# Patient Record
Sex: FEMALE | Race: WHITE | NOT HISPANIC OR LATINO | Employment: UNEMPLOYED | URBAN - METROPOLITAN AREA
[De-identification: names, ages, dates, MRNs, and addresses within clinical notes are randomized per-mention and may not be internally consistent; named-entity substitution may affect disease eponyms.]

---
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
## 2022-01-01 ENCOUNTER — EXTERNAL RECORD (OUTPATIENT)
Dept: OTHER | Age: 22
End: 2022-01-01

## 2022-01-03 ENCOUNTER — LAB SERVICES (OUTPATIENT)
Dept: LAB | Age: 22
End: 2022-01-03

## 2022-01-03 ENCOUNTER — OFFICE VISIT (OUTPATIENT)
Dept: FAMILY MEDICINE | Age: 22
End: 2022-01-03

## 2022-01-03 VITALS
HEIGHT: 68 IN | OXYGEN SATURATION: 99 % | BODY MASS INDEX: 21.95 KG/M2 | WEIGHT: 144.8 LBS | SYSTOLIC BLOOD PRESSURE: 98 MMHG | HEART RATE: 87 BPM | DIASTOLIC BLOOD PRESSURE: 60 MMHG

## 2022-01-03 DIAGNOSIS — Z00.00 ROUTINE GENERAL MEDICAL EXAMINATION AT HEALTH CARE FACILITY: Primary | ICD-10-CM

## 2022-01-03 DIAGNOSIS — Z32.00 POSSIBLE PREGNANCY: ICD-10-CM

## 2022-01-03 DIAGNOSIS — I34.1 MITRAL VALVE PROLAPSE: ICD-10-CM

## 2022-01-03 DIAGNOSIS — Z00.00 ROUTINE GENERAL MEDICAL EXAMINATION AT A HEALTH CARE FACILITY: ICD-10-CM

## 2022-01-03 DIAGNOSIS — Z32.00 POSSIBLE PREGNANCY: Primary | ICD-10-CM

## 2022-01-03 PROCEDURE — 99395 PREV VISIT EST AGE 18-39: CPT | Performed by: PHYSICIAN ASSISTANT

## 2022-01-03 PROCEDURE — 84703 CHORIONIC GONADOTROPIN ASSAY: CPT | Performed by: INTERNAL MEDICINE

## 2022-01-03 PROCEDURE — 36415 COLL VENOUS BLD VENIPUNCTURE: CPT | Performed by: INTERNAL MEDICINE

## 2022-01-03 ASSESSMENT — PATIENT HEALTH QUESTIONNAIRE - PHQ9
CLINICAL INTERPRETATION OF PHQ2 SCORE: NO FURTHER SCREENING NEEDED
SUM OF ALL RESPONSES TO PHQ9 QUESTIONS 1 AND 2: 0
1. LITTLE INTEREST OR PLEASURE IN DOING THINGS: NOT AT ALL
2. FEELING DOWN, DEPRESSED OR HOPELESS: NOT AT ALL
SUM OF ALL RESPONSES TO PHQ9 QUESTIONS 1 AND 2: 0

## 2022-01-04 ENCOUNTER — TELEPHONE (OUTPATIENT)
Dept: FAMILY MEDICINE | Age: 22
End: 2022-01-04

## 2022-01-04 LAB — HCG SERPL QL: NEGATIVE

## 2022-01-07 ENCOUNTER — HOSPITAL ENCOUNTER (OUTPATIENT)
Dept: MAMMOGRAPHY | Age: 22
End: 2022-01-07
Attending: ADVANCED PRACTICE MIDWIFE

## 2022-01-11 DIAGNOSIS — N92.0 MENORRHAGIA WITH REGULAR CYCLE: Primary | ICD-10-CM

## 2022-01-13 ENCOUNTER — APPOINTMENT (OUTPATIENT)
Dept: LAB | Facility: HOSPITAL | Age: 22
End: 2022-01-13
Attending: INTERNAL MEDICINE
Payer: COMMERCIAL

## 2022-01-13 DIAGNOSIS — N92.0 MENORRHAGIA WITH REGULAR CYCLE: ICD-10-CM

## 2022-01-13 LAB
BASOPHILS # BLD AUTO: 0.03 THOUSANDS/ΜL (ref 0–0.1)
BASOPHILS NFR BLD AUTO: 1 % (ref 0–1)
EOSINOPHIL # BLD AUTO: 0.16 THOUSAND/ΜL (ref 0–0.61)
EOSINOPHIL NFR BLD AUTO: 3 % (ref 0–6)
ERYTHROCYTE [DISTWIDTH] IN BLOOD BY AUTOMATED COUNT: 11.9 % (ref 11.6–15.1)
FERRITIN SERPL-MCNC: 44 NG/ML (ref 8–388)
HCT VFR BLD AUTO: 40.9 % (ref 34.8–46.1)
HGB BLD-MCNC: 13.8 G/DL (ref 11.5–15.4)
IMM GRANULOCYTES # BLD AUTO: 0.01 THOUSAND/UL (ref 0–0.2)
IMM GRANULOCYTES NFR BLD AUTO: 0 % (ref 0–2)
IRON SATN MFR SERPL: 19 % (ref 15–50)
IRON SERPL-MCNC: 66 UG/DL (ref 50–170)
LYMPHOCYTES # BLD AUTO: 1.49 THOUSANDS/ΜL (ref 0.6–4.47)
LYMPHOCYTES NFR BLD AUTO: 26 % (ref 14–44)
MCH RBC QN AUTO: 31 PG (ref 26.8–34.3)
MCHC RBC AUTO-ENTMCNC: 33.7 G/DL (ref 31.4–37.4)
MCV RBC AUTO: 92 FL (ref 82–98)
MONOCYTES # BLD AUTO: 0.66 THOUSAND/ΜL (ref 0.17–1.22)
MONOCYTES NFR BLD AUTO: 11 % (ref 4–12)
NEUTROPHILS # BLD AUTO: 3.47 THOUSANDS/ΜL (ref 1.85–7.62)
NEUTS SEG NFR BLD AUTO: 59 % (ref 43–75)
NRBC BLD AUTO-RTO: 0 /100 WBCS
PLATELET # BLD AUTO: 221 THOUSANDS/UL (ref 149–390)
PMV BLD AUTO: 9.6 FL (ref 8.9–12.7)
RBC # BLD AUTO: 4.45 MILLION/UL (ref 3.81–5.12)
TIBC SERPL-MCNC: 344 UG/DL (ref 250–450)
WBC # BLD AUTO: 5.82 THOUSAND/UL (ref 4.31–10.16)

## 2022-01-13 PROCEDURE — 85025 COMPLETE CBC W/AUTO DIFF WBC: CPT

## 2022-01-13 PROCEDURE — 83550 IRON BINDING TEST: CPT

## 2022-01-13 PROCEDURE — 82728 ASSAY OF FERRITIN: CPT

## 2022-01-13 PROCEDURE — 83540 ASSAY OF IRON: CPT

## 2022-01-13 PROCEDURE — 36415 COLL VENOUS BLD VENIPUNCTURE: CPT

## 2022-01-21 ENCOUNTER — OFFICE VISIT (OUTPATIENT)
Dept: FAMILY MEDICINE CLINIC | Facility: CLINIC | Age: 22
End: 2022-01-21
Payer: COMMERCIAL

## 2022-01-21 VITALS
HEART RATE: 88 BPM | TEMPERATURE: 98.2 F | OXYGEN SATURATION: 99 % | BODY MASS INDEX: 21.22 KG/M2 | RESPIRATION RATE: 14 BRPM | SYSTOLIC BLOOD PRESSURE: 120 MMHG | WEIGHT: 140 LBS | DIASTOLIC BLOOD PRESSURE: 80 MMHG | HEIGHT: 68 IN

## 2022-01-21 DIAGNOSIS — F33.1 MODERATE EPISODE OF RECURRENT MAJOR DEPRESSIVE DISORDER (HCC): ICD-10-CM

## 2022-01-21 DIAGNOSIS — M54.6 CHRONIC MIDLINE THORACIC BACK PAIN: Primary | ICD-10-CM

## 2022-01-21 DIAGNOSIS — F41.1 GAD (GENERALIZED ANXIETY DISORDER): ICD-10-CM

## 2022-01-21 DIAGNOSIS — N92.0 MENORRHAGIA WITH REGULAR CYCLE: ICD-10-CM

## 2022-01-21 DIAGNOSIS — G95.0 SYRINGOMYELIA (HCC): ICD-10-CM

## 2022-01-21 DIAGNOSIS — G89.29 CHRONIC MIDLINE THORACIC BACK PAIN: Primary | ICD-10-CM

## 2022-01-21 PROBLEM — F33.9 DEPRESSION, RECURRENT (HCC): Status: ACTIVE | Noted: 2022-01-21

## 2022-01-21 PROCEDURE — 99214 OFFICE O/P EST MOD 30 MIN: CPT | Performed by: FAMILY MEDICINE

## 2022-01-21 PROCEDURE — 3725F SCREEN DEPRESSION PERFORMED: CPT | Performed by: FAMILY MEDICINE

## 2022-01-21 PROCEDURE — 1036F TOBACCO NON-USER: CPT | Performed by: FAMILY MEDICINE

## 2022-01-21 PROCEDURE — 3008F BODY MASS INDEX DOCD: CPT | Performed by: FAMILY MEDICINE

## 2022-01-21 NOTE — PROGRESS NOTES
FAMILY PRACTICE OFFICE VISIT       NAME: Martha Hollis  AGE: 25 y o  SEX: female       : 2000        MRN: [id number]        Assessment and Plan     1  Chronic midline thoracic back pain  Assessment & Plan:  Proceed with x-ray  Start core strengthening exercises  Orders:  -     XR spine thoracic 3 vw; Future; Expected date: 2022    2  Syringomyelia ([address] )  Assessment & Plan:  Diagnosed a little child during workup at [facility] for chronic constipation  No routine follow-up was advised by specialist at that time  Reassess lumbar spine x-ray  No low back pain at present time      Orders:  -     XR spine lumbar minimum 4 views non injury; Future; Expected date: 2022    3  Menorrhagia with regular cycle  Assessment & Plan:  IUD was removed few months ago as per patient's request   She was concerned that IUD was contributing to depression symptoms  Menses are regular and heavy, she flows 5 to 7 days  Normal CBC and iron panel on   Patient is under care of gyn      4  MARCOS (generalized anxiety disorder)  Assessment & Plan:  Worsening of anxiety symptoms of fluoxetine  Patient remains in counseling on a weekly basis, helpful  She is not ready to restart medication but will contact me if symptoms worsen or persist      5  Moderate episode of recurrent major depressive disorder [facility])  Assessment & Plan:  Patient self discontinued fluoxetine a months ago  She is hesitant to restart medication at present time, overall she has been handling well  She remains on weekly counseling  Patient/mother will contact me if symptoms worsen or persist             Patient Instructions     Core Strengthening Exercises   AMBULATORY CARE:   What you need to know about core strengthening exercises: Your core includes the muscles of your lower back, hip, pelvis, and abdomen  Core strengthening exercises help heal and strengthen these muscles   This helps prevent another injury, and keeps your pelvis, spine, and hips in the correct position  Contact your healthcare provider if:   · You have sharp or worsening pain during exercise or at rest     · You have questions or concerns about your shoulder exercises  Safety tips:  Talk to your healthcare provider before you start an exercise program  A physical therapist can teach you how to do core strengthening exercises safely  · Do the exercises on a mat or firm surface  A firm surface will support your spine and prevent low back pain  Do not do these exercises on a bed  · Move slowly and smoothly  Avoid fast or jerky motions  · Stop if you feel pain  Core exercises should not be painful  Stop if you feel pain  · Breathe normally during core exercises  Do not hold your breath  This may cause an increase in blood pressure and prevent muscle strengthening  Your healthcare provider will tell you when to inhale and exhale during the exercise  · Begin all of your exercises with abdominal bracing  Abdominal bracing helps warm up your core muscles  You can also practice abdominal bracing throughout the day  Lie on your back with your knees bent and feet flat on the floor  Place your arms in a relaxed position beside your body  Tighten your abdominal muscles  Pull your belly button in and up toward your spine  Hold for 5 seconds  Relax your muscles  Repeat 10 times  Core strengthening exercises: Your healthcare provider will tell you how often to do these exercises  The provider will also tell you how many repetitions of each exercise you should do  Hold each exercise for 5 seconds or as directed  As you get stronger, increase your hold to 10 to 15 seconds  You can do some of these exercises on a stability ball, or with a weight  Ask your healthcare provider how to use a stability ball or weight for these exercises:  · Bridging:  Lie on your back with your knees bent and feet flat on the floor   Rest your arms at your side  Tighten your buttocks, and then lift your hips 1 inch off the floor  Hold for 5 seconds  When you can do this exercise without pain for 10 seconds, increase the distance you lift your hips  A good goal is to be able to lift your hips so that your shoulders, hips, and knees are in a straight line  · Dead bug:  Lie on your back with your knees bent and feet flat on the floor  Place your arms in a relaxed position beside your body  Begin with abdominal bracing  Next, raise one leg, keeping your knee bent  Hold for 5 seconds  Repeat with the other leg  When you can do this exercise without pain for 10 to 15 seconds, you may raise one straight leg and hold  Repeat with the other leg  · Quadruped:  Place your hands and knees on the floor  Keep your wrists directly below your shoulders and your knees directly below your hips  Pull your belly button in toward your spine  Do not flatten or arch your back  Tighten your abdominal muscles below your belly button  Hold for 5 seconds  When you can do this exercise without pain for 10 to 15 seconds, you may extend one arm and hold  Repeat on the other side  · Side bridge exercises:      ? Standing side bridge:  Stand next to a wall and extend one arm toward the wall  Place your palm flat on the wall with your fingers pointing upward  Begin with abdominal bracing  Next, without moving your feet, slowly bend your arm to 90 degrees  Hold for 5 seconds  Repeat on the other side  When you can do this exercise without pain for 10 to 15 seconds, you may do the bent leg side bridge on the floor  ? Bent leg side bridge:  Lie on one side with your legs, hips, and shoulders in a straight line  Prop yourself up onto your forearm so your elbow is directly below your shoulder  Bend your knees back to 90 degrees  Begin with abdominal bracing  Next, lift your hips and balance yourself on your forearm and knees  Hold for 5 seconds  Repeat on the other side  When you can do this exercise without pain for 10 to 15 seconds, you may do the straight leg side bridge on the floor  ? Straight leg side bridge:  Lie on one side with your legs, hips, and shoulders in a straight line  Prop yourself up onto your forearm so your elbow is directly below your shoulder  Begin with abdominal bracing  Lift your hips off the floor and balance yourself on your forearm and the outside of your flexed foot  Do not let your ankle bend sideways  Hold for 5 seconds  Repeat on the other side  When you can do this exercise without pain for 10 to 15 seconds, ask your healthcare provider for more advanced exercises  · Superman:  Lie on your stomach  Extend your arms forward on the floor  Tighten your abdominal muscles and lift your right hand and left leg off the floor  Hold this position  Slowly return to the starting position  Tighten your abdominal muscles and lift your left hand and right leg off the floor  Hold this position  Slowly return to the starting position  · Clam:  Lie on your side with your knees bent  Put your bottom arm under your head to keep your neck in line  Put your top hand on your hip to keep your pelvis from moving  Put your heels together, and keep them together during this exercise  Slowly raise your top knee toward the ceiling  Then lower your leg so your knees are together  Repeat this exercise 10 times  Then switch sides and do the exercise 10 times with the other leg  · Curl up:  Lie on your back with your knees bent and feet flat on the floor  Place your hands, palms down, underneath your lower back  Next, with your elbows on the floor, lift your shoulders and chest 2 to 3 inches off the floor  Keep your head in line with your shoulders  Hold this position  Slowly return to the starting position  · Straight leg raises:  Lie on your back with one leg straight  Bend the other knee and place your foot flat on the floor   Tighten your abdominal muscles  Keep your leg straight and slowly lift it straight up 6 to 12 inches off the floor  Hold this position  Lower your leg slowly  Do as many repetitions as directed on this side  Repeat with the other leg  · Plank:  Lie on your stomach  Bend your elbows and place your forearms flat on the floor  Lift your chest, stomach, and knees off the floor  Make sure your elbows are below your shoulders  Your body should be in a straight line  Do not let your hips or lower back sink to the ground  Squeeze your abdominal muscles together and hold for 15 seconds  To make this exercise harder, hold for 30 seconds or lift 1 leg at a time  · Bicycles:  Lie on your back  Bend both knees and bring them toward your chest  Your calves should be parallel to the floor  Place the palms of your hands on the back of your head  Straighten your right leg and keep it lifted 2 inches off the floor  Raise your head and shoulders off the floor and twist towards your left  Keep your head and shoulders lifted  Bend your right knee while you straighten your left leg  Keep your left leg 2 inches off the floor  Twist your head and chest towards the left leg  Continue to straighten 1 leg at a time and twist        Follow up with your doctor as directed:  Write down your questions so you remember to ask them during your visits  © Copyright [employer] 2021 Information is for End User's use only and may not be sold, redistributed or otherwise used for commercial purposes  All illustrations and images included in CareNotes® are the copyrighted property of A D A M , Inc  or [facility] Angeline Santamaria   The above information is an  only  It is not intended as medical advice for individual conditions or treatments  Talk to your doctor, nurse or pharmacist before following any medical regimen to see if it is safe and effective for you  No follow-ups on file      Discussed with the patient and all questioned fully answered  She will call me if any problems arise  M*Modal software was used to dictate this note  It may contain errors with dictating incorrect words/spelling  Please contact provider directly with any questions  Chief Complaint     Chief Complaint   Patient presents with    Back Pain     Mid area for a few months        History of Present Illness     Mid back pain x few months, no preceding injury or fall  No radiation of discomfort  Pain is worse afterprolonged sitting or standing,  feels better with positional changes or stretching  No neck pain, no lower back pain, no upper or lower extremity numbness, tingling, paresthesias or weakness  No SOB, no chest pain, no palpitations    Patient is here today accompanied by her mother  Reportedly patient has been reviewing her "my chart" and has notice diagnosis of syringomyelia  She has started research online and became very anxious about current symptoms of midback pain which has prompted today's visit  Patient was diagnosed with syringomyelia as a little child during evaluation of constipation at [facility]   At that time there was no concern and no routine follow-up was advised by specialists  Patient has discontinued all medications  IUD was removed 2 months ago  Patient was concerned that IUD is contributing to symptoms of depression  Menses are rather heavy, she flows 5 to 7 days and changes patch up to every hour during her 1st few days of period  Recent CBC and iron panel performed on January 13, 2022 were normal        Patient also weaned of Prozac  She felt that she wanted to reassess her symptoms without medication  She has been off fluoxetine for a months  She admits to intermittent symptoms of anxiety and depression  She is in weekly counseling which has been helpful  She has recently traveled to [place] and stayed with her friend  She is back home now    She is not sure of whether not she would like to go back on medication, patient would like to continue close observation and will contact me if symptoms of depression/anxiety worsen at any time  Back Pain        Review of Systems   Review of Systems   Constitutional: Negative  HENT: Negative  Respiratory: Negative  Cardiovascular: Negative  Gastrointestinal: Negative  Genitourinary: Positive for menstrual problem (Heavy and regular menses)  Musculoskeletal: Positive for back pain  Neurological: Negative  Hematological: Negative  Psychiatric/Behavioral: The patient is nervous/anxious          Active Problem List     Patient Active Problem List   Diagnosis    Cardiac murmur    Syringomyelia (HCC)    Allergic rhinitis    Encntr for gyn exam (general) (routine) w/o abn findings    Selective deficiency of immunoglobulin a (iga) ([facility] [address] )    Elevated bilirubin    Menorrhagia with regular cycle    Encounter for insertion of mirena IUD    MARCOS (generalized anxiety disorder)    Moderate episode of recurrent major depressive disorder (HCC)    Mitral valve prolapse    Hyperbilirubinemia    Pectus excavatum    Plantar warts    Chronic seasonal allergic rhinitis due to pollen    Personal history of COVID-19    Constipation    Epigastric pain    Depression, recurrent (HCC)    Chronic midline thoracic back pain       Past Medical History:  Past Medical History:   Diagnosis Date    Anemia     resolved     Anxiety     Constipation     Depression     Heart murmur     Heavy menstrual bleeding     resolved     Hives     Iron deficiency anemia secondary to inadequate dietary iron intake 10/15/2019    Migraine     resolved     Wears glasses        Past Surgical History:  Past Surgical History:   Procedure Laterality Date    TONSILECTOMY AND ADNOIDECTOMY      WISDOM TOOTH EXTRACTION         Family History:  Family History   Problem Relation Age of Onset    Breast cancer Mother 29    No Known Problems Father        Social History:  Social History     Socioeconomic History    Marital status: Single     Spouse name: Not on file    Number of children: 0    Years of education: Not on file    Highest education level: Not on file   Occupational History    Occupation: student   Tobacco Use    Smoking status: Never Smoker    Smokeless tobacco: Never Used   Vaping Use    Vaping Use: Never used   Substance and Sexual Activity    Alcohol use: No    Drug use: No    Sexual activity: Yes     Partners: Male     Birth control/protection: I U D       Comment: 6yrs   Other Topics Concern    Not on file   Social History Narrative    Daily caffeine consumption    Does not exercise        Who lives in your home: Mom     What type of home do you live in: Other condo     Age of your home: Built 14 yrs ago     How long have you been living there: 2 yrs     Type of heat: Forced hot air    Type of fuel: Electric    What type of naomi is in your bedroom: Carpet    Do you have the following in or near your home:    Air products: Central air and Humidifier    Pests: None    Pets: Dog and Rabbit    Are pets allowed in bedroom: Yes    Open fields, wooded areas nearby: Open fields and Wooded areas    Basement: None    Exposure to second hand smoke: Yes boyfriends house         Habits:    Caffeine: coffee 1 cup daily -    Chocolate: occasionally     Other:     Social Determinants of Health     Financial Resource Strain: Not on file   Food Insecurity: Not on file   Transportation Needs: Not on file   Physical Activity: Insufficiently Active    Days of Exercise per Week: 1 day    Minutes of Exercise per Session: 60 min   Stress: Not on file   Social Connections: Not on file   Intimate Partner Violence: Not on file   Housing Stability: Not on file         Objective     Vitals:    01/21/22 1010   BP: 120/80   BP Location: Right arm   Patient Position: Sitting   Cuff Size: Standard   Pulse: 88   Resp: 14   Temp: 98 2 °F (36 8 °C)   TempSrc: Temporal SpO2: 99%   Weight: 63 5 kg (140 lb)   Height: 5' 8" (1 727 m)       Wt Readings from Last 3 Encounters:   01/21/22 63 5 kg (140 lb) (71 %, Z= 0 56)*   08/31/21 59 8 kg (131 lb 12 8 oz) (61 %, Z= 0 28)*   08/30/21 59 7 kg (131 lb 9 6 oz) (61 %, Z= 0 27)*     * Growth percentiles are based on CDC (Girls, 2-20 Years) data  Physical Exam  Vitals and nursing note reviewed  Constitutional:       Appearance: Normal appearance  She is well-developed  HENT:      Head: Normocephalic and atraumatic  Eyes:      Conjunctiva/sclera: Conjunctivae normal    Neck:      Thyroid: No thyromegaly  Vascular: No carotid bruit  Cardiovascular:      Rate and Rhythm: Normal rate and regular rhythm  Heart sounds: Normal heart sounds  No murmur heard  Pulmonary:      Effort: Pulmonary effort is normal  No respiratory distress  Breath sounds: Normal breath sounds  No wheezing  Abdominal:      General: Bowel sounds are normal  There is no abdominal bruit  Musculoskeletal:         General: Normal range of motion  Cervical back: Neck supple  Comments: Mild tenderness with palpation of mid back/thoracic spine  No paraspinal spasm or tenderness  Neurological:      General: No focal deficit present  Mental Status: She is alert and oriented to person, place, and time  Cranial Nerves: No cranial nerve deficit  Coordination: Coordination normal    Psychiatric:         Mood and Affect: Mood normal          Behavior: Behavior normal          Thought Content:  Thought content normal           Pertinent Laboratory/Diagnostic Studies:    Lab Results   Component Value Date    WBC 5 82 01/13/2022    HGB 13 8 01/13/2022    HCT 40 9 01/13/2022    MCV 92 01/13/2022     01/13/2022       No results found for: TSH    No results found for: CHOL  No results found for: TRIG  No results found for: HDL  No results found for: LDLCALC  No results found for: HGBA1C  Lab Results   Component Value Date SODIUM 139 11/03/2020    K 3 5 11/03/2020     11/03/2020    CO2 29 11/03/2020    AGAP 5 11/03/2020    BUN 21 11/03/2020    CREATININE 0 68 11/03/2020    GLUF 91 11/03/2020    CALCIUM 9 5 11/03/2020    AST 12 11/03/2020    ALT 21 11/03/2020    ALKPHOS 92 11/03/2020    PROT 6 6 06/11/2016    TP 7 6 11/03/2020    BILITOT 2 0 (H) 06/11/2016    TBILI 1 30 (H) 11/03/2020       Orders Placed This Encounter   Procedures    XR spine lumbar minimum 4 views non injury    XR spine thoracic 3 vw       ALLERGIES:  Allergies   Allergen Reactions    Pollen Extract        Current Medications     Current Outpatient Medications   Medication Sig Dispense Refill    cetirizine (ZyrTEC) 5 MG tablet Take 5 mg by mouth daily      lidocaine (XYLOCAINE) 2 % topical gel Apply topically as needed for mild pain (Patient not taking: Reported on 8/30/2021) 30 mL 4     No current facility-administered medications for this visit         Medications Discontinued During This Encounter   Medication Reason    propranolol (INDERAL) 10 mg tablet     busPIRone (BUSPAR) 5 mg tablet     Clindamycin Phos-Benzoyl Perox gel     FLUoxetine (PROzac) 40 MG capsule     gabapentin (NEURONTIN) 100 mg capsule     levonorgestrel (MIRENA) 20 MCG/24HR IUD     pantoprazole (PROTONIX) 40 mg tablet     Multiple Vitamin (multivitamin) tablet     ibuprofen (MOTRIN) 100 mg/5 mL suspension     valACYclovir (VALTREX) 1,000 mg tablet     triamcinolone (KENALOG) 0 5 % cream        Health Maintenance     Health Maintenance   Topic Date Due    Hepatitis C Screening  Never done    Pneumococcal Vaccine: Pediatrics (0 to 5 Years) and At-Risk Patients (6 to 59 Years) (1 of 4 - PCV13) 11/27/2008    COVID-19 Vaccine (3 - Pfizer risk 4-dose series) 07/03/2021    Influenza Vaccine (1) 06/30/2022 (Originally 9/1/2021)    Annual Physical  08/30/2022    Chlamydia Screening  08/30/2022    BMI: Adult  01/21/2023    Depression Remission PHQ  01/21/2023    DTaP,Tdap,and Td Vaccines (7 - Td or Tdap) 08/15/2024    HIV Screening  Completed    HIB Vaccine  Completed    Hepatitis B Vaccine  Completed    IPV Vaccine  Completed    Meningococcal ACWY Vaccine  Completed    HPV Vaccine  Completed    Hepatitis A Vaccine  Aged Out       Immunization History   Administered Date(s) Administered    COVID-19 PFIZER VACCINE 0 3 ML IM 05/14/2021, 06/05/2021    DTaP 5 02/13/2003, 04/14/2003, 06/16/2003, 06/29/2004, 03/02/2007    HPV9 02/03/2017, 08/14/2017    Hep B, adult 2002, 01/13/2003, 09/16/2003    Hib (PRP-OMP) 02/13/2003, 04/14/2003, 06/16/2003, 03/25/2004    IPV 02/13/2003, 04/14/2003, 06/29/2004, 03/02/2007    MMR 03/25/2004, 04/05/2007    Meningococcal MCV4P 08/15/2014, 08/16/2019    Meningococcal, Unknown Serogroups 08/15/2014    Pneumococcal Polysaccharide PPV23 02/13/2003    Tdap 08/15/2014    Tuberculin Skin Test-PPD Intradermal 06/25/2021    Varicella 12/30/2003, 04/05/2007       Adolfo Denton MD

## 2022-01-21 NOTE — PATIENT INSTRUCTIONS
Core Strengthening Exercises   AMBULATORY CARE:   What you need to know about core strengthening exercises: Your core includes the muscles of your lower back, hip, pelvis, and abdomen  Core strengthening exercises help heal and strengthen these muscles  This helps prevent another injury, and keeps your pelvis, spine, and hips in the correct position  Contact your healthcare provider if:   · You have sharp or worsening pain during exercise or at rest     · You have questions or concerns about your shoulder exercises  Safety tips:  Talk to your healthcare provider before you start an exercise program  A physical therapist can teach you how to do core strengthening exercises safely  · Do the exercises on a mat or firm surface  A firm surface will support your spine and prevent low back pain  Do not do these exercises on a bed  · Move slowly and smoothly  Avoid fast or jerky motions  · Stop if you feel pain  Core exercises should not be painful  Stop if you feel pain  · Breathe normally during core exercises  Do not hold your breath  This may cause an increase in blood pressure and prevent muscle strengthening  Your healthcare provider will tell you when to inhale and exhale during the exercise  · Begin all of your exercises with abdominal bracing  Abdominal bracing helps warm up your core muscles  You can also practice abdominal bracing throughout the day  Lie on your back with your knees bent and feet flat on the floor  Place your arms in a relaxed position beside your body  Tighten your abdominal muscles  Pull your belly button in and up toward your spine  Hold for 5 seconds  Relax your muscles  Repeat 10 times  Core strengthening exercises: Your healthcare provider will tell you how often to do these exercises  The provider will also tell you how many repetitions of each exercise you should do  Hold each exercise for 5 seconds or as directed   As you get stronger, increase your hold to 10 to 15 seconds  You can do some of these exercises on a stability ball, or with a weight  Ask your healthcare provider how to use a stability ball or weight for these exercises:  · Bridging:  Lie on your back with your knees bent and feet flat on the floor  Rest your arms at your side  Tighten your buttocks, and then lift your hips 1 inch off the floor  Hold for 5 seconds  When you can do this exercise without pain for 10 seconds, increase the distance you lift your hips  A good goal is to be able to lift your hips so that your shoulders, hips, and knees are in a straight line  · Dead bug:  Lie on your back with your knees bent and feet flat on the floor  Place your arms in a relaxed position beside your body  Begin with abdominal bracing  Next, raise one leg, keeping your knee bent  Hold for 5 seconds  Repeat with the other leg  When you can do this exercise without pain for 10 to 15 seconds, you may raise one straight leg and hold  Repeat with the other leg  · Quadruped:  Place your hands and knees on the floor  Keep your wrists directly below your shoulders and your knees directly below your hips  Pull your belly button in toward your spine  Do not flatten or arch your back  Tighten your abdominal muscles below your belly button  Hold for 5 seconds  When you can do this exercise without pain for 10 to 15 seconds, you may extend one arm and hold  Repeat on the other side  · Side bridge exercises:      ? Standing side bridge:  Stand next to a wall and extend one arm toward the wall  Place your palm flat on the wall with your fingers pointing upward  Begin with abdominal bracing  Next, without moving your feet, slowly bend your arm to 90 degrees  Hold for 5 seconds  Repeat on the other side  When you can do this exercise without pain for 10 to 15 seconds, you may do the bent leg side bridge on the floor  ?  Bent leg side bridge:  Lie on one side with your legs, hips, and shoulders in a straight line  Prop yourself up onto your forearm so your elbow is directly below your shoulder  Bend your knees back to 90 degrees  Begin with abdominal bracing  Next, lift your hips and balance yourself on your forearm and knees  Hold for 5 seconds  Repeat on the other side  When you can do this exercise without pain for 10 to 15 seconds, you may do the straight leg side bridge on the floor  ? Straight leg side bridge:  Lie on one side with your legs, hips, and shoulders in a straight line  Prop yourself up onto your forearm so your elbow is directly below your shoulder  Begin with abdominal bracing  Lift your hips off the floor and balance yourself on your forearm and the outside of your flexed foot  Do not let your ankle bend sideways  Hold for 5 seconds  Repeat on the other side  When you can do this exercise without pain for 10 to 15 seconds, ask your healthcare provider for more advanced exercises  · Superman:  Lie on your stomach  Extend your arms forward on the floor  Tighten your abdominal muscles and lift your right hand and left leg off the floor  Hold this position  Slowly return to the starting position  Tighten your abdominal muscles and lift your left hand and right leg off the floor  Hold this position  Slowly return to the starting position  · Clam:  Lie on your side with your knees bent  Put your bottom arm under your head to keep your neck in line  Put your top hand on your hip to keep your pelvis from moving  Put your heels together, and keep them together during this exercise  Slowly raise your top knee toward the ceiling  Then lower your leg so your knees are together  Repeat this exercise 10 times  Then switch sides and do the exercise 10 times with the other leg  · Curl up:  Lie on your back with your knees bent and feet flat on the floor  Place your hands, palms down, underneath your lower back   Next, with your elbows on the floor, lift your shoulders and chest 2 to 3 inches off the floor  Keep your head in line with your shoulders  Hold this position  Slowly return to the starting position  · Straight leg raises:  Lie on your back with one leg straight  Bend the other knee and place your foot flat on the floor  Tighten your abdominal muscles  Keep your leg straight and slowly lift it straight up 6 to 12 inches off the floor  Hold this position  Lower your leg slowly  Do as many repetitions as directed on this side  Repeat with the other leg  · Plank:  Lie on your stomach  Bend your elbows and place your forearms flat on the floor  Lift your chest, stomach, and knees off the floor  Make sure your elbows are below your shoulders  Your body should be in a straight line  Do not let your hips or lower back sink to the ground  Squeeze your abdominal muscles together and hold for 15 seconds  To make this exercise harder, hold for 30 seconds or lift 1 leg at a time  · Bicycles:  Lie on your back  Bend both knees and bring them toward your chest  Your calves should be parallel to the floor  Place the palms of your hands on the back of your head  Straighten your right leg and keep it lifted 2 inches off the floor  Raise your head and shoulders off the floor and twist towards your left  Keep your head and shoulders lifted  Bend your right knee while you straighten your left leg  Keep your left leg 2 inches off the floor  Twist your head and chest towards the left leg  Continue to straighten 1 leg at a time and twist        Follow up with your doctor as directed:  Write down your questions so you remember to ask them during your visits  © Copyright [employer] 2021 Information is for End User's use only and may not be sold, redistributed or otherwise used for commercial purposes  All illustrations and images included in CareNotes® are the copyrighted property of [employer]  or [facility] [employer]   The above information is an  only  It is not intended as medical advice for individual conditions or treatments  Talk to your doctor, nurse or pharmacist before following any medical regimen to see if it is safe and effective for you

## 2022-01-23 PROBLEM — G89.29 CHRONIC MIDLINE THORACIC BACK PAIN: Status: ACTIVE | Noted: 2022-01-23

## 2022-01-23 PROBLEM — Z30.431 IUD CHECK UP: Status: RESOLVED | Noted: 2020-02-21 | Resolved: 2022-01-23

## 2022-01-23 PROBLEM — M54.6 CHRONIC MIDLINE THORACIC BACK PAIN: Status: ACTIVE | Noted: 2022-01-23

## 2022-01-23 NOTE — ASSESSMENT & PLAN NOTE
IUD was removed few months ago as per patient's request   She was concerned that IUD was contributing to depression symptoms  Menses are regular and heavy, she flows 5 to 7 days  Normal CBC and iron panel on January 13th    Patient is under care of gyn

## 2022-01-23 NOTE — ASSESSMENT & PLAN NOTE
Worsening of anxiety symptoms of fluoxetine  Patient remains in counseling on a weekly basis, helpful     She is not ready to restart medication but will contact me if symptoms worsen or persist

## 2022-01-23 NOTE — ASSESSMENT & PLAN NOTE
Patient self discontinued fluoxetine a months ago  She is hesitant to restart medication at present time, overall she has been handling well  She remains on weekly counseling    Patient/mother will contact me if symptoms worsen or persist

## 2022-01-23 NOTE — ASSESSMENT & PLAN NOTE
Diagnosed a little child during workup at [facility] for chronic constipation  No routine follow-up was advised by specialist at that time  Reassess lumbar spine x-ray    No low back pain at present time

## 2022-01-25 ENCOUNTER — TELEPHONE (OUTPATIENT)
Dept: OBGYN CLINIC | Facility: CLINIC | Age: 22
End: 2022-01-25

## 2022-01-25 DIAGNOSIS — Z32.00 POSSIBLE PREGNANCY, NOT CONFIRMED: Primary | ICD-10-CM

## 2022-01-25 NOTE — TELEPHONE ENCOUNTER
----- Message from Rod Teresa PA-C sent at 1/25/2022 10:05 AM EST -----  Regarding: Rosy President ordered  I saw that Jorge Hernandes is on my schedule for 2/7/22  The note says she feels she may be pregnant  Will you please call the her  to let her know I ordered a quant to definitively determine if pregnant  If so would need to be moved to someone else's schedule for early US       Thanks,  Latoya Barron

## 2022-01-27 ENCOUNTER — TELEPHONE (OUTPATIENT)
Dept: FAMILY MEDICINE CLINIC | Facility: CLINIC | Age: 22
End: 2022-01-27

## 2022-01-27 NOTE — TELEPHONE ENCOUNTER
FYI: Patient's Mother stopped in the office this morning for advice on what to do regarding concerns she has for patient's mental health state  Per Mom, patient has not been compliant with taking her medication, and patient's friends have also reached out to Mom to express their concerns for patient  Mom did not go into detail about the specific concerns, however we advised her to take patient to [facility] ED for evaluation and possible admission to [facility]  Mom was agreeable to this

## 2022-01-27 NOTE — TELEPHONE ENCOUNTER
Please follow-up with patient's mother or patient  I do not see any emergency room visit today  If symptoms of depression on a recurrent-she should restart Prozac (fluoxetine  Okay to schedule follow-up appointment    Thank you

## 2022-01-28 NOTE — TELEPHONE ENCOUNTER
Spoke with mom and she stated that patient refused to go to the Er and will tel her to restart the prozac and will call if appointment needed

## 2022-01-31 ENCOUNTER — APPOINTMENT (OUTPATIENT)
Dept: NEUROLOGY | Age: 22
End: 2022-01-31

## 2022-02-09 ENCOUNTER — HOSPITAL ENCOUNTER (EMERGENCY)
Facility: HOSPITAL | Age: 22
Discharge: HOME | End: 2022-02-09
Attending: EMERGENCY MEDICINE
Payer: COMMERCIAL

## 2022-02-09 VITALS
DIASTOLIC BLOOD PRESSURE: 81 MMHG | WEIGHT: 141.98 LBS | OXYGEN SATURATION: 98 % | SYSTOLIC BLOOD PRESSURE: 137 MMHG | HEIGHT: 68 IN | BODY MASS INDEX: 21.52 KG/M2 | RESPIRATION RATE: 16 BRPM | TEMPERATURE: 97.5 F | HEART RATE: 97 BPM

## 2022-02-09 DIAGNOSIS — R10.9 ABDOMINAL PAIN, UNSPECIFIED ABDOMINAL LOCATION: Primary | ICD-10-CM

## 2022-02-09 LAB
ALBUMIN SERPL BCP-MCNC: 4.5 G/DL (ref 3.5–5)
ALP SERPL-CCNC: 81 U/L (ref 46–384)
ALT SERPL W P-5'-P-CCNC: 19 U/L (ref 12–78)
ANION GAP SERPL CALCULATED.3IONS-SCNC: 10 MMOL/L (ref 4–13)
AST SERPL W P-5'-P-CCNC: 17 U/L (ref 5–45)
BACTERIA UR QL AUTO: NORMAL /HPF
BASOPHILS # BLD AUTO: 0.05 THOUSANDS/ΜL (ref 0–0.1)
BASOPHILS NFR BLD AUTO: 1 % (ref 0–1)
BILIRUB SERPL-MCNC: 1.6 MG/DL (ref 0.2–1)
BILIRUB UR QL STRIP: ABNORMAL
BUN SERPL-MCNC: 14 MG/DL (ref 5–25)
CALCIUM SERPL-MCNC: 9.4 MG/DL (ref 8.3–10.1)
CHLORIDE SERPL-SCNC: 101 MMOL/L (ref 100–108)
CLARITY UR: CLEAR
CO2 SERPL-SCNC: 28 MMOL/L (ref 21–32)
COLOR UR: YELLOW
CREAT SERPL-MCNC: 0.75 MG/DL (ref 0.6–1.3)
EOSINOPHIL # BLD AUTO: 0.17 THOUSAND/ΜL (ref 0–0.61)
EOSINOPHIL NFR BLD AUTO: 2 % (ref 0–6)
ERYTHROCYTE [DISTWIDTH] IN BLOOD BY AUTOMATED COUNT: 11.9 % (ref 11.6–15.1)
EXT PREG TEST URINE: NEGATIVE
EXT. CONTROL ED NAV: NORMAL
GFR SERPL CREATININE-BSD FRML MDRD: 115 ML/MIN/1.73SQ M
GLUCOSE SERPL-MCNC: 98 MG/DL (ref 65–140)
GLUCOSE UR STRIP-MCNC: NEGATIVE MG/DL
HCT VFR BLD AUTO: 44.9 % (ref 34.8–46.1)
HGB BLD-MCNC: 15.4 G/DL (ref 11.5–15.4)
HGB UR QL STRIP.AUTO: ABNORMAL
IMM GRANULOCYTES # BLD AUTO: 0.01 THOUSAND/UL (ref 0–0.2)
IMM GRANULOCYTES NFR BLD AUTO: 0 % (ref 0–2)
KETONES UR STRIP-MCNC: ABNORMAL MG/DL
LEUKOCYTE ESTERASE UR QL STRIP: NEGATIVE
LIPASE SERPL-CCNC: 58 U/L (ref 73–393)
LYMPHOCYTES # BLD AUTO: 1.93 THOUSANDS/ΜL (ref 0.6–4.47)
LYMPHOCYTES NFR BLD AUTO: 23 % (ref 14–44)
MCH RBC QN AUTO: 31.2 PG (ref 26.8–34.3)
MCHC RBC AUTO-ENTMCNC: 34.3 G/DL (ref 31.4–37.4)
MCV RBC AUTO: 91 FL (ref 82–98)
MONOCYTES # BLD AUTO: 0.87 THOUSAND/ΜL (ref 0.17–1.22)
MONOCYTES NFR BLD AUTO: 11 % (ref 4–12)
NEUTROPHILS # BLD AUTO: 5.21 THOUSANDS/ΜL (ref 1.85–7.62)
NEUTS SEG NFR BLD AUTO: 63 % (ref 43–75)
NITRITE UR QL STRIP: NEGATIVE
NON-SQ EPI CELLS URNS QL MICRO: NORMAL /HPF
NRBC BLD AUTO-RTO: 0 /100 WBCS
PH UR STRIP.AUTO: 5.5 [PH]
PLATELET # BLD AUTO: 220 THOUSANDS/UL (ref 149–390)
PMV BLD AUTO: 9.2 FL (ref 8.9–12.7)
POTASSIUM SERPL-SCNC: 3.5 MMOL/L (ref 3.5–5.3)
PROT SERPL-MCNC: 8.1 G/DL (ref 6.4–8.2)
PROT UR STRIP-MCNC: NEGATIVE MG/DL
RBC # BLD AUTO: 4.93 MILLION/UL (ref 3.81–5.12)
RBC #/AREA URNS AUTO: NORMAL /HPF
SODIUM SERPL-SCNC: 139 MMOL/L (ref 136–145)
SP GR UR STRIP.AUTO: >=1.03 (ref 1–1.03)
UROBILINOGEN UR QL STRIP.AUTO: 0.2 E.U./DL
WBC # BLD AUTO: 8.24 THOUSAND/UL (ref 4.31–10.16)
WBC #/AREA URNS AUTO: NORMAL /HPF

## 2022-02-09 PROCEDURE — 80053 COMPREHEN METABOLIC PANEL: CPT | Performed by: EMERGENCY MEDICINE

## 2022-02-09 PROCEDURE — 99284 EMERGENCY DEPT VISIT MOD MDM: CPT

## 2022-02-09 PROCEDURE — 36415 COLL VENOUS BLD VENIPUNCTURE: CPT | Performed by: EMERGENCY MEDICINE

## 2022-02-09 PROCEDURE — 85025 COMPLETE CBC W/AUTO DIFF WBC: CPT | Performed by: EMERGENCY MEDICINE

## 2022-02-09 PROCEDURE — 96375 TX/PRO/DX INJ NEW DRUG ADDON: CPT

## 2022-02-09 PROCEDURE — 81025 URINE PREGNANCY TEST: CPT | Performed by: EMERGENCY MEDICINE

## 2022-02-09 PROCEDURE — 81001 URINALYSIS AUTO W/SCOPE: CPT | Performed by: EMERGENCY MEDICINE

## 2022-02-09 PROCEDURE — 83690 ASSAY OF LIPASE: CPT | Performed by: EMERGENCY MEDICINE

## 2022-02-09 PROCEDURE — 96361 HYDRATE IV INFUSION ADD-ON: CPT

## 2022-02-09 PROCEDURE — 96374 THER/PROPH/DIAG INJ IV PUSH: CPT

## 2022-02-09 PROCEDURE — 99284 EMERGENCY DEPT VISIT MOD MDM: CPT | Performed by: EMERGENCY MEDICINE

## 2022-02-09 RX ORDER — KETOROLAC TROMETHAMINE 30 MG/ML
30 INJECTION, SOLUTION INTRAMUSCULAR; INTRAVENOUS ONCE
Status: COMPLETED | OUTPATIENT
Start: 2022-02-09 | End: 2022-02-09

## 2022-02-09 RX ORDER — ONDANSETRON 2 MG/ML
4 INJECTION INTRAMUSCULAR; INTRAVENOUS ONCE
Status: COMPLETED | OUTPATIENT
Start: 2022-02-09 | End: 2022-02-09

## 2022-02-09 RX ADMIN — ONDANSETRON 4 MG: 2 INJECTION INTRAMUSCULAR; INTRAVENOUS at 05:05

## 2022-02-09 RX ADMIN — SODIUM CHLORIDE 1000 ML: 0.9 INJECTION, SOLUTION INTRAVENOUS at 05:04

## 2022-02-09 RX ADMIN — KETOROLAC TROMETHAMINE 30 MG: 30 INJECTION, SOLUTION INTRAMUSCULAR at 05:11

## 2022-02-09 NOTE — ED PROVIDER NOTES
History  Chief Complaint   Patient presents with    Abdominal Pain     Patient reports abdominal pain x 3 days, worse after eating  Also reports nausea but was not able to vomit  Three days of abdominal pain, onset after eating mac and cheese and has been constant since that time  No fevers  Nausea without vomiting  No vaginal or urinary symptoms  No other complaints  History provided by:  Patient   used: No    Abdominal Pain  Pain location:  Generalized  Pain quality: aching and burning    Pain radiates to:  Does not radiate  Pain severity:  Moderate  Onset quality:  Gradual  Duration:  3 days  Timing:  Constant  Progression:  Unchanged  Chronicity:  New  Relieved by:  Nothing  Worsened by:  Nothing  Ineffective treatments:  None tried  Associated symptoms: nausea    Associated symptoms: no belching, no chest pain, no chills, no constipation, no cough, no diarrhea, no dysuria, no fever, no hematemesis, no hematochezia, no hematuria, no melena, no shortness of breath, no sore throat, no vaginal bleeding, no vaginal discharge and no vomiting        Prior to Admission Medications   Prescriptions Last Dose Informant Patient Reported? Taking?    FLUoxetine HCl (PROZAC PO) 2/8/2022 at Unknown time  Yes Yes   Sig: Take by mouth in the morning   cetirizine (ZyrTEC) 5 MG tablet Not Taking at Unknown time Self Yes No   Sig: Take 5 mg by mouth daily   Patient not taking: Reported on 2/9/2022    lidocaine (XYLOCAINE) 2 % topical gel Not Taking at Unknown time Self No No   Sig: Apply topically as needed for mild pain   Patient not taking: Reported on 8/30/2021      Facility-Administered Medications: None       Past Medical History:   Diagnosis Date    Anemia     resolved     Anxiety     Constipation     Depression     Heart murmur     Heavy menstrual bleeding     resolved     Hives     Iron deficiency anemia secondary to inadequate dietary iron intake 10/15/2019    Migraine resolved     Wears glasses        Past Surgical History:   Procedure Laterality Date    TONSILECTOMY AND ADNOIDECTOMY      WISDOM TOOTH EXTRACTION         Family History   Problem Relation Age of Onset    Breast cancer Mother 29    No Known Problems Father      I have reviewed and agree with the history as documented  E-Cigarette/Vaping    E-Cigarette Use Never User      E-Cigarette/Vaping Substances    Nicotine No     THC No     CBD No     Flavoring No     Other No     Unknown No      Social History     Tobacco Use    Smoking status: Never Smoker    Smokeless tobacco: Never Used   Vaping Use    Vaping Use: Never used   Substance Use Topics    Alcohol use: Yes     Comment: Social    Drug use: No       Review of Systems   Constitutional: Negative for chills and fever  HENT: Negative for ear pain, hearing loss, sore throat, trouble swallowing and voice change  Eyes: Negative for pain and discharge  Respiratory: Negative for cough, shortness of breath and wheezing  Cardiovascular: Negative for chest pain and palpitations  Gastrointestinal: Positive for abdominal pain and nausea  Negative for blood in stool, constipation, diarrhea, hematemesis, hematochezia, melena and vomiting  Genitourinary: Negative for dysuria, flank pain, frequency, hematuria, vaginal bleeding and vaginal discharge  Musculoskeletal: Negative for joint swelling, neck pain and neck stiffness  Skin: Negative for rash and wound  Neurological: Negative for dizziness, seizures, syncope, facial asymmetry and headaches  Psychiatric/Behavioral: Negative for hallucinations, self-injury and suicidal ideas  All other systems reviewed and are negative  Physical Exam  Physical Exam  Vitals and nursing note reviewed  Constitutional:       General: She is not in acute distress  Appearance: She is well-developed  HENT:      Head: Normocephalic and atraumatic        Right Ear: External ear normal       Left Ear: External ear normal    Eyes:      General: No scleral icterus  Right eye: No discharge  Left eye: No discharge  Extraocular Movements: Extraocular movements intact  Conjunctiva/sclera: Conjunctivae normal    Cardiovascular:      Rate and Rhythm: Normal rate and regular rhythm  Heart sounds: Normal heart sounds  No murmur heard  Pulmonary:      Effort: Pulmonary effort is normal       Breath sounds: Normal breath sounds  No wheezing or rales  Abdominal:      General: Bowel sounds are normal  There is no distension  Palpations: Abdomen is soft  Tenderness: There is no abdominal tenderness  There is no guarding or rebound  Musculoskeletal:         General: No deformity  Normal range of motion  Cervical back: Normal range of motion and neck supple  Skin:     General: Skin is warm and dry  Findings: No rash  Neurological:      General: No focal deficit present  Mental Status: She is alert and oriented to person, place, and time  Cranial Nerves: No cranial nerve deficit  Psychiatric:         Mood and Affect: Mood normal          Behavior: Behavior normal          Thought Content:  Thought content normal          Judgment: Judgment normal          Vital Signs  ED Triage Vitals   Temperature Pulse Respirations Blood Pressure SpO2   02/09/22 0454 02/09/22 0454 02/09/22 0454 02/09/22 0454 02/09/22 0454   97 5 °F (36 4 °C) 97 16 137/81 98 %      Temp Source Heart Rate Source Patient Position - Orthostatic VS BP Location FiO2 (%)   02/09/22 0454 02/09/22 0454 02/09/22 0454 02/09/22 0454 --   Temporal Monitor Lying Left arm       Pain Score       02/09/22 0511       7           Vitals:    02/09/22 0454   BP: 137/81   Pulse: 97   Patient Position - Orthostatic VS: Lying         Visual Acuity      ED Medications  Medications   sodium chloride 0 9 % bolus 1,000 mL (0 mL Intravenous Stopped 2/9/22 0549)   ondansetron (ZOFRAN) injection 4 mg (4 mg Intravenous Given 2/9/22 0505)   ketorolac (TORADOL) injection 30 mg (30 mg Intravenous Given 2/9/22 0511)       Diagnostic Studies  Results Reviewed     Procedure Component Value Units Date/Time    Urine Microscopic [[id number]]  (Normal) Collected: 02/09/22 0504    Lab Status: Final result Specimen: Urine, Clean Catch Updated: 02/09/22 0604     RBC, UA 2-4 /hpf      WBC, UA None Seen /hpf      Epithelial Cells None Seen /hpf      Bacteria, UA None Seen /hpf     UA w Reflex to Microscopic w Reflex to Culture [[id number]]  (Abnormal) Collected: 02/09/22 0504    Lab Status: Final result Specimen: Urine, Clean Catch Updated: 02/09/22 0551     Color, UA Yellow     Clarity, UA Clear     Specific Gravity, UA >=1 030     pH, UA 5 5     Leukocytes, UA Negative     Nitrite, UA Negative     Protein, UA Negative mg/dl      Glucose, UA Negative mg/dl      Ketones, UA 40 (2+) mg/dl      Urobilinogen, UA 0 2 E U /dl      Bilirubin, UA Small     Blood, UA Trace-Intact    Comprehensive metabolic panel [[id number]]  (Abnormal) Collected: 02/09/22 0500    Lab Status: Final result Specimen: Blood from Arm, Left Updated: 02/09/22 0542     Sodium 139 mmol/L      Potassium 3 5 mmol/L      Chloride 101 mmol/L      CO2 28 mmol/L      ANION GAP 10 mmol/L      BUN 14 mg/dL      Creatinine 0 75 mg/dL      Glucose 98 mg/dL      Calcium 9 4 mg/dL      AST 17 U/L      ALT 19 U/L      Alkaline Phosphatase 81 U/L      Total Protein 8 1 g/dL      Albumin 4 5 g/dL      Total Bilirubin 1 60 mg/dL      eGFR 115 ml/min/1 73sq m     Narrative:      [place] guidelines for Chronic Kidney Disease (CKD):     Stage 1 with normal or high GFR (GFR > 90 mL/min/1 73 square meters)    Stage 2 Mild CKD (GFR = 60-89 mL/min/1 73 square meters)    Stage 3A Moderate CKD (GFR = 45-59 mL/min/1 73 square meters)    Stage 3B Moderate CKD (GFR = 30-44 mL/min/1 73 square meters)    Stage 4 Severe CKD (GFR = 15-29 mL/min/1 73 square meters)   Stage 5 End Stage CKD (GFR <15 mL/min/1 73 square meters)  Note: GFR calculation is accurate only with a steady state creatinine    Lipase [[id number]]  (Abnormal) Collected: 02/09/22 0500    Lab Status: Final result Specimen: Blood from Arm, Left Updated: 02/09/22 0542     Lipase 58 u/L     CBC and differential [[id number]] Collected: 02/09/22 0500    Lab Status: Final result Specimen: Blood from Arm, Left Updated: 02/09/22 0518     WBC 8 24 Thousand/uL      RBC 4 93 Million/uL      Hemoglobin 15 4 g/dL      Hematocrit 44 9 %      MCV 91 fL      MCH 31 2 pg      MCHC 34 3 g/dL      RDW 11 9 %      MPV 9 2 fL      Platelets 971 Thousands/uL      nRBC 0 /100 WBCs      Neutrophils Relative 63 %      Immat GRANS % 0 %      Lymphocytes Relative 23 %      Monocytes Relative 11 %      Eosinophils Relative 2 %      Basophils Relative 1 %      Neutrophils Absolute 5 21 Thousands/µL      Immature Grans Absolute 0 01 Thousand/uL      Lymphocytes Absolute 1 93 Thousands/µL      Monocytes Absolute 0 87 Thousand/µL      Eosinophils Absolute 0 17 Thousand/µL      Basophils Absolute 0 05 Thousands/µL     POCT pregnancy, urine [[id number]]  (Normal) Resulted: 02/09/22 0509    Lab Status: Final result Updated: 02/09/22 0515     EXT PREG TEST UR (Ref: Negative) negative     Control valid                 No orders to display              Procedures  Procedures         ED Course  ED Course as of 02/09/22 0627   Wed Feb 09, 2022   0546 Pain improved after IV Toradol  Lab work negative  Benign examination  Awaiting results of urinalysis  CRAFFT      Most Recent Value   SBIRT (13-21 yo)    In order to provide better care to our patients, we are screening all of our patients for alcohol and drug use  Would it be okay to ask you these screening questions? Yes Filed at: 02/09/2022 0501   CRAFFT Initial Screen: During the past 12 months, did you:    1  Drink any alcohol (more than a few sips)? No Filed at: 02/09/2022 0501   2   Smoke any marijuana or hashish No Filed at: 02/09/2022 0501   3  Use anything else to get high? ("anything else" includes illegal drugs, over the counter and prescription drugs, and things that you sniff or 'ohara')? No Filed at: 02/09/2022 0501                                          [facility]  Number of Diagnoses or Management Options     Amount and/or Complexity of Data Reviewed  Clinical lab tests: ordered and reviewed  Decide to obtain previous medical records or to obtain history from someone other than the patient: yes  Review and summarize past medical records: yes  Independent visualization of images, tracings, or specimens: yes        Disposition  Final diagnoses:   Abdominal pain, unspecified abdominal location     Time reflects when diagnosis was documented in both MDM as applicable and the Disposition within this note     Time User Action Codes Description Comment    2/9/2022  6:10 AM Betominal Loving Add [R10 9] Abdominal pain, unspecified abdominal location       ED Disposition     ED Disposition Condition Date/Time Comment    Discharge Stable Wed Feb 9, 2022  6:10 AM Martha Hollis discharge to home/self care  Follow-up Information     Follow up With Specialties Details Why Contact Info    Cristal Ferreira MD [facility]   [address] [place] [address]      Yoly Jama MD Gastroenterology  As needed [address] [facility] [address] [facility]  [phone]            Discharge Medication List as of 2/9/2022  6:11 AM      CONTINUE these medications which have NOT CHANGED    Details   FLUoxetine HCl (PROZAC PO) Take by mouth in the morning, Historical Med      cetirizine (ZyrTEC) 5 MG tablet Take 5 mg by mouth daily, Historical Med      lidocaine (XYLOCAINE) 2 % topical gel Apply topically as needed for mild pain, Starting Thu 6/10/2021, Normal             No discharge procedures on file      PDMP Review     None          ED Provider  Electronically Signed by Cha Oliva MD  02/09/22 7778

## 2022-02-12 ENCOUNTER — APPOINTMENT (OUTPATIENT)
Dept: RADIOLOGY | Facility: CLINIC | Age: 22
End: 2022-02-12
Payer: COMMERCIAL

## 2022-02-12 DIAGNOSIS — G89.29 CHRONIC MIDLINE THORACIC BACK PAIN: ICD-10-CM

## 2022-02-12 DIAGNOSIS — M54.6 CHRONIC MIDLINE THORACIC BACK PAIN: ICD-10-CM

## 2022-02-12 DIAGNOSIS — G95.0 SYRINGOMYELIA (HCC): ICD-10-CM

## 2022-02-12 PROCEDURE — 72110 X-RAY EXAM L-2 SPINE 4/>VWS: CPT

## 2022-02-12 PROCEDURE — 72072 X-RAY EXAM THORAC SPINE 3VWS: CPT

## 2022-02-14 ENCOUNTER — TELEPHONE (OUTPATIENT)
Dept: OBGYN CLINIC | Facility: CLINIC | Age: 22
End: 2022-02-14

## 2022-02-14 NOTE — TELEPHONE ENCOUNTER
Patient calling in regards to needing an appt to establish care in the office  Patient had an iud removed and would like to have it checked and discuss hx of endometriosis in her family  No one really talked to her about her u/s results when she lived in [facility]

## 2022-02-15 ENCOUNTER — APPOINTMENT (OUTPATIENT)
Dept: RADIOLOGY | Facility: CLINIC | Age: 22
End: 2022-02-15
Payer: COMMERCIAL

## 2022-02-15 ENCOUNTER — OFFICE VISIT (OUTPATIENT)
Dept: FAMILY MEDICINE CLINIC | Facility: CLINIC | Age: 22
End: 2022-02-15
Payer: COMMERCIAL

## 2022-02-15 ENCOUNTER — TELEPHONE (OUTPATIENT)
Dept: FAMILY MEDICINE CLINIC | Facility: CLINIC | Age: 22
End: 2022-02-15

## 2022-02-15 VITALS
BODY MASS INDEX: 21.52 KG/M2 | SYSTOLIC BLOOD PRESSURE: 108 MMHG | TEMPERATURE: 98 F | DIASTOLIC BLOOD PRESSURE: 60 MMHG | HEIGHT: 68 IN | WEIGHT: 142 LBS | OXYGEN SATURATION: 99 % | HEART RATE: 77 BPM

## 2022-02-15 DIAGNOSIS — R11.0 NAUSEA: ICD-10-CM

## 2022-02-15 DIAGNOSIS — R10.84 GENERALIZED ABDOMINAL PAIN: Primary | ICD-10-CM

## 2022-02-15 DIAGNOSIS — K59.00 CONSTIPATION, UNSPECIFIED CONSTIPATION TYPE: ICD-10-CM

## 2022-02-15 DIAGNOSIS — K59.00 CONSTIPATION, UNSPECIFIED CONSTIPATION TYPE: Primary | ICD-10-CM

## 2022-02-15 DIAGNOSIS — F41.1 GAD (GENERALIZED ANXIETY DISORDER): ICD-10-CM

## 2022-02-15 DIAGNOSIS — R10.84 GENERALIZED ABDOMINAL PAIN: ICD-10-CM

## 2022-02-15 DIAGNOSIS — N91.2 AMENORRHEA: ICD-10-CM

## 2022-02-15 DIAGNOSIS — F33.9 DEPRESSION, RECURRENT (HCC): ICD-10-CM

## 2022-02-15 LAB — SL AMB POCT URINE HCG: NORMAL

## 2022-02-15 PROCEDURE — 3008F BODY MASS INDEX DOCD: CPT | Performed by: NURSE PRACTITIONER

## 2022-02-15 PROCEDURE — 1036F TOBACCO NON-USER: CPT | Performed by: NURSE PRACTITIONER

## 2022-02-15 PROCEDURE — 81025 URINE PREGNANCY TEST: CPT | Performed by: NURSE PRACTITIONER

## 2022-02-15 PROCEDURE — 99203 OFFICE O/P NEW LOW 30 MIN: CPT | Performed by: NURSE PRACTITIONER

## 2022-02-15 PROCEDURE — 74018 RADEX ABDOMEN 1 VIEW: CPT

## 2022-02-15 RX ORDER — POLYETHYLENE GLYCOL 3350 17 G/17G
17 POWDER, FOR SOLUTION ORAL DAILY
Qty: 255 G | Refills: 1 | Status: SHIPPED | OUTPATIENT
Start: 2022-02-15

## 2022-02-15 RX ORDER — POLYETHYLENE GLYCOL 3350 17 G/17G
17 POWDER, FOR SOLUTION ORAL DAILY
Qty: 255 G | Refills: 1 | Status: SHIPPED | OUTPATIENT
Start: 2022-02-15 | End: 2022-02-15 | Stop reason: SDUPTHER

## 2022-02-15 RX ORDER — ONDANSETRON 4 MG/1
4 TABLET, ORALLY DISINTEGRATING ORAL EVERY 6 HOURS PRN
Qty: 20 TABLET | Refills: 0 | Status: SHIPPED | OUTPATIENT
Start: 2022-02-15

## 2022-02-15 RX ORDER — DOCUSATE SODIUM 100 MG/1
100 CAPSULE, LIQUID FILLED ORAL 2 TIMES DAILY PRN
Qty: 30 CAPSULE | Refills: 1 | Status: SHIPPED | OUTPATIENT
Start: 2022-02-15

## 2022-02-15 RX ORDER — DOCUSATE SODIUM 100 MG/1
100 CAPSULE, LIQUID FILLED ORAL 2 TIMES DAILY PRN
Qty: 30 CAPSULE | Refills: 1 | Status: SHIPPED | OUTPATIENT
Start: 2022-02-15 | End: 2022-02-15 | Stop reason: SDUPTHER

## 2022-02-15 NOTE — TELEPHONE ENCOUNTER
Patient went to [facility] asking for a script  The pharmacist called and is wondering what med needs filled  No script was received  Please resend script if one was needed       Thank you

## 2022-02-15 NOTE — PATIENT INSTRUCTIONS
You may receive a survey in the mail, by text message, or by e-mail, please fill it out COMPLETELY, and let us know how we did! Please remember to sign up for your [facility] ovidio to check you lab results, send us messages, and schedule appointments  If you have questions about the [facility] option, please call us! Thank you again for choosing [facility]!

## 2022-02-15 NOTE — PROGRESS NOTES
Assessment/Plan:         Diagnoses and all orders for this visit:    Generalized abdominal pain  -     US abdomen complete; Future  -     XR abdomen 1 view kub; Future    Nausea  -     ondansetron (Zofran ODT) 4 mg disintegrating tablet; Take 1 tablet (4 mg total) by mouth every 6 (six) hours as needed for nausea or vomiting  -     XR abdomen 1 view kub; Future    Constipation, unspecified constipation type  -     XR abdomen 1 view kub; Future    Depression, recurrent ([address] )  -     Ambulatory Referral to Psychiatry; Future    MARCOS (generalized anxiety disorder)  -     Ambulatory Referral to Psychiatry; Future    Amenorrhea  -     POCT urine HCG     Urine HCG today was negative  LMP 01/14/2022  Will have an abdominal xray completed today - history of constipation  Reports BM today but cannot remember when she had one prior to this  Blood work in ED showed elevated bilirubin - hx of it being elevated  US abdomen ordered though as she also has right upper quadrant tenderness on exam        Subjective:      Patient ID: Martin Jackson is a 25 y o  female  Here today as a new patient and for an ED follow-up  Seen on 02/09 in the ED for abdominal pain  Reports at the time she had eaten mac and cheese three days prior and began with abdominal pain  Reports the pain continues today and has been ongoing  She had blood work drawn in the ED which showed elevated bilirubin  Urine test in the ED showed ketones only  She is also with nausea  She is concerned with pregnancy - history of recently having her IUD removed - wanting to get pregnant  She was due for her menses yesterday  Nausea  Associated symptoms include abdominal pain and nausea         The following portions of the patient's history were reviewed and updated as appropriate: allergies, current medications, past family history, past medical history, past social history, past surgical history and problem list     Review of Systems   Respiratory: Negative  Cardiovascular: Negative  Gastrointestinal: Positive for abdominal pain and nausea  All other systems reviewed and are negative  Objective:      /60 (BP Location: Right arm, Patient Position: Sitting, Cuff Size: Large)   Pulse 77   Temp 98 °F (36 7 °C)   Ht 5' 8" (1 727 m)   Wt 64 4 kg (142 lb)   SpO2 99%   BMI 21 59 kg/m²          Physical Exam  HENT:      Head: Normocephalic and atraumatic  Cardiovascular:      Rate and Rhythm: Normal rate and regular rhythm  Heart sounds: Normal heart sounds  No friction rub  Pulmonary:      Effort: Pulmonary effort is normal  No respiratory distress  Breath sounds: Normal breath sounds  Abdominal:      General: Abdomen is flat  Bowel sounds are normal       Tenderness: There is abdominal tenderness in the right upper quadrant  Neurological:      Mental Status: She is alert and oriented to person, place, and time  Psychiatric:         Mood and Affect: Mood normal          Behavior: Behavior normal          Thought Content:  Thought content normal          Judgment: Judgment normal

## 2022-02-15 NOTE — TELEPHONE ENCOUNTER
Can we call Martha and let her know that I reviewed the image of her abdominal xray - it shows a large amount of stool in her colon  I know she noted a history of constipation  I would recommend she begin taking Miralax daily for the next week plus a stool softener twice a day  I will send them to the pharmacy if she is okay with this plan  Thanks

## 2022-02-17 ENCOUNTER — HOSPITAL ENCOUNTER (OUTPATIENT)
Dept: ULTRASOUND IMAGING | Facility: HOSPITAL | Age: 22
Discharge: HOME | End: 2022-02-17
Payer: COMMERCIAL

## 2022-02-17 DIAGNOSIS — F41.1 GAD (GENERALIZED ANXIETY DISORDER): Primary | ICD-10-CM

## 2022-02-17 DIAGNOSIS — R10.84 GENERALIZED ABDOMINAL PAIN: ICD-10-CM

## 2022-02-17 PROCEDURE — 76700 US EXAM ABDOM COMPLETE: CPT

## 2022-02-17 RX ORDER — FLUOXETINE HYDROCHLORIDE 40 MG/1
40 CAPSULE ORAL DAILY
Qty: 90 CAPSULE | Refills: 1 | Status: SHIPPED | OUTPATIENT
Start: 2022-02-17

## 2022-02-21 DIAGNOSIS — K59.00 CONSTIPATION, UNSPECIFIED CONSTIPATION TYPE: ICD-10-CM

## 2022-02-21 DIAGNOSIS — K82.4 GALL BLADDER POLYP: ICD-10-CM

## 2022-02-21 DIAGNOSIS — R10.84 GENERALIZED ABDOMINAL PAIN: Primary | ICD-10-CM

## 2022-02-25 ENCOUNTER — TELEPHONE (OUTPATIENT)
Dept: OBGYN CLINIC | Facility: CLINIC | Age: 22
End: 2022-02-25

## 2022-02-25 NOTE — TELEPHONE ENCOUNTER
Patient calling in regards to being 9 days late on her period  And is known to have irregular periods and received it yesterday  Patient states it is extremely heavy  Patient states changing pad more than once in an hour  States its just dripping blood  No pain patient states  Patient states slight cramping and bloating  In beginning of january iud was removed  Patient was trying to have a child and still having no luck getting pregnant  Patient came back home in the area and wanted to make aware she in the past is anemia  Patient would like advise in regards to this period she received , concerns her  Please advise  Spoke with Dr Sharona Kirkland and advised her to go to the ED and follow up with us next week  Patient states that [facility] is closer and will keep that appt on 3/10 if anything changes she will call and is aware of recommendations to go to the ED

## 2022-03-01 ENCOUNTER — TELEMEDICINE (OUTPATIENT)
Dept: FAMILY MEDICINE CLINIC | Facility: CLINIC | Age: 22
End: 2022-03-01
Payer: COMMERCIAL

## 2022-03-01 VITALS — BODY MASS INDEX: 21.52 KG/M2 | WEIGHT: 142 LBS | HEIGHT: 68 IN

## 2022-03-01 DIAGNOSIS — R10.84 GENERALIZED ABDOMINAL PAIN: Primary | ICD-10-CM

## 2022-03-01 DIAGNOSIS — K59.00 CONSTIPATION, UNSPECIFIED CONSTIPATION TYPE: ICD-10-CM

## 2022-03-01 DIAGNOSIS — K82.4 GALL BLADDER POLYP: ICD-10-CM

## 2022-03-01 PROCEDURE — 99213 OFFICE O/P EST LOW 20 MIN: CPT | Performed by: NURSE PRACTITIONER

## 2022-03-01 NOTE — PROGRESS NOTES
Virtual Regular Visit    Verification of patient location:    Patient is located in the following state in which I hold an active license [place]      Assessment/Plan:    Problem List Items Addressed This Visit        Other    Constipation      Other Visit Diagnoses     Generalized abdominal pain    -  Primary    Gall bladder polyp               She continues with moderated abdominal pain near umbilicus  Reports it worsens when she has not had a BM  Suspect IBS with constipation as primary  Will have her continue with Miralax and docusate sodium for the present  Dulcolax tablets prescribed to take if she has not had a BM for several days  She will see GI on 03/17/2022  Reason for visit is   Chief Complaint   Patient presents with    Follow-up    Virtual Regular Visit        Encounter provider DOU[facility]    Provider located at [address] [place] [address] A  [address] [place] [id number]      Recent Visits  No visits were found meeting these conditions  Showing recent visits within past 7 days and meeting all other requirements  Today's Visits  Date Type Provider Dept   03/01/22 Telemedicine [facility]   Showing today's visits and meeting all other requirements  Future Appointments  No visits were found meeting these conditions  Showing future appointments within next 150 days and meeting all other requirements       The patient was identified by name and date of birth  Juan M Huerta was informed that this is a telemedicine visit and that the visit is being conducted through [address] and patient was informed this is a secure, HIPAA-complaint platform  She agrees to proceed     My office door was closed  No one else was in the room  She acknowledged consent and understanding of privacy and security of the video platform   The patient has agreed to participate and understands they can discontinue the visit at any time  Patient is aware this is a billable service  Subjective  Mae Sheriff is a 25 y o  female reports she is still with the central abdominal pain  XR showed some stool  Abdominal US showed a gallbladder polyp  She is having a normal BM every few days  She is compliant with miralax and docusate sodium daily  Last "antony" BM for her was approximately 2 days ago  HPI     Past Medical History:   Diagnosis Date    Anemia     resolved     Anxiety     Constipation     Depression     GERD (gastroesophageal reflux disease)     Heart murmur     Heavy menstrual bleeding     resolved     Hives     Iron deficiency anemia secondary to inadequate dietary iron intake 10/15/2019    Migraine     resolved     Wears glasses        Past Surgical History:   Procedure Laterality Date    TONSILECTOMY AND ADNOIDECTOMY      WISDOM TOOTH EXTRACTION         Current Outpatient Medications   Medication Sig Dispense Refill    docusate sodium (COLACE) 100 mg capsule Take 1 capsule (100 mg total) by mouth 2 (two) times a day as needed for constipation 30 capsule 1    FLUoxetine (PROzac) 40 MG capsule Take 1 capsule (40 mg total) by mouth in the morning 90 capsule 1    ondansetron (Zofran ODT) 4 mg disintegrating tablet Take 1 tablet (4 mg total) by mouth every 6 (six) hours as needed for nausea or vomiting 20 tablet 0    polyethylene glycol (GLYCOLAX) 17 GM/SCOOP powder Take 17 g by mouth daily 255 g 1     No current facility-administered medications for this visit  Allergies   Allergen Reactions    Pollen Extract        Review of Systems   Gastrointestinal: Positive for abdominal pain  All other systems reviewed and are negative  Video Exam    Vitals:    03/01/22 0849   Weight: 64 4 kg (142 lb)   Height: 5' 8" (1 727 m)       Physical Exam  Neurological:      Mental Status: She is alert and oriented to person, place, and time            I spent 10 minutes directly with the patient during this visit    VIRTUAL VISIT DISCLAIMER      Martha Hollis verbally agrees to participate in [employer]  Pt is aware that [employer] could be limited without vital signs or the ability to perform a full hands-on physical exam  Martha Hollis understands she or the provider may request at any time to terminate the video visit and request the patient to seek care or treatment in person

## 2022-03-10 ENCOUNTER — OFFICE VISIT (OUTPATIENT)
Dept: OBGYN CLINIC | Facility: CLINIC | Age: 22
End: 2022-03-10
Payer: COMMERCIAL

## 2022-03-10 VITALS
SYSTOLIC BLOOD PRESSURE: 98 MMHG | DIASTOLIC BLOOD PRESSURE: 60 MMHG | HEART RATE: 80 BPM | TEMPERATURE: 96.1 F | BODY MASS INDEX: 21.29 KG/M2 | WEIGHT: 140 LBS

## 2022-03-10 DIAGNOSIS — N94.6 DYSMENORRHEA: ICD-10-CM

## 2022-03-10 DIAGNOSIS — N93.9 ABNORMAL UTERINE BLEEDING (AUB): ICD-10-CM

## 2022-03-10 DIAGNOSIS — Z11.3 SCREENING EXAMINATION FOR STD (SEXUALLY TRANSMITTED DISEASE): ICD-10-CM

## 2022-03-10 DIAGNOSIS — N92.6 IRREGULAR MENSES: ICD-10-CM

## 2022-03-10 DIAGNOSIS — R10.2 PELVIC PAIN: Primary | ICD-10-CM

## 2022-03-10 DIAGNOSIS — N89.8 VAGINAL ODOR: ICD-10-CM

## 2022-03-10 LAB
BACTERIA UR QL AUTO: ABNORMAL /HPF
BILIRUB UR QL STRIP: NEGATIVE
BV WHIFF TEST VAG QL: NORMAL
CLARITY UR: CLEAR
CLUE CELLS SPEC QL WET PREP: NORMAL
COLOR UR: YELLOW
GLUCOSE UR STRIP-MCNC: NEGATIVE MG/DL
HGB UR QL STRIP.AUTO: ABNORMAL
KETONES UR STRIP-MCNC: NEGATIVE MG/DL
LEUKOCYTE ESTERASE UR QL STRIP: NEGATIVE
MUCOUS THREADS UR QL AUTO: ABNORMAL
NITRITE UR QL STRIP: NEGATIVE
NON-SQ EPI CELLS URNS QL MICRO: ABNORMAL /HPF
PH SMN: 4.5 [PH]
PH UR STRIP.AUTO: 5.5 [PH]
PROT UR STRIP-MCNC: ABNORMAL MG/DL
RBC #/AREA URNS AUTO: ABNORMAL /HPF
SL AMB  POCT GLUCOSE, UA: NORMAL
SL AMB LEUKOCYTE ESTERASE,UA: NORMAL
SL AMB POCT BILIRUBIN,UA: NORMAL
SL AMB POCT BLOOD,UA: NORMAL
SL AMB POCT CLARITY,UA: NORMAL
SL AMB POCT COLOR,UA: YELLOW
SL AMB POCT KETONES,UA: NORMAL
SL AMB POCT NITRITE,UA: NORMAL
SL AMB POCT PH,UA: 5
SL AMB POCT SPECIFIC GRAVITY,UA: NORMAL
SL AMB POCT URINE PROTEIN: NORMAL
SL AMB POCT UROBILINOGEN: NORMAL
SP GR UR STRIP.AUTO: 1.03 (ref 1–1.03)
T VAGINALIS VAG QL WET PREP: NORMAL
UROBILINOGEN UR STRIP-ACNC: <2 MG/DL
WBC #/AREA URNS AUTO: ABNORMAL /HPF
YEAST VAG QL WET PREP: NORMAL

## 2022-03-10 PROCEDURE — 87591 N.GONORRHOEAE DNA AMP PROB: CPT | Performed by: OBSTETRICS & GYNECOLOGY

## 2022-03-10 PROCEDURE — 87210 SMEAR WET MOUNT SALINE/INK: CPT | Performed by: OBSTETRICS & GYNECOLOGY

## 2022-03-10 PROCEDURE — 87086 URINE CULTURE/COLONY COUNT: CPT | Performed by: OBSTETRICS & GYNECOLOGY

## 2022-03-10 PROCEDURE — 99214 OFFICE O/P EST MOD 30 MIN: CPT | Performed by: OBSTETRICS & GYNECOLOGY

## 2022-03-10 PROCEDURE — 81001 URINALYSIS AUTO W/SCOPE: CPT | Performed by: OBSTETRICS & GYNECOLOGY

## 2022-03-10 PROCEDURE — 87491 CHLMYD TRACH DNA AMP PROBE: CPT | Performed by: OBSTETRICS & GYNECOLOGY

## 2022-03-10 PROCEDURE — 81002 URINALYSIS NONAUTO W/O SCOPE: CPT | Performed by: OBSTETRICS & GYNECOLOGY

## 2022-03-10 RX ORDER — TRANEXAMIC ACID 650 1/1
1300 TABLET ORAL 3 TIMES DAILY PRN
Qty: 30 TABLET | Refills: 3 | Status: SHIPPED | OUTPATIENT
Start: 2022-03-10

## 2022-03-10 RX ORDER — NAPROXEN 500 MG/1
500 TABLET ORAL 2 TIMES DAILY PRN
Qty: 60 TABLET | Refills: 3 | Status: SHIPPED | OUTPATIENT
Start: 2022-03-10

## 2022-03-10 NOTE — PATIENT INSTRUCTIONS
Endometriosis   AMBULATORY CARE:   Endometriosis  is a condition in which tissue that is normally only in your uterus grows outside of the uterus  Endometriosis causes tissue that should be shed during a monthly period to grow on your ovaries, fallopian tubes, bladder, or other organs  Organs and tissue may stick together and cause inflammation and pain  Common symptoms include the following:   · Abdominal pain or nausea and vomiting before or during your period    · Painful periods    · Feeling full or bloated    · Dizziness or fatigue    · Heavy periods, or vaginal bleeding at times other than during your monthly period    · Infertility (being unable to get pregnant)    · Lower back pain or painful bowel movements during your monthly periods    · Pain during or after sex    · Pain when you urinate    Seek care immediately if:   · You have severe pain that does not go away after you take pain medicine  Contact your healthcare provider if:   · Your symptoms return after treatment  · You have heavy or unusual vaginal bleeding  · You see blood in your urine or bowel movement  · You have questions or concerns about your condition or care  Medicines:   · Hormones  may help shrink endometrial tissue and decrease pain and inflammation  You may be given birth control pills, androgen hormones, or medicine that makes your body produce less of certain hormones  · Acetaminophen  decreases pain and is available without a doctor's order  Ask how much to take and how often to take it  Follow directions  Acetaminophen can cause liver damage if not taken correctly  · NSAIDs , such as ibuprofen, help decrease swelling, pain, and fever  This medicine is available with or without a doctor's order  NSAIDs can cause stomach bleeding or kidney problems in certain people  If you take blood thinner medicine, always ask your healthcare provider if NSAIDs are safe for you   Always read the medicine label and follow directions  · Take your medicine as directed  Contact your healthcare provider if you think your medicine is not helping or if you have side effects  Tell him or her if you are allergic to any medicine  Keep a list of the medicines, vitamins, and herbs you take  Include the amounts, and when and why you take them  Bring the list or the pill bottles to follow-up visits  Carry your medicine list with you in case of an emergency  Self-care:   · Apply heat  on your abdomen for 20 to 30 minutes every 2 hours for as many days as directed  Heat helps decrease pain and muscle spasms  · Exercise regularly  to help reduce symptoms, such as pain  Ask about the best exercise plan for you  Follow up with your doctor as directed:  Write down your questions so you remember to ask them during your visits  © Copyright [employer] 2022 Information is for End User's use only and may not be sold, redistributed or otherwise used for commercial purposes  All illustrations and images included in CareNotes® are the copyrighted property of [employer] D [employer] Inc  or [employer]   The above information is an  only  It is not intended as medical advice for individual conditions or treatments  Talk to your doctor, nurse or pharmacist before following any medical regimen to see if it is safe and effective for you  Pelvic Pain   WHAT YOU NEED TO KNOW:   Pelvic pain may be caused by a number of conditions, such as irritable bowel syndrome, appendicitis, or constipation  A urinary tract infection, prostate inflammation, menstrual cramps, or kidney stones can also cause pelvic pain  You may have pain on one or both sides of your pelvis  Pelvic pain can develop if you have trauma to your pelvis or if you sit or stand for a long time  DISCHARGE INSTRUCTIONS:   Medicines: You may need any of the following:  · NSAIDs , such as ibuprofen, help decrease swelling, pain, and fever   This medicine is available with or without a doctor's order  NSAIDs can cause stomach bleeding or kidney problems in certain people  If you take blood thinner medicine, always ask your healthcare provider if NSAIDs are safe for you  Always read the medicine label and follow directions  · Prescription pain medicine  may be given  Ask how to take this medicine safely  · Birth control medicines  may help decrease pain in women  · Take your medicine as directed  Contact your healthcare provider if you think your medicine is not helping or if you have side effects  Tell him or her if you are allergic to any medicine  Keep a list of the medicines, vitamins, and herbs you take  Include the amounts, and when and why you take them  Bring the list or the pill bottles to follow-up visits  Carry your medicine list with you in case of an emergency  Call 911 for any of the following:   · You have severe chest pain and sudden trouble breathing  Contact your healthcare provider if:   · You have a fever  · You have nausea, vomiting, or diarrhea  · Your pain does not go away, or it gets worse, even after treatment  · You have numbness in your legs or toes  · You have heavy or unusual vaginal bleeding  · You have questions or concerns about your condition or care  Manage your pelvic pain:   · Keep a pain record  Write down when your pain happens and how severe it is  Include any other symptoms you have with your pain  A record will help you keep track of pain cycles  Bring the record with you to your follow-up visits  It may also help your healthcare provider find out what is causing your pain  · Learn ways to relax  Deep breathing, meditation, and relaxation techniques can help decrease your pain  When you are tense, your pain may increase  · Treat or prevent constipation  Drink liquids as directed  You may need to drink more liquid than usual  Ask your healthcare provider how much liquid to drink each day   Eat high-fiber foods  High-fiber foods include fruit, vegetables, whole-grain breads and cereals, and beans  You may need to change the foods you eat if you have irritable bowel syndrome  Follow up with your healthcare provider as directed: You may need physical therapy  You may need to see an orthopedic specialist  Write down your questions so you remember to ask them during your visits  © Copyright [employer] 2022 Information is for End User's use only and may not be sold, redistributed or otherwise used for commercial purposes  All illustrations and images included in CareNotes® are the copyrighted property of A D A M , Inc  or [facility] [employer]   The above information is an  only  It is not intended as medical advice for individual conditions or treatments  Talk to your doctor, nurse or pharmacist before following any medical regimen to see if it is safe and effective for you  Exploratory Laparoscopy   WHAT YOU NEED TO KNOW:   Exploratory laparoscopy is surgery to look for causes of pain, abnormal growths, bleeding, or disease in your abdomen  During this surgery, small incisions are made in your abdomen  A small scope and tools are inserted through these incisions  A scope is a flexible tube with a light and camera on the end  HOW TO PREPARE:   The week before your surgery:   · Ask your healthcare provider if you need to stop using aspirin or any other prescribed or over-the-counter medicine before your procedure or surgery  · Bring your medicine bottles or a list of your medicines when you see your healthcare provider  Tell your provider if you are allergic to any medicine  Tell your provider if you use any herbs, food supplements, or over-the-counter medicine  · Arrange a ride home  Ask a family member or friend to drive you home after your surgery or procedure  Do not drive yourself home  · You may need blood tests, x-rays, and other tests before surgery   Ask your healthcare provider for more information  Write down the date, time, and location of each test      · Take any medicine that your healthcare provider has given you before surgery exactly as ordered  · You may need to empty your bowel before surgery  This may help prevent a bowel infection after surgery  Ask if you need to do the following:     ? Eat high-fiber foods for 1 to 2 days before surgery  Examples are fruits, vegetables, and whole-wheat cereals and breads  Drink 6 to 8 (eight-ounce) cups of liquids each day, unless your healthcare provider tells you not to  ? Take a laxative to help empty your bowel  This medicine may cause diarrhea  · Write down the correct date, time, and location of your surgery  The night before your surgery:   · Ask healthcare providers about directions for eating and drinking  The day of your surgery:   · Ask your healthcare provider before taking any medicine on the day of your procedure  These medicines include insulin, diabetic pills, high blood pressure pills, or heart pills  Bring a list of all the medicines you take, or your pill bottles, with you to the hospital     · An anesthesiologist will talk to you before your surgery  This healthcare provider will give you medicine to make you sleep during surgery  · You or a close family member will be asked to sign a legal document called a consent form  It gives healthcare providers permission to do the procedure or surgery  It also explains the problems that may happen, and your choices  Make sure all your questions are answered before you sign this form  WHAT WILL HAPPEN:   What will happen:   · You may be given medicine in your IV to help you relax or make you drowsy  You will get medicine called anesthesia to prevent pain and keep you comfortable during surgery  · Healthcare providers will clean your abdomen with soap and water   A laparoscope and other small tools will be put into 3 or 4 small incisions made in your abdomen  After your operation, your incisions will be closed with stitches or staples  Adhesive strips or bandages may also be put over the incisions  It is normal to have bruises at the incision sites  The bruises should fade in about a week  After surgery: You are taken to a room where your heart and breathing will be monitored  Do not get out of bed until your healthcare provider says it is okay  A bandage may cover wounds to help prevent infection  You may be able to go home after some time passes  An adult will need to drive you home and should stay with you for 24 hours  If you cannot go home, you will be taken to a hospital room  CONTACT YOUR HEALTHCARE PROVIDER IF:   · You have a fever  · The problems for which you are having surgery get worse  · You have questions or concerns about your surgery  RISKS:   Surgery may cause you to bleed or get an infection  The gas used during surgery may cause shoulder pain for a few days after surgery  If you have scar tissue, bleeding, or other problems, you may need open surgery  Organs such as your liver, lungs, and spleen could be damaged during surgery  You may get a blood clot in your leg or arm  The clot may travel to your heart or brain and cause life-threatening problems, such as a heart attack or stroke  CARE AGREEMENT:   You have the right to help plan your care  Learn about your health condition and how it may be treated  Discuss treatment options with your healthcare providers to decide what care you want to receive  You always have the right to refuse treatment  © Copyright "[facility]" 2018 Information is for End User's use only and may not be sold, redistributed or otherwise used for commercial purposes  All illustrations and images included in CareNotes® are the copyrighted property of [employer]  or [facility] Ang[facility] Santamaria   The above information is an  only   It is not intended as medical advice for individual conditions or treatments  Talk to your doctor, nurse or pharmacist before following any medical regimen to see if it is safe and effective for you

## 2022-03-10 NOTE — PROGRESS NOTES
Assessment/Plan:  Problem List Items Addressed This Visit     None      Visit Diagnoses     Pelvic pain    -  Primary    Relevant Medications    naproxen (EC NAPROSYN) 500 MG EC tablet    Other Relevant Orders    Chlamydia/GC amplified DNA by PCR    POCT wet mount (Completed)    Urine culture    Urinalysis with microscopic    US pelvis complete w transvaginal    POCT urine dip (Completed)    Irregular menses        Relevant Orders    hCG, quantitative    TSH, 3rd generation with Free T4 reflex    Prolactin    CBC    US pelvis complete w transvaginal    Dysmenorrhea        Relevant Medications    naproxen (EC NAPROSYN) 500 MG EC tablet    Other Relevant Orders    US pelvis complete w transvaginal    Vaginal odor        Relevant Orders    POCT wet mount (Completed)    Screening examination for STD (sexually transmitted disease)        Abnormal uterine bleeding (AUB)        Relevant Medications    Tranexamic Acid 650 MG TABS         Pelvic ultrasound repeated to rule out pelvic masses, ovarian cysts, uterine fibroids  Discussed with patient definitive diagnosis of endometriosis via laparoscopy  Discussed with patient infertility workup after 1 year of trying to get pregnant  Discussed with patient treatment options for dysmenorrhea, pelvic pain a potential endometriosis  She is actively trying to get pregnant and so birth control pills are not the option at this point  Advised NSAIDs as needed for pelvic pain  Can consider tranexamic acid for heavy menses  No signs of vaginitis on examination  Gonorrhea and chlamydia testing was collected and we will call patient with results  Urinalysis was also ordered due to pelvic pain    Transvaginal ultrasound was ordered to rule out uterine fibroids, ovarian cyst   Advised patient use of naproxen as needed for pain and tranexamic acid to use as needed for heavy menses  Discussed with patient slightly increased risk of DVT/PE    She has no risk factors  Follow-up in 3 weeks      Subjective   Patient ID: Carolyn Galan is a 25 y o  female  Patient is here for a problem visit  Chief Complaint   Patient presents with    New Patient Visit     Patient recently had Mirena IUD taken out and wants to ensure everything is ok  She also has had lower abdominal pain - not sure if related to UTI or recent polyp on gallbladder  Has c/o urinary frequency, brown discharge, and abdominal pressure for the past few days  Pt has family hx with endometriosis and personal history of anemia- states periods are irregular     She had Mirena IUD placed on 2020 for heavy periods, placed by [facility] [place] on 2022  Mirena IUD removed due to desire for fertility  She has been trying to get pregnant  She is sexually active  She has been with partner x 7 years    She is having lower abdominal pain x several days, midline  Pain is 8/10, constant, pressure  Nothing makes it feel better  No pain meds taken  Since Mirena IUD, periods have been regular  She states it  is pretty heavy, uses about 2-3 pads/day  She has a h/o anemia  She states she was on the pill before that did not help with her bleeding  She had a pelvic  US in  that was normal   She had an [address] in Dec 2021 that showed IUD in appropriate location, no fibroid or polyps  She has severe cramping pain with her periods  She has no pain with intercourse  She has been noticing vaginal odor, vaginal discharge (brown), no fevers/chills  She has no UTI symptoms (dysuria, frequency, hematuria)  She has a family h/o endometriosis, paternal aunt, and a h/o anemia  She has not had any recent labs  She has no h/o STD, no recent STD testing      CBC 22 Hgb 13 8  HCG neg      Menstrual History:  OB History        0    Para   0    Term   0       0    AB   0    Living   0       SAB   0    IAB   0    Ectopic   0    Multiple   0    Live Births 0                Menarche age: 17  Patient's last menstrual period was 02/24/2022 (exact date)    Period Pattern: (!) Irregular  Menstrual Flow: Heavy      Past Medical History:   Diagnosis Date    Anemia     resolved     Anxiety     Constipation     Depression     GERD (gastroesophageal reflux disease)     Heart murmur     Heavy menstrual bleeding     resolved     Hives     Iron deficiency anemia secondary to inadequate dietary iron intake 10/15/2019    Migraine     resolved     Wears glasses        Past Surgical History:   Procedure Laterality Date    TONSILECTOMY AND ADNOIDECTOMY      WISDOM TOOTH EXTRACTION         Social History     Tobacco Use    Smoking status: Never Smoker    Smokeless tobacco: Never Used   Vaping Use    Vaping Use: Never used   Substance Use Topics    Alcohol use: Yes     Comment: Social    Drug use: No        Allergies   Allergen Reactions    Pollen Extract          Current Outpatient Medications:     bisacodyl (DULCOLAX) 5 mg EC tablet, Take 1 tablet (5 mg total) by mouth daily as needed for constipation, Disp: 30 tablet, Rfl: 0    docusate sodium (COLACE) 100 mg capsule, Take 1 capsule (100 mg total) by mouth 2 (two) times a day as needed for constipation, Disp: 30 capsule, Rfl: 1    FLUoxetine (PROzac) 40 MG capsule, Take 1 capsule (40 mg total) by mouth in the morning, Disp: 90 capsule, Rfl: 1    ondansetron (Zofran ODT) 4 mg disintegrating tablet, Take 1 tablet (4 mg total) by mouth every 6 (six) hours as needed for nausea or vomiting, Disp: 20 tablet, Rfl: 0    polyethylene glycol (GLYCOLAX) 17 GM/SCOOP powder, Take 17 g by mouth daily, Disp: 255 g, Rfl: 1    naproxen (EC NAPROSYN) 500 MG EC tablet, Take 1 tablet (500 mg total) by mouth 2 (two) times a day as needed for mild pain (cramping), Disp: 60 tablet, Rfl: 3    Tranexamic Acid 650 MG TABS, Take 2 tablets (1,300 mg total) by mouth 3 (three) times a day as needed (heavy menses), Disp: 30 tablet, Rfl: 3      Review of Systems   Constitutional: Negative  HENT: Negative  Eyes: Negative  Respiratory: Negative  Cardiovascular: Negative  Gastrointestinal: Negative  Endocrine: Negative  Genitourinary:        As noted in HPI   Musculoskeletal: Negative  Skin: Negative  Allergic/Immunologic: Negative  Neurological: Negative  Hematological: Negative  Psychiatric/Behavioral: Negative  BP 98/60 (BP Location: Right arm, Patient Position: Sitting, Cuff Size: Adult)   Pulse 80   Temp (!) 96 1 °F (35 6 °C) (Tympanic)   Wt 63 5 kg (140 lb)   LMP 02/24/2022 (Exact Date)   BMI 21 29 kg/m²       Physical Exam  Constitutional:       General: She is not in acute distress  Appearance: She is well-developed  Genitourinary:      Bladder and urethral meatus normal       No lesions in the vagina  Right Labia: No rash, tenderness, lesions, skin changes or Bartholin's cyst      Left Labia: No tenderness, lesions, skin changes, Bartholin's cyst or rash  No vaginal discharge, erythema, tenderness or bleeding  No vaginal prolapse present  No vaginal atrophy present  Right Adnexa: not tender, not full and no mass present  Left Adnexa: not tender, not full and no mass present  No cervical polyp  Uterus is not enlarged or tender  No uterine mass detected  Pelvic exam was performed with patient in the lithotomy position  Rectum:      No tenderness  Cardiovascular:      Rate and Rhythm: Normal rate  Pulmonary:      Effort: Pulmonary effort is normal    Abdominal:      General: There is no distension  Palpations: Abdomen is soft  Tenderness: There is no abdominal tenderness  Neurological:      Mental Status: She is alert and oriented to person, place, and time  Skin:     General: Skin is warm and dry     Psychiatric:         Behavior: Behavior normal              Counseling / Coordination of Care  Total time spent on the day of encounter 30 minutes

## 2022-03-11 LAB — BACTERIA UR CULT: NORMAL

## 2022-03-15 ENCOUNTER — TELEPHONE (OUTPATIENT)
Dept: OTHER | Facility: OTHER | Age: 22
End: 2022-03-15

## 2022-03-15 LAB
C TRACH DNA SPEC QL NAA+PROBE: NEGATIVE
N GONORRHOEA DNA SPEC QL NAA+PROBE: NEGATIVE

## 2022-03-15 NOTE — TELEPHONE ENCOUNTER
Patient called saying that she is returning a phone that she had got from the office can is asking if the office can giver her back a call

## 2022-03-17 ENCOUNTER — APPOINTMENT (OUTPATIENT)
Dept: LAB | Facility: HOSPITAL | Age: 22
End: 2022-03-17
Attending: OBSTETRICS & GYNECOLOGY
Payer: COMMERCIAL

## 2022-03-17 DIAGNOSIS — R14.0 BLOATING: ICD-10-CM

## 2022-03-17 DIAGNOSIS — N92.6 IRREGULAR MENSES: ICD-10-CM

## 2022-03-17 DIAGNOSIS — Z83.79 FAMILY HISTORY OF CELIAC DISEASE: ICD-10-CM

## 2022-03-17 DIAGNOSIS — R11.0 NAUSEA: ICD-10-CM

## 2022-03-17 DIAGNOSIS — K59.00 CONSTIPATION, UNSPECIFIED CONSTIPATION TYPE: ICD-10-CM

## 2022-03-17 LAB
B-HCG SERPL-ACNC: <2 MIU/ML
ERYTHROCYTE [DISTWIDTH] IN BLOOD BY AUTOMATED COUNT: 11.8 % (ref 11.6–15.1)
HCT VFR BLD AUTO: 43.5 % (ref 34.8–46.1)
HGB BLD-MCNC: 15 G/DL (ref 11.5–15.4)
IGA SERPL-MCNC: 120 MG/DL (ref 70–400)
MCH RBC QN AUTO: 31.3 PG (ref 26.8–34.3)
MCHC RBC AUTO-ENTMCNC: 34.5 G/DL (ref 31.4–37.4)
MCV RBC AUTO: 91 FL (ref 82–98)
PLATELET # BLD AUTO: 196 THOUSANDS/UL (ref 149–390)
PMV BLD AUTO: 9.1 FL (ref 8.9–12.7)
PROLACTIN SERPL-MCNC: 12.7 NG/ML
RBC # BLD AUTO: 4.79 MILLION/UL (ref 3.81–5.12)
TSH SERPL DL<=0.05 MIU/L-ACNC: 2.91 UIU/ML (ref 0.46–3.98)
WBC # BLD AUTO: 8.59 THOUSAND/UL (ref 4.31–10.16)

## 2022-03-17 PROCEDURE — 82784 ASSAY IGA/IGD/IGG/IGM EACH: CPT

## 2022-03-17 PROCEDURE — 36415 COLL VENOUS BLD VENIPUNCTURE: CPT

## 2022-03-17 PROCEDURE — 85027 COMPLETE CBC AUTOMATED: CPT

## 2022-03-17 PROCEDURE — 84702 CHORIONIC GONADOTROPIN TEST: CPT

## 2022-03-17 PROCEDURE — 84443 ASSAY THYROID STIM HORMONE: CPT

## 2022-03-17 PROCEDURE — 84146 ASSAY OF PROLACTIN: CPT

## 2022-03-17 PROCEDURE — 86364 TISS TRNSGLTMNASE EA IG CLAS: CPT

## 2022-03-18 ENCOUNTER — HOSPITAL ENCOUNTER (OUTPATIENT)
Dept: ULTRASOUND IMAGING | Facility: HOSPITAL | Age: 22
Discharge: HOME | End: 2022-03-18
Attending: OBSTETRICS & GYNECOLOGY
Payer: COMMERCIAL

## 2022-03-18 DIAGNOSIS — N94.6 DYSMENORRHEA: ICD-10-CM

## 2022-03-18 DIAGNOSIS — R10.2 PELVIC PAIN: ICD-10-CM

## 2022-03-18 DIAGNOSIS — N92.6 IRREGULAR MENSES: ICD-10-CM

## 2022-03-18 LAB — TTG IGA SER-ACNC: <2 U/ML (ref 0–3)

## 2022-03-18 PROCEDURE — 76856 US EXAM PELVIC COMPLETE: CPT

## 2022-03-18 PROCEDURE — 76830 TRANSVAGINAL US NON-OB: CPT

## 2022-03-30 NOTE — PROGRESS NOTES
Assessment/Plan:    Problem List Items Addressed This Visit     None      Visit Diagnoses     Irregular menses    -  Primary    Relevant Orders    Progesterone    Pelvic pain        Dysmenorrhea                Pre conception counseling was performed for patient  She was prescribed prenatal vitamins to start  Discussed with patient healthy lifestyle, exercise, maintenance of healthy weight  She was advised to avoid smoking, alcohol, drugs and medications that can be harmful to pregnancy  She was advised to limit caffeine intake to about 200 mg per day  Also discussed with patient optimization of natural fertility including timed intercourse, coital frequency and use of ovulation tests  She has an appt w/ psychiatry for anxiety  She is concerned about Prozac and I discuss Prozac may be used if needed  Continue mental health therapy    Pelvic US results discussed - normal      Follow-up prn    No infertility work up needed    Discussed consideration for laparoscopy to r/o endometriosis is pelvic pain persistent or if with infertility      Call if with positive pregnancy test    Due ton irregular menses D  21 progesterone ordered    She reports occasional vaginal irritation - advised vulvar hygiene, ph balanced soaps, avoidance of irritants         Subjective   Patient ID: Elvira Srivastava is a 25 y o  female  Patient is here for a follow-up  Chief Complaint   Patient presents with    Follow-up         She is here for discussion       She had Mirena IUD placed on 1/22/2020 for heavy periods, placed by [facility] [place] on 1/2022  Mirena IUD removed due to desire for fertility  She has been trying to get pregnant  She is sexually active  She has been with partner x 7 years  She states her periods have been monthly but days vary   q 30 days        Patient was having pain, pelvic US was normal   She was seen by GI and was told Crohn's      Since Mirena IUD, periods have been regular  She states it  is pretty heavy, uses about 2-3 pads/day  She has a h/o anemia  Menstrual History:  OB History        0    Para   0    Term   0       0    AB   0    Living   0       SAB   0    IAB   0    Ectopic   0    Multiple   0    Live Births   0                Menarche age: 17  No LMP recorded           Past Medical History:   Diagnosis Date    Anemia     resolved     Anxiety     Constipation     Depression     GERD (gastroesophageal reflux disease)     Heart murmur     Heavy menstrual bleeding     resolved     Hives     Iron deficiency anemia secondary to inadequate dietary iron intake 10/15/2019    Migraine     resolved     Wears glasses        Social History     Tobacco Use    Smoking status: Never Smoker    Smokeless tobacco: Never Used   Vaping Use    Vaping Use: Never used   Substance Use Topics    Alcohol use: Yes     Comment: Social    Drug use: No       Allergies   Allergen Reactions    Pollen Extract          Current Outpatient Medications:     docusate sodium (COLACE) 100 mg capsule, Take 1 capsule (100 mg total) by mouth 2 (two) times a day as needed for constipation, Disp: 30 capsule, Rfl: 1    FLUoxetine (PROzac) 40 MG capsule, Take 1 capsule (40 mg total) by mouth in the morning, Disp: 90 capsule, Rfl: 1    naproxen (EC NAPROSYN) 500 MG EC tablet, Take 1 tablet (500 mg total) by mouth 2 (two) times a day as needed for mild pain (cramping), Disp: 60 tablet, Rfl: 3    ondansetron (Zofran ODT) 4 mg disintegrating tablet, Take 1 tablet (4 mg total) by mouth every 6 (six) hours as needed for nausea or vomiting, Disp: 20 tablet, Rfl: 0    Tranexamic Acid 650 MG TABS, Take 2 tablets (1,300 mg total) by mouth 3 (three) times a day as needed (heavy menses), Disp: 30 tablet, Rfl: 3    bisacodyl (DULCOLAX) 5 mg EC tablet, Take 1 tablet (5 mg total) by mouth daily as needed for constipation (Patient not taking: Reported on 3/31/2022 ), Disp: 30 tablet, Rfl: 0   polyethylene glycol (GLYCOLAX) 17 GM/SCOOP powder, Take 17 g by mouth daily (Patient not taking: Reported on 3/31/2022 ), Disp: 255 g, Rfl: 1      Review of Systems   Constitutional: Negative  HENT: Negative  Eyes: Negative  Respiratory: Negative  Cardiovascular: Negative  Gastrointestinal: Negative  Endocrine: Negative  Genitourinary:        As noted in HPI   Musculoskeletal: Negative  Skin: Negative  Allergic/Immunologic: Negative  Neurological: Negative  Hematological: Negative  Psychiatric/Behavioral: Negative  /64 (BP Location: Right arm, Patient Position: Sitting, Cuff Size: Adult)   Temp 98 3 °F (36 8 °C) (Temporal)   Ht 5' 8" (1 727 m)   Wt 63 kg (139 lb)   BMI 21 13 kg/m²       Physical Exam  Constitutional:       General: She is not in acute distress  Appearance: She is well-developed  Cardiovascular:      Rate and Rhythm: Normal rate  Pulses: Normal pulses  Neurological:      Mental Status: She is alert and oriented to person, place, and time  Skin:     General: Skin is warm and dry  Psychiatric:         Attention and Perception: Attention normal          Mood and Affect: Mood normal          Speech: Speech normal          Behavior: Behavior normal  Behavior is cooperative  Study Result    Narrative & Impression   PELVIC ULTRASOUND, COMPLETE     INDICATION:  The patient is 25years old  R10 2: Pelvic and perineal pain  N92 6: Irregular menstruation, unspecified  N94 6: Dysmenorrhea, unspecified      COMPARISON: 1/12/2020     TECHNIQUE:   Transabdominal pelvic ultrasound was performed in sagittal and transverse planes with a curvilinear transducer  Additional transvaginal imaging was performed to better evaluate the endometrium and ovaries  Imaging included volumetric   sweeps as well as traditional still imaging technique      FINDINGS:     UTERUS:  The uterus is anteverted in position, measuring 7 8 x 3 4 x 5 0 cm  The uterus has a normal contour and echotexture  The cervix appears within normal limits      ENDOMETRIUM:    Normal endometrial AP caliber of 9 0 mm  Homogenous and normal in appearance         OVARIES/ADNEXA:  Right ovary:  3 6 x 3 3 x 4 3 cm  27 0 mL  No suspicious right ovarian abnormality  Doppler flow within normal limits      Left ovary:  2 5 x 2 3 x 2 9 cm  8 8 mL  No suspicious left ovarian abnormality  Doppler flow within normal limits      No suspicious adnexal mass or loculated collections  There is no free fluid      IMPRESSION:     Normal            CBC and differential  Order: [id number]   Status: Final result     Visible to patient: Yes (seen)     Next appt: 03/31/2022 at 11:30 AM in Obstetrics and Gynecology Rita Fontana MD)     0 Result Notes         (important suggestion)  Newer results are available  Click to view them now       Component Ref Range & Units 2/9/22  5:00 AM 1/13/22  3:13 PM 8/27/21 12:56 PM 11/3/20  7:13 AM 2/14/20  8:55 AM 11/14/19  8:21 AM 10/14/19  7:03 AM   WBC 4 31 - 10 16 Thousand/uL 8 24  5 82  6 81  8 24  6 86  4 79  5 32    RBC 3 81 - 5 12 Million/uL 4 93  4 45  4 58  4 96  5 32 High   4 89  4 92    Hemoglobin 11 5 - 15 4 g/dL 15 4  13 8  14 4  15 4  14 7  9 6 Low   9 4 Low     Hematocrit 34 8 - 46 1 % 44 9  40 9  42 6  46 4 High   44 7  34 3 Low   34 0 Low     MCV 82 - 98 fL 91  92  93  94  84  70 Low   69 Low     MCH 26 8 - 34 3 pg 31 2  31 0  31 4  31 0  27 6  19 6 Low   19 1 Low     MCHC 31 4 - 37 4 g/dL 34 3  33 7  33 8  33 2  32 9  28 0 Low   27 6 Low     RDW 11 6 - 15 1 % 11 9  11 9  11 7  12 0   19 4 High   17 6 High     MPV 8 9 - 12 7 fL 9 2  9 6  9 7  9 6  9 9  9 5  9 3    Platelets 420 - 072 Thousands/uL 220  221  195  190  166  202  215    nRBC /100 WBCs 0  0  0   0   0    Neutrophils Relative 43 - 75 % 63  59  58   57   47    Immat GRANS % 0 - 2 % 0  0  0   0   0    Lymphocytes Relative 14 - 44 % 23  26  24   26   37    Monocytes Relative 4 - 12 % 11 11  13 High    11   10    Eosinophils Relative 0 - 6 % 2  3  4   5   5    Basophils Relative 0 - 1 % 1  1  1   1   1    Neutrophils Absolute 1 85 - 7 62 Thousands/µL 5 21  3 47  4 00   3 90   2 48    Immature Grans Absolute 0 00 - 0 20 Thousand/uL 0 01  0 01  0 02   0 02   0 01    Lymphocytes Absolute 0 60 - 4 47 Thousands/µL 1 93  1 49  1 66   1 80   1 99    Monocytes Absolute 0 17 - 1 22 Thousand/µL 0 87  0 66  0 85   0 73   0 54    Eosinophils Absolute 0 00 - 0 61 Thousand/µL 0 17  0 16  0 24   0 37   0 25    Basophils Absolute 0 00 - 0 10 Thousands/µL 0 05  0 03  0 04   0 04   0 05               Specimen Collected: 02/09/22  5:00 AM Last Resulted: 02/09/22  5:18 AM

## 2022-03-31 ENCOUNTER — OFFICE VISIT (OUTPATIENT)
Dept: OBGYN CLINIC | Facility: CLINIC | Age: 22
End: 2022-03-31
Payer: COMMERCIAL

## 2022-03-31 VITALS
WEIGHT: 139 LBS | DIASTOLIC BLOOD PRESSURE: 64 MMHG | HEIGHT: 68 IN | TEMPERATURE: 98.3 F | SYSTOLIC BLOOD PRESSURE: 110 MMHG | BODY MASS INDEX: 21.07 KG/M2

## 2022-03-31 DIAGNOSIS — R10.2 PELVIC PAIN: ICD-10-CM

## 2022-03-31 DIAGNOSIS — N94.6 DYSMENORRHEA: ICD-10-CM

## 2022-03-31 DIAGNOSIS — N92.6 IRREGULAR MENSES: Primary | ICD-10-CM

## 2022-03-31 PROCEDURE — 1036F TOBACCO NON-USER: CPT | Performed by: OBSTETRICS & GYNECOLOGY

## 2022-03-31 PROCEDURE — 99213 OFFICE O/P EST LOW 20 MIN: CPT | Performed by: OBSTETRICS & GYNECOLOGY

## 2022-03-31 PROCEDURE — 3008F BODY MASS INDEX DOCD: CPT | Performed by: OBSTETRICS & GYNECOLOGY

## 2022-03-31 NOTE — PATIENT INSTRUCTIONS
Planning for Pregnancy   AMBULATORY CARE:   Why you should plan for pregnancy:  You can help get your body ready for a healthy pregnancy  A healthy pregnancy can improve your ability to have a healthy baby  The steps you need to take and the amount of time needed depends on your current health and habits  Work with your healthcare provider to help you plan a healthy pregnancy  What you need to know about nutrition and exercise before pregnancy:   · Eat a variety of healthy foods  Healthy foods include fruits, vegetables, whole-grain breads, low-fat dairy foods, beans, lean meats, and fish  Limit foods high in sugar, fat, and sodium  Limit your intake of fish to 2 servings each week  Choose fish low in mercury such as canned light tuna, shrimp, salmon, cod, or tilapia  Do not  eat fish high in mercury such as swordfish, tilefish, lito mackerel, and shark  · Take 400 micrograms (mcg) of folic acid each day  This will help to prevent birth defects of the brain and spine such as spina bifida  Most women should take folic acid before pregnancy and up to 12 weeks after getting pregnant  · Exercise for at least 30 minutes, 5 days a week  Some examples of exercise include walking, biking, dancing, and swimming  Include muscle strengthening activities 2 days each week  Regular exercise provides many health benefits  It helps you manage your weight, and decreases your risk for type 2 diabetes, heart disease, stroke, and high blood pressure  Exercise can also help improve your mood  Ask your healthcare provider about the best exercise plan for you  How weight affects pregnancy:   · Obesity  can make it harder for you to get pregnant  It also increases your risk of health problems during pregnancy  Some of these health problems include gestational diabetes, high blood pressure, and infections  It can also increase your baby's risk of health problems such as birth defects   Your baby may also be large and harder to deliver or be born prematurely (early)  Your risk of miscarriage is also higher if you are obese  Work with your healthcare provider to reach a healthy weight before you try to get pregnant  · Being underweight  can also make it hard for you to get pregnant  It can also increase your risk of having a premature baby and miscarriage  Your baby may be born at a low birth weight  What you need to know about smoking, alcohol, and drugs:   · Smoking  increases your risk of a miscarriage and other health problems during pregnancy  Smoking can cause your baby to be born too early or weigh less at birth  Ask your healthcare provider for information if you need help quitting  · Alcohol  passes from your body to your baby through the placenta  It can affect your baby's brain development and cause fetal alcohol syndrome (FAS)  FAS is a group of conditions that causes mental, behavior, and growth problems  Talk to your healthcare provider if you abuse alcohol and need help quitting before pregnancy  · Drugs , such as marijuana and cocaine, should not be used while you are trying to get pregnant or during pregnancy  They increase your risk of problems during pregnancy and increase the risk of having a baby with health problems  These include birth defects, premature birth, and infant death  What you need to know about medicines and supplements:  Tell your healthcare provider about all the medicines and supplements you take  Certain medicines and supplements should not be used during pregnancy  These include over-the-counter medicines, prescription medicines, vitamins, and herbal supplements  He or she may recommend that you take different medicines that are safer during pregnancy  What you need to know about immunizations:  Tell your healthcare provider about all the immunizations you have had   If you have missed any immunizations, your healthcare provider may recommend that you update your immunizations  These include hepatitis B, influenza, MMR (measles, mumps, rubella), Tdap, and varicella immunizations  You should get 1 dose of the Tdap vaccine with each pregnancy  It is best to get the vaccine at 27 to 36 weeks of pregnancy  Tests you may need before pregnancy:  Your healthcare provider may recommend that you have tests to screen for sexually transmitted infections  These include chlamydia, gonorrhea, herpes, HIV infection, syphilis, and tuberculosis  These infectious diseases should be treated before pregnancy, if needed  What you need to know about toxic substances:  Toxic substances can harm a developing baby  Examples include cleaning products, paints, solvents, pesticides, and other chemical products  They can increase the risk of having a miscarriage, premature birth, and low-birth weight baby  They also increase the risk of developmental delay and childhood cancer  Avoid exposure to toxic substances and materials at work and home  What you need to know about genetic testing:  Tell your healthcare provider about genetic disorders, developmental delays, or other disabilities  Include your family and your partner's family  Also tell your provider about any problems in past pregnancies  Your provider may recommend that you see a healthcare professional called a genetic counselor  He or she will talk to you about how genes, birth defects, and other medical conditions are passed down  He or she can also tell you about your risk for passing a genetic disease in a future pregnancy  A screening test may include blood tests to check your DNA or your partner's DNA  Genetic tests are not always accurate or complete  Your baby may be born with a genetic disorder that did not show up in the tests  Talk to your healthcare provider about any concerns you have with genetic testing    How you can prepare for pregnancy if you have a medical condition:  Medical conditions such as diabetes, high blood pressure, asthma, seizure disorders, and thyroid disorders should be managed before pregnancy  Mental health conditions, such as depression and anxiety, should also be treated  This will decrease your risk of having health problems during pregnancy  It will also decrease your baby's risk for medical problems  Medicines used to treat certain conditions are not safe to use during pregnancy and may need to be changed before you get pregnant  Ask your healthcare provider if it is safe for you to get pregnant if you have a medical condition  Follow up with your doctor or obstetrician as directed:  Write down your questions so you remember to ask them during your visits  © Copyright [employer] 2022 Information is for End User's use only and may not be sold, redistributed or otherwise used for commercial purposes  All illustrations and images included in CareNotes® are the copyrighted property of A [employer]  or Nayeli Rodríguez  The above information is an  only  It is not intended as medical advice for individual conditions or treatments  Talk to your doctor, nurse or pharmacist before following any medical regimen to see if it is safe and effective for you

## 2022-04-12 ENCOUNTER — OFFICE VISIT (OUTPATIENT)
Dept: OBGYN CLINIC | Facility: CLINIC | Age: 22
End: 2022-04-12
Payer: COMMERCIAL

## 2022-04-12 VITALS — BODY MASS INDEX: 20.89 KG/M2 | WEIGHT: 137.4 LBS | SYSTOLIC BLOOD PRESSURE: 110 MMHG | DIASTOLIC BLOOD PRESSURE: 64 MMHG

## 2022-04-12 DIAGNOSIS — N91.3 PRIMARY OLIGOMENORRHEA: Primary | ICD-10-CM

## 2022-04-12 DIAGNOSIS — N92.0 SPOTTING: ICD-10-CM

## 2022-04-12 DIAGNOSIS — Z11.3 SCREENING EXAMINATION FOR STD (SEXUALLY TRANSMITTED DISEASE): ICD-10-CM

## 2022-04-12 PROBLEM — Z01.419 ENCNTR FOR GYN EXAM (GENERAL) (ROUTINE) W/O ABN FINDINGS: Status: RESOLVED | Noted: 2019-08-12 | Resolved: 2022-04-12

## 2022-04-12 PROBLEM — Z86.16 PERSONAL HISTORY OF COVID-19: Status: RESOLVED | Noted: 2021-05-13 | Resolved: 2022-04-12

## 2022-04-12 PROBLEM — Z30.430 ENCOUNTER FOR INSERTION OF MIRENA IUD: Status: RESOLVED | Noted: 2020-01-22 | Resolved: 2022-04-12

## 2022-04-12 PROCEDURE — 99213 OFFICE O/P EST LOW 20 MIN: CPT | Performed by: PHYSICIAN ASSISTANT

## 2022-04-12 RX ORDER — GABAPENTIN 100 MG/1
100 CAPSULE ORAL DAILY
COMMUNITY

## 2022-04-12 RX ORDER — ESOMEPRAZOLE MAGNESIUM 40 MG/1
40 CAPSULE, DELAYED RELEASE ORAL
COMMUNITY

## 2022-04-12 RX ORDER — FAMOTIDINE 20 MG/1
TABLET, FILM COATED ORAL
COMMUNITY
Start: 2022-03-17

## 2022-04-12 NOTE — PROGRESS NOTES
Martha Hollis  2000    S:  25 y o  female here for a problem visit  She complains of an irregular bleed  She had a Mirena placed 1/22/20 for heavy periods  This was removed 1/2022 for desired fertility  February normal menses   Nothing in march 4/1-4/3 spotting  4/4-4/5 heavy   4/6-9 spotting    She says this is very unusual for her  However, when questioned closely, prior to her Mirena she had very infrequent periods  She is trying to get pregnant and is sexually active with a partner of 7 years  Her periods have been monthly and are described as heavy (saturating 5 pads/day)       She had a normal TSH, CBC, pelvic ultrasound and GC/chlamydia this past month     She denies changes in diet, exercise pattern, stress level, new vitamins, herbal supplements, or medications     Past Medical History:   Diagnosis Date    Anemia     resolved     Anxiety     Constipation     Depression     GERD (gastroesophageal reflux disease)     Heart murmur     Heavy menstrual bleeding     resolved     Hives     Iron deficiency anemia secondary to inadequate dietary iron intake 10/15/2019    Migraine     resolved     Wears glasses      Family History   Problem Relation Age of Onset    Breast cancer Mother 29    No Known Problems Father      Social History     Socioeconomic History    Marital status: Single     Spouse name: None    Number of children: 0    Years of education: None    Highest education level: None   Occupational History    Occupation: student   Tobacco Use    Smoking status: Never Smoker    Smokeless tobacco: Never Used   Vaping Use    Vaping Use: Never used   Substance and Sexual Activity    Alcohol use: Yes     Comment: Social    Drug use: No    Sexual activity: Yes     Partners: Male     Birth control/protection: None   Other Topics Concern    None   Social History Narrative    Daily caffeine consumption    Does not exercise        Who lives in your home: Mom     What type of home do you live in: Other condo     Age of your home: Built 14 yrs ago     How long have you been living there: 2 yrs     Type of heat: Forced hot air    Type of fuel: Electric    What type of naomi is in your bedroom: Carpet    Do you have the following in or near your home:    Air products: Central air and Humidifier    Pests: None    Pets: Dog and Rabbit    Are pets allowed in bedroom: Yes    Open fields, wooded areas nearby: Open fields and Wooded areas    Basement: None    Exposure to second hand smoke: Yes boyfriends house         Habits:    Caffeine: coffee 1 cup daily -    Chocolate: occasionally     Other:     Social Determinants of Health     Financial Resource Strain: Not on file   Food Insecurity: Not on file   Transportation Needs: Not on file   Physical Activity: Insufficiently Active    Days of Exercise per Week: 1 day    Minutes of Exercise per Session: 60 min   Stress: Not on file   Social Connections: Not on file   Intimate Partner Violence: Not on file   Housing Stability: Not on file       Review of Systems   Respiratory: Negative  Cardiovascular: Negative  Gastrointestinal: Negative for constipation and diarrhea  Genitourinary: Negative for difficulty urinating, pelvic pain, vaginal bleeding, vaginal discharge, itching or odor  O:  /64 (BP Location: Right arm, Patient Position: Sitting, Cuff Size: Standard)   Wt 62 3 kg (137 lb 6 4 oz)   LMP 04/01/2022   BMI 20 89 kg/m²   She appears well and is in no distress  Abdomen is soft and nontender  External genitals are normal without lesions or rashes  Vagina is normal, no discharge or bleeding noted  Cervix is normal, no lesions or discharge  Uterus is nontender, no masses  Adnexa are nontender, no pelvic masses appreciated    A/P: Oligomenorrhea  Discussed tracking cycles with a period tracker ovidio, using ovulation predictor kits to time her intercourse   Return in 6 months to review cycles and whether or not we need to help induce ovulation

## 2022-04-14 ENCOUNTER — TELEPHONE (OUTPATIENT)
Dept: GASTROENTEROLOGY | Facility: MEDICAL CENTER | Age: 22
End: 2022-04-14

## 2022-04-21 ENCOUNTER — LAB (OUTPATIENT)
Dept: LAB | Facility: CLINIC | Age: 22
End: 2022-04-21
Payer: COMMERCIAL

## 2022-04-21 DIAGNOSIS — N92.6 IRREGULAR MENSES: ICD-10-CM

## 2022-04-21 LAB — PROGEST SERPL-MCNC: 2.1 NG/ML

## 2022-04-21 PROCEDURE — 36415 COLL VENOUS BLD VENIPUNCTURE: CPT

## 2022-04-21 PROCEDURE — 84144 ASSAY OF PROGESTERONE: CPT

## 2022-04-26 ENCOUNTER — OFFICE VISIT (OUTPATIENT)
Dept: PSYCHIATRY | Facility: CLINIC | Age: 22
End: 2022-04-26
Payer: COMMERCIAL

## 2022-04-26 DIAGNOSIS — F33.9 DEPRESSION, RECURRENT (HCC): ICD-10-CM

## 2022-04-26 DIAGNOSIS — F41.1 GAD (GENERALIZED ANXIETY DISORDER): Primary | ICD-10-CM

## 2022-04-26 DIAGNOSIS — F40.10 SOCIAL ANXIETY DISORDER: ICD-10-CM

## 2022-04-26 PROCEDURE — 90792 PSYCH DIAG EVAL W/MED SRVCS: CPT | Performed by: PHYSICIAN ASSISTANT

## 2022-04-26 RX ORDER — BUSPIRONE HYDROCHLORIDE 10 MG/1
TABLET ORAL
Qty: 90 TABLET | Refills: 0 | Status: SHIPPED | OUTPATIENT
Start: 2022-04-26 | End: 2022-06-07 | Stop reason: SDUPTHER

## 2022-04-27 PROBLEM — F40.10 SOCIAL ANXIETY DISORDER: Status: ACTIVE | Noted: 2022-04-27

## 2022-04-27 NOTE — PSYCH
This note was not shared with the patient due to reasonable likelihood of causing patient harm    Outpatient Psychiatry Intake Exam  [facility] [address] [facility] [id number] [phone]      PCP: No primary care provider on file  Identifying information:  Judith Hutson is a 25 y o  female with a history of anxiety and depression here for evaluation and determination of mental health management needs  Sources of information:   Information for this evaluation was gathered through direct interview with the patient  Additionally EMR was reviewed  Confidentiality discussion: Limits of confidentiality in cases of safety concerns involving self and others as well as this physician's role as a mandatory  of abuse  They voiced understanding and a desire to continue with the evaluation  SUBJECTIVE     Chief Complaint / reason for visit: " social anxiety"    History of Present Illness:      24 yo single female with hx of anxiety and depression referred by PCP  Martha sees OP therapist-  Low Caballero x 6-7 yrs  Is currently on prozac 40 mg by PCP- has been on this for a couple of yrs  On subtherapeutic dose of buspar couple of months ago -no longer taking  Martha reports significant anxiety since childhood- describes separation anxiety which persisted throughout her school years- "wouldn't go anywhere without her mother  Describes significant social anxiety- unable to perform in front of others; fears others staring at her; difficulty answering the phone; unable to go into grocery store by herself  Additionally has been unable to use public bathrooms, even when in school; gets very anxious in restaurant at the sight of uneaten food or uncleanliness  Since childhood has excessively cleaned, organized, straightened     When anxiety gets overwhelming she can experience hyperventilations, crying spells, hitting her head; tense muscles  Reports excessive worry and fidgetiness  Worries about being judged and criticized  Feels guilt when she argues with her mom  Reports low energy  Is up at night and sleeps during the day  Difficulty relaxing; mind won't shut off  No motivation  Feels irritable and depressed at times accompanied by difficulty getting out of bed; decreased appetite, poor hygiene; passive SI  Denies symptoms of barry/hypomania, A/V hallucinations, paranoia  Past Psychiatric History    Inpatient:  None  [facility]  No hx of suicide attempts  Outpatient:  Therapist- Roberto puentes- x 6-76 yrs  Med trials: lexapro -more depressed  Trauma/Loss History:          Abandonment by bio father  Mother's illness    Family Psychiatric History:     Psychiatric Illness:  Depression- grandfather; anxiety -aunt  Substance Abuse:  none reported  Suicide Attempts:  uncle    Social History:          HS grad  Had IEP- learning disability- comprehension  Single  Lives with mom  Martha's boyfriend lives there also - strained relationship  Starts [employer] [facility] in June  Past Medical / Surgical History:    Current PCP: DOUG Schuler  Allergies: Allergies   Allergen Reactions    Pollen Extract        Past Medical History:  Past Medical History:   Diagnosis Date    Anemia     resolved     Anxiety     Constipation     Depression     GERD (gastroesophageal reflux disease)     Heart murmur     Heavy menstrual bleeding     resolved     Hives     Iron deficiency anemia secondary to inadequate dietary iron intake 10/15/2019    Migraine     resolved     Wears glasses          Past Surgical History:  Past Surgical History:   Procedure Laterality Date    TONSILECTOMY AND ADNOIDECTOMY      WISDOM TOOTH EXTRACTION           Recent labs:  I have reviewed all pertinent labs    Lab Results   Component Value Date     06/11/2016    SODIUM 139 02/09/2022    K 3 5 02/09/2022     02/09/2022    CO2 28 02/09/2022    AGAP 10 02/09/2022    BUN 14 02/09/2022    CREATININE 0 75 02/09/2022    GLUC 98 02/09/2022    GLUF 91 11/03/2020    CALCIUM 9 4 02/09/2022    AST 17 02/09/2022    ALT 19 02/09/2022    ALKPHOS 81 02/09/2022    PROT 6 6 06/11/2016    TP 8 1 02/09/2022    BILITOT 2 0 (H) 06/11/2016    TBILI 1 60 (H) 02/09/2022    EGFR 115 02/09/2022     Lab Results   Component Value Date    WBC 8 59 03/17/2022    HGB 15 0 03/17/2022    HCT 43 5 03/17/2022    MCV 91 03/17/2022     03/17/2022       Lab Results   Component Value Date    ODO5SISVEOJN 2 909 03/17/2022               Medical Review Of Systems:    Review of Systems   Constitutional: Positive for appetite change and fatigue  Gastrointestinal: Positive for abdominal pain, constipation and nausea  Is f/u with GI   Neurological:        Restless legs- sleep med has prescribed neurontin- effective   Psychiatric/Behavioral: Positive for sleep disturbance                Objective     OBJECTIVE     LMP 04/01/2022     MENTAL STATUS EXAM  Appearance:  age appropriate   Behavior:  Pleasant & cooperative   Speech:  Normal volume, regular rate and rhythm   Mood:  anxious   Affect:  constricted   Language: intact and appropriate for age, education, and intellect   Thought Process:  goal directed   Associations: intact associations   Thought Content:  negative ruminations   Perceptual Disturbances: no auditory or visual hallcunations   Risk Potential / Abnormal Thoughts: Suicidal ideation - Yes, but passive without plan or intent  Homicidal ideation - None  Potential for aggression - No       Consciousness:  Alert & Awake   Sensorium:  Grossly oriented   Attention: attention span and concentration are age appropriate   Intellect: within normal limits   Fund of Knowledge:  Memory: awareness of current events: yes  recent and remote memory grossly intact   Insight:  good   Judgment: good   Muscle Strength Muscle Tone: Grossly normal  normal   Gait/Station: normal gait/station with good balance   Motor Activity: no abnormal movements             ASSESSMENT & PLAN          Diagnoses and all orders for this visit:    MARCOS (generalized anxiety disorder)  -     Ambulatory Referral to Psychiatry  -     busPIRone (BUSPAR) 10 mg tablet; 1/2 tab po tid with meals x one week then one po tid with meals    Depression, recurrent ([facility] [address] )  -     Ambulatory Referral to Psychiatry    Social anxiety disorder  -     busPIRone (BUSPAR) 10 mg tablet; 1/2 tab po tid with meals x one week then one po tid with meals      Current Outpatient Medications   Medication Sig Dispense Refill    bisacodyl (DULCOLAX) 5 mg EC tablet Take 1 tablet (5 mg total) by mouth daily as needed for constipation 30 tablet 0    busPIRone (BUSPAR) 10 mg tablet 1/2 tab po tid with meals x one week then one po tid with meals 90 tablet 0    docusate sodium (COLACE) 100 mg capsule Take 1 capsule (100 mg total) by mouth 2 (two) times a day as needed for constipation 30 capsule 1    esomeprazole (NexIUM) 40 MG capsule Take 40 mg by mouth      famotidine (PEPCID) 20 mg tablet TAKE 1 TABLET BY MOUTH WITH LUNCH AND 1 AT BEDTIME      FLUoxetine (PROzac) 40 MG capsule Take 1 capsule (40 mg total) by mouth in the morning 90 capsule 1    gabapentin (NEURONTIN) 100 mg capsule Take 100 mg by mouth daily      naproxen (EC NAPROSYN) 500 MG EC tablet Take 1 tablet (500 mg total) by mouth 2 (two) times a day as needed for mild pain (cramping) 60 tablet 3    ondansetron (Zofran ODT) 4 mg disintegrating tablet Take 1 tablet (4 mg total) by mouth every 6 (six) hours as needed for nausea or vomiting 20 tablet 0    polyethylene glycol (GLYCOLAX) 17 GM/SCOOP powder Take 17 g by mouth daily 255 g 1    Tranexamic Acid 650 MG TABS Take 2 tablets (1,300 mg total) by mouth 3 (three) times a day as needed (heavy menses) (Patient not taking: Reported on 4/12/2022 ) 30 tablet 3 No current facility-administered medications for this visit  Plan: For now will keep prozac at 40 mg/d and retry buspar at higher dose -titrate to 10 mg tid  Ok to try OTC melatonin 3-6 mg/d  Discussed coping tools for anxiety- Yoga with Tita Romero), journaling, apps such as Calm, Head Space, Insight Timer  Reviewed risks, benefits, side effects of medications, including no medication  Patient understands and agrees to treatment plan  Cont f/u with ind therapist         F/u Daniel 4 weeks, sooner prn          Patient has been informed of 24 hours and weekend coverage for urgent situations accessed by calling the main clinic phone number        Lianne Morris PA-C

## 2022-04-27 NOTE — BH TREATMENT PLAN
TREATMENT PLAN (Medication Management Only)        [facility]    Name and Date of Birth:  Tanya Taylor 25 y o  2000  Date of Treatment Plan: April 27, 2022  Diagnosis/Diagnoses:    1  MARCOS (generalized anxiety disorder)    2  Depression, recurrent ([address] )    3  Social anxiety disorder      Strengths/Personal Resources for Self-Care: supportive family, taking medications as prescribed, ability to understand psychiatric illness, motivation for treatment, willingness to work on problems  Area/Areas of need (in own words): anxiety  1  Long Term Goal: decrease anxiety  Target Date:3 months - 7/27/2022  Person/Persons responsible for completion of goal: Martha  2  Short Term Objective (s) - How will we reach this goal?:   A  Provider new recommended medication/dosage changes and/or continue medication(s): continue current medications as prescribed  B  Attend medication management appointments regularly  C  Attend psychotherapy regularly  Target Date:3 months - 7/27/2022  Person/Persons Responsible for Completion of Goal: Martha/PER/MD  Progress Towards Goals: continuing treatment  Treatment Modality: medication management every 4 weeks  Review due 180 days from date of this plan: 4 months - 8/27/2022  Expected length of service: over 1 year  My Physician/PA/NP and I have developed this plan together and I agree to work on the goals and objectives  I understand the treatment goals that were developed for my treatment    Treatment Plan done but not signed at time of office visit due to:  Plan reviewed by phone or in person  and verbal consent given due to Milagros social roya

## 2022-04-28 ENCOUNTER — OFFICE VISIT (OUTPATIENT)
Dept: FAMILY MEDICINE CLINIC | Facility: CLINIC | Age: 22
End: 2022-04-28
Payer: COMMERCIAL

## 2022-04-28 VITALS
HEIGHT: 68 IN | BODY MASS INDEX: 20.89 KG/M2 | HEART RATE: 84 BPM | DIASTOLIC BLOOD PRESSURE: 60 MMHG | TEMPERATURE: 98.2 F | SYSTOLIC BLOOD PRESSURE: 110 MMHG | OXYGEN SATURATION: 98 %

## 2022-04-28 DIAGNOSIS — R10.33 PERIUMBILICAL ABDOMINAL PAIN: Primary | ICD-10-CM

## 2022-04-28 DIAGNOSIS — F33.9 DEPRESSION, RECURRENT (HCC): ICD-10-CM

## 2022-04-28 DIAGNOSIS — F41.1 GAD (GENERALIZED ANXIETY DISORDER): ICD-10-CM

## 2022-04-28 PROCEDURE — 99214 OFFICE O/P EST MOD 30 MIN: CPT | Performed by: FAMILY MEDICINE

## 2022-04-28 PROCEDURE — 3008F BODY MASS INDEX DOCD: CPT | Performed by: PHYSICIAN ASSISTANT

## 2022-04-28 NOTE — PROGRESS NOTES
FAMILY PRACTICE OFFICE VISIT       NAME: Martha Hollis  AGE: 25 y o  SEX: female       : 2000        MRN: [id number]        Assessment and Plan     1  Periumbilical abdominal pain  Assessment & Plan:  Persistent epigastric/periumbilical abdominal pain  I am concerned about gastroduodenitis  Patient reports few episodes of bright red blood per rectum, usually on the wipe  Borderline calprotectin  Workup so far included negative celiac panel, patient reports symptomatic improvement on gluten free diet  Right upper quadrant ultrasound was negative, incidental finding of 3 mm gallbladder polyp  Start Nexium 40 mg daily  Take Pepcid 40 mg q h s  Patient should follow-up with GI and have EGD/colonoscopy done  We discussed importance of bland low acid diet  If no GI findings-we should consider endometriosis was possible diagnostic consideration  Orders:  -     Ambulatory referral to Gastroenterology; Future  -     Ambulatory referral to Gastroenterology; Future    2  Depression, recurrent ([facility] [address] )  Assessment & Plan:  Symptoms are well controlled on Prozac 40 mg daily      3  MARCOS (generalized anxiety disorder)  Assessment & Plan:  Patient is doing well on regimen of Prozac and BuSpar               Patient Instructions   Please take Nexium (esomeprazole) 40 mg once a day in the morning on an empty stomach half an hour before breakfast  Please take Pepcid (famotidine) 40 mg once a day in the evening before bed  You may continue gluten free strict diet  Please schedule consultation with Gastroenterology, we need to rule out possibility of gastritis/duodenitis and perform colonoscopy  If no findings on  EGD/colonoscopy, we may discuss possibility of endometriosis  Please follow low acid/bland diet        Return if symptoms worsen or fail to improve  Discussed with the patient and all questioned fully answered  She will call me if any problems arise       [employer] software was used to dictate this note  It may contain errors with dictating incorrect words/spelling  Please contact provider directly with any questions  Chief Complaint     Chief Complaint   Patient presents with    Abdominal Pain     X a few months after eating Pesto Mac and Cheese seen at ED and went to doctor in [facility]   Constipation     Had US- polyp on gallbladder seen by GI  Taking Zofran as she feels sick after both eating and drinking,even water   GERD       History of Present Illness     Intermittent symptoms of periumbilical abdominal pain for over 2 months  Patient was seen by primary care physician in [facility] area and was subsequently referred to gastroenterologist   She was also seen in emergency room  Workup included negative flat x-ray of abdomen, essentially unremarkable  RUQ ultrasound revealing 3 mm gallbladder polyp and  normal pelvic US 3/2022  Patient was tried on PPI and H2 blocker  She was recommended to use Metamucil  GI testing included negative celiac panel and borderline calprotectin which was 75, normal value is below 50, 50 -120 is considered borderline  GI nurse practitioner recommended further evaluation for possible IBD  Pain is daily, worse after meals  Patient feels that  gluten elimination seems to help  C/O nausea, no vomiting  Pain is stabbing, epigastric and periumbilical , no radiation  Regular BMs, BRBPR / only on the wipe, possible " hemorrhoids"-  Pt states it is chronic  Eating is OK, appetite is fair  Current Rx :  Pepcid BID,and PRN Nexium  A lot of burping and belching    FHx:  Endometriosis - aunt  Celiac Ds - aunt        Abdominal Pain  Associated symptoms include constipation  Constipation  Associated symptoms include abdominal pain  Review of Systems   Review of Systems   Constitutional: Negative  HENT: Negative  Eyes: Negative  Respiratory: Negative  Cardiovascular: Negative      Gastrointestinal: Positive for abdominal pain, blood in stool and constipation  Endocrine: Negative  Genitourinary: Negative  Musculoskeletal: Negative  Skin: Negative  Negative for color change  Allergic/Immunologic: Negative  Neurological: Negative  Hematological: Negative  Psychiatric/Behavioral: Negative          Active Problem List     Patient Active Problem List   Diagnosis    Cardiac murmur    Syringomyelia (HCC)    Allergic rhinitis    Selective deficiency of immunoglobulin a (iga) ([address] )    Elevated bilirubin    MARCOS (generalized anxiety disorder)    Mitral valve prolapse    Hyperbilirubinemia    Pectus excavatum    Plantar warts    Chronic seasonal allergic rhinitis due to pollen    Constipation    Periumbilical abdominal pain    Depression, recurrent (HCC)    Chronic midline thoracic back pain    Social anxiety disorder       Past Medical History:  Past Medical History:   Diagnosis Date    Anemia     resolved     Anxiety     Constipation     Depression     GERD (gastroesophageal reflux disease)     Heart murmur     Heavy menstrual bleeding     resolved     Hives     Iron deficiency anemia secondary to inadequate dietary iron intake 10/15/2019    Migraine     resolved     Wears glasses        Past Surgical History:  Past Surgical History:   Procedure Laterality Date    TONSILECTOMY AND ADNOIDECTOMY      WISDOM TOOTH EXTRACTION         Family History:  Family History   Problem Relation Age of Onset    Breast cancer Mother 29    No Known Problems Father        Social History:  Social History     Socioeconomic History    Marital status: Single     Spouse name: Not on file    Number of children: 0    Years of education: Not on file    Highest education level: Not on file   Occupational History    Occupation: student   Tobacco Use    Smoking status: Never Smoker    Smokeless tobacco: Never Used   Vaping Use    Vaping Use: Never used   Substance and Sexual Activity    Alcohol use: Yes     Comment: Social    Drug use: No    Sexual activity: Yes     Partners: Male     Birth control/protection: None   Other Topics Concern    Not on file   Social History Narrative    Daily caffeine consumption    Does not exercise        Who lives in your home: Mom     What type of home do you live in: Other condo     Age of your home: Built 14 yrs ago     How long have you been living there: 2 yrs     Type of heat: Forced hot air    Type of fuel: Electric    What type of naomi is in your bedroom: Carpet    Do you have the following in or near your home:    Air products: Central air and Humidifier    Pests: None    Pets: Dog and Rabbit    Are pets allowed in bedroom: Yes    Open fields, wooded areas nearby: Open fields and Wooded areas    Basement: None    Exposure to second hand smoke: Yes boyfriends house         Habits:    Caffeine: coffee 1 cup daily -    Chocolate: occasionally     Other:     Social Determinants of Health     Financial Resource Strain: Not on file   Food Insecurity: Not on file   Transportation Needs: Not on file   Physical Activity: Insufficiently Active    Days of Exercise per Week: 1 day    Minutes of Exercise per Session: 60 min   Stress: Not on file   Social Connections: Not on file   Intimate Partner Violence: Not on file   Housing Stability: Not on file         Objective     Vitals:    04/28/22 1255   BP: 110/60   BP Location: Right arm   Patient Position: Sitting   Cuff Size: Standard   Pulse: 84   Temp: 98 2 °F (36 8 °C)   TempSrc: Temporal   SpO2: 98%   Height: 5' 8" (1 727 m)       Wt Readings from Last 3 Encounters:   04/12/22 62 3 kg (137 lb 6 4 oz) (67 %, Z= 0 44)*   03/31/22 63 kg (139 lb) (69 %, Z= 0 50)*   03/10/22 63 5 kg (140 lb) (71 %, Z= 0 55)*     * Growth percentiles are based on CDC (Girls, 2-20 Years) data  Physical Exam  Vitals and nursing note reviewed  Constitutional:       General: She is not in acute distress  Appearance: Normal appearance  She is well-developed   She is not ill-appearing  HENT:      Head: Normocephalic and atraumatic  Mouth/Throat:      Mouth: Mucous membranes are moist    Eyes:      Conjunctiva/sclera: Conjunctivae normal    Neck:      Thyroid: No thyromegaly  Vascular: No carotid bruit  Cardiovascular:      Rate and Rhythm: Normal rate and regular rhythm  Heart sounds: Normal heart sounds  No murmur heard  Pulmonary:      Effort: Pulmonary effort is normal  No respiratory distress  Breath sounds: Normal breath sounds  No wheezing  Abdominal:      General: Abdomen is flat  Bowel sounds are normal  There is no distension or abdominal bruit  Palpations: Abdomen is soft  Tenderness: There is abdominal tenderness in the epigastric area and periumbilical area  There is no right CVA tenderness, left CVA tenderness, guarding or rebound  Musculoskeletal:         General: Normal range of motion  Cervical back: Neck supple  Right lower leg: No edema  Left lower leg: No edema  Lymphadenopathy:      Cervical: No cervical adenopathy  Skin:     General: Skin is warm  Neurological:      General: No focal deficit present  Mental Status: She is alert and oriented to person, place, and time  Cranial Nerves: No cranial nerve deficit  Coordination: Coordination normal    Psychiatric:         Mood and Affect: Mood normal          Behavior: Behavior normal          Thought Content:  Thought content normal           Pertinent Laboratory/Diagnostic Studies:    Lab Results   Component Value Date    WBC 8 59 03/17/2022    HGB 15 0 03/17/2022    HCT 43 5 03/17/2022    MCV 91 03/17/2022     03/17/2022       No results found for: TSH    No results found for: CHOL  No results found for: TRIG  No results found for: HDL  No results found for: LDLCALC  No results found for: HGBA1C  Lab Results   Component Value Date    SODIUM 139 02/09/2022    K 3 5 02/09/2022     02/09/2022    CO2 28 02/09/2022    AGAP 10 02/09/2022    BUN 14 02/09/2022    CREATININE 0 75 02/09/2022    GLUC 98 02/09/2022    GLUF 91 11/03/2020    CALCIUM 9 4 02/09/2022    AST 17 02/09/2022    ALT 19 02/09/2022    ALKPHOS 81 02/09/2022    PROT 6 6 06/11/2016    TP 8 1 02/09/2022    BILITOT 2 0 (H) 06/11/2016    TBILI 1 60 (H) 02/09/2022    EGFR 115 02/09/2022       Orders Placed This Encounter   Procedures    Ambulatory referral to Gastroenterology    Ambulatory referral to Gastroenterology       ALLERGIES:  Allergies   Allergen Reactions    Pollen Extract        Current Medications     Current Outpatient Medications   Medication Sig Dispense Refill    bisacodyl (DULCOLAX) 5 mg EC tablet Take 1 tablet (5 mg total) by mouth daily as needed for constipation 30 tablet 0    busPIRone (BUSPAR) 10 mg tablet 1/2 tab po tid with meals x one week then one po tid with meals 90 tablet 0    docusate sodium (COLACE) 100 mg capsule Take 1 capsule (100 mg total) by mouth 2 (two) times a day as needed for constipation 30 capsule 1    esomeprazole (NexIUM) 40 MG capsule Take 40 mg by mouth      famotidine (PEPCID) 20 mg tablet TAKE 1 TABLET BY MOUTH WITH LUNCH AND 1 AT BEDTIME      FLUoxetine (PROzac) 40 MG capsule Take 1 capsule (40 mg total) by mouth in the morning 90 capsule 1    gabapentin (NEURONTIN) 100 mg capsule Take 100 mg by mouth daily      naproxen (EC NAPROSYN) 500 MG EC tablet Take 1 tablet (500 mg total) by mouth 2 (two) times a day as needed for mild pain (cramping) 60 tablet 3    ondansetron (Zofran ODT) 4 mg disintegrating tablet Take 1 tablet (4 mg total) by mouth every 6 (six) hours as needed for nausea or vomiting 20 tablet 0    polyethylene glycol (GLYCOLAX) 17 GM/SCOOP powder Take 17 g by mouth daily 255 g 1    Tranexamic Acid 650 MG TABS Take 2 tablets (1,300 mg total) by mouth 3 (three) times a day as needed (heavy menses) (Patient not taking: Reported on 4/12/2022 ) 30 tablet 3     No current facility-administered medications for this visit  There are no discontinued medications      Health Maintenance     Health Maintenance   Topic Date Due    Pneumococcal Vaccine: Pediatrics (0 to 5 Years) and At-Risk Patients (6 to 59 Years) (1 of 4 - PCV13) 11/27/2008    COVID-19 Vaccine (3 - Pfizer risk 4-dose series) 07/03/2021    Hepatitis C Screening  02/24/2023 (Originally 2000)    Annual Physical  08/30/2022    Influenza Vaccine (Season Ended) 09/01/2022    Depression Remission PHQ  01/21/2023    Chlamydia Screening  03/10/2023    BMI: Adult  04/12/2023    DTaP,Tdap,and Td Vaccines (7 - Td or Tdap) 08/15/2024    HIV Screening  Completed    HIB Vaccine  Completed    Hepatitis B Vaccine  Completed    IPV Vaccine  Completed    Meningococcal ACWY Vaccine  Completed    HPV Vaccine  Completed    Hepatitis A Vaccine  Aged Out       Immunization History   Administered Date(s) Administered    COVID-19 PFIZER VACCINE 0 3 ML IM 05/14/2021, 06/05/2021    DTaP 5 02/13/2003, 04/14/2003, 06/16/2003, 06/29/2004, 03/02/2007    HPV9 02/03/2017, 08/14/2017    Hep B, adult 2002, 01/13/2003, 09/16/2003    Hib (PRP-OMP) 02/13/2003, 04/14/2003, 06/16/2003, 03/25/2004    IPV 02/13/2003, 04/14/2003, 06/29/2004, 03/02/2007    MMR 03/25/2004, 04/05/2007    Meningococcal MCV4P 08/15/2014, 08/16/2019    Meningococcal, Unknown Serogroups 08/15/2014    Pneumococcal Polysaccharide PPV23 02/13/2003    Tdap 08/15/2014    Tuberculin Skin Test-PPD Intradermal 06/25/2021    Varicella 12/30/2003, 04/05/2007       Ryan Mauricio MD

## 2022-04-28 NOTE — PATIENT INSTRUCTIONS
· Please take Nexium (esomeprazole) 40 mg once a day in the morning on an empty stomach half an hour before breakfast  · Please take Pepcid (famotidine) 40 mg once a day in the evening before bed  · You may continue gluten free strict diet  · Please schedule consultation with Gastroenterology, we need to rule out possibility of gastritis/duodenitis and perform colonoscopy  · If no findings on  EGD/colonoscopy, we may discuss possibility of endometriosis  · Please follow low acid/bland diet

## 2022-05-01 PROBLEM — R10.33 PERIUMBILICAL ABDOMINAL PAIN: Chronic | Status: ACTIVE | Noted: 2021-07-21

## 2022-05-01 PROBLEM — F33.1 MODERATE EPISODE OF RECURRENT MAJOR DEPRESSIVE DISORDER (HCC): Status: RESOLVED | Noted: 2020-03-11 | Resolved: 2022-05-01

## 2022-05-02 NOTE — ASSESSMENT & PLAN NOTE
· Persistent epigastric/periumbilical abdominal pain  · I am concerned about gastroduodenitis  · Patient reports few episodes of bright red blood per rectum, usually on the wipe  Borderline calprotectin  · Workup so far included negative celiac panel, patient reports symptomatic improvement on gluten free diet  · Right upper quadrant ultrasound was negative, incidental finding of 3 mm gallbladder polyp  · Start Nexium 40 mg daily  · Take Pepcid 40 mg q h s  · Patient should follow-up with GI and have EGD/colonoscopy done  · We discussed importance of bland low acid diet  · If no GI findings-we should consider endometriosis was possible diagnostic consideration

## 2022-05-24 ENCOUNTER — OFFICE VISIT (OUTPATIENT)
Dept: GASTROENTEROLOGY | Facility: MEDICAL CENTER | Age: 22
End: 2022-05-24
Payer: COMMERCIAL

## 2022-05-24 VITALS
OXYGEN SATURATION: 98 % | BODY MASS INDEX: 20.52 KG/M2 | HEART RATE: 89 BPM | SYSTOLIC BLOOD PRESSURE: 112 MMHG | HEIGHT: 68 IN | DIASTOLIC BLOOD PRESSURE: 68 MMHG | WEIGHT: 135.4 LBS

## 2022-05-24 DIAGNOSIS — R10.33 PERIUMBILICAL ABDOMINAL PAIN: Primary | ICD-10-CM

## 2022-05-24 DIAGNOSIS — K90.41 NON-CELIAC GLUTEN SENSITIVITY: ICD-10-CM

## 2022-05-24 DIAGNOSIS — K59.00 CONSTIPATION, UNSPECIFIED CONSTIPATION TYPE: ICD-10-CM

## 2022-05-24 PROCEDURE — 99244 OFF/OP CNSLTJ NEW/EST MOD 40: CPT | Performed by: INTERNAL MEDICINE

## 2022-05-24 PROCEDURE — 3008F BODY MASS INDEX DOCD: CPT | Performed by: INTERNAL MEDICINE

## 2022-05-24 RX ORDER — DICYCLOMINE HCL 20 MG
20 TABLET ORAL EVERY 6 HOURS PRN
Qty: 120 TABLET | Refills: 2 | Status: SHIPPED | OUTPATIENT
Start: 2022-05-24

## 2022-05-24 NOTE — PROGRESS NOTES
[facility] - Outpatient Consultation  Martha Hollis 25 y o  female MRN: [id number]  Encounter: [id number]          ASSESSMENT AND PLAN:    Arya Mendez is a 25 y o  female who presents with complaint of chronic constipation, abdominal pain, reflux, gallbladder polyp  Calprotectin 75  Celiac serologies negative  CBC normal   TSH normal   Prior CMP total bilirubin 1 6 but otherwise normal   Lipase normal   Albumin normal  Abdominal US with polyp, 3mm  Pelvic US normal  She notes constipation and pain  She might have IBS-C vs CIC  Also with FH of IBD and celiac disease and these do remain possible  DDx also includes SIBO and dysmotility  1  Periumbilical abdominal pain    2  Constipation, unspecified constipation type    3  Non-celiac gluten sensitivity        Orders Placed This Encounter   Procedures    Colonoscopy    EGD     Endoscopy and colonoscopy  MiraLax daily  Add Linzess if miralax not enough  Drink at least 8 cups of water per day  Bentyl as needed for pain  Beano with meals and snacks  Gas-X as needed for gas pain  Pending above consider gastric emptying study versus SIBO testing  ______________________________________________________________________    HPI:    Arya Mendez is a 25 y o  female who presents with complaint of abdominal pain  After she eats and drinks she feels nauseated  It occurs for a few month  Pain near the umbilicus, sharp and stabbing  Better gluten free  She has been eating gluten free for 2-3 months and she feels better (50% better)  Nothing makes better or worse  The pain can last several hours  It does not radiate  She has a Bm every other day, formed, without BRBPR or black stools (she sees blood when she wipes)  No heartburn, dysphagia, odynophagia, nausea, vomiting, weight loss  No fevers, chills, rashes, mouth sores, joint pains  No prior colonoscopy  No prior EGD  + FH of celiac disease in grandmother's side (aunt, cousins)   One relative with Crohn's     No FH of colon cancer or other GI related issues      Answers for HPI/ROS submitted by the patient on 5/21/2022  Chronicity: chronic  Onset: more than 1 month ago  Onset quality: gradual  Frequency: constantly  Episode duration: 5 hours  Progression since onset: gradually worsening  Pain location: periumbilical region  Pain - numeric: 10/10  Pain quality: sharp  Radiates to: suprapubic region, back  anorexia: Yes  arthralgias: No  belching: Yes  constipation: Yes  diarrhea: No  dysuria: No  fever: No  flatus: Yes  frequency: No  headaches: Yes  hematochezia: Yes  hematuria: No  melena: No  myalgias: No  nausea: Yes  weight loss: No  vomiting: No  Aggravated by: eating  Relieved by: being still, certain positions, recumbency  Diagnostic workup: ultrasound      REVIEW OF SYSTEMS:  10 point ROS reviewed and negative, except as above      Historical Information   Past Medical History:   Diagnosis Date    Anemia     resolved     Anxiety     Constipation     Depression     GERD (gastroesophageal reflux disease)     Heart murmur     Heavy menstrual bleeding     resolved     Hives     Iron deficiency anemia secondary to inadequate dietary iron intake 10/15/2019    Migraine     resolved     Wears glasses      Past Surgical History:   Procedure Laterality Date    TONSILECTOMY AND ADNOIDECTOMY      WISDOM TOOTH EXTRACTION       Social History   Social History     Substance and Sexual Activity   Alcohol Use Yes    Comment: Social     Social History     Substance and Sexual Activity   Drug Use No     Social History     Tobacco Use   Smoking Status Never Smoker   Smokeless Tobacco Never Used     Family History   Problem Relation Age of Onset    Breast cancer Mother 29    No Known Problems Father        Meds/Allergies       Current Outpatient Medications:     busPIRone (BUSPAR) 10 mg tablet    dicyclomine (BENTYL) 20 mg tablet    esomeprazole (NexIUM) 40 MG capsule    famotidine (PEPCID) 20 mg tablet    FLUoxetine (PROzac) 40 MG capsule    gabapentin (NEURONTIN) 100 mg capsule    naproxen (EC NAPROSYN) 500 MG EC tablet    ondansetron (Zofran ODT) 4 mg disintegrating tablet    bisacodyl (DULCOLAX) 5 mg EC tablet    docusate sodium (COLACE) 100 mg capsule    polyethylene glycol (GLYCOLAX) 17 GM/SCOOP powder    Tranexamic Acid 650 MG TABS    Allergies   Allergen Reactions    Pollen Extract            Objective     Blood pressure 112/68, pulse 89, height 5' 8" (1 727 m), weight 61 4 kg (135 lb 6 4 oz), SpO2 98 %  Body mass index is 20 59 kg/m²  PHYSICAL EXAMINATION:    General Appearance:   Alert, cooperative, no distress   HEENT:  Normocephalic, atraumatic, anicteric  Neck supple, symmetrical, trachea midline  Lungs:   Equal chest rise and unlabored breathing, normal effort, no coughing  Cardiovascular:   No visualized JVD  Abdomen:   No abdominal distension  Skin:   No jaundice, rashes, or lesions  Musculoskeletal:   Normal range of motion visualized  Psych:  Normal affect and normal insight  Neuro:  Alert and appropriate  Lab Results:   No visits with results within 1 Day(s) from this visit     Latest known visit with results is:   Lab on 04/21/2022   Component Date Value    Progesterone 04/21/2022 2 10        Lab Results   Component Value Date    WBC 8 59 03/17/2022    HGB 15 0 03/17/2022    HCT 43 5 03/17/2022    MCV 91 03/17/2022     03/17/2022       Lab Results   Component Value Date     06/11/2016    SODIUM 139 02/09/2022    K 3 5 02/09/2022     02/09/2022    CO2 28 02/09/2022    AGAP 10 02/09/2022    BUN 14 02/09/2022    CREATININE 0 75 02/09/2022    GLUC 98 02/09/2022    GLUF 91 11/03/2020    CALCIUM 9 4 02/09/2022    AST 17 02/09/2022    ALT 19 02/09/2022    ALKPHOS 81 02/09/2022    PROT 6 6 06/11/2016    TP 8 1 02/09/2022    BILITOT 2 0 (H) 06/11/2016    TBILI 1 60 (H) 02/09/2022    EGFR 115 02/09/2022       Lab Results   Component Value Date    CRP 4 5 (H) 02/14/2020       Lab Results   Component Value Date    [id number] 2 909 03/17/2022       Lab Results   Component Value Date    IRON 66 01/13/2022    TIBC 344 01/13/2022    FERRITIN 44 01/13/2022       Radiology Results:   No results found

## 2022-05-24 NOTE — H&P (VIEW-ONLY)
[facility] - Outpatient Consultation  Martha Hollis 25 y o  female MRN: [id number]  Encounter: [id number]          ASSESSMENT AND PLAN:    Arya Mendez is a 25 y o  female who presents with complaint of chronic constipation, abdominal pain, reflux, gallbladder polyp  Calprotectin 75  Celiac serologies negative  CBC normal   TSH normal   Prior CMP total bilirubin 1 6 but otherwise normal   Lipase normal   Albumin normal  Abdominal US with polyp, 3mm  Pelvic US normal  She notes constipation and pain  She might have IBS-C vs CIC  Also with FH of IBD and celiac disease and these do remain possible  DDx also includes SIBO and dysmotility  1  Periumbilical abdominal pain    2  Constipation, unspecified constipation type    3  Non-celiac gluten sensitivity        Orders Placed This Encounter   Procedures    Colonoscopy    EGD     Endoscopy and colonoscopy  MiraLax daily  Add Linzess if miralax not enough  Drink at least 8 cups of water per day  Bentyl as needed for pain  Beano with meals and snacks  Gas-X as needed for gas pain  Pending above consider gastric emptying study versus SIBO testing  ______________________________________________________________________    HPI:    Arya Mendez is a 25 y o  female who presents with complaint of abdominal pain  After she eats and drinks she feels nauseated  It occurs for a few month  Pain near the umbilicus, sharp and stabbing  Better gluten free  She has been eating gluten free for 2-3 months and she feels better (50% better)  Nothing makes better or worse  The pain can last several hours  It does not radiate  She has a Bm every other day, formed, without BRBPR or black stools (she sees blood when she wipes)  No heartburn, dysphagia, odynophagia, nausea, vomiting, weight loss  No fevers, chills, rashes, mouth sores, joint pains  No prior colonoscopy  No prior EGD  + FH of celiac disease in grandmother's side (aunt, cousins)   One relative with Crohn's     No FH of colon cancer or other GI related issues      Answers for HPI/ROS submitted by the patient on 5/21/2022  Chronicity: chronic  Onset: more than 1 month ago  Onset quality: gradual  Frequency: constantly  Episode duration: 5 hours  Progression since onset: gradually worsening  Pain location: periumbilical region  Pain - numeric: 10/10  Pain quality: sharp  Radiates to: suprapubic region, back  anorexia: Yes  arthralgias: No  belching: Yes  constipation: Yes  diarrhea: No  dysuria: No  fever: No  flatus: Yes  frequency: No  headaches: Yes  hematochezia: Yes  hematuria: No  melena: No  myalgias: No  nausea: Yes  weight loss: No  vomiting: No  Aggravated by: eating  Relieved by: being still, certain positions, recumbency  Diagnostic workup: ultrasound      REVIEW OF SYSTEMS:  10 point ROS reviewed and negative, except as above      Historical Information   Past Medical History:   Diagnosis Date    Anemia     resolved     Anxiety     Constipation     Depression     GERD (gastroesophageal reflux disease)     Heart murmur     Heavy menstrual bleeding     resolved     Hives     Iron deficiency anemia secondary to inadequate dietary iron intake 10/15/2019    Migraine     resolved     Wears glasses      Past Surgical History:   Procedure Laterality Date    TONSILECTOMY AND ADNOIDECTOMY      WISDOM TOOTH EXTRACTION       Social History   Social History     Substance and Sexual Activity   Alcohol Use Yes    Comment: Social     Social History     Substance and Sexual Activity   Drug Use No     Social History     Tobacco Use   Smoking Status Never Smoker   Smokeless Tobacco Never Used     Family History   Problem Relation Age of Onset    Breast cancer Mother 29    No Known Problems Father        Meds/Allergies       Current Outpatient Medications:     busPIRone (BUSPAR) 10 mg tablet    dicyclomine (BENTYL) 20 mg tablet    esomeprazole (NexIUM) 40 MG capsule    famotidine (PEPCID) 20 mg tablet    FLUoxetine (PROzac) 40 MG capsule    gabapentin (NEURONTIN) 100 mg capsule    naproxen (EC NAPROSYN) 500 MG EC tablet    ondansetron (Zofran ODT) 4 mg disintegrating tablet    bisacodyl (DULCOLAX) 5 mg EC tablet    docusate sodium (COLACE) 100 mg capsule    polyethylene glycol (GLYCOLAX) 17 GM/SCOOP powder    Tranexamic Acid 650 MG TABS    Allergies   Allergen Reactions    Pollen Extract            Objective     Blood pressure 112/68, pulse 89, height 5' 8" (1 727 m), weight 61 4 kg (135 lb 6 4 oz), SpO2 98 %  Body mass index is 20 59 kg/m²  PHYSICAL EXAMINATION:    General Appearance:   Alert, cooperative, no distress   HEENT:  Normocephalic, atraumatic, anicteric  Neck supple, symmetrical, trachea midline  Lungs:   Equal chest rise and unlabored breathing, normal effort, no coughing  Cardiovascular:   No visualized JVD  Abdomen:   No abdominal distension  Skin:   No jaundice, rashes, or lesions  Musculoskeletal:   Normal range of motion visualized  Psych:  Normal affect and normal insight  Neuro:  Alert and appropriate  Lab Results:   No visits with results within 1 Day(s) from this visit     Latest known visit with results is:   Lab on 04/21/2022   Component Date Value    Progesterone 04/21/2022 2 10        Lab Results   Component Value Date    WBC 8 59 03/17/2022    HGB 15 0 03/17/2022    HCT 43 5 03/17/2022    MCV 91 03/17/2022     03/17/2022       Lab Results   Component Value Date     06/11/2016    SODIUM 139 02/09/2022    K 3 5 02/09/2022     02/09/2022    CO2 28 02/09/2022    AGAP 10 02/09/2022    BUN 14 02/09/2022    CREATININE 0 75 02/09/2022    GLUC 98 02/09/2022    GLUF 91 11/03/2020    CALCIUM 9 4 02/09/2022    AST 17 02/09/2022    ALT 19 02/09/2022    ALKPHOS 81 02/09/2022    PROT 6 6 06/11/2016    TP 8 1 02/09/2022    BILITOT 2 0 (H) 06/11/2016    TBILI 1 60 (H) 02/09/2022    EGFR 115 02/09/2022       Lab Results   Component Value Date    CRP 4 5 (H) 02/14/2020       Lab Results   Component Value Date    [id number] 2 909 03/17/2022       Lab Results   Component Value Date    IRON 66 01/13/2022    TIBC 344 01/13/2022    FERRITIN 44 01/13/2022       Radiology Results:   No results found

## 2022-05-24 NOTE — PATIENT INSTRUCTIONS
Endoscopy and colonoscopy to be scheduled  MiraLax daily  Add Linzess if Miralax not enough  Drink at least 8 cups of water per day  Bentyl as needed for pain  Beano with meals and snacks  Gas-X as needed for gas pain  Pending above consider gastric emptying study versus SIBO testing    Scheduled date of Colon/EGD (as of today): 06/08/2022  Physician performing: Dr Rolan Hernandes  Location of procedure:  [facility]  Bowel prep reviewed with patient: Miralax/Mag Citrate  Instructions reviewed with patient by: MA  Clearances:  n/a

## 2022-06-06 ENCOUNTER — TELEPHONE (OUTPATIENT)
Dept: OBGYN CLINIC | Facility: CLINIC | Age: 22
End: 2022-06-06

## 2022-06-06 DIAGNOSIS — Z30.011 ENCOUNTER FOR INITIAL PRESCRIPTION OF CONTRACEPTIVE PILLS: Primary | ICD-10-CM

## 2022-06-07 DIAGNOSIS — F41.1 GAD (GENERALIZED ANXIETY DISORDER): ICD-10-CM

## 2022-06-07 DIAGNOSIS — F40.10 SOCIAL ANXIETY DISORDER: ICD-10-CM

## 2022-06-07 RX ORDER — BUSPIRONE HYDROCHLORIDE 10 MG/1
10 TABLET ORAL 3 TIMES DAILY
Qty: 90 TABLET | Refills: 0 | Status: SHIPPED | OUTPATIENT
Start: 2022-06-07 | End: 2022-06-10 | Stop reason: SDUPTHER

## 2022-06-07 RX ORDER — LEVONORGESTREL AND ETHINYL ESTRADIOL 0.1-0.02MG
1 KIT ORAL DAILY
Qty: 28 TABLET | Refills: 3 | Status: SHIPPED | OUTPATIENT
Start: 2022-06-07

## 2022-06-07 RX ORDER — SODIUM CHLORIDE 9 MG/ML
125 INJECTION, SOLUTION INTRAVENOUS CONTINUOUS
Status: CANCELLED | OUTPATIENT
Start: 2022-06-07

## 2022-06-07 NOTE — TELEPHONE ENCOUNTER
Spoke with Maria Isabel at length about her birth control options, benefits, risks and alternatives  She would like to restart a DEACON and had previously taken lessina  Discussed concerns for endometriosis vs other causes of pelvic/abd discomfort  Completing colonoscopy tomorrow  Relationship ended with long term partner and currently wants pregnancy prevention with new partner  Reviewed use of condoms for STI protection  Denies ACHES  May consider use of MVI with folic acid as many questions centered around a future pregnancy  Currently menstruating and last sexually active prior to this menses  Will start [address] today  Rx sent to requested pharmacy  Due for annual exam early September

## 2022-06-08 ENCOUNTER — ANESTHESIA (OUTPATIENT)
Dept: GASTROENTEROLOGY | Facility: MEDICAL CENTER | Age: 22
End: 2022-06-08

## 2022-06-08 ENCOUNTER — HOSPITAL ENCOUNTER (OUTPATIENT)
Dept: GASTROENTEROLOGY | Facility: MEDICAL CENTER | Age: 22
Setting detail: OUTPATIENT SURGERY
Discharge: HOME | End: 2022-06-08
Attending: INTERNAL MEDICINE
Payer: COMMERCIAL

## 2022-06-08 ENCOUNTER — ANESTHESIA EVENT (OUTPATIENT)
Dept: GASTROENTEROLOGY | Facility: MEDICAL CENTER | Age: 22
End: 2022-06-08

## 2022-06-08 VITALS
OXYGEN SATURATION: 98 % | TEMPERATURE: 98.1 F | BODY MASS INDEX: 20.46 KG/M2 | HEART RATE: 82 BPM | DIASTOLIC BLOOD PRESSURE: 56 MMHG | WEIGHT: 135 LBS | SYSTOLIC BLOOD PRESSURE: 106 MMHG | HEIGHT: 68 IN | RESPIRATION RATE: 20 BRPM

## 2022-06-08 DIAGNOSIS — R10.33 PERIUMBILICAL ABDOMINAL PAIN: ICD-10-CM

## 2022-06-08 DIAGNOSIS — K20.90 ESOPHAGITIS: Primary | ICD-10-CM

## 2022-06-08 DIAGNOSIS — K59.00 CONSTIPATION, UNSPECIFIED CONSTIPATION TYPE: ICD-10-CM

## 2022-06-08 DIAGNOSIS — K90.41 NON-CELIAC GLUTEN SENSITIVITY: ICD-10-CM

## 2022-06-08 PROBLEM — Z98.890 PONV (POSTOPERATIVE NAUSEA AND VOMITING): Status: ACTIVE | Noted: 2022-06-08

## 2022-06-08 PROBLEM — R11.2 PONV (POSTOPERATIVE NAUSEA AND VOMITING): Status: ACTIVE | Noted: 2022-06-08

## 2022-06-08 LAB
EXT PREGNANCY TEST URINE: NEGATIVE
EXT. CONTROL: NORMAL

## 2022-06-08 PROCEDURE — 3008F BODY MASS INDEX DOCD: CPT | Performed by: PHYSICIAN ASSISTANT

## 2022-06-08 PROCEDURE — 81025 URINE PREGNANCY TEST: CPT | Performed by: ANESTHESIOLOGY

## 2022-06-08 PROCEDURE — 45380 COLONOSCOPY AND BIOPSY: CPT | Performed by: INTERNAL MEDICINE

## 2022-06-08 PROCEDURE — 88305 TISSUE EXAM BY PATHOLOGIST: CPT | Performed by: PATHOLOGY

## 2022-06-08 PROCEDURE — 43239 EGD BIOPSY SINGLE/MULTIPLE: CPT | Performed by: INTERNAL MEDICINE

## 2022-06-08 RX ORDER — SODIUM CHLORIDE 9 MG/ML
125 INJECTION, SOLUTION INTRAVENOUS CONTINUOUS
Status: DISCONTINUED | OUTPATIENT
Start: 2022-06-08 | End: 2022-06-12 | Stop reason: HOSPADM

## 2022-06-08 RX ORDER — LIDOCAINE HYDROCHLORIDE 20 MG/ML
INJECTION, SOLUTION EPIDURAL; INFILTRATION; INTRACAUDAL; PERINEURAL AS NEEDED
Status: DISCONTINUED | OUTPATIENT
Start: 2022-06-08 | End: 2022-06-08

## 2022-06-08 RX ORDER — PROPOFOL 10 MG/ML
INJECTION, EMULSION INTRAVENOUS AS NEEDED
Status: DISCONTINUED | OUTPATIENT
Start: 2022-06-08 | End: 2022-06-08

## 2022-06-08 RX ORDER — ESOMEPRAZOLE MAGNESIUM 40 MG/1
40 CAPSULE, DELAYED RELEASE ORAL
Qty: 60 CAPSULE | Refills: 0 | Status: SHIPPED | OUTPATIENT
Start: 2022-06-08 | End: 2022-06-30

## 2022-06-08 RX ADMIN — PROPOFOL 200 MG: 10 INJECTION, EMULSION INTRAVENOUS at 07:25

## 2022-06-08 RX ADMIN — PROPOFOL 50 MG: 10 INJECTION, EMULSION INTRAVENOUS at 07:30

## 2022-06-08 RX ADMIN — LIDOCAINE HYDROCHLORIDE 100 MG: 20 INJECTION, SOLUTION EPIDURAL; INFILTRATION; INTRACAUDAL at 07:25

## 2022-06-08 RX ADMIN — PROPOFOL 50 MG: 10 INJECTION, EMULSION INTRAVENOUS at 07:34

## 2022-06-08 RX ADMIN — PROPOFOL 50 MG: 10 INJECTION, EMULSION INTRAVENOUS at 07:28

## 2022-06-08 RX ADMIN — SODIUM CHLORIDE 125 ML/HR: 0.9 INJECTION, SOLUTION INTRAVENOUS at 07:13

## 2022-06-08 RX ADMIN — PROPOFOL 50 MG: 10 INJECTION, EMULSION INTRAVENOUS at 07:37

## 2022-06-08 RX ADMIN — PROPOFOL 50 MG: 10 INJECTION, EMULSION INTRAVENOUS at 07:41
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
## 2022-06-08 NOTE — ANESTHESIA PREPROCEDURE EVALUATION
Procedure:  COLONOSCOPY  EGD    Relevant Problems   ANESTHESIA   (+) PONV (postoperative nausea and vomiting)      CARDIO   (+) Cardiac murmur   (+) Chronic midline thoracic back pain   (+) Mitral valve prolapse      ENDO (within normal limits)      MUSCULOSKELETAL   (+) Chronic midline thoracic back pain      NEURO/PSYCH   (+) Chronic midline thoracic back pain   (+) Depression, recurrent (HCC)   (+) MARCOS (generalized anxiety disorder)   (+) Social anxiety disorder   (+) Syringomyelia (HCC)      PULMONARY (within normal limits)        Physical Exam    Airway    Mallampati score: I  TM Distance: >3 FB  Neck ROM: full     Dental   No notable dental hx     Cardiovascular  Cardiovascular exam normal    Pulmonary  Pulmonary exam normal     Other Findings        Anesthesia Plan  ASA Score- 2     Anesthesia Type- IV sedation with anesthesia with ASA Monitors  Additional Monitors:   Airway Plan:           Plan Factors-    Chart reviewed  Imaging results reviewed  Existing labs reviewed  Patient summary reviewed  Induction- intravenous  Postoperative Plan-     Informed Consent- Anesthetic plan and risks discussed with patient

## 2022-06-08 NOTE — INTERVAL H&P NOTE
H&P reviewed  After examining the patient I find no changes in the patients condition since the H&P had been written      Vitals:    06/08/22 0702   BP: 134/74   Pulse: 94   Resp: 20   Temp: 98 1 °F (36 7 °C)   SpO2: 100%

## 2022-06-08 NOTE — ANESTHESIA POSTPROCEDURE EVALUATION
Post-Op Assessment Note    CV Status:  Stable    Pain management: adequate     Mental Status:  Alert and awake   Hydration Status:  Euvolemic   PONV Controlled:  Controlled   Airway Patency:  Patent      Post Op Vitals Reviewed: Yes      Staff: Anesthesiologist         No complications documented      /56 (06/08/22 0808)    Temp      Pulse 82 (06/08/22 0808)   Resp 20 (06/08/22 0808)    SpO2 98 % (06/08/22 0808)

## 2022-06-10 ENCOUNTER — TELEMEDICINE (OUTPATIENT)
Dept: PSYCHIATRY | Facility: CLINIC | Age: 22
End: 2022-06-10
Payer: COMMERCIAL

## 2022-06-10 DIAGNOSIS — F40.10 SOCIAL ANXIETY DISORDER: ICD-10-CM

## 2022-06-10 DIAGNOSIS — F33.9 DEPRESSION, RECURRENT (HCC): Primary | ICD-10-CM

## 2022-06-10 DIAGNOSIS — F41.1 GAD (GENERALIZED ANXIETY DISORDER): ICD-10-CM

## 2022-06-10 PROCEDURE — 1036F TOBACCO NON-USER: CPT | Performed by: PHYSICIAN ASSISTANT

## 2022-06-10 PROCEDURE — 99213 OFFICE O/P EST LOW 20 MIN: CPT | Performed by: PHYSICIAN ASSISTANT

## 2022-06-10 RX ORDER — BUSPIRONE HYDROCHLORIDE 10 MG/1
10 TABLET ORAL 3 TIMES DAILY
Qty: 90 TABLET | Refills: 0 | Status: SHIPPED | OUTPATIENT
Start: 2022-06-10
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
## 2022-06-27 ENCOUNTER — TELEPHONE (OUTPATIENT)
Dept: OBGYN CLINIC | Facility: CLINIC | Age: 22
End: 2022-06-27

## 2022-06-27 NOTE — TELEPHONE ENCOUNTER
----- Message from [place] sent at 6/25/2022  8:18 AM EDT -----  Regarding: Birth Control   Yobany Rosa,  I have a question so I have been having brown discharge for the past week or so and I dont know if that is normal or not  I still have some  Not sure if its the birth control or something else if you could possibly give me a call so we can talk that would be great!

## 2022-06-30 DIAGNOSIS — K20.90 ESOPHAGITIS: ICD-10-CM

## 2022-06-30 RX ORDER — ESOMEPRAZOLE MAGNESIUM 40 MG/1
CAPSULE, DELAYED RELEASE ORAL
Qty: 180 CAPSULE | Refills: 1 | Status: SHIPPED | OUTPATIENT
Start: 2022-06-30

## 2022-07-11 ENCOUNTER — TELEPHONE (OUTPATIENT)
Dept: FAMILY MEDICINE CLINIC | Facility: CLINIC | Age: 22
End: 2022-07-11

## 2022-08-28 ENCOUNTER — AMB VIDEO VISIT (OUTPATIENT)
Dept: OTHER | Facility: HOSPITAL | Age: 22
End: 2022-08-28

## 2022-08-28 DIAGNOSIS — J01.00 ACUTE NON-RECURRENT MAXILLARY SINUSITIS: Primary | ICD-10-CM

## 2022-08-28 PROCEDURE — ECARE PR SL URGENT CARE VIRTUAL VISIT: Performed by: PHYSICIAN ASSISTANT

## 2022-08-28 RX ORDER — DOXYCYCLINE HYCLATE 100 MG
100 TABLET ORAL 2 TIMES DAILY
Qty: 14 TABLET | Refills: 0 | Status: SHIPPED | OUTPATIENT
Start: 2022-08-28 | End: 2022-09-04

## 2022-08-28 RX ORDER — BENZONATATE 200 MG/1
200 CAPSULE ORAL 3 TIMES DAILY PRN
Qty: 20 CAPSULE | Refills: 0 | Status: SHIPPED | OUTPATIENT
Start: 2022-08-28 | End: 2022-09-15 | Stop reason: ALTCHOICE

## 2022-08-28 RX ORDER — AMOXICILLIN AND CLAVULANATE POTASSIUM 875; 125 MG/1; MG/1
1 TABLET, FILM COATED ORAL 2 TIMES DAILY
Qty: 20 TABLET | Refills: 0 | Status: SHIPPED | OUTPATIENT
Start: 2022-08-28 | End: 2022-08-28 | Stop reason: ALTCHOICE

## 2022-08-28 NOTE — CARE ANYWHERE EVISITS
Visit Summary for Belle WILD - Gender: Female - Date of Birth: [birth year]  Date: [id number] - Duration: 17 minutes  Patient: Belle WILD  Provider: Adolfo Cho PA-C    Patient Contact Information  Address  [address] [place] [address],[address]  [place]; [address] [facility]  [phone]    Visit Topics  Cold like symptoms and earache  [Added By: Self - 2022-08-28]    Triage Questions   What is your current physical address in the event of a medical emergency? Answer []  Are you allergic to any medications? Answer []  Are you now or could you be pregnant? Answer []  Do you have any immune system compromise or chronic lung   disease? Answer []  Do you have any vulnerable family members in the home (infant, pregnant, cancer, elderly)? Answer []     Conversation Transcripts  [0A][0A] [Notification] You are connected with Adolfo Cho PA-C, Urgent Care Specialist [0A][Notification] Lucien Briggs is located in [address] [place] [address]  [0A][Notification] Lucien Briggs has shared health history  Urvashi Luis  [0A]    Diagnosis  Acute maxillary sinusitis    Procedures  Value: [id number] Code: CPT-4 UNLISTED E&M SERVICE    Medications Prescribed    No prescriptions ordered    Electronically signed by: Apryl Figueroa(NPI [id number])

## 2022-08-28 NOTE — PATIENT INSTRUCTIONS
As discussed, sinus infections are typically viral and will get better on their own in 7-10 days  You should try symptomatic relief in the meantime with OTC (Claritin or Zyrtec), Flonase, and Sudafed  You can also try sinus rinses with a neti pot or nasal lavage (be sure to use distilled water ) If your symptoms do not improve after 7-10 days, you may need antibiotics because it is more likely to be bacterial at that point  Follow-up with your primary care physician for recheck in 2-3 business days  Go to the ER for sudden severe headache, high fevers, change in vision, seizure activity or anything else that is concerning

## 2022-08-28 NOTE — LETTER
[facility] VISIT VIR  [address] [address]    August 28, 2022     Patient: Vesna Matson   YOB: 2000   Date of Visit: 8/28/2022       To Whom it May Concern:    Vesna Matson is under my professional care  She was seen virtually on 8/28/2022  She may return to work on 8/30/22  If you have any questions or concerns, please don't hesitate to call           Sincerely,          Charley Phipps PA-C        CC: No Recipients

## 2022-08-28 NOTE — PROGRESS NOTES
Video Visit - Martha Hollis 25 y o  female MRN: [id number]    REQUIRED DOCUMENTATION:         1  [facility] service was [facility] via [facility]  2  Provider located at [address] [place] [id number]  3  [facility] provider: Kun Bernal PA-C   4  Identify all parties in room with patient during [facility] visit:  boyfriend, permission granted  5  After connecting through [employer], patient was identified by name and date of birth  Patient was then informed that this was a Telemedicine visit and that the exam was being conducted confidentially over secure lines  My office door was closed  No one else was in the room  Patient acknowledged consent and understanding of privacy and security of the Telemedicine visit  I informed the patient that I have reviewed their record in Epic and presented the opportunity for them to ask any questions regarding the visit today  The patient agreed to participate  VITALS: Heart Rate: 60 BPM, Respiratory Rate: 12 RPM, Temperature Unavailable° F, Blood Pressure Unavailable mmHg, Pulse Ox Unavailable % on RA    HPI  Pt reports pain in R ear starting 1 week ago and was seen at [facility] and prescribed sudafed and flonase  Feels clogged feeling in R ear and gets sharp pain radiating down eustachian tube  Then 2 days later developed sinus congestion in maxillary sinus region  Recently developed dry cough x 3 days  Reports subjective fevers, but not registering a fever on thermometer  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  Tested Negative for [facility] 2 days ago at [facility]  Vaccinated x 3  LMP today  Patient later explains she was seen at [facility] 1 week ago and rx'd amox and then was seen again 2 days ago when she tested negative for COVID  Physical Exam  Constitutional:       General: She is not in acute distress  Appearance: Normal appearance  She is not toxic-appearing  HENT:      Head: Normocephalic and atraumatic  Nose: No rhinorrhea  Mouth/Throat:      Mouth: Mucous membranes are moist    Eyes:      Conjunctiva/sclera: Conjunctivae normal    Cardiovascular:      Rate and Rhythm: Normal rate  Pulmonary:      Effort: Pulmonary effort is normal  No respiratory distress  Breath sounds: No wheezing (no gross audible wheeze through computer)  Musculoskeletal:      Cervical back: Normal range of motion  Skin:     Findings: No rash (on face or neck)  Neurological:      Mental Status: She is alert  Cranial Nerves: No dysarthria or facial asymmetry  Psychiatric:         Mood and Affect: Mood normal          Behavior: Behavior normal          Diagnoses and all orders for this visit:    Acute non-recurrent maxillary sinusitis  -     Discontinue: amoxicillin-clavulanate (AUGMENTIN) 875-125 mg per tablet; Take 1 tablet by mouth 2 (two) times a day for 10 days  -     doxycycline hyclate (VIBRA-TABS) 100 mg tablet; Take 1 tablet (100 mg total) by mouth 2 (two) times a day for 7 days  -     benzonatate (TESSALON) 200 MG capsule; Take 1 capsule (200 mg total) by mouth 3 (three) times a day as needed for cough      Patient Instructions   As discussed, sinus infections are typically viral and will get better on their own in 7-10 days  You should try symptomatic relief in the meantime with OTC (Claritin or Zyrtec), Flonase, and Sudafed  You can also try sinus rinses with a neti pot or nasal lavage (be sure to use distilled water ) If your symptoms do not improve after 7-10 days, you may need antibiotics because it is more likely to be bacterial at that point  Follow-up with your primary care physician for recheck in 2-3 business days  Go to the ER for sudden severe headache, high fevers, change in vision, seizure activity or anything else that is concerning

## 2022-09-09 ENCOUNTER — TELEPHONE (OUTPATIENT)
Dept: OBGYN CLINIC | Facility: CLINIC | Age: 22
End: 2022-09-09

## 2022-09-09 DIAGNOSIS — Z30.41 ENCOUNTER FOR SURVEILLANCE OF CONTRACEPTIVE PILLS: Primary | ICD-10-CM

## 2022-09-09 DIAGNOSIS — Z30.011 ENCOUNTER FOR INITIAL PRESCRIPTION OF CONTRACEPTIVE PILLS: ICD-10-CM

## 2022-09-09 NOTE — TELEPHONE ENCOUNTER
Left voice message for Maria Isabel to return  I wanted to see how she is doing and discuss her birth control as I just received a request for refill   She is also due for her annual exam

## 2022-09-12 RX ORDER — FLUTICASONE PROPIONATE 50 MCG
SPRAY, SUSPENSION (ML) NASAL
COMMUNITY
Start: 2022-08-26

## 2022-09-13 NOTE — TELEPHONE ENCOUNTER
Pt is available anytime today with the exception of 12:30-2pm  She has an annual sched with am 12/5/22

## 2022-09-13 NOTE — TELEPHONE ENCOUNTER
Pt called asking if Hipolito Parks can call he back  And requesting a refill  Please advise! Thanks!

## 2022-09-14 RX ORDER — LEVONORGESTREL AND ETHINYL ESTRADIOL 0.1-0.02MG
1 KIT ORAL DAILY
Qty: 84 TABLET | Refills: 0 | Status: SHIPPED | OUTPATIENT
Start: 2022-09-14

## 2022-09-14 NOTE — TELEPHONE ENCOUNTER
Spoke with Maria Isabel who is pleased with her current [facility]  Bleeding is less and admits to typical compliance  Rarely misses a pill and did double up once she remembered  Admits to nausea after taking 2 pills at once  Encouraged to separate pills by 8 hour and set cell phone to assist with compliance

## 2022-09-15 ENCOUNTER — OFFICE VISIT (OUTPATIENT)
Dept: FAMILY MEDICINE CLINIC | Facility: CLINIC | Age: 22
End: 2022-09-15
Payer: COMMERCIAL

## 2022-09-15 VITALS
RESPIRATION RATE: 16 BRPM | HEIGHT: 68 IN | BODY MASS INDEX: 19.61 KG/M2 | OXYGEN SATURATION: 98 % | DIASTOLIC BLOOD PRESSURE: 64 MMHG | SYSTOLIC BLOOD PRESSURE: 122 MMHG | TEMPERATURE: 98 F | WEIGHT: 129.4 LBS | HEART RATE: 91 BPM

## 2022-09-15 DIAGNOSIS — J01.90 ACUTE SINUSITIS, RECURRENCE NOT SPECIFIED, UNSPECIFIED LOCATION: ICD-10-CM

## 2022-09-15 DIAGNOSIS — G43.009 MIGRAINE WITHOUT AURA AND WITHOUT STATUS MIGRAINOSUS, NOT INTRACTABLE: ICD-10-CM

## 2022-09-15 DIAGNOSIS — R05.9 COUGH IN ADULT PATIENT: Primary | ICD-10-CM

## 2022-09-15 PROCEDURE — 99214 OFFICE O/P EST MOD 30 MIN: CPT | Performed by: FAMILY MEDICINE

## 2022-09-15 RX ORDER — CEFUROXIME AXETIL 500 MG/1
500 TABLET ORAL 2 TIMES DAILY
Qty: 14 TABLET | Refills: 0 | Status: SHIPPED | OUTPATIENT
Start: 2022-09-15 | End: 2022-09-22

## 2022-09-15 NOTE — PATIENT INSTRUCTIONS
DENIED-ADVISED IN July TO COME BACK IN AUGUST FOR APPOINTMENT   If you experience recurrence of migraine, please take 600 mg of ibuprofen with cold caffeinated soda, every 6 to 8 hours, please send me a message that migraines have recurred  We may consider prescription medication for migraine  if needed  Complete blood work please  Please consider gargling and using saline nasal rinses twice a day  Please take new antibiotic for cough for 7 days, please continue NyQuil as needed    If you cough will not improve significantly by Monday/Tuesday-please call me a message me and will consider short course of prednisone

## 2022-09-15 NOTE — PROGRESS NOTES
FAMILY PRACTICE OFFICE VISIT       NAME: Martha Hollis  AGE: 25 y o  SEX: female       : 2000        MRN: [id number]        Assessment and Plan     1  Cough in adult patient    2  Migraine without aura and without status migrainosus, not intractable  Assessment & Plan:  Single occurrence of migraine headache back in May  No recent headaches  Patient is on birth control pills as per gyn  Proceed with blood work  Patient will contact me if symptoms recur    Orders:  -     CBC and differential; Future  -     Comprehensive metabolic panel; Future  -     Lyme Total Antibody Profile with reflex to WB; Future  -     TSH, 3rd generation; Future    3  Acute sinusitis, recurrence not specified, unspecified location  -     cefuroxime (CEFTIN) 500 mg tablet; Take 1 tablet (500 mg total) by mouth 2 (two) times a day for 7 days    Patient presents for evaluation of recurrent symptoms of uric, postnasal drip, likely related to sinusitis  She was treated with amoxicillin and subsequently doxycycline with partial but incomplete improvement  Lung exam is clear  Cough is primarily at night  Patient will start Ceftin for 7 days continue Flonase and saline nasal rinses  Patient Instructions   If you experience recurrence of migraine, please take 600 mg of ibuprofen with cold caffeinated soda, every 6 to 8 hours, please send me a message that migraines have recurred  We may consider prescription medication for migraine  if needed  Complete blood work please  Please consider gargling and using saline nasal rinses twice a day  Please take new antibiotic for cough for 7 days, please continue NyQuil as needed  If you cough will not improve significantly by Monday/Tuesday-please call me a message me and will consider short course of prednisone        Return if symptoms worsen or fail to improve  Discussed with the patient and all questioned fully answered  She will call me if any problems arise  M*DWNLD software was used to dictate this note  It may contain errors with dictating incorrect words/spelling  Please contact provider directly with any questions  Chief Complaint     Chief Complaint   Patient presents with    Follow-up     Migraines/ Dizziness/ Fatigue and Discuss recent health       History of Present Illness      Recurrent cold symptoms lately    Seen at [facility]  X 2 times - Dxed with URI/ otitis meida   Treated with Amoxil / Flonase and Sudafed    Subsequently seen by telemedicine-  Dxed with sinusitis    Chills and fever 100 9 over the weekend  Negative COVID test  Fever lasted 1-2 days and resoved  Ongoing cough, PND, nasal congestion  Currently treated with doxycycline, symptoms have improved but did not resolve  Cough is worse at night - attacks  PND ,tickle in the back of her throat  No sinus pressure  Started new BCP in May , as per [facility]   Episode of migraine- lasting 3 days in June, no recurrences   Midol did not help   History of iron deficiency anemia  Patient reports that menses have been regular and overall lighter  She would like to proceed with repeat blood work          Review of Systems   Review of Systems   Constitutional: Negative  HENT: Positive for congestion, postnasal drip and sore throat  Eyes: Negative  Respiratory: Positive for cough  Negative for chest tightness, shortness of breath and wheezing  Cardiovascular: Negative  Gastrointestinal: Negative  Endocrine: Negative  Genitourinary: Negative  Musculoskeletal: Negative  Neurological: Positive for headaches  Hematological: Negative  Psychiatric/Behavioral: Negative          Active Problem List     Patient Active Problem List   Diagnosis    Cardiac murmur    Syringomyelia (HCC)    Allergic rhinitis    Selective deficiency of immunoglobulin a (iga) ([facility] [address] )    Elevated bilirubin    MARCOS (generalized anxiety disorder)    Mitral valve prolapse    Pectus excavatum    Chronic seasonal allergic rhinitis due to pollen    Constipation    Periumbilical abdominal pain    Depression, recurrent (HCC)    Chronic midline thoracic back pain    Social anxiety disorder    PONV (postoperative nausea and vomiting)    Migraine without aura and without status migrainosus, not intractable       Past Medical History:  Past Medical History:   Diagnosis Date    Anemia     resolved     Anxiety     Constipation     Depression     GERD (gastroesophageal reflux disease)     Heart murmur     Heavy menstrual bleeding     resolved     Hives     Iron deficiency anemia secondary to inadequate dietary iron intake 10/15/2019    Migraine     resolved     Wears glasses        Past Surgical History:  Past Surgical History:   Procedure Laterality Date    TONSILECTOMY AND ADNOIDECTOMY      WISDOM TOOTH EXTRACTION         Family History:  Family History   Problem Relation Age of Onset    Breast cancer Mother 29    No Known Problems Father        Social History:  Social History     Socioeconomic History    Marital status: Single     Spouse name: Not on file    Number of children: 0    Years of education: Not on file    Highest education level: Not on file   Occupational History    Occupation: student   Tobacco Use    Smoking status: Never Smoker    Smokeless tobacco: Never Used   Vaping Use    Vaping Use: Never used   Substance and Sexual Activity    Alcohol use: Yes     Comment: Social    Drug use: No    Sexual activity: Yes     Partners: Male     Birth control/protection: None   Other Topics Concern    Not on file   Social History Narrative    Daily caffeine consumption    Does not exercise        Who lives in your home: Mom     What type of home do you live in: Other condo     Age of your home: Built 14 yrs ago     How long have you been living there: 2 yrs     Type of heat: Forced hot air    Type of fuel: Electric    What type of naomi is in your bedroom: Carpet    Do you have the following in or near your home:    Air products: Central air and Humidifier    Pests: None    Pets: Dog and Rabbit    Are pets allowed in bedroom: Yes    Open fields, wooded areas nearby: Open fields and Wooded areas    Basement: None    Exposure to second hand smoke: Yes boyfriends house         Habits:    Caffeine: coffee 1 cup daily -    Chocolate: occasionally     Other:     Social Determinants of Health     Financial Resource Strain: Not on file   Food Insecurity: Not on file   Transportation Needs: Not on file   Physical Activity: Not on file   Stress: Not on file   Social Connections: Not on file   Intimate Partner Violence: Not on file   Housing Stability: Not on file         Objective     Vitals:    09/15/22 1342   BP: 122/64   BP Location: Left arm   Patient Position: Sitting   Cuff Size: Standard   Pulse: 91   Resp: 16   Temp: 98 °F (36 7 °C)   TempSrc: Temporal   SpO2: 98%   Weight: 58 7 kg (129 lb 6 4 oz)   Height: 5' 8" (1 727 m)       Wt Readings from Last 3 Encounters:   09/15/22 58 7 kg (129 lb 6 4 oz) (52 %, Z= 0 06)*   06/08/22 61 2 kg (135 lb) (63 %, Z= 0 33)*   05/24/22 61 4 kg (135 lb 6 4 oz) (64 %, Z= 0 35)*     * Growth percentiles are based on CDC (Girls, 2-20 Years) data  Physical Exam  Vitals and nursing note reviewed  Constitutional:       General: She is not in acute distress  Appearance: Normal appearance  She is well-developed  She is not ill-appearing  HENT:      Head: Normocephalic and atraumatic  Right Ear: Tympanic membrane normal       Left Ear: Tympanic membrane normal       Nose: Congestion present  Mouth/Throat:      Pharynx: Posterior oropharyngeal erythema (postnasal drip) present  Eyes:      Conjunctiva/sclera: Conjunctivae normal    Neck:      Thyroid: No thyromegaly  Vascular: No carotid bruit  Cardiovascular:      Rate and Rhythm: Normal rate and regular rhythm  Heart sounds: Normal heart sounds  No murmur heard    Pulmonary: Effort: Pulmonary effort is normal  No respiratory distress  Breath sounds: Normal breath sounds  No wheezing  Abdominal:      General: There is no abdominal bruit  Musculoskeletal:         General: Normal range of motion  Cervical back: Neck supple  Right lower leg: No edema  Left lower leg: No edema  Neurological:      General: No focal deficit present  Mental Status: She is alert and oriented to person, place, and time  Cranial Nerves: No cranial nerve deficit  Coordination: Coordination normal    Psychiatric:         Mood and Affect: Mood normal          Behavior: Behavior normal          Thought Content:  Thought content normal           Pertinent Laboratory/Diagnostic Studies:    Lab Results   Component Value Date    WBC 8 59 03/17/2022    HGB 15 0 03/17/2022    HCT 43 5 03/17/2022    MCV 91 03/17/2022     03/17/2022       No results found for: TSH    No results found for: CHOL  No results found for: TRIG  No results found for: HDL  No results found for: LDLCALC  No results found for: HGBA1C  Lab Results   Component Value Date    SODIUM 139 02/09/2022    K 3 5 02/09/2022     02/09/2022    CO2 28 02/09/2022    AGAP 10 02/09/2022    BUN 14 02/09/2022    CREATININE 0 75 02/09/2022    GLUC 98 02/09/2022    GLUF 91 11/03/2020    CALCIUM 9 4 02/09/2022    AST 17 02/09/2022    ALT 19 02/09/2022    ALKPHOS 81 02/09/2022    PROT 6 6 06/11/2016    TP 8 1 02/09/2022    BILITOT 2 0 (H) 06/11/2016    TBILI 1 60 (H) 02/09/2022    EGFR 115 02/09/2022       Orders Placed This Encounter   Procedures    CBC and differential    Comprehensive metabolic panel    Lyme Total Antibody Profile with reflex to WB    TSH, 3rd generation       ALLERGIES:  Allergies   Allergen Reactions    Pollen Extract        Current Medications     Current Outpatient Medications   Medication Sig Dispense Refill    busPIRone (BUSPAR) 10 mg tablet Take 1 tablet (10 mg total) by mouth 3 (three) times a day 90 tablet 0    cefuroxime (CEFTIN) 500 mg tablet Take 1 tablet (500 mg total) by mouth 2 (two) times a day for 7 days 14 tablet 0    dicyclomine (BENTYL) 20 mg tablet Take 1 tablet (20 mg total) by mouth every 6 (six) hours as needed (urgency, abdominal pain) 120 tablet 2    esomeprazole (NexIUM) 40 MG capsule Take 40 mg by mouth      FLUoxetine (PROzac) 40 MG capsule Take 1 capsule (40 mg total) by mouth in the morning 90 capsule 1    fluticasone (FLONASE) 50 mcg/act nasal spray SPRAY 2 SPRAYS INTO EACH NOSTRIL EVERY DAY      gabapentin (NEURONTIN) 100 mg capsule Take 100 mg by mouth daily      levonorgestrel-ethinyl estradiol (Vienva) 0 1-20 MG-MCG per tablet Take 1 tablet by mouth daily 84 tablet 0    naproxen (EC NAPROSYN) 500 MG EC tablet Take 1 tablet (500 mg total) by mouth 2 (two) times a day as needed for mild pain (cramping) 60 tablet 3    ondansetron (Zofran ODT) 4 mg disintegrating tablet Take 1 tablet (4 mg total) by mouth every 6 (six) hours as needed for nausea or vomiting 20 tablet 0     No current facility-administered medications for this visit         Medications Discontinued During This Encounter   Medication Reason    benzonatate (TESSALON) 200 MG capsule Therapy completed       Health Maintenance     Health Maintenance   Topic Date Due    Pneumococcal Vaccine: Pediatrics (0 to 5 Years) and At-Risk Patients (6 to 59 Years) (1 - PCV) 11/27/2008    COVID-19 Vaccine (4 - Booster for VIOlife series) 05/20/2022    Annual Physical  08/30/2022    Influenza Vaccine (1) 09/01/2022    Hepatitis C Screening  02/24/2023 (Originally 2000)    Depression Remission PHQ  01/21/2023    Chlamydia Screening  03/10/2023    BMI: Adult  09/15/2023    DTaP,Tdap,and Td Vaccines (7 - Td or Tdap) 08/15/2024    HIV Screening  Completed    HIB Vaccine  Completed    Hepatitis B Vaccine  Completed    IPV Vaccine  Completed    Meningococcal ACWY Vaccine  Completed    HPV Vaccine Completed    Hepatitis A Vaccine  Aged Out       Immunization History   Administered Date(s) Administered    COVID-19 PFIZER VACCINE 0 3 ML IM 05/14/2021, 06/05/2021, 02/20/2022    DTaP 5 02/13/2003, 04/14/2003, 06/16/2003, 06/29/2004, 03/02/2007    HPV9 02/03/2017, 08/14/2017    Hep B, adult 2000, 01/13/2003, 09/16/2003    Hib (PRP-OMP) 02/13/2003, 04/14/2003, 06/16/2003, 03/25/2004    IPV 02/13/2003, 04/14/2003, 06/29/2004, 03/02/2007    MMR 03/25/2004, 04/05/2007    Meningococcal MCV4P 08/15/2014, 08/16/2019    Meningococcal, Unknown Serogroups 08/15/2014    Pneumococcal Polysaccharide PPV23 02/13/2003    Tdap 08/15/2014    Tuberculin Skin Test-PPD Intradermal 06/25/2021    Varicella 12/30/2003, 04/05/2007       Savannah Gamboa MD

## 2022-09-18 PROBLEM — G43.009 MIGRAINE WITHOUT AURA AND WITHOUT STATUS MIGRAINOSUS, NOT INTRACTABLE: Status: ACTIVE | Noted: 2022-09-18

## 2022-10-04 LAB
ALBUMIN SERPL-MCNC: 4.2 G/DL (ref 3.6–5.1)
ALBUMIN/GLOB SERPL: 1.7 (CALC) (ref 1–2.5)
ALP SERPL-CCNC: 66 U/L (ref 36–128)
ALT SERPL-CCNC: 12 U/L (ref 5–32)
AST SERPL-CCNC: 15 U/L (ref 12–32)
B BURGDOR AB SER IA-ACNC: <0.9 INDEX
BASOPHILS # BLD AUTO: 40 CELLS/UL (ref 0–200)
BASOPHILS NFR BLD AUTO: 0.7 %
BILIRUB SERPL-MCNC: 1.3 MG/DL (ref 0.2–1.1)
BUN SERPL-MCNC: 15 MG/DL (ref 7–20)
BUN/CREAT SERPL: ABNORMAL (CALC) (ref 6–22)
CALCIUM SERPL-MCNC: 9.3 MG/DL (ref 8.9–10.4)
CHLORIDE SERPL-SCNC: 103 MMOL/L (ref 98–110)
CO2 SERPL-SCNC: 28 MMOL/L (ref 20–32)
CREAT SERPL-MCNC: 0.63 MG/DL (ref 0.5–0.96)
EOSINOPHIL # BLD AUTO: 336 CELLS/UL (ref 15–500)
EOSINOPHIL NFR BLD AUTO: 5.9 %
ERYTHROCYTE [DISTWIDTH] IN BLOOD BY AUTOMATED COUNT: 11.4 % (ref 11–15)
GFR/BSA.PRED SERPLBLD CYS-BASED-ARV: 131 ML/MIN/1.73M2
GLOBULIN SER CALC-MCNC: 2.5 G/DL (CALC) (ref 2–3.8)
GLUCOSE SERPL-MCNC: 82 MG/DL (ref 65–99)
HCT VFR BLD AUTO: 42.3 % (ref 35–45)
HGB BLD-MCNC: 14 G/DL (ref 11.7–15.5)
LYMPHOCYTES # BLD AUTO: 2223 CELLS/UL (ref 850–3900)
LYMPHOCYTES NFR BLD AUTO: 39 %
MCH RBC QN AUTO: 29.7 PG (ref 27–33)
MCHC RBC AUTO-ENTMCNC: 33.1 G/DL (ref 32–36)
MCV RBC AUTO: 89.8 FL (ref 80–100)
MONOCYTES # BLD AUTO: 599 CELLS/UL (ref 200–950)
MONOCYTES NFR BLD AUTO: 10.5 %
NEUTROPHILS # BLD AUTO: 2502 CELLS/UL (ref 1500–7800)
NEUTROPHILS NFR BLD AUTO: 43.9 %
PLATELET # BLD AUTO: 242 THOUSAND/UL (ref 140–400)
PMV BLD REES-ECKER: 9.2 FL (ref 7.5–12.5)
POTASSIUM SERPL-SCNC: 3.8 MMOL/L (ref 3.8–5.1)
PROT SERPL-MCNC: 6.7 G/DL (ref 6.3–8.2)
RBC # BLD AUTO: 4.71 MILLION/UL (ref 3.8–5.1)
SODIUM SERPL-SCNC: 139 MMOL/L (ref 135–146)
TSH SERPL-ACNC: 2.62 MIU/L
WBC # BLD AUTO: 5.7 THOUSAND/UL (ref 3.8–10.8)

## 2022-10-06 ENCOUNTER — OFFICE VISIT (OUTPATIENT)
Dept: GASTROENTEROLOGY | Facility: CLINIC | Age: 22
End: 2022-10-06
Payer: COMMERCIAL

## 2022-10-06 VITALS
HEIGHT: 68 IN | BODY MASS INDEX: 19.85 KG/M2 | HEART RATE: 95 BPM | DIASTOLIC BLOOD PRESSURE: 72 MMHG | OXYGEN SATURATION: 96 % | SYSTOLIC BLOOD PRESSURE: 112 MMHG | WEIGHT: 131 LBS | TEMPERATURE: 98.8 F

## 2022-10-06 DIAGNOSIS — R14.0 BLOATING: ICD-10-CM

## 2022-10-06 DIAGNOSIS — K59.00 CONSTIPATION, UNSPECIFIED CONSTIPATION TYPE: ICD-10-CM

## 2022-10-06 DIAGNOSIS — K90.49 DAIRY PRODUCT INTOLERANCE: ICD-10-CM

## 2022-10-06 DIAGNOSIS — K90.41 NON-CELIAC GLUTEN SENSITIVITY: Primary | ICD-10-CM

## 2022-10-06 PROCEDURE — 99213 OFFICE O/P EST LOW 20 MIN: CPT | Performed by: INTERNAL MEDICINE

## 2022-10-06 NOTE — PATIENT INSTRUCTIONS
Maintain dairy free and gluten free diet for now  Keep a food and symptoms journal to identify any additional triggers  Drink at least 8 cups of water per day  Miralax as needed  Continue dicyclomine as needed for abdominal pain  Can use Beano with meals and snacks for gassiness  Gas-X as needed  Continue Nexium daily  Gaviscon as needed for breakthrough symptoms  Repeat colonoscopy at age 41 for routine screening

## 2022-10-06 NOTE — PROGRESS NOTES
[facility] - Outpatient Follow-up Note  Marquita Whaley 25 y o  female MRN: [id number]  Encounter: [id number]          ASSESSMENT AND PLAN:    Marquita Whaley is a 25 y o  female who was previously seen for chronic constipation, abdominal pain, reflux, gallbladder polyps who presents now for follow-up  Overall much better now  She might have dairy and gluten intolerance  Endoscopy and colonoscopy from June 2022 revealed grade a esophagitis, benign-appearing colon polyp  Biopsies with inactive gastritis, benign colon polyp, otherwise benign biopsies  Pelvic ultrasound from March was normal   Abdominal ultrasound from February with 3 mm gallbladder polyp but otherwise normal  Prior calprotectin 75  Most recent CMP revealed a total bilirubin of 1 3 but otherwise normal   Most recent CBC was normal   TSH normal   Lyme screen normal   Prior celiac serologies negative  Prior lipase normal     1  Non-celiac gluten sensitivity    2  Dairy product intolerance    3  Constipation, unspecified constipation type    4  Bloating        No orders of the defined types were placed in this encounter  Maintain dairy free and gluten free diet for now  Keep a food and symptoms journal to identify any additional triggers  Drink at least 8 cups of water per day  Miralax as needed  Continue dicyclomine as needed for abdominal pain  Can use Beano with meals and snacks for gassiness  Gas-X as needed  Continue Nexium daily  Gaviscon as needed for breakthrough symptoms  Consider gastric emptying study versus small intestinal bacterial overgrowth testing  Repeat colonoscopy at age 41 for routine screening  ______________________________________________________________________    SUBJECTIVE:    Marquita Whaley is a 25 y o  female who presents with complaint of abdominal pain  She was seen in the ER on July 28th with nausea and vomiting, and it was thought that her symptoms were likely related to food poisoning  Urine pregnancy test at that time was negative  She feels better  Her stomach here and there is not so good  She has some pain but not every day  The frequency varies  She works at a day care  When she eats the lunch her stomach hurts  Sometimes there is dairy  She is drinking almond milk  She noticed dairy is a big trigger  She is trying to watch what she eats  She feels full after eating gluten  When she was gluten free she felt better  Her iron levels were okay  Some discomfort when she eats those foods  She had salad yesterday and was burping  She has acid reflux depending on what she eats  Maybe 1-2 times per week  No dysphagia, odynophagia, nausea, vomiting, diarrhea, constipation (only occasional), BRBPR, melena  She thinks she lost weight, but she gained it back  Answers for HPI/ROS submitted by the patient on 10/1/2022  Chronicity: chronic  Onset: more than 1 month ago  Onset quality: sudden  Frequency: every several days  Episode duration: 5 days  Progression since onset: waxing and waning  Pain location: periumbilical region  Pain - numeric: 10/10  Pain quality: sharp  Radiates to: back  anorexia: Yes  arthralgias: No  belching: Yes  constipation: Yes  diarrhea: No  dysuria: No  fever: No  flatus: Yes  frequency: No  headaches: No  hematochezia: Yes  hematuria: No  melena: No  myalgias: No  nausea: Yes  weight loss: No  vomiting: No  Aggravated by: being still, bowel movement, certain positions, eating  Relieved by: activity, bowel movements, passing flatus, recumbency  Diagnostic workup: lower endoscopy, upper endoscopy        REVIEW OF SYSTEMS IS OTHERWISE NEGATIVE    10 point ROS reviewed and negative, except as above    Historical Information   Past Medical History:   Diagnosis Date    Anemia     resolved     Anxiety     Constipation     Depression     GERD (gastroesophageal reflux disease)     Heart murmur     Heavy menstrual bleeding     resolved     Hives     Iron deficiency anemia secondary to inadequate dietary iron intake 10/15/2019    Migraine     resolved     Wears glasses      Past Surgical History:   Procedure Laterality Date    TONSILECTOMY AND ADNOIDECTOMY      WISDOM TOOTH EXTRACTION       Social History   Social History     Substance and Sexual Activity   Alcohol Use Yes    Comment: Social     Social History     Substance and Sexual Activity   Drug Use No     Social History     Tobacco Use   Smoking Status Never Smoker   Smokeless Tobacco Never Used     Family History   Problem Relation Age of Onset    Breast cancer Mother 29    No Known Problems Father        Meds/Allergies       Current Outpatient Medications:     busPIRone (BUSPAR) 10 mg tablet    dicyclomine (BENTYL) 20 mg tablet    esomeprazole (NexIUM) 40 MG capsule    FLUoxetine (PROzac) 40 MG capsule    fluticasone (FLONASE) 50 mcg/act nasal spray    gabapentin (NEURONTIN) 100 mg capsule    levonorgestrel-ethinyl estradiol (Vienva) 0 1-20 MG-MCG per tablet    naproxen (EC NAPROSYN) 500 MG EC tablet    ondansetron (Zofran ODT) 4 mg disintegrating tablet    Allergies   Allergen Reactions    Pollen Extract            Objective     Blood pressure 112/72, pulse 95, temperature 98 8 °F (37 1 °C), temperature source Tympanic, height 5' 8" (1 727 m), weight 59 4 kg (131 lb), SpO2 96 %  Body mass index is 19 92 kg/m²  PHYSICAL EXAMINATION:    General Appearance:   Alert, cooperative, no distress   HEENT:  Normocephalic, atraumatic, anicteric  Neck supple, symmetrical, trachea midline  Lungs:   Equal chest rise and unlabored breathing, normal effort, no coughing  Cardiovascular:   No visualized JVD  Abdomen:   No abdominal distension  Skin:   No jaundice, rashes, or lesions  Musculoskeletal:   Normal range of motion visualized  Psych:  Normal affect and normal insight  Neuro:  Alert and appropriate  Lab Results:   No visits with results within 1 Day(s) from this visit     Latest known visit with results is:   Orders Only on 10/01/2022   Component Date Value    Glucose, Random 10/01/2022 82     BUN 10/01/2022 15     Creatinine 10/01/2022 0 63     eGFR 10/01/2022 131     [facility] BUN/CREATININE RA* 03/07/0376 NOT APPLICABLE     Sodium 40/31/5789 139     Potassium 10/01/2022 3 8     Chloride 10/01/2022 103     CO2 10/01/2022 28     Calcium 10/01/2022 9 3     Protein, Total 10/01/2022 6 7     Albumin 10/01/2022 4 2     Globulin 10/01/2022 2 5     Albumin/Globulin Ratio 10/01/2022 1 7     TOTAL BILIRUBIN 10/01/2022 1 3 (A)    Alkaline Phosphatase 10/01/2022 66     AST 10/01/2022 15     ALT 10/01/2022 12     White Blood Cell Count 10/01/2022 5 7     Red Blood Cell Count 10/01/2022 4 71     Hemoglobin 10/01/2022 14 0     HCT 10/01/2022 42 3     MCV 10/01/2022 89 8     MCH 10/01/2022 29 7     MCHC 10/01/2022 33 1     RDW 10/01/2022 11 4     Platelet Count 36/42/7067 242     [facility] AMB MPV 10/01/2022 9 2     Neutrophils (Absolute) 10/01/2022 2,502     Lymphocytes (Absolute) 10/01/2022 2,223     Monocytes (Absolute) 10/01/2022 599     Eosinophils (Absolute) 10/01/2022 336     Basophils ABS 10/01/2022 40     Neutrophils 10/01/2022 43 9     Lymphocytes 10/01/2022 39 0     Monocytes 10/01/2022 10 5     Eosinophils 10/01/2022 5 9     Basophils PCT 10/01/2022 0 7     SL AMB LYME AB SCREEN 10/01/2022 <0 90     TSH 10/01/2022 2 62        Lab Results   Component Value Date    WBC 5 7 10/01/2022    HGB 14 0 10/01/2022    HCT 42 3 10/01/2022    MCV 89 8 10/01/2022     10/01/2022       Lab Results   Component Value Date     06/11/2016    SODIUM 139 10/01/2022    K 3 8 10/01/2022     10/01/2022    CO2 28 10/01/2022    AGAP 10 02/09/2022    BUN 15 10/01/2022    CREATININE 0 63 10/01/2022    GLUC 82 10/01/2022    GLUF 91 11/03/2020    CALCIUM 9 3 10/01/2022    AST 15 10/01/2022    ALT 12 10/01/2022    ALKPHOS 66 10/01/2022    PROT 6 6 06/11/2016    TP 6 7 10/01/2022 BILITOT 2 0 (H) 06/11/2016    TBILI 1 3 (H) 10/01/2022    EGFR 131 10/01/2022       Lab Results   Component Value Date    CRP 4 5 (H) 02/14/2020       Lab Results   Component Value Date    NOG2UGKDMPCN 2 909 03/17/2022    TSH 2 62 10/01/2022       Lab Results   Component Value Date    IRON 66 01/13/2022    TIBC 344 01/13/2022    FERRITIN 44 01/13/2022       Radiology Results:   No results found

## 2022-10-13 DIAGNOSIS — J01.90 ACUTE SINUSITIS, RECURRENCE NOT SPECIFIED, UNSPECIFIED LOCATION: Primary | ICD-10-CM

## 2022-10-13 RX ORDER — CEFPROZIL 500 MG/1
500 TABLET, FILM COATED ORAL 2 TIMES DAILY
Qty: 14 TABLET | Refills: 0 | Status: SHIPPED | OUTPATIENT
Start: 2022-10-13 | End: 2022-10-14 | Stop reason: ALTCHOICE

## 2022-10-14 ENCOUNTER — TELEMEDICINE (OUTPATIENT)
Dept: FAMILY MEDICINE CLINIC | Facility: CLINIC | Age: 22
End: 2022-10-14
Payer: COMMERCIAL

## 2022-10-14 VITALS — WEIGHT: 131 LBS | BODY MASS INDEX: 19.85 KG/M2 | HEIGHT: 68 IN

## 2022-10-14 DIAGNOSIS — J06.9 VIRAL URI: Primary | ICD-10-CM

## 2022-10-14 PROCEDURE — 99213 OFFICE O/P EST LOW 20 MIN: CPT | Performed by: NURSE PRACTITIONER

## 2022-10-14 NOTE — LETTER
October 14, 2022     Patient: Adolfo Irizarry  YOB: 2000  Date of Visit: 10/14/2022      To Whom it May Concern:    Adolfo Irizarry is under my professional care  Murali Camarena was seen in my office on 10/14/2022  Wilmerangelina Nicol may return to work on 10/17/2022  If you have any questions or concerns, please don't hesitate to call           Sincerely,          DOUG Jameson        CC: No Recipients

## 2022-10-14 NOTE — PROGRESS NOTES
COVID-19 Outpatient Progress Note    Assessment/Plan:    Problem List Items Addressed This Visit    None     Visit Diagnoses     Viral URI    -  Primary         Disposition:     I have recommended she complete PCR COVID-19 swab at a local pharmacy  Currently living in [place], too far to travel to our office for swab  Notify me of results when available  Continue supportive care, rest, fluids, tylenol or ibuprofen as needed  Plain mucinex or robitussin as needed  Call for worsening of symptoms or if symptoms are persisting past 7-10 days  I have spent 10 minutes directly with the patient  Encounter provider: DUOG Galaviz     Provider located at: [address] [place] [id number]     Recent Visits  No visits were found meeting these conditions  Showing recent visits within past 7 days and meeting all other requirements  Today's Visits  Date Type Provider Dept   10/14/22 Telemedicine Thao Mcbride, [address] [place] [address] today's visits and meeting all other requirements  Future Appointments  No visits were found meeting these conditions  Showing future appointments within next 150 days and meeting all other requirements     This virtual check-in was done via [address] and patient was informed that this is a secure, HIPAA-compliant platform  She agrees to proceed  Patient agrees to participate in a virtual check in via telephone or video visit instead of presenting to the office to address urgent/immediate medical needs  Patient is aware this is a billable service  She acknowledged consent and understanding of privacy and security of the video platform  The patient has agreed to participate and understands they can discontinue the visit at any time  After connecting through [facility], the patient was identified by name and date of birth   Aminta Díaz was informed that this was a telemedicine visit and that the exam was being conducted confidentially over secure lines  My office door was closed  No one else was in the room  Enid Moncada acknowledged consent and understanding of privacy and security of the telemedicine visit  I informed the patient that I have reviewed her record in Epic and presented the opportunity for her to ask any questions regarding the visit today  The patient agreed to participate  Verification of patient location:  Patient is located in the following state in which I hold an active license: [place]    Subjective:   Enid Moncada is a 25 y o  female who is concerned about COVID-19  Patient's symptoms include fatigue, nasal congestion, rhinorrhea, sore throat, cough and headache  Patient denies fever, chills, anosmia, loss of taste, shortness of breath, chest tightness, abdominal pain, nausea, vomiting, diarrhea and myalgias  - Date of symptom onset: 10/11/2022      COVID-19 vaccination status: Fully vaccinated with booster    Exposure:   Contact with a person who is under investigation (PUI) for or who is positive for COVID-19 within the last 14 days?: No    Hospitalized recently for fever and/or lower respiratory symptoms?: No      Currently a healthcare worker that is involved in direct patient care?: No      Works in a special setting where the risk of COVID-19 transmission may be high? (this may include long-term care, correctional and [facility] facilities; homeless shelters; assisted-living facilities and group homes ): No      Resident in a special setting where the risk of COVID-19 transmission may be high? (this may include long-term care, correctional and [facility] facilities; homeless shelters; assisted-living facilities and group homes ): No      Works in [employer]  Boyfriend now has similar symptoms  Had COVID-19 in 2020  Had sinusitis in September--took Amoxicillin, Doxycycline, and Ceftin  Was feeling better, now sick again  Currently living in [facility] COVID-19 test 2 days ago was negative  Lab Results   Component Value Date    SARSCOV2 Detected (A) 12/15/2020       Review of Systems   Constitutional: Positive for fatigue  Negative for chills and fever  HENT: Positive for congestion, rhinorrhea and sore throat  Respiratory: Positive for cough  Negative for chest tightness and shortness of breath  Gastrointestinal: Negative for abdominal pain, diarrhea, nausea and vomiting  Musculoskeletal: Negative for myalgias  Neurological: Positive for headaches  Current Outpatient Medications on File Prior to Visit   Medication Sig   • busPIRone (BUSPAR) 10 mg tablet Take 1 tablet (10 mg total) by mouth 3 (three) times a day   • dicyclomine (BENTYL) 20 mg tablet Take 1 tablet (20 mg total) by mouth every 6 (six) hours as needed (urgency, abdominal pain)   • esomeprazole (NexIUM) 40 MG capsule Take 40 mg by mouth   • FLUoxetine (PROzac) 40 MG capsule Take 1 capsule (40 mg total) by mouth in the morning   • fluticasone (FLONASE) 50 mcg/act nasal spray SPRAY 2 SPRAYS INTO EACH NOSTRIL EVERY DAY   • gabapentin (NEURONTIN) 100 mg capsule Take 100 mg by mouth daily   • levonorgestrel-ethinyl estradiol (Vienva) 0 1-20 MG-MCG per tablet Take 1 tablet by mouth daily   • naproxen (EC NAPROSYN) 500 MG EC tablet Take 1 tablet (500 mg total) by mouth 2 (two) times a day as needed for mild pain (cramping)   • ondansetron (Zofran ODT) 4 mg disintegrating tablet Take 1 tablet (4 mg total) by mouth every 6 (six) hours as needed for nausea or vomiting   • [DISCONTINUED] cefprosil (CEFZIL) 500 MG tablet Take 1 tablet (500 mg total) by mouth 2 (two) times a day for 7 days       Objective:    Ht 5' 8" (1 727 m)   Wt 59 4 kg (131 lb)   LMP 09/26/2022 (Exact Date)   BMI 19 92 kg/m²      Physical Exam  Vitals and nursing note reviewed  Constitutional:       General: She is not in acute distress  Appearance: Normal appearance  She is not ill-appearing     Pulmonary:      Effort: Pulmonary effort is normal  No respiratory distress  Comments: No cough, no conversational dyspnea  Neurological:      Mental Status: She is alert     Psychiatric:         Mood and Affect: Mood normal        Thao Mcgarry

## 2022-10-24 ENCOUNTER — TELEPHONE (OUTPATIENT)
Dept: PSYCHIATRY | Facility: CLINIC | Age: 22
End: 2022-10-24

## 2022-10-24 ENCOUNTER — TELEPHONE (OUTPATIENT)
Dept: OBGYN CLINIC | Facility: CLINIC | Age: 22
End: 2022-10-24

## 2022-10-24 NOTE — TELEPHONE ENCOUNTER
----- Message from John Schaefer sent at 10/22/2022 11:52 AM EDT -----  Regarding: Birth Control  Yobany Rosa,  So I missed my pill a few times this month and not sure what to do  I’m scared to take it twice In a day because of that one experience I had  But I am still kind of worried with missing it since I’m high risk of becoming pregnant  Im supposed to get my period this coming week since it’s the week of my sugar pill  [employer] been having back pain and cramping the past couple days  To prepare myself… what prenatals do you recommend and how much mcgs do I need  I have some but they are [id number] with folic acid and DHA  Should I continue the birth control or what do you think     Thanks,  [employer]

## 2022-10-25 DIAGNOSIS — N94.6 DYSMENORRHEA: ICD-10-CM

## 2022-10-25 DIAGNOSIS — R10.2 PELVIC PAIN: ICD-10-CM

## 2022-10-25 RX ORDER — NAPROXEN 500 MG/1
500 TABLET ORAL 2 TIMES DAILY PRN
Qty: 60 TABLET | Refills: 0 | Status: SHIPPED | OUTPATIENT
Start: 2022-10-25

## 2022-10-25 NOTE — TELEPHONE ENCOUNTER
----- Message from Everardo Presley sent at 10/25/2022  9:49 AM EDT -----  Regarding: questions  Okay sounds good is there a way you can put another order in because I’m on my last pack  I took a at home pregnancy test and it says negative so that’s a good thing

## 2022-10-31 DIAGNOSIS — R11.0 NAUSEA: ICD-10-CM

## 2022-10-31 RX ORDER — ONDANSETRON 4 MG/1
4 TABLET, ORALLY DISINTEGRATING ORAL EVERY 6 HOURS PRN
Qty: 20 TABLET | Refills: 0 | Status: SHIPPED | OUTPATIENT
Start: 2022-10-31 | End: 2022-10-31 | Stop reason: SDUPTHER

## 2022-10-31 RX ORDER — ONDANSETRON 4 MG/1
4 TABLET, ORALLY DISINTEGRATING ORAL EVERY 6 HOURS PRN
Qty: 20 TABLET | Refills: 0 | Status: SHIPPED | OUTPATIENT
Start: 2022-10-31

## 2022-11-10 ENCOUNTER — OFFICE VISIT (OUTPATIENT)
Dept: URGENT CARE | Facility: CLINIC | Age: 22
End: 2022-11-10

## 2022-11-10 VITALS
RESPIRATION RATE: 18 BRPM | HEART RATE: 100 BPM | SYSTOLIC BLOOD PRESSURE: 129 MMHG | TEMPERATURE: 98.6 F | DIASTOLIC BLOOD PRESSURE: 86 MMHG | OXYGEN SATURATION: 96 %

## 2022-11-10 DIAGNOSIS — J01.00 ACUTE MAXILLARY SINUSITIS, RECURRENCE NOT SPECIFIED: Primary | ICD-10-CM

## 2022-11-10 DIAGNOSIS — J40 BRONCHITIS: ICD-10-CM

## 2022-11-10 DIAGNOSIS — Z20.822 ENCOUNTER FOR LABORATORY TESTING FOR COVID-19 VIRUS: ICD-10-CM

## 2022-11-10 RX ORDER — AZITHROMYCIN 250 MG/1
TABLET, FILM COATED ORAL
Qty: 6 TABLET | Refills: 0 | Status: SHIPPED | OUTPATIENT
Start: 2022-11-10 | End: 2022-11-14

## 2022-11-10 RX ORDER — BENZONATATE 100 MG/1
100 CAPSULE ORAL 3 TIMES DAILY PRN
Qty: 20 CAPSULE | Refills: 0 | Status: SHIPPED | OUTPATIENT
Start: 2022-11-10

## 2022-11-10 NOTE — PROGRESS NOTES
[facility]        NAME: Sruthi Cousin is a 25 y o  female  : 2000    MRN: [id number]  DATE: November 10, 2022  TIME: 10:01 AM    /86   Pulse 100   Temp 98 6 °F (37 °C)   Resp 18   SpO2 96%     Assessment and Plan   Acute maxillary sinusitis, recurrence not specified [J01 00]  1  Acute maxillary sinusitis, recurrence not specified  benzonatate (TESSALON PERLES) 100 mg capsule    azithromycin (ZITHROMAX) 250 mg tablet   2  Bronchitis  benzonatate (TESSALON PERLES) 100 mg capsule    azithromycin (ZITHROMAX) 250 mg tablet   3  Encounter for laboratory testing for COVID-19 virus           Patient Instructions       Follow up with PCP in 3-5 days  Proceed to  ER if symptoms worsen  Chief Complaint   No chief complaint on file  History of Present Illness       Pt with nasal congestion and productive cough x 5-6 days       Review of Systems   Review of Systems   Constitutional: Negative  HENT: Positive for congestion, sinus pressure, sinus pain and sore throat  Eyes: Negative  Respiratory: Positive for cough  Cardiovascular: Negative  Gastrointestinal: Negative  Endocrine: Negative  Genitourinary: Negative  Musculoskeletal: Negative  Skin: Negative  Allergic/Immunologic: Negative  Neurological: Negative  Hematological: Negative  Psychiatric/Behavioral: Negative  All other systems reviewed and are negative          Current Medications       Current Outpatient Medications:   •  azithromycin (ZITHROMAX) 250 mg tablet, Take 2 tablets today then 1 tablet daily x 4 days, Disp: 6 tablet, Rfl: 0  •  benzonatate (TESSALON PERLES) 100 mg capsule, Take 1 capsule (100 mg total) by mouth 3 (three) times a day as needed for cough, Disp: 20 capsule, Rfl: 0  •  busPIRone (BUSPAR) 10 mg tablet, Take 1 tablet (10 mg total) by mouth 3 (three) times a day, Disp: 90 tablet, Rfl: 0  •  dicyclomine (BENTYL) 20 mg tablet, Take 1 tablet (20 mg total) by mouth every 6 (six) hours as needed (urgency, abdominal pain), Disp: 120 tablet, Rfl: 2  •  esomeprazole (NexIUM) 40 MG capsule, Take 40 mg by mouth, Disp: , Rfl:   •  FLUoxetine (PROzac) 40 MG capsule, Take 1 capsule (40 mg total) by mouth in the morning, Disp: 90 capsule, Rfl: 1  •  fluticasone (FLONASE) 50 mcg/act nasal spray, SPRAY 2 SPRAYS INTO EACH NOSTRIL EVERY DAY, Disp: , Rfl:   •  gabapentin (NEURONTIN) 100 mg capsule, Take 100 mg by mouth daily, Disp: , Rfl:   •  levonorgestrel-ethinyl estradiol (Vienva) 0 1-20 MG-MCG per tablet, Take 1 tablet by mouth daily, Disp: 84 tablet, Rfl: 0  •  naproxen (EC NAPROSYN) 500 MG EC tablet, Take 1 tablet (500 mg total) by mouth 2 (two) times a day as needed for mild pain (cramping), Disp: 60 tablet, Rfl: 0  •  ondansetron (Zofran ODT) 4 mg disintegrating tablet, Take 1 tablet (4 mg total) by mouth every 6 (six) hours as needed for nausea or vomiting, Disp: 20 tablet, Rfl: 0    Current Allergies     Allergies as of 11/10/2022 - Reviewed 11/10/2022   Allergen Reaction Noted   • Pollen extract  12/01/2020            The following portions of the patient's history were reviewed and updated as appropriate: allergies, current medications, past family history, past medical history, past social history, past surgical history and problem list      Past Medical History:   Diagnosis Date   • Anemia     resolved    • Anxiety    • Constipation    • Depression    • GERD (gastroesophageal reflux disease)    • Heart murmur    • Heavy menstrual bleeding     resolved    • Hives    • Iron deficiency anemia secondary to inadequate dietary iron intake 10/15/2019   • Migraine     resolved    • Wears glasses        Past Surgical History:   Procedure Laterality Date   • TONSILECTOMY AND ADNOIDECTOMY     • WISDOM TOOTH EXTRACTION         Family History   Problem Relation Age of Onset   • Breast cancer Mother 29   • No Known Problems Father          Medications have been verified          Objective   BP 129/86   Pulse 100   Temp 98 6 °F (37 °C)   Resp 18   SpO2 96%        Physical Exam     Physical Exam  Vitals and nursing note reviewed  Constitutional:       Appearance: Normal appearance  She is normal weight  HENT:      Head: Normocephalic and atraumatic  Right Ear: Tympanic membrane, ear canal and external ear normal       Left Ear: Tympanic membrane, ear canal and external ear normal       Nose: Congestion and rhinorrhea present  Comments: Boggy mucsosa  Yellow nasal d/c     Mouth/Throat:      Mouth: Mucous membranes are moist       Pharynx: Oropharynx is clear  Eyes:      Extraocular Movements: Extraocular movements intact  Conjunctiva/sclera: Conjunctivae normal       Pupils: Pupils are equal, round, and reactive to light  Cardiovascular:      Rate and Rhythm: Normal rate and regular rhythm  Pulses: Normal pulses  Heart sounds: Normal heart sounds  Pulmonary:      Effort: Pulmonary effort is normal       Comments: Minor coarse sounds   Abdominal:      General: Abdomen is flat  Bowel sounds are normal       Palpations: Abdomen is soft  Musculoskeletal:         General: Normal range of motion  Cervical back: Normal range of motion and neck supple  Skin:     General: Skin is warm  Capillary Refill: Capillary refill takes less than 2 seconds  Neurological:      General: No focal deficit present  Mental Status: She is alert and oriented to person, place, and time     Psychiatric:         Mood and Affect: Mood normal          Behavior: Behavior normal

## 2022-11-11 LAB
FLUAV RNA RESP QL NAA+PROBE: NEGATIVE
FLUBV RNA RESP QL NAA+PROBE: NEGATIVE
SARS-COV-2 RNA RESP QL NAA+PROBE: NEGATIVE

## 2022-11-15 ENCOUNTER — OFFICE VISIT (OUTPATIENT)
Dept: PSYCHIATRY | Facility: CLINIC | Age: 22
End: 2022-11-15

## 2022-11-15 DIAGNOSIS — F33.9 DEPRESSION, RECURRENT (HCC): Primary | ICD-10-CM

## 2022-11-15 DIAGNOSIS — F41.1 GAD (GENERALIZED ANXIETY DISORDER): ICD-10-CM

## 2022-11-15 DIAGNOSIS — F40.10 SOCIAL ANXIETY DISORDER: ICD-10-CM

## 2022-11-15 RX ORDER — FLUOXETINE HYDROCHLORIDE 20 MG/1
20 CAPSULE ORAL DAILY
Qty: 90 CAPSULE | Refills: 0 | Status: SHIPPED | OUTPATIENT
Start: 2022-11-15

## 2022-11-15 RX ORDER — BUSPIRONE HYDROCHLORIDE 10 MG/1
TABLET ORAL
Qty: 90 TABLET | Refills: 0 | Status: SHIPPED | OUTPATIENT
Start: 2022-11-15

## 2022-11-15 NOTE — PSYCH
MEDICATION MANAGEMENT NOTE        [address]  [facility] [place] [address] [phone]        Name and Date of Birth:  Richard Solis 25 y o  2000    Date of Visit: November 15, 2022/seen with Russell FLOYD with pt's permission    SUBJECTIVE:     Martha last seen by Daniel 6/10/22 at which time prozac and buspar unchanged  Martha states after that visit she had a break-up with boyfriend at the time; was "getting sick a lot" and stopped the meds  Has continued to see ind therapist   Mack Pedroza not doing well without medication  Symptoms exacerbated by break-up with boyfriend of 6 mths last week and mother;s cancer  Martha presents with symptoms as she did her initial visit here in April: excessive worry, "overthinking"; fear of abandonment, stressed, mistrustful of others; few friends; avoids going places by herself; avoids phone; depressed mood (7-8/10) with amotivation, difficulty getting out of bed and decreased ADLS  Does excessively straighten and organize but no definite OCD  Denies SI  Denies A/V hallucinations  No symptoms of barry/hypomania  Review of Systems   Constitutional: Negative for unexpected weight change  Gastrointestinal:        Under care of GI -last note reviewed- Oct     Genitourinary: Negative for menstrual problem  LMP 10/23       Past Psychiatric History     Inpatient:  None  [facility]  No hx of suicide attempts  Outpatient:  Therapist- Claudia Villafuerte -socorro- x 6-76 yrs  This office since April 2022     Med trials: lexapro -more depressed  Trauma/Loss History:           Abandonment by bio father    Mother's illness  (stage 4 metastatic breast cancer)     Family Psychiatric History:      Psychiatric Illness:      Depression- grandfather; anxiety -aunt  Substance Abuse:       none reported  Suicide Attempts:        uncle     Social History:          HS grad  Had IEP- learning disability- comprehension  Single  Lives with mom  Is student at [employer]- on line classes only -A and Bs               OBJECTIVE:     MENTAL STATUS EXAM  Appearance:  age appropriate, dressed casually   Behavior:  Pleasant & cooperative   Speech:  Normal volume, regular rate and rhythm   Mood:  depressed and anxious   Affect:  constricted   Language: intact and appropriate for age, education, and intellect   Thought Process:  goal directed   Associations: intact associations   Thought Content:  negative ruminations   Perceptual Disturbances: no auditory or visual hallcunations   Risk Potential / Abnormal Thoughts: Suicidal ideation - None  Homicidal ideation - None  Potential for aggression - No       Consciousness:  Alert & Awake   Sensorium:  Grossly oriented   Attention: attention span and concentration are age appropriate       Fund of Knowledge:  Memory: awareness of current events: yes  recent and remote memory grossly intact   Insight:  good   Judgment: good   Muscle Strength Muscle Tone: Grossly normal  normal   Gait/Station: normal gait/station with good balance   Motor Activity: no abnormal movements       Lab Review: I have reviewed all pertinent labs    Lab Results   Component Value Date     06/11/2016    SODIUM 139 10/01/2022    K 3 8 10/01/2022     10/01/2022    CO2 28 10/01/2022    AGAP 10 02/09/2022    BUN 15 10/01/2022    CREATININE 0 63 10/01/2022    GLUC 82 10/01/2022    GLUF 91 11/03/2020    CALCIUM 9 3 10/01/2022    AST 15 10/01/2022    ALT 12 10/01/2022    ALKPHOS 66 10/01/2022    PROT 6 6 06/11/2016    TP 6 7 10/01/2022    BILITOT 2 0 (H) 06/11/2016    TBILI 1 3 (H) 10/01/2022    EGFR 131 10/01/2022     Lab Results   Component Value Date    WBC 5 7 10/01/2022    HGB 14 0 10/01/2022    HCT 42 3 10/01/2022    MCV 89 8 10/01/2022     10/01/2022       Lab Results   Component Value Date    AKL2UFYHOEOX 2 909 03/17/2022    TSH 2 62 10/01/2022           ASSESSMENT & PLAN          Diagnoses and all orders for this visit:    Depression, recurrent (HCC)  -     FLUoxetine (PROzac) 20 mg capsule; Take 1 capsule (20 mg total) by mouth daily    MARCOS (generalized anxiety disorder)  -     FLUoxetine (PROzac) 20 mg capsule; Take 1 capsule (20 mg total) by mouth daily  -     busPIRone (BUSPAR) 10 mg tablet; 1/2 tab po tid x 7 days then one po tid    Social anxiety disorder  -     FLUoxetine (PROzac) 20 mg capsule; Take 1 capsule (20 mg total) by mouth daily  -     busPIRone (BUSPAR) 10 mg tablet; 1/2 tab po tid x 7 days then one po tid      Current Outpatient Medications   Medication Sig Dispense Refill   • busPIRone (BUSPAR) 10 mg tablet 1/2 tab po tid x 7 days then one po tid 90 tablet 0   • FLUoxetine (PROzac) 20 mg capsule Take 1 capsule (20 mg total) by mouth daily 90 capsule 0   • benzonatate (TESSALON PERLES) 100 mg capsule Take 1 capsule (100 mg total) by mouth 3 (three) times a day as needed for cough 20 capsule 0   • dicyclomine (BENTYL) 20 mg tablet Take 1 tablet (20 mg total) by mouth every 6 (six) hours as needed (urgency, abdominal pain) 120 tablet 2   • esomeprazole (NexIUM) 40 MG capsule Take 40 mg by mouth     • fluticasone (FLONASE) 50 mcg/act nasal spray SPRAY 2 SPRAYS INTO EACH NOSTRIL EVERY DAY     • gabapentin (NEURONTIN) 100 mg capsule Take 100 mg by mouth daily     • levonorgestrel-ethinyl estradiol (Vienva) 0 1-20 MG-MCG per tablet Take 1 tablet by mouth daily 84 tablet 0   • naproxen (EC NAPROSYN) 500 MG EC tablet Take 1 tablet (500 mg total) by mouth 2 (two) times a day as needed for mild pain (cramping) 60 tablet 0   • ondansetron (Zofran ODT) 4 mg disintegrating tablet Take 1 tablet (4 mg total) by mouth every 6 (six) hours as needed for nausea or vomiting 20 tablet 0     No current facility-administered medications for this visit  Plan:        Shireen Joy returns to restart meds    Did respond to last med regimen of prozac and buspar- will restart prozac at 20 mg/d for now and buspar - titrate to 10 mg tid as tolerated  Asked about DOUG versus service dog  Martha will look into getting her dog trained as a service dog  Pa-C supportive of this  Will cont to f/u with OP therapist      Reviewed risks, benefits, side effects of medications, including no medication  Patient understands and agrees to treatment plan  F/u PaMike C 4 weeks, sooner prn  Strongly encouraged to call if any symptoms worse, inc any SI, even fleeting  Patient has been informed of 24 hours and weekend coverage for urgent situations accessed by calling the main clinic phone number       Myra Hussein PA-C   Visit Time    Visit Start Time: 7313  Visit Stop Time: 1400  Total Visit Duration: 25 minutes

## 2022-11-16 NOTE — BH TREATMENT PLAN
TREATMENT PLAN (Medication Management Only)        [facility]    Name and Date of Birth:  Russ Lam 25 y o  2000  Date of Treatment Plan: November 16, 2022  Diagnosis/Diagnoses:    1  Depression, recurrent ([address] )    2  MARCOS (generalized anxiety disorder)    3  Social anxiety disorder      Strengths/Personal Resources for Self-Care: ability to communicate well, family ties, good understanding of illness, motivation for treatment, willingness to work on problems  Area/Areas of need (in own words): anxiety, depression  1  Long Term Goal: decrease depression and anxiety  Target Date:3 months - 2/16/2023  Person/Persons responsible for completion of goal: Martha  2  Short Term Objective (s) - How will we reach this goal?:   A  Provider new recommended medication/dosage changes and/or continue medication(s): continue current medications as prescribed  B  Attend medication management appointments regularly  C  Attend psychotherapy regularly  Target Date:3 months - 2/16/2023  Person/Persons Responsible for Completion of Goal: Martha/Daniel/MD  Progress Towards Goals: continuing treatment  Treatment Modality: medication management every 4 weeks  Review due 180 days from date of this plan: 4 months - 3/16/2023  Expected length of service: ongoing treatment  My Physician/PA/NP and I have developed this plan together and I agree to work on the goals and objectives  I understand the treatment goals that were developed for my treatment    Treatment Plan done but not signed at time of office visit due to:  Plan reviewed by phone or in person  and verbal consent given due to Milagros social roya

## 2022-11-17 ENCOUNTER — TELEPHONE (OUTPATIENT)
Dept: OBGYN CLINIC | Facility: CLINIC | Age: 22
End: 2022-11-17

## 2022-11-18 ENCOUNTER — TELEPHONE (OUTPATIENT)
Dept: OBGYN CLINIC | Facility: MEDICAL CENTER | Age: 22
End: 2022-11-18

## 2022-11-18 NOTE — TELEPHONE ENCOUNTER
Left voice message that ideally I need to speak with her about her symptoms as there are a few  If we don't connect before the weekend  Treat with monistat 7 day (follow directions on box) for yeast infection which should resolve the clumpy discharge and itching  No sex during treatment  If the symptoms persist, call back to discuss

## 2022-11-18 NOTE — TELEPHONE ENCOUNTER
----- Message from Enrique Arguelles sent at 11/17/2022  2:03 PM EST -----  Regarding: FW: Yeast Infection? Contact: [phone]  Several issues happening, not sure how you want to treat  ----- Message -----  From: Heath Moore  Sent: 11/17/2022   1:55 PM EST  To: [facility]  Subject: Yeast Infection? My discharge is stringy a little clumpy and it itches internally and externally  I have a strong fish odor

## 2022-12-05 ENCOUNTER — ANNUAL EXAM (OUTPATIENT)
Dept: OBGYN CLINIC | Facility: CLINIC | Age: 22
End: 2022-12-05

## 2022-12-05 VITALS
BODY MASS INDEX: 19.52 KG/M2 | HEIGHT: 68 IN | WEIGHT: 128.8 LBS | SYSTOLIC BLOOD PRESSURE: 120 MMHG | DIASTOLIC BLOOD PRESSURE: 64 MMHG

## 2022-12-05 DIAGNOSIS — Z30.41 ENCOUNTER FOR SURVEILLANCE OF CONTRACEPTIVE PILLS: ICD-10-CM

## 2022-12-05 DIAGNOSIS — Z01.411 ENCNTR FOR GYN EXAM (GENERAL) (ROUTINE) W ABNORMAL FINDINGS: Primary | ICD-10-CM

## 2022-12-05 DIAGNOSIS — Z11.3 SCREENING FOR STD (SEXUALLY TRANSMITTED DISEASE): ICD-10-CM

## 2022-12-05 DIAGNOSIS — N76.0 BV (BACTERIAL VAGINOSIS): ICD-10-CM

## 2022-12-05 DIAGNOSIS — B96.89 BV (BACTERIAL VAGINOSIS): ICD-10-CM

## 2022-12-05 RX ORDER — METRONIDAZOLE 500 MG/1
500 TABLET ORAL EVERY 12 HOURS SCHEDULED
Qty: 14 TABLET | Refills: 0 | Status: SHIPPED | OUTPATIENT
Start: 2022-12-05 | End: 2022-12-12

## 2022-12-05 RX ORDER — LEVONORGESTREL AND ETHINYL ESTRADIOL 0.1-0.02MG
1 KIT ORAL DAILY
Qty: 84 TABLET | Refills: 4 | Status: SHIPPED | OUTPATIENT
Start: 2022-12-05

## 2022-12-05 NOTE — PROGRESS NOTES
Assessment/Plan:  Treatment for menstrual cramps/bleeding- Ibuprofen 600 mg by mouth with onset of bleeding or cramping, whichever is first  Take second dose of ibuprofen  400 mg by mouth with food and repeat every 6 hours x 3 days  Bacterial vaginosis noted on wet mount  Rx sent to requested pharmacy  No sex during treatment  Complete all medication as directed  Consider daily probiotic  Call with any recurrence of symptoms  Multivitamin with folic acid (826 mcg)  Pap every 3 years starting at age 24,  if normal   STI testing as indicated  GC/CT collected today  Exercise most days of week-minimum of 150 minutes per week  Obtain appropriate diet and hydration  Calcium 1000mg + 600 vit D daily  Birth control refilled  Take as directed (ACHES reviewed)  Benefits, risks and alternatives discussed/reviewed  Condom use when sexually active for sexually transmitted infection prevention  HPV 9 vaccine series completed  Annual mammogram- will start sooner than age 38, will consider ABUS at age 24, monthly breast self exam recommended  Return to office in one year or sooner, if needed  1  Encntr for gyn exam (general) (routine) w abnormal findings    2  Screening for STD (sexually transmitted disease)  -     Chlamydia/GC amplified DNA by PCR    3  Encounter for surveillance of contraceptive pills  -     levonorgestrel-ethinyl estradiol (Vienva) 0 1-20 MG-MCG per tablet; Take 1 tablet by mouth daily    4  BV (bacterial vaginosis)  -     POCT wet mount  -     metroNIDAZOLE (FLAGYL) 500 mg tablet; Take 1 tablet (500 mg total) by mouth every 12 (twelve) hours for 7 days               Subjective:      Patient ID: Tha Ospina is a 23 y o  female  HPI    Tha Ospina is a 23 y o  [place] female who is here today for her annual visit  C/o internal and external vaginal itching with malodorous discharge x 3-4 weeks  No medications used  Monthly menses x 5-6 days with varied flow   Severe lower pelvic cramping that starts days before her menses  Rates cramps 8/10  Motrin taken and is effective for pain relief  Menses is acceptable while on birth control  Exercise- not currently   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  Lives with her mom  [facility] [employer] [facility]  Pablo Ohara is sexually active with male partner of 2 months  Monogamous and feels safe in this relationship  Denies vaginal pain,bleeding or dryness  She uses DEACON  for contraception  Admits compliance  She is interested in STD screening today  She denies vaginal discharge or pelvic pain  She has no urinary concerns, does not have incontinence  No bowel concerns  No breast concerns  Last pap: Due at age 26  DEXA scan: N/A  Mammogram: N/A;  Her mother was diagnosed with breast cancer at age 29  MGM also has breast cancer  Colonoscopy: N/A  HPV vaccine series:Completed  The following portions of the patient's history were reviewed and updated as appropriate: allergies, current medications, past family history, past medical history, past social history, past surgical history and problem list     Review of Systems   Constitutional: Negative  Negative for activity change, appetite change, chills, diaphoresis, fatigue, fever and unexpected weight change  HENT: Negative for congestion, dental problem, sneezing, sore throat and trouble swallowing  Eyes: Negative for visual disturbance  Respiratory: Negative for chest tightness and shortness of breath  Cardiovascular: Negative for chest pain and leg swelling  Gastrointestinal: Negative for abdominal pain, constipation, diarrhea, nausea and vomiting  Genitourinary: Negative for difficulty urinating, dyspareunia, dysuria, frequency, hematuria, menstrual problem, pelvic pain, urgency, vaginal bleeding, vaginal discharge and vaginal pain  External and internal vaginal  itching   Musculoskeletal: Negative for back pain and neck pain  Skin: Negative      Allergic/Immunologic: Negative  Neurological: Negative for weakness and headaches  Hematological: Negative for adenopathy  Psychiatric/Behavioral: Negative  Objective:      /64 (BP Location: Right arm, Patient Position: Sitting, Cuff Size: Standard)   Ht 5' 8 11" (1 73 m)   Wt 58 4 kg (128 lb 12 8 oz)   LMP 11/22/2022 (Approximate)   BMI 19 52 kg/m²          Physical Exam  Vitals and nursing note reviewed  Constitutional:       Appearance: Normal appearance  She is well-developed  HENT:      Head: Normocephalic and atraumatic  Eyes:      General:         Right eye: No discharge  Left eye: No discharge  Neck:      Thyroid: No thyromegaly  Trachea: Trachea normal    Cardiovascular:      Rate and Rhythm: Normal rate and regular rhythm  Heart sounds: Murmur heard  Pulmonary:      Effort: Pulmonary effort is normal       Breath sounds: Normal breath sounds  Chest:   Breasts:     Breasts are symmetrical       Right: Normal  No inverted nipple, mass, nipple discharge, skin change or tenderness  Left: Normal  No inverted nipple, mass, nipple discharge, skin change or tenderness  Comments: Pectus excavatum  Abdominal:      Palpations: Abdomen is soft  Genitourinary:     General: Normal vulva  Exam position: Lithotomy position  Labia:         Right: No rash, tenderness, lesion or injury  Left: No rash, tenderness, lesion or injury  Urethra: No prolapse, urethral pain, urethral swelling or urethral lesion  Vagina: No signs of injury and foreign body  Vaginal discharge (thin white) present  No erythema, tenderness or bleeding  Cervix: Normal       Uterus: Normal        Adnexa:         Right: No mass, tenderness or fullness  Left: No mass, tenderness or fullness  Rectum: No external hemorrhoid  Musculoskeletal:         General: Normal range of motion  Cervical back: Normal range of motion and neck supple     Lymphadenopathy: Head:      Right side of head: No submental, submandibular or tonsillar adenopathy  Left side of head: No submental, submandibular or tonsillar adenopathy  Cervical: No cervical adenopathy  Upper Body:      Right upper body: No supraclavicular or axillary adenopathy  Left upper body: No supraclavicular or axillary adenopathy  Lower Body: No right inguinal adenopathy  No left inguinal adenopathy  Skin:     General: Skin is warm and dry  Neurological:      Mental Status: She is alert and oriented to person, place, and time     Psychiatric:         Mood and Affect: Mood normal          Behavior: Behavior normal

## 2022-12-05 NOTE — PATIENT INSTRUCTIONS
Treatment for menstrual cramps/bleeding- Ibuprofen 600 mg by mouth with onset of bleeding or cramping, whichever is first  Take second dose of ibuprofen  400 mg by mouth with food and repeat every 6 hours x 3 days  Bacterial vaginosis noted on wet mount  Rx sent to requested pharmacy  No sex during treatment  Complete all medication as directed  Consider daily probiotic  Call with any recurrence of symptoms  Multivitamin with folic acid (446 mcg)  Pap every 3 years starting at age 24,  if normal   STI testing as indicated  GC/CT collected today  Exercise most days of week-minimum of 150 minutes per week  Obtain appropriate diet and hydration  Calcium 1000mg + 600 vit D daily  Birth control refilled  Take as directed (ACHES reviewed)  Benefits, risks and alternatives discussed/reviewed  Condom use when sexually active for sexually transmitted infection prevention  HPV 9 vaccine series completed  Annual mammogram- will start sooner than age 38, will consider ABUS at age 24, monthly breast self exam recommended  Return to office in one year or sooner, if needed

## 2022-12-06 LAB
BV WHIFF TEST VAG QL: ABNORMAL
C TRACH DNA SPEC QL NAA+PROBE: NEGATIVE
CLUE CELLS SPEC QL WET PREP: ABNORMAL
N GONORRHOEA DNA SPEC QL NAA+PROBE: NEGATIVE
PH SMN: ABNORMAL [PH]
SL AMB POCT WET MOUNT: ABNORMAL
T VAGINALIS VAG QL WET PREP: ABNORMAL
YEAST VAG QL WET PREP: ABNORMAL

## 2022-12-16 ENCOUNTER — OFFICE VISIT (OUTPATIENT)
Dept: PSYCHIATRY | Facility: CLINIC | Age: 22
End: 2022-12-16

## 2022-12-16 DIAGNOSIS — F41.1 GAD (GENERALIZED ANXIETY DISORDER): ICD-10-CM

## 2022-12-16 DIAGNOSIS — F33.9 DEPRESSION, RECURRENT (HCC): Primary | ICD-10-CM

## 2022-12-16 DIAGNOSIS — F40.10 SOCIAL ANXIETY DISORDER: ICD-10-CM

## 2022-12-16 RX ORDER — FLUOXETINE 10 MG/1
10 CAPSULE ORAL DAILY
Qty: 30 CAPSULE | Refills: 1 | Status: SHIPPED | OUTPATIENT
Start: 2022-12-16

## 2022-12-16 NOTE — PSYCH
MEDICATION MANAGEMENT NOTE        [address]  [facility] [address] [address] [phone]        Name and Date of Birth:  Enid Moncada 23 y o  2000    Date of Visit: December 16, 2022    SUBJECTIVE:        Rachana seen by [facility] 11/15 at which time prozac and buspar restarted  Rachana continues in OP ind therapy  Reports feeliing calmer- less worry and overthinking  Depressed mood down to 5 from 7-8  Motivation, ADLS better  However, reports "need to feel pain" since restart of meds   States she looked up alternatives to this behavior and has used hair band on her wrist (used so strongly she broke it) and clenching hands   Found herself hitting her head with her hand which is a behavior she did in the distant past   Denies SI  Has been vaping nicotine and having urges to drink  Reports ruminating thoughts of ex-boyfriend accompanied by thoughts of "Im always going to be alone" and "what is my purpose?"  Sleep has been poor  Review of Systems   Constitutional: Negative for activity change  Psychiatric/Behavioral: Positive for sleep disturbance  Past Psychiatric History     Inpatient:  None  [facility]  No hx of suicide attempts  Outpatient:  Therapist- Enio Tellez -[facility]- x 6-7 yrs  This office since April 2022     Med trials: lexapro -more depressed  Trauma/Loss History:           Abandonment by bio father  Mother's illness  (stage 4 metastatic breast cancer)     Family Psychiatric History:      Psychiatric Illness:      Depression- grandfather; anxiety -aunt  Substance Abuse:       none reported  Suicide Attempts:        uncle     Social History:          HS grad  Had IEP- learning disability- comprehension  Single  Lives with mom  Is student at [employer]- on line classes only -A and Bs             OBJECTIVE:     MENTAL STATUS EXAM  Appearance:  age appropriate   Behavior:  Pleasant & cooperative   Speech:  soft   Mood:  low, anxious   Affect:  constricted   Language: intact and appropriate for age, education, and intellect   Thought Process:  goal directed   Associations: intact associations   Thought Content:  negative thinking and cognitive distortions, negative ruminations   Perceptual Disturbances: no auditory or visual hallcunations   Risk Potential / Abnormal Thoughts: Suicidal ideation - None  Homicidal ideation - None  Potential for aggression - No       Consciousness:  Alert & Awake   Sensorium:  Grossly oriented   Attention: attention span and concentration are age appropriate       Fund of Knowledge:  Memory: awareness of current events: yes  recent and remote memory grossly intact   Insight:  good   Judgment: good   Muscle Strength Muscle Tone: Grossly normal  normal   Gait/Station: normal gait/station with good balance   Motor Activity: no abnormal movements       Lab Review: I have reviewed all pertinent labs    Lab Results   Component Value Date     06/11/2016    SODIUM 139 10/01/2022    K 3 8 10/01/2022     10/01/2022    CO2 28 10/01/2022    AGAP 10 02/09/2022    BUN 15 10/01/2022    CREATININE 0 63 10/01/2022    GLUC 82 10/01/2022    GLUF 91 11/03/2020    CALCIUM 9 3 10/01/2022    AST 15 10/01/2022    ALT 12 10/01/2022    ALKPHOS 66 10/01/2022    PROT 6 6 06/11/2016    TP 6 7 10/01/2022    BILITOT 2 0 (H) 06/11/2016    TBILI 1 3 (H) 10/01/2022    EGFR 131 10/01/2022     Lab Results   Component Value Date    WBC 5 7 10/01/2022    HGB 14 0 10/01/2022    HCT 42 3 10/01/2022    MCV 89 8 10/01/2022     10/01/2022       Lab Results   Component Value Date    [id number] 2 909 03/17/2022    TSH 2 62 10/01/2022           ASSESSMENT & PLAN          Diagnoses and all orders for this visit:    Depression, recurrent (HCC)  -     FLUoxetine (PROzac) 10 mg capsule;  Take 1 capsule (10 mg total) by mouth daily    MARCOS (generalized anxiety disorder)  -     FLUoxetine (PROzac) 10 mg capsule; Take 1 capsule (10 mg total) by mouth daily    Social anxiety disorder      Current Outpatient Medications   Medication Sig Dispense Refill   • FLUoxetine (PROzac) 10 mg capsule Take 1 capsule (10 mg total) by mouth daily 30 capsule 1   • busPIRone (BUSPAR) 10 mg tablet Take 1 tablet (10 mg total) by mouth 3 (three) times a day 270 tablet 0   • dicyclomine (BENTYL) 20 mg tablet Take 1 tablet (20 mg total) by mouth every 6 (six) hours as needed (urgency, abdominal pain) 120 tablet 2   • esomeprazole (NexIUM) 40 MG capsule Take 40 mg by mouth     • fluticasone (FLONASE) 50 mcg/act nasal spray SPRAY 2 SPRAYS INTO EACH NOSTRIL EVERY DAY     • levonorgestrel-ethinyl estradiol (Vienva) 0 1-20 MG-MCG per tablet Take 1 tablet by mouth daily 84 tablet 4   • naproxen (EC NAPROSYN) 500 MG EC tablet Take 1 tablet (500 mg total) by mouth 2 (two) times a day as needed for mild pain (cramping) 60 tablet 0   • ondansetron (Zofran ODT) 4 mg disintegrating tablet Take 1 tablet (4 mg total) by mouth every 6 (six) hours as needed for nausea or vomiting 20 tablet 0     No current facility-administered medications for this visit  Plan:            Some symptoms better but now having "need to feel pain", vaping and urges to drink  Will decrease prozac to 10 mg and cont buspar 10 mg tid- take last dose at dinner, not bedtime  We reviewed grounding techniques  Was referred for DBT grp starting in Feb   Will meet [facility] for treatment plan x one 1/23/23  Strongly encouraged to call if symptoms worsen  Reviewed risks, benefits, side effects of medications, including no medication  Patient understands and agrees to treatment plan  Cont f/u with ind therapist          F/u PER 1/23/23, sooner prn         Patient has been informed of 24 hours and weekend coverage for urgent situations accessed by calling the main clinic phone number  Ulysses Leonardo PA-C   Visit Time    Visit Start Time: 1667  Visit Stop Time: 2693  Total Visit Duration: 30 minutes

## 2022-12-24 ENCOUNTER — OFFICE VISIT (OUTPATIENT)
Dept: URGENT CARE | Facility: CLINIC | Age: 22
End: 2022-12-24

## 2022-12-24 VITALS
TEMPERATURE: 98.5 F | SYSTOLIC BLOOD PRESSURE: 118 MMHG | BODY MASS INDEX: 19.34 KG/M2 | HEART RATE: 91 BPM | RESPIRATION RATE: 19 BRPM | OXYGEN SATURATION: 98 % | WEIGHT: 127.6 LBS | HEIGHT: 68 IN | DIASTOLIC BLOOD PRESSURE: 77 MMHG

## 2022-12-24 DIAGNOSIS — N39.0 URINARY TRACT INFECTION WITHOUT HEMATURIA, SITE UNSPECIFIED: Primary | ICD-10-CM

## 2022-12-24 LAB
SL AMB  POCT GLUCOSE, UA: NEGATIVE
SL AMB POCT BLOOD,UA: ABNORMAL
SL AMB POCT CLARITY,UA: CLEAR
SL AMB POCT COLOR,UA: ABNORMAL
SL AMB POCT PH,UA: 5
SL AMB POCT SPECIFIC GRAVITY,UA: 1.01
SL AMB POCT URINE HCG: NEGATIVE

## 2022-12-24 RX ORDER — CEPHALEXIN 500 MG/1
500 CAPSULE ORAL EVERY 8 HOURS SCHEDULED
Qty: 30 CAPSULE | Refills: 0 | Status: SHIPPED | OUTPATIENT
Start: 2022-12-24 | End: 2023-01-03

## 2022-12-24 NOTE — PROGRESS NOTES
[facility]        NAME: Lizy Paulino is a 23 y o  female  : 2000    MRN: [id number]  DATE: 2022  TIME: 1:02 PM    /77   Pulse 91   Temp 98 5 °F (36 9 °C)   Resp 19   Ht 5' 8" (1 727 m)   Wt 57 9 kg (127 lb 9 6 oz)   SpO2 98%   BMI 19 40 kg/m²     Assessment and Plan   Dysuria [R30 0]  1  Dysuria  POCT urine dip    Urine culture            Patient Instructions       Follow up with PCP in 3-5 days  Proceed to  ER if symptoms worsen  Chief Complaint     Chief Complaint   Patient presents with   • Possible UTI     Pt presents with dysuria, and pressure during urination that started today  Back pain present  History of Present Illness       Pt with urine burning and frequency for 1 days       Review of Systems   Review of Systems   Constitutional: Negative  HENT: Negative  Eyes: Negative  Respiratory: Negative  Cardiovascular: Negative  Gastrointestinal: Negative  Endocrine: Negative  Genitourinary: Positive for dysuria, frequency and urgency  Musculoskeletal: Negative  Skin: Negative  Allergic/Immunologic: Negative  Neurological: Negative  Hematological: Negative  Psychiatric/Behavioral: Negative  All other systems reviewed and are negative          Current Medications       Current Outpatient Medications:   •  busPIRone (BUSPAR) 10 mg tablet, Take 1 tablet (10 mg total) by mouth 3 (three) times a day, Disp: 270 tablet, Rfl: 0  •  dicyclomine (BENTYL) 20 mg tablet, Take 1 tablet (20 mg total) by mouth every 6 (six) hours as needed (urgency, abdominal pain), Disp: 120 tablet, Rfl: 2  •  esomeprazole (NexIUM) 40 MG capsule, Take 40 mg by mouth, Disp: , Rfl:   •  FLUoxetine (PROzac) 10 mg capsule, Take 1 capsule (10 mg total) by mouth daily, Disp: 30 capsule, Rfl: 1  •  levonorgestrel-ethinyl estradiol (Vienva) 0 1-20 MG-MCG per tablet, Take 1 tablet by mouth daily, Disp: 84 tablet, Rfl: 4  •  naproxen (EC NAPROSYN) 500 MG EC tablet, Take 1 tablet (500 mg total) by mouth 2 (two) times a day as needed for mild pain (cramping), Disp: 60 tablet, Rfl: 0  •  ondansetron (Zofran ODT) 4 mg disintegrating tablet, Take 1 tablet (4 mg total) by mouth every 6 (six) hours as needed for nausea or vomiting, Disp: 20 tablet, Rfl: 0  •  fluticasone (FLONASE) 50 mcg/act nasal spray, SPRAY 2 SPRAYS INTO EACH NOSTRIL EVERY DAY (Patient not taking: Reported on 12/24/2022), Disp: , Rfl:     Current Allergies     Allergies as of 12/24/2022 - Reviewed 12/24/2022   Allergen Reaction Noted   • Pollen extract  12/01/2020            The following portions of the patient's history were reviewed and updated as appropriate: allergies, current medications, past family history, past medical history, past social history, past surgical history and problem list      Past Medical History:   Diagnosis Date   • Anemia     resolved    • Anxiety    • Constipation    • Depression    • GERD (gastroesophageal reflux disease)    • Heart murmur    • Heavy menstrual bleeding     resolved    • Hives    • Iron deficiency anemia secondary to inadequate dietary iron intake 10/15/2019   • Migraine     resolved    • Wears glasses        Past Surgical History:   Procedure Laterality Date   • TONSILECTOMY AND ADNOIDECTOMY     • WISDOM TOOTH EXTRACTION         Family History   Problem Relation Age of Onset   • Breast cancer Mother 29   • No Known Problems Father    • Breast cancer Maternal Grandfather    • Colon cancer Neg Hx    • Ovarian cancer Neg Hx          Medications have been verified  Objective   /77   Pulse 91   Temp 98 5 °F (36 9 °C)   Resp 19   Ht 5' 8" (1 727 m)   Wt 57 9 kg (127 lb 9 6 oz)   SpO2 98%   BMI 19 40 kg/m²        Physical Exam     Physical Exam  Vitals and nursing note reviewed  Constitutional:       Appearance: Normal appearance  She is normal weight  HENT:      Head: Normocephalic and atraumatic        Right Ear: Tympanic membrane, ear canal and external ear normal       Left Ear: Tympanic membrane, ear canal and external ear normal       Nose: Nose normal       Mouth/Throat:      Mouth: Mucous membranes are moist       Pharynx: Oropharynx is clear  Eyes:      Extraocular Movements: Extraocular movements intact  Conjunctiva/sclera: Conjunctivae normal       Pupils: Pupils are equal, round, and reactive to light  Cardiovascular:      Rate and Rhythm: Normal rate and regular rhythm  Pulses: Normal pulses  Heart sounds: Normal heart sounds  Pulmonary:      Effort: Pulmonary effort is normal       Breath sounds: Normal breath sounds  Abdominal:      General: Abdomen is flat  Bowel sounds are normal       Palpations: Abdomen is soft  Musculoskeletal:         General: Normal range of motion  Cervical back: Normal range of motion and neck supple  Skin:     General: Skin is warm  Capillary Refill: Capillary refill takes less than 2 seconds  Neurological:      General: No focal deficit present  Mental Status: She is alert and oriented to person, place, and time     Psychiatric:         Mood and Affect: Mood normal          Behavior: Behavior normal

## 2022-12-26 LAB — BACTERIA UR CULT: NORMAL

## 2023-01-23 ENCOUNTER — OFFICE VISIT (OUTPATIENT)
Dept: PSYCHIATRY | Facility: CLINIC | Age: 23
End: 2023-01-23

## 2023-01-23 DIAGNOSIS — G47.00 INSOMNIA, UNSPECIFIED TYPE: ICD-10-CM

## 2023-01-23 DIAGNOSIS — F33.9 DEPRESSION, RECURRENT (HCC): Primary | ICD-10-CM

## 2023-01-23 DIAGNOSIS — F40.10 SOCIAL ANXIETY DISORDER: ICD-10-CM

## 2023-01-23 DIAGNOSIS — F41.1 GAD (GENERALIZED ANXIETY DISORDER): ICD-10-CM

## 2023-01-24 NOTE — PSYCH
MEDICATION MANAGEMENT NOTE        [address]  [facility]  [address] [place] [id number] [phone]        Name and Date of Birth:  Zina Avila y o  2000    Date of Visit:January 23, 2023  SUBJECTIVE:     Maria Isabel last seen by Daniel 12/16 at which time prozac decreased, buspar continued and Maria Isabel referred to DBT grp  Is scheduled to meet with Amy today but has to decline grp secondary to new job, work schedule and distance to office  Martha reports feeling a lot better with decrease in prozac to 10 mg   "Need to feel pain"; urges to hit herself, drink and vape have resolved  Feels "happier"  Continues to feel low energy and has had ongoing sleep issues  Was seen by sleep medicine in 2019 and symptoms attributed to anxiety  Maria Isabel would like referral for sleep study  Reports problems with insomnia since childhood  Whole body feels restless at night despite tx of iron def  +daytime fatigue  Is able to sleep during the day  Ibuprofen PM helps at times  Sometimes snores; vivid dreams but no nightmares; denies sleep paralysis; hallucinations when falling or waking from sleep; denies symptoms of cataplexy  Did sleep walk as a child  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  Review of Systems   Constitutional: Positive for fatigue  Psychiatric/Behavioral: Positive for sleep disturbance  Past Psychiatric History     Inpatient:  None  [facility]  No hx of suicide attempts  Outpatient:  Therapist- Kate Navarrete -socorro- x 6-7 yrs  This office since April 2022     Med trials: lexapro -more depressed  Trauma/Loss History:           Abandonment by bio father    Mother's illness  (stage 4 metastatic breast cancer)     Family Psychiatric History:      Psychiatric Illness:      Depression- grandfather; anxiety -aunt  Substance Abuse:       none reported  Suicide Attempts:        uncle     Social History:          HS grad  Had IEP- learning disability- comprehension  Single  Lives with mom  Is student at [employer]- on line classes only -A and Bs  Employed- [employer]               OBJECTIVE:     MENTAL STATUS EXAM  Appearance:  age appropriate   Behavior:  Pleasant & cooperative   Speech:  Normal volume, regular rate and rhythm   Mood:  mildly low   Affect:  mood congruent   Language: intact and appropriate for age, education, and intellect   Thought Process:  goal directed   Associations: intact associations   Thought Content:  normal and appropriate   Perceptual Disturbances: no auditory or visual hallcunations   Risk Potential / Abnormal Thoughts: Suicidal ideation - None  Homicidal ideation - None  Potential for aggression - No       Consciousness:  Alert & Awake   Sensorium:  Grossly oriented   Attention: attention span and concentration are age appropriate       Fund of Knowledge:  Memory: awareness of current events: yes  recent and remote memory grossly intact   Insight:  intact   Judgment: intact   Muscle Strength Muscle Tone: Grossly normal  normal   Gait/Station: normal gait/station with good balance   Motor Activity: no abnormal movements       Lab Review: I have reviewed all pertinent labs    Lab Results   Component Value Date     06/11/2016    SODIUM 139 10/01/2022    K 3 8 10/01/2022     10/01/2022    CO2 28 10/01/2022    AGAP 10 02/09/2022    BUN 15 10/01/2022    CREATININE 0 63 10/01/2022    GLUC 82 10/01/2022    GLUF 91 11/03/2020    CALCIUM 9 3 10/01/2022    AST 15 10/01/2022    ALT 12 10/01/2022    ALKPHOS 66 10/01/2022    PROT 6 6 06/11/2016    TP 6 7 10/01/2022    BILITOT 2 0 (H) 06/11/2016    TBILI 1 3 (H) 10/01/2022    EGFR 131 10/01/2022     Lab Results   Component Value Date    WBC 5 7 10/01/2022    HGB 14 0 10/01/2022    HCT 42 3 10/01/2022    MCV 89 8 10/01/2022    PLT 242 10/01/2022       Lab Results   Component Value Date    ZCD1BOKBPVZC 2 909 03/17/2022    TSH 2 62 10/01/2022           ASSESSMENT & PLAN          Diagnoses and all orders for this visit:    Depression, recurrent ([facility] [address] )    MARCOS (generalized anxiety disorder)    Social anxiety disorder      Current Outpatient Medications   Medication Sig Dispense Refill   • busPIRone (BUSPAR) 10 mg tablet Take 1 tablet (10 mg total) by mouth 3 (three) times a day 270 tablet 0   • dicyclomine (BENTYL) 20 mg tablet Take 1 tablet (20 mg total) by mouth every 6 (six) hours as needed (urgency, abdominal pain) 120 tablet 2   • esomeprazole (NexIUM) 40 MG capsule Take 40 mg by mouth     • FLUoxetine (PROzac) 10 mg capsule TAKE 1 CAPSULE BY MOUTH EVERY DAY 90 capsule 0   • fluticasone (FLONASE) 50 mcg/act nasal spray SPRAY 2 SPRAYS INTO EACH NOSTRIL EVERY DAY (Patient not taking: Reported on 12/24/2022)     • levonorgestrel-ethinyl estradiol (Vienva) 0 1-20 MG-MCG per tablet Take 1 tablet by mouth daily 84 tablet 4   • naproxen (EC NAPROSYN) 500 MG EC tablet Take 1 tablet (500 mg total) by mouth 2 (two) times a day as needed for mild pain (cramping) 60 tablet 0   • ondansetron (Zofran ODT) 4 mg disintegrating tablet Take 1 tablet (4 mg total) by mouth every 6 (six) hours as needed for nausea or vomiting 20 tablet 0     No current facility-administered medications for this visit  Plan:        Improved  Will cont prozac 10 mg/d and buspar 10 mg tid  Form completed for [address]  Faxed and LASHON signed  Will refer to sleep med  Reviewed risks, benefits, side effects of medications, including no medication  Patient understands and agrees to treatment plan  F/u Daniel 6 weeks, sooner prn          Cont f/u OP therapist         Patient has been informed of 24 hours and weekend coverage for urgent situations accessed by calling the main clinic phone number       Judi Jang PA-C   Visit Time    Visit Start Time: 1440  Visit Stop Time: 1505  Total Visit Duration: 25 minutes

## 2023-02-20 ENCOUNTER — TELEPHONE (OUTPATIENT)
Dept: OBGYN CLINIC | Facility: CLINIC | Age: 23
End: 2023-02-20

## 2023-02-20 NOTE — TELEPHONE ENCOUNTER
----- Message from [facility] sent at 2/19/2023 10:12 AM EST -----  Regarding: Birth control and vaginal pain  Contact: [phone]  Yobany Rosa,  I have a concern about some pain while having intercourse  It’s been going on for a few months now and won’t go away not sure what could be causing it  I am also on my last pack of birth control was not sure if you could refill them or not  I also don’t know if you can help me with this but Barak Forbes also been noticing my urine has a strong odor to it     I hope you have a great week!  -Julia

## 2023-02-21 ENCOUNTER — OFFICE VISIT (OUTPATIENT)
Dept: OBGYN CLINIC | Facility: MEDICAL CENTER | Age: 23
End: 2023-02-21

## 2023-02-21 VITALS — WEIGHT: 128 LBS | SYSTOLIC BLOOD PRESSURE: 110 MMHG | DIASTOLIC BLOOD PRESSURE: 70 MMHG | BODY MASS INDEX: 19.46 KG/M2

## 2023-02-21 DIAGNOSIS — B96.89 BV (BACTERIAL VAGINOSIS): Primary | ICD-10-CM

## 2023-02-21 DIAGNOSIS — Z11.3 SCREENING FOR STD (SEXUALLY TRANSMITTED DISEASE): ICD-10-CM

## 2023-02-21 DIAGNOSIS — N76.0 BV (BACTERIAL VAGINOSIS): Primary | ICD-10-CM

## 2023-02-21 LAB
BV WHIFF TEST VAG QL: ABNORMAL
CLUE CELLS SPEC QL WET PREP: ABNORMAL
PH SMN: ABNORMAL [PH]
SL AMB POCT WET MOUNT: ABNORMAL
T VAGINALIS VAG QL WET PREP: ABNORMAL
YEAST VAG QL WET PREP: ABNORMAL

## 2023-02-21 RX ORDER — METRONIDAZOLE 500 MG/1
500 TABLET ORAL EVERY 12 HOURS SCHEDULED
Qty: 14 TABLET | Refills: 0 | Status: SHIPPED | OUTPATIENT
Start: 2023-02-21 | End: 2023-02-28

## 2023-02-21 NOTE — PROGRESS NOTES
Assessment/Plan:  Bacterial vaginosis noted on wet mount  Rx sent to requested pharmacy  No sex during treatment  Complete all medication as directed  Consider daily probiotic  Call with any recurrence of symptoms  GC/CT culture done  Always condom used recommended  Return to office as needed  1  BV (bacterial vaginosis)  -     metroNIDAZOLE (FLAGYL) 500 mg tablet; Take 1 tablet (500 mg total) by mouth every 12 (twelve) hours for 7 days  -     POCT wet mount    2  Screening for STD (sexually transmitted disease)  -     POCT wet mount  -     Chlamydia/GC amplified DNA by PCR               Subjective:      Patient ID: Corinne Corolla is a 23 y o  female  HPI    Corinne Corolla is a 23 y o  [place] female who is here today for a problem visit   Last in office 12/5/22 for annual exam    C/o malodorous vaginal discharge, painful sex and needs refill of her DEACON  Informed she has enough refills for one year at her pharmacy  She can call them for refills  Monthly menses x 7 days with light flow  Menses is acceptable  Admits to high life stress currently with new job  Corinne Corolla is sexually active with male partner of 3 months  No condom used  Admits to insertional pain x 1 month  Admits she lina have some vaginal dryness  Denies post coital or coital bleeding  She is  monogamous  She uses DEACON  for contraception  She is interested in STD screening today  She admits to white colored malodorous vaginal discharge, internal and external vaginal itching  She has no urinary concerns, does not have incontinence  No bowel concerns  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  She is currently living with her grandmother  The following portions of the patient's history were reviewed and updated as appropriate: allergies, current medications, past medical history, past social history, past surgical history and problem list     Review of Systems   Constitutional: Negative      HENT: Negative for sore throat and trouble swallowing  Gastrointestinal: Negative for abdominal pain, constipation, diarrhea, nausea and vomiting  Genitourinary: Positive for vaginal discharge  Negative for dyspareunia, dysuria, genital sores, menstrual problem, pelvic pain, vaginal bleeding and vaginal pain  Vaginal malodor   Musculoskeletal: Negative for arthralgias and myalgias  Skin: Negative  Hematological: Negative for adenopathy  Psychiatric/Behavioral: Negative  All other systems reviewed and are negative  Objective:      /70 (BP Location: Left arm, Patient Position: Sitting, Cuff Size: Standard)   Wt 58 1 kg (128 lb)   LMP 02/14/2023 (Exact Date)   BMI 19 46 kg/m²          Physical Exam  Vitals and nursing note reviewed  Constitutional:       Appearance: Normal appearance  She is well-developed  Genitourinary:     General: Normal vulva  Exam position: Lithotomy position  Labia:         Right: No rash, tenderness, lesion or injury  Left: No rash, tenderness, lesion or injury  Urethra: No prolapse, urethral pain, urethral swelling or urethral lesion  Vagina: No signs of injury and foreign body  Vaginal discharge (thin white discharge  ) present  No erythema, tenderness or bleeding  Cervix: No cervical motion tenderness, discharge, friability, lesion, erythema or cervical bleeding  Uterus: Normal        Adnexa: Right adnexa normal and left adnexa normal         Right: No mass, tenderness or fullness  Left: No mass, tenderness or fullness  Rectum: No external hemorrhoid  Lymphadenopathy:      Lower Body: No right inguinal adenopathy  No left inguinal adenopathy  Skin:     General: Skin is warm and dry  Neurological:      Mental Status: She is alert and oriented to person, place, and time     Psychiatric:         Mood and Affect: Mood normal          Behavior: Behavior normal

## 2023-02-21 NOTE — PATIENT INSTRUCTIONS
Bacterial vaginosis noted on wet mount  Rx sent to requested pharmacy  No sex during treatment  Complete all medication as directed  Consider daily probiotic  Call with any recurrence of symptoms  GC/CT culture done  Always condom used recommended  Return to office as needed

## 2023-02-22 LAB
C TRACH DNA SPEC QL NAA+PROBE: NEGATIVE
N GONORRHOEA DNA SPEC QL NAA+PROBE: NEGATIVE

## 2023-02-24 ENCOUNTER — OFFICE VISIT (OUTPATIENT)
Dept: FAMILY MEDICINE CLINIC | Facility: CLINIC | Age: 23
End: 2023-02-24

## 2023-02-24 VITALS
HEART RATE: 79 BPM | RESPIRATION RATE: 16 BRPM | BODY MASS INDEX: 19.49 KG/M2 | WEIGHT: 128.6 LBS | OXYGEN SATURATION: 99 % | SYSTOLIC BLOOD PRESSURE: 110 MMHG | HEIGHT: 68 IN | TEMPERATURE: 98 F | DIASTOLIC BLOOD PRESSURE: 70 MMHG

## 2023-02-24 DIAGNOSIS — R11.0 NAUSEA: ICD-10-CM

## 2023-02-24 RX ORDER — ONDANSETRON 4 MG/1
4 TABLET, ORALLY DISINTEGRATING ORAL EVERY 6 HOURS PRN
Qty: 20 TABLET | Refills: 0 | Status: SHIPPED | OUTPATIENT
Start: 2023-02-24

## 2023-02-24 RX ORDER — DIPHENOXYLATE HYDROCHLORIDE AND ATROPINE SULFATE 2.5; .025 MG/1; MG/1
1 TABLET ORAL DAILY
COMMUNITY

## 2023-02-24 NOTE — LETTER
February 24, 2023     Patient: Luli Estrada  YOB: 2000  Date of Visit: 2/24/2023      To Whom it May Concern:    Luli Estrada is under my professional care  Jennifer Grace was seen in my office on 2/24/2023  Jennifer Grace may return to work on 2/27/23  If you have any questions or concerns, please don't hesitate to call           Sincerely,          Mallika Pretty,         CC: No Recipients

## 2023-02-24 NOTE — ASSESSMENT & PLAN NOTE
Likely due to Flagyl which she recently started for BV 2 days ago vs viral syndrome given her associated fatigue  Recommend trial of Zofran and allowing time to complete abx or virus to pass  She will follow up if not improved in one week

## 2023-02-24 NOTE — PROGRESS NOTES
Name: Jaswinder Ford      : 2000      MRN: [id number]  Encounter Provider: [place] DO  Encounter Date: 2023   Encounter department: [address] [place] [address] Dr [place]  Nausea  Assessment & Plan:  Likely due to Flagyl which she recently started for BV 2 days ago vs viral syndrome given her associated fatigue  Recommend trial of Zofran and allowing time to complete abx or virus to pass  She will follow up if not improved in one week    Orders:  -     ondansetron (Zofran ODT) 4 mg disintegrating tablet; Take 1 tablet (4 mg total) by mouth every 6 (six) hours as needed for nausea or vomiting           Subjective      HPI   Woke up this morning feeling nauseous with abdominal pain, no vomiting  Was able to eat breakfast but has abdominal pain after eating  She has recently started taking Flagyl 2 days ago  She has been taking it with food  Review of Systems   Constitutional: Positive for fatigue  Negative for chills and fever  HENT: Negative  Respiratory: Negative  Gastrointestinal: Positive for abdominal pain and nausea  Negative for vomiting  Skin: Negative for rash  Neurological: Negative for headaches         Current Outpatient Medications on File Prior to Visit   Medication Sig   • ascorbic acid (VITAMIN C) 250 MG CHEW Chew 250 mg daily   • busPIRone (BUSPAR) 10 mg tablet Take 1 tablet (10 mg total) by mouth 3 (three) times a day   • esomeprazole (NexIUM) 40 MG capsule Take 40 mg by mouth   • FLUoxetine (PROzac) 10 mg capsule TAKE 1 CAPSULE BY MOUTH EVERY DAY   • levonorgestrel-ethinyl estradiol (Vienva) 0 1-20 MG-MCG per tablet Take 1 tablet by mouth daily   • metroNIDAZOLE (FLAGYL) 500 mg tablet Take 1 tablet (500 mg total) by mouth every 12 (twelve) hours for 7 days   • multivitamin (THERAGRAN) TABS Take 1 tablet by mouth daily   • [DISCONTINUED] ondansetron (Zofran ODT) 4 mg disintegrating tablet Take 1 tablet (4 mg total) by mouth every 6 (six) hours as needed for nausea or vomiting   • [DISCONTINUED] dicyclomine (BENTYL) 20 mg tablet Take 1 tablet (20 mg total) by mouth every 6 (six) hours as needed (urgency, abdominal pain)   • [DISCONTINUED] fluticasone (FLONASE) 50 mcg/act nasal spray    • [DISCONTINUED] naproxen (EC NAPROSYN) 500 MG EC tablet Take 1 tablet (500 mg total) by mouth 2 (two) times a day as needed for mild pain (cramping)       Objective     /70 (BP Location: Left arm, Patient Position: Sitting, Cuff Size: Standard)   Pulse 79   Temp 98 °F (36 7 °C) (Temporal)   Resp 16   Ht 5' 8" (1 727 m)   Wt 58 3 kg (128 lb 9 6 oz)   LMP 02/14/2023 (Exact Date)   SpO2 99%   BMI 19 55 kg/m²     Physical Exam  Vitals reviewed  Constitutional:       Appearance: Normal appearance  Cardiovascular:      Rate and Rhythm: Normal rate  Pulmonary:      Effort: Pulmonary effort is normal    Abdominal:      General: Abdomen is flat  There is no distension  Palpations: Abdomen is soft  Tenderness: There is no abdominal tenderness  Skin:     General: Skin is warm and dry  Capillary Refill: Capillary refill takes less than 2 seconds  Neurological:      Mental Status: She is alert     Psychiatric:         Mood and Affect: Mood normal          Behavior: Behavior normal        Howie Mejia DO

## 2023-02-25 ENCOUNTER — HOSPITAL ENCOUNTER (EMERGENCY)
Facility: HOSPITAL | Age: 23
Discharge: HOME | End: 2023-02-26
Attending: EMERGENCY MEDICINE | Admitting: EMERGENCY MEDICINE

## 2023-02-25 DIAGNOSIS — R10.9 ABDOMINAL PAIN: ICD-10-CM

## 2023-02-25 DIAGNOSIS — N39.0 UTI (URINARY TRACT INFECTION): Primary | ICD-10-CM

## 2023-02-25 RX ORDER — KETOROLAC TROMETHAMINE 30 MG/ML
30 INJECTION, SOLUTION INTRAMUSCULAR; INTRAVENOUS ONCE
Status: COMPLETED | OUTPATIENT
Start: 2023-02-26 | End: 2023-02-26

## 2023-02-25 NOTE — Clinical Note
Rigoberto Lassiter was seen and treated in our emergency department on 2/25/2023  Diagnosis:     Alfreda Gottlieb  may return to work on return date  She may return on this date: 02/27/2023         If you have any questions or concerns, please don't hesitate to call        Betsey Marie MD    ______________________________           _______________          _______________  Hospital Representative                              Date                                Time

## 2023-02-25 NOTE — Clinical Note
Kalia Stevens was seen and treated in our emergency department on 2/25/2023  Diagnosis:     Nirali Liriano  may return to work on return date  She may return on this date: 02/28/2023         If you have any questions or concerns, please don't hesitate to call        Sukhi Aldrich MD    ______________________________           _______________          _______________  Hospital Representative                              Date                                Time

## 2023-02-26 ENCOUNTER — APPOINTMENT (EMERGENCY)
Dept: CT IMAGING | Facility: HOSPITAL | Age: 23
End: 2023-02-26

## 2023-02-26 ENCOUNTER — APPOINTMENT (EMERGENCY)
Dept: ULTRASOUND IMAGING | Facility: HOSPITAL | Age: 23
End: 2023-02-26

## 2023-02-26 VITALS
SYSTOLIC BLOOD PRESSURE: 112 MMHG | BODY MASS INDEX: 19.48 KG/M2 | RESPIRATION RATE: 16 BRPM | TEMPERATURE: 97 F | OXYGEN SATURATION: 98 % | HEART RATE: 85 BPM | DIASTOLIC BLOOD PRESSURE: 66 MMHG | WEIGHT: 128.09 LBS

## 2023-02-26 LAB
ALBUMIN SERPL BCP-MCNC: 4.2 G/DL (ref 3.5–5)
ALP SERPL-CCNC: 55 U/L (ref 34–104)
ALT SERPL W P-5'-P-CCNC: 12 U/L (ref 7–52)
ANION GAP SERPL CALCULATED.3IONS-SCNC: 6 MMOL/L (ref 4–13)
AST SERPL W P-5'-P-CCNC: 16 U/L (ref 13–39)
B-HCG SERPL-ACNC: <1 MIU/ML (ref 0–11.6)
BACTERIA UR QL AUTO: ABNORMAL /HPF
BASOPHILS # BLD AUTO: 0.04 THOUSANDS/ÂΜL (ref 0–0.1)
BASOPHILS NFR BLD AUTO: 1 % (ref 0–1)
BILIRUB SERPL-MCNC: 1.16 MG/DL (ref 0.2–1)
BILIRUB UR QL STRIP: ABNORMAL
BUN SERPL-MCNC: 11 MG/DL (ref 5–25)
CALCIUM SERPL-MCNC: 9.1 MG/DL (ref 8.4–10.2)
CHLORIDE SERPL-SCNC: 104 MMOL/L (ref 96–108)
CLARITY UR: ABNORMAL
CO2 SERPL-SCNC: 28 MMOL/L (ref 21–32)
COLOR UR: ABNORMAL
CREAT SERPL-MCNC: 0.78 MG/DL (ref 0.6–1.3)
EOSINOPHIL # BLD AUTO: 0.06 THOUSAND/ÂΜL (ref 0–0.61)
EOSINOPHIL NFR BLD AUTO: 1 % (ref 0–6)
ERYTHROCYTE [DISTWIDTH] IN BLOOD BY AUTOMATED COUNT: 12.2 % (ref 11.6–15.1)
EXT PREGNANCY TEST URINE: NEGATIVE
EXT. CONTROL: NORMAL
GFR SERPL CREATININE-BSD FRML MDRD: 109 ML/MIN/1.73SQ M
GLUCOSE SERPL-MCNC: 100 MG/DL (ref 65–140)
GLUCOSE UR STRIP-MCNC: NEGATIVE MG/DL
HCT VFR BLD AUTO: 41.1 % (ref 34.8–46.1)
HGB BLD-MCNC: 13.4 G/DL (ref 11.5–15.4)
HGB UR QL STRIP.AUTO: ABNORMAL
IMM GRANULOCYTES # BLD AUTO: 0.03 THOUSAND/UL (ref 0–0.2)
IMM GRANULOCYTES NFR BLD AUTO: 0 % (ref 0–2)
KETONES UR STRIP-MCNC: ABNORMAL MG/DL
LEUKOCYTE ESTERASE UR QL STRIP: ABNORMAL
LIPASE SERPL-CCNC: 24 U/L (ref 11–82)
LYMPHOCYTES # BLD AUTO: 1.22 THOUSANDS/ÂΜL (ref 0.6–4.47)
LYMPHOCYTES NFR BLD AUTO: 15 % (ref 14–44)
MCH RBC QN AUTO: 29.1 PG (ref 26.8–34.3)
MCHC RBC AUTO-ENTMCNC: 32.6 G/DL (ref 31.4–37.4)
MCV RBC AUTO: 89 FL (ref 82–98)
MONOCYTES # BLD AUTO: 0.5 THOUSAND/ÂΜL (ref 0.17–1.22)
MONOCYTES NFR BLD AUTO: 6 % (ref 4–12)
NEUTROPHILS # BLD AUTO: 6.14 THOUSANDS/ÂΜL (ref 1.85–7.62)
NEUTS SEG NFR BLD AUTO: 77 % (ref 43–75)
NITRITE UR QL STRIP: NEGATIVE
NON-SQ EPI CELLS URNS QL MICRO: ABNORMAL /HPF
NRBC BLD AUTO-RTO: 0 /100 WBCS
PH UR STRIP.AUTO: 5.5 [PH]
PLATELET # BLD AUTO: 210 THOUSANDS/UL (ref 149–390)
PMV BLD AUTO: 9.4 FL (ref 8.9–12.7)
POTASSIUM SERPL-SCNC: 3.8 MMOL/L (ref 3.5–5.3)
PROT SERPL-MCNC: 6.8 G/DL (ref 6.4–8.4)
PROT UR STRIP-MCNC: NEGATIVE MG/DL
RBC # BLD AUTO: 4.6 MILLION/UL (ref 3.81–5.12)
RBC #/AREA URNS AUTO: ABNORMAL /HPF
SODIUM SERPL-SCNC: 138 MMOL/L (ref 135–147)
SP GR UR STRIP.AUTO: >=1.03 (ref 1–1.03)
UROBILINOGEN UR QL STRIP.AUTO: 0.2 E.U./DL
WBC # BLD AUTO: 7.99 THOUSAND/UL (ref 4.31–10.16)
WBC #/AREA URNS AUTO: ABNORMAL /HPF

## 2023-02-26 RX ORDER — CEPHALEXIN 500 MG/1
500 CAPSULE ORAL EVERY 8 HOURS SCHEDULED
Qty: 21 CAPSULE | Refills: 0 | Status: SHIPPED | OUTPATIENT
Start: 2023-02-26 | End: 2023-03-05

## 2023-02-26 RX ORDER — CEFTRIAXONE 1 G/50ML
1000 INJECTION, SOLUTION INTRAVENOUS ONCE
Status: COMPLETED | OUTPATIENT
Start: 2023-02-26 | End: 2023-02-26

## 2023-02-26 RX ADMIN — KETOROLAC TROMETHAMINE 30 MG: 30 INJECTION, SOLUTION INTRAMUSCULAR; INTRAVENOUS at 00:20

## 2023-02-26 RX ADMIN — CEFTRIAXONE 1000 MG: 1 INJECTION, SOLUTION INTRAVENOUS at 03:52

## 2023-02-26 RX ADMIN — IOHEXOL 85 ML: 350 INJECTION, SOLUTION INTRAVENOUS at 01:23

## 2023-02-26 RX ADMIN — SODIUM CHLORIDE, SODIUM LACTATE, POTASSIUM CHLORIDE, AND CALCIUM CHLORIDE 1000 ML: .6; .31; .03; .02 INJECTION, SOLUTION INTRAVENOUS at 00:19

## 2023-02-26 NOTE — ED PROVIDER NOTES
History  Chief Complaint   Patient presents with   • Abdominal Pain     Middle abdominal pain starting yesterday  + Nausea, vomiting, and urinary frequency       History provided by:  Medical records and patient  Abdominal Pain  Pain location:  Generalized (Worse in the suprapubic region)  Pain quality: aching, cramping and dull    Pain radiates to:  Does not radiate  Pain severity:  Mild  Onset quality:  Gradual  Duration:  1 week  Timing:  Constant  Progression:  Waxing and waning  Chronicity:  New  Context comment:  Patient with generalized abdominal pain, worse in the suprapubic region, recently seen by OB/GYN for vaginitis, placed on Flagyl for suspected BV  Relieved by:  Nothing  Worsened by:  Nothing  Ineffective treatments:  None tried  Associated symptoms: no chest pain, no chills, no cough, no diarrhea, no dysuria, no fatigue, no fever, no hematuria, no nausea, no shortness of breath, no sore throat, no vaginal bleeding, no vaginal discharge and no vomiting        Prior to Admission Medications   Prescriptions Last Dose Informant Patient Reported? Taking?    FLUoxetine (PROzac) 10 mg capsule   No No   Sig: TAKE 1 CAPSULE BY MOUTH EVERY DAY   ascorbic acid (VITAMIN C) 250 MG CHEW   Yes No   Sig: Chew 250 mg daily   busPIRone (BUSPAR) 10 mg tablet   No No   Sig: Take 1 tablet (10 mg total) by mouth 3 (three) times a day   esomeprazole (NexIUM) 40 MG capsule   Yes No   Sig: Take 40 mg by mouth   levonorgestrel-ethinyl estradiol (Vienva) 0 1-20 MG-MCG per tablet   No No   Sig: Take 1 tablet by mouth daily   metroNIDAZOLE (FLAGYL) 500 mg tablet   No No   Sig: Take 1 tablet (500 mg total) by mouth every 12 (twelve) hours for 7 days   multivitamin (THERAGRAN) TABS   Yes No   Sig: Take 1 tablet by mouth daily   ondansetron (Zofran ODT) 4 mg disintegrating tablet   No No   Sig: Take 1 tablet (4 mg total) by mouth every 6 (six) hours as needed for nausea or vomiting      Facility-Administered Medications: None Past Medical History:   Diagnosis Date   • Anemia     resolved    • Anxiety    • Constipation    • Depression    • GERD (gastroesophageal reflux disease)    • Heart murmur    • Heavy menstrual bleeding     resolved    • Hives    • Iron deficiency anemia secondary to inadequate dietary iron intake 10/15/2019   • Migraine     resolved    • Wears glasses        Past Surgical History:   Procedure Laterality Date   • TONSILECTOMY AND ADNOIDECTOMY     • WISDOM TOOTH EXTRACTION         Family History   Problem Relation Age of Onset   • Breast cancer Mother 29   • No Known Problems Father    • Breast cancer Maternal Grandfather    • Colon cancer Neg Hx    • Ovarian cancer Neg Hx      I have reviewed and agree with the history as documented  E-Cigarette/Vaping   • E-Cigarette Use Never User      E-Cigarette/Vaping Substances   • Nicotine No    • THC No    • CBD No    • Flavoring No    • Other No    • Unknown No      Social History     Tobacco Use   • Smoking status: Never   • Smokeless tobacco: Never   Vaping Use   • Vaping Use: Never used   Substance Use Topics   • Alcohol use: Not Currently     Comment: Social   • Drug use: No       Review of Systems   Constitutional: Negative for chills, fatigue and fever  HENT: Negative for ear discharge, ear pain, rhinorrhea and sore throat  Eyes: Negative for pain and visual disturbance  Respiratory: Negative for cough and shortness of breath  Cardiovascular: Negative for chest pain and palpitations  Gastrointestinal: Positive for abdominal pain  Negative for diarrhea, nausea and vomiting  Endocrine: Negative for polydipsia, polyphagia and polyuria  Genitourinary: Positive for frequency  Negative for decreased urine volume, difficulty urinating, dyspareunia, dysuria, enuresis, flank pain, genital sores, hematuria, menstrual problem, pelvic pain, urgency, vaginal bleeding, vaginal discharge and vaginal pain     Musculoskeletal: Negative for arthralgias and back pain    Skin: Negative for color change and rash  Allergic/Immunologic: Negative for immunocompromised state  Neurological: Negative for dizziness, seizures, syncope, weakness and headaches  Psychiatric/Behavioral: Negative for confusion and self-injury  The patient is not nervous/anxious  All other systems reviewed and are negative  Physical Exam  Physical Exam  Vitals and nursing note reviewed  Constitutional:       General: She is not in acute distress  Appearance: Normal appearance  She is not ill-appearing, toxic-appearing or diaphoretic  HENT:      Head: Normocephalic and atraumatic  Nose: Nose normal  No congestion or rhinorrhea  Mouth/Throat:      Mouth: Mucous membranes are moist       Pharynx: Oropharynx is clear  No oropharyngeal exudate or posterior oropharyngeal erythema  Eyes:      General:         Right eye: No discharge  Left eye: No discharge  Cardiovascular:      Rate and Rhythm: Normal rate and regular rhythm  Pulses: Normal pulses  Heart sounds: Normal heart sounds  No murmur heard  No gallop  Pulmonary:      Effort: Pulmonary effort is normal  No respiratory distress  Breath sounds: Normal breath sounds  No stridor  No wheezing, rhonchi or rales  Chest:      Chest wall: No tenderness  Abdominal:      General: Bowel sounds are normal  There is no distension  Palpations: Abdomen is soft  There is no mass  Tenderness: There is generalized abdominal tenderness  There is no right CVA tenderness, left CVA tenderness, guarding or rebound  Hernia: No hernia is present  Musculoskeletal:         General: Normal range of motion  Cervical back: Normal range of motion and neck supple  Skin:     General: Skin is warm and dry  Capillary Refill: Capillary refill takes less than 2 seconds  Neurological:      General: No focal deficit present  Mental Status: She is alert and oriented to person, place, and time  Cranial Nerves: No cranial nerve deficit  Sensory: No sensory deficit  Motor: No weakness  Coordination: Coordination normal       Gait: Gait normal       Deep Tendon Reflexes: Reflexes normal    Psychiatric:         Mood and Affect: Mood normal          Behavior: Behavior normal          Thought Content:  Thought content normal          Judgment: Judgment normal          Vital Signs  ED Triage Vitals   Temperature Pulse Respirations Blood Pressure SpO2   02/25/23 2315 02/25/23 2315 02/25/23 2315 02/25/23 2315 02/25/23 2315   (!) 97 °F (36 1 °C) 94 16 131/78 96 %      Temp Source Heart Rate Source Patient Position - Orthostatic VS BP Location FiO2 (%)   02/25/23 2315 02/25/23 2315 02/26/23 0300 02/25/23 2315 --   Temporal Monitor Lying Right arm       Pain Score       02/25/23 2315       8           Vitals:    02/25/23 2315 02/26/23 0300 02/26/23 0354 02/26/23 0400   BP: 131/78 126/71 115/67 112/66   Pulse: 94 78 82 85   Patient Position - Orthostatic VS:  Lying  Lying         Visual Acuity      ED Medications  Medications   lactated ringers bolus 1,000 mL (0 mL Intravenous Stopped 2/26/23 0119)   ketorolac (TORADOL) injection 30 mg (30 mg Intravenous Given 2/26/23 0020)   iohexol (OMNIPAQUE) 350 MG/ML injection (SINGLE-DOSE) 85 mL (85 mL Intravenous Given 2/26/23 0123)   cefTRIAXone (ROCEPHIN) IVPB (premix in dextrose) 1,000 mg 50 mL (0 mg Intravenous Stopped 2/26/23 0431)       Diagnostic Studies  Results Reviewed     Procedure Component Value Units Date/Time    Quantitative hCG [[id number]]  (Normal) Collected: 02/26/23 0018    Lab Status: Final result Specimen: Blood from Arm, Left Updated: 02/26/23 0059     HCG, Quant <1 mIU/mL     Narrative:       Expected Ranges:     Approximate               Approximate HCG  Gestation age          Concentration ( mIU/mL)  _____________          ______________________   [place]                      HCG values  0 2-1                       5-50  1-2   2-3                         100-5000  3-4                         500-36675  4-5                         1000-99158  5-6                         [id number]  6-8                         16483-236417  8-12                        93730-486448      Comprehensive metabolic panel [[id number]]  (Abnormal) Collected: 02/26/23 0018    Lab Status: Final result Specimen: Blood from Arm, Left Updated: 02/26/23 0051     Sodium 138 mmol/L      Potassium 3 8 mmol/L      Chloride 104 mmol/L      CO2 28 mmol/L      ANION GAP 6 mmol/L      BUN 11 mg/dL      Creatinine 0 78 mg/dL      Glucose 100 mg/dL      Calcium 9 1 mg/dL      AST 16 U/L      ALT 12 U/L      Alkaline Phosphatase 55 U/L      Total Protein 6 8 g/dL      Albumin 4 2 g/dL      Total Bilirubin 1 16 mg/dL      eGFR 109 ml/min/1 73sq m     Narrative:      [place] guidelines for Chronic Kidney Disease (CKD):   •  Stage 1 with normal or high GFR (GFR > 90 mL/min/1 73 square meters)  •  Stage 2 Mild CKD (GFR = 60-89 mL/min/1 73 square meters)  •  Stage 3A Moderate CKD (GFR = 45-59 mL/min/1 73 square meters)  •  Stage 3B Moderate CKD (GFR = 30-44 mL/min/1 73 square meters)  •  Stage 4 Severe CKD (GFR = 15-29 mL/min/1 73 square meters)  •  Stage 5 End Stage CKD (GFR <15 mL/min/1 73 square meters)  Note: GFR calculation is accurate only with a steady state creatinine    Lipase [[id number]]  (Normal) Collected: 02/26/23 0018    Lab Status: Final result Specimen: Blood from Arm, Left Updated: 02/26/23 0051     Lipase 24 u/L     Urine Microscopic [[id number]]  (Abnormal) Collected: 02/26/23 0018    Lab Status: Final result Specimen: Urine, Clean Catch Updated: 02/26/23 0045     RBC, UA 4-10 /hpf      WBC, UA 20-30 /hpf      Epithelial Cells Occasional /hpf      Bacteria, UA Moderate /hpf     Urine culture [[id number]] Collected: 02/26/23 0018    Lab Status:  In process Specimen: Urine, Clean Catch Updated: 02/26/23 0045    UA w Reflex to Microscopic w Reflex to Culture [[id number]]  (Abnormal) Collected: 02/26/23 0018    Lab Status: Final result Specimen: Urine, Clean Catch Updated: 02/26/23 0035     Color, UA Oanh     Clarity, UA Slightly Cloudy     Specific Gravity, UA >=1 030     pH, UA 5 5     Leukocytes, UA Moderate     Nitrite, UA Negative     Protein, UA Negative mg/dl      Glucose, UA Negative mg/dl      Ketones, UA >=80 (3+) mg/dl      Urobilinogen, UA 0 2 E U /dl      Bilirubin, UA Small     Occult Blood, UA Trace-Intact    CBC and differential [[id number]]  (Abnormal) Collected: 02/26/23 0018    Lab Status: Final result Specimen: Blood from Arm, Left Updated: 02/26/23 0032     WBC 7 99 Thousand/uL      RBC 4 60 Million/uL      Hemoglobin 13 4 g/dL      Hematocrit 41 1 %      MCV 89 fL      MCH 29 1 pg      MCHC 32 6 g/dL      RDW 12 2 %      MPV 9 4 fL      Platelets 531 Thousands/uL      nRBC 0 /100 WBCs      Neutrophils Relative 77 %      Immat GRANS % 0 %      Lymphocytes Relative 15 %      Monocytes Relative 6 %      Eosinophils Relative 1 %      Basophils Relative 1 %      Neutrophils Absolute 6 14 Thousands/µL      Immature Grans Absolute 0 03 Thousand/uL      Lymphocytes Absolute 1 22 Thousands/µL      Monocytes Absolute 0 50 Thousand/µL      Eosinophils Absolute 0 06 Thousand/µL      Basophils Absolute 0 04 Thousands/µL     Chlamydia/GC amplified DNA by PCR [[id number]] Collected: 02/26/23 0018    Lab Status: In process Specimen: Urine, Other Updated: 02/26/23 0030    POCT pregnancy, urine [[id number]]  (Normal) Resulted: 02/26/23 0026    Lab Status: Final result Updated: 02/26/23 0026     EXT Preg Test, Ur Negative     Control Valid                 US pelvis complete w transvaginal   Final Result by Michaela Van,  (02/26 0543)      Nabothian cysts, otherwise normal appearance of the uterus and bilateral ovaries  Suspected grossly normal caliber appendix in the right lower quadrant/region of the right adnexa  Small amount of free fluid is noted which is nonspecific; correlation with the patient's symptoms and laboratory values recommended to exclude    appendicitis  Other findings as above  Clinical follow-up recommended  Workstation performed: [id number]         CT abdomen pelvis with contrast   Final Result by Serafin Mendez MD (02/26 0300)      Prominent tubular structure in the right lower quadrant/right hemipelvis; does the patient have pyosalpinx or evidence for a tubo-ovarian abscess? Consider pelvic sonogram in the appropriate clinical setting  [facility] Appendix not visualized      Circumferential bladder wall thickening concerning for cystitis versus hypertrophy (other causes not excluded)  Recommend correlation with urinalysis and urine culture  Recommend urologic consultation in the appropriate clinical setting  Workstation performed: [id number]                    Procedures  Procedures         ED Course                                             Medical Decision Making  2347: Patient appears well, vital signs reviewed  Benign abdominal exam   Plan to complete basic labs including urinalysis, UPT  If pregnancy test negative, plan to complete CT abdomen pelvis  I will rehydrate with IV fluids prescan, give analgesics for her discomfort  0310: CT and labs reviewed  Abnormal structure in the right lower quadrant, recommendations to complete pelvic ultrasound to rule out TOA  Ultrasound technician to be called in     0530: Ultrasound reviewed with ultrasonographer and radiologist, no evidence of acute appendicitis or TOA  Pain well controlled  Stable for discharge  Abdominal pain: acute illness or injury  UTI (urinary tract infection): acute illness or injury  Amount and/or Complexity of Data Reviewed  Labs: ordered  Radiology: ordered  Risk  Prescription drug management            Disposition  Final diagnoses:   UTI (urinary tract infection)   Abdominal pain     Time reflects when diagnosis was documented in both MDM as applicable and the Disposition within this note     Time User Action Codes Description Comment    2/26/2023  5:02 AM Zondra [place] Add [N39 0] UTI (urinary tract infection)     2/26/2023  5:02 AM Zondra [place] Add [R10 9] Abdominal pain       ED Disposition     ED Disposition   Discharge    Condition   Stable    Date/Time   Sun Feb 26, 2023  5:45 AM    Comment   Martha Hollis discharge to home/self care                 Follow-up Information     Follow up With Specialties Details Why Contact Info Additional Information    Aracelis Multani MD Family Medicine Schedule an appointment as soon as possible for a visit   [address] [place] [address] Emergency Department Emergency Medicine  If symptoms worsen [address] Emergency [facility], [facility]  DavidKathy Nulokesh [address] [place] [address], [id number]          Discharge Medication List as of 2/26/2023  5:46 AM      START taking these medications    Details   cephalexin (KEFLEX) 500 mg capsule Take 1 capsule (500 mg total) by mouth every 8 (eight) hours for 7 days, Starting Sun 2/26/2023, Until Sun 3/5/2023, Normal         CONTINUE these medications which have NOT CHANGED    Details   ascorbic acid (VITAMIN C) 250 MG CHEW Chew 250 mg daily, Historical Med      busPIRone (BUSPAR) 10 mg tablet Take 1 tablet (10 mg total) by mouth 3 (three) times a day, Starting Fri 12/16/2022, Normal      esomeprazole (NexIUM) 40 MG capsule Take 40 mg by mouth, Historical Med      FLUoxetine (PROzac) 10 mg capsule TAKE 1 CAPSULE BY MOUTH EVERY DAY, Normal      levonorgestrel-ethinyl estradiol (Vienva) 0 1-20 MG-MCG per tablet Take 1 tablet by mouth daily, Starting Mon 12/5/2022, Normal      metroNIDAZOLE (FLAGYL) 500 mg tablet Take 1 tablet (500 mg total) by mouth every 12 (twelve) hours for 7 days, Starting Tue 2/21/2023, Until Tue 2/28/2023, Normal      multivitamin (THERAGRAN) TABS Take 1 tablet by mouth daily, Historical Med      ondansetron (Zofran ODT) 4 mg disintegrating tablet Take 1 tablet (4 mg total) by mouth every 6 (six) hours as needed for nausea or vomiting, Starting Fri 2/24/2023, Normal             No discharge procedures on file      PDMP Review     None          ED Provider  Electronically Signed by           Patria Mac MD  02/26/23 4431

## 2023-02-27 LAB — BACTERIA UR CULT: NORMAL

## 2023-02-28 LAB
C TRACH DNA SPEC QL NAA+PROBE: NEGATIVE
N GONORRHOEA DNA SPEC QL NAA+PROBE: NEGATIVE

## 2023-03-01 ENCOUNTER — TELEMEDICINE (OUTPATIENT)
Dept: FAMILY MEDICINE CLINIC | Facility: CLINIC | Age: 23
End: 2023-03-01

## 2023-03-01 VITALS — BODY MASS INDEX: 19.4 KG/M2 | HEIGHT: 68 IN | WEIGHT: 128 LBS

## 2023-03-01 DIAGNOSIS — R35.0 URINARY FREQUENCY: ICD-10-CM

## 2023-03-01 DIAGNOSIS — R10.30 LOWER ABDOMINAL PAIN: ICD-10-CM

## 2023-03-01 DIAGNOSIS — N32.89 BLADDER WALL THICKENING: Primary | ICD-10-CM

## 2023-03-02 NOTE — PROGRESS NOTES
Virtual Regular Visit    Verification of patient location:    Patient is located in the following state in which I hold an active license [place]      Assessment/Plan:    Problem List Items Addressed This Visit    None  Visit Diagnoses     Bladder wall thickening    -  Primary    Relevant Orders    Ambulatory Referral to Urology    Urinary frequency        Relevant Orders    Ambulatory Referral to Urology    Lower abdominal pain        Relevant Orders    US appendix        Julia presents today for emergency room follow-up for lower abdominal pain, intermittent nausea, urinary frequency  Abdominal pain started 5 days ago  Currently taking metronidazole for bacterial vaginosis, prescribed by gynecology  Recent OB/GYN notes reviewed  Metronidazole may be contributing to nausea  Reviewed her emergency department notes and diagnostic testing  Urine culture was negative for urinary tract infection with mixed contaminants  Urine pregnancy test was negative  Chlamydia gonorrhea negative  Lipase normal   CBC unremarkable with normal white blood cell count  CMP unremarkable  CT abdomen and pelvis with contrast:  Prominent tubular structure in the right lower quadrant, possible tubo-ovarian abscess, pyosalpinx  Circumferential bladder wall thickening concerning for cystitis versus hypertrophy  Appendix was not visualized  Otherwise unremarkable CT  Follow-up pelvic ultrasound was completed:  Suspected grossly normal caliber appendix in the right lower quadrant region of the right adnexa  Otherwise unremarkable  In the setting of persistent urinary frequency, without evidence of urinary tract infection and urine culture, and bladder wall thickening on CT, will refer to urology for further evaluation  May stop Keflex  Also will have her complete ultrasound of the appendix, to verify no appendicitis    Low suspicion of appendicitis with stable, no worsening symptoms of bilateral lower abdominal pain left greater than right, no fever, no further vomiting  Unfortunately she is not able to complete the ultrasound today, or the next 2 days because she is working  She will complete the ultrasound on the weekend  She will call me with any worsening or persisting of pain  If she should develop any increasing pain, fevers, persistent nausea, vomiting, go to the emergency room  Reason for visit is   Chief Complaint   Patient presents with   • Follow-up     ER f/u abdominal pain   • Virtual Regular Visit        Encounter provider DOUG Acosta    Provider located at [address] [place] [id number]      Recent Visits  Date Type Provider Dept   02/24/23 Office Visit SAMIA Crespo 61 recent visits within past 7 days and meeting all other requirements  Today's Visits  Date Type Provider Dept   03/01/23 Telemedicine Thao Ang, [address] [place] [address] today's visits and meeting all other requirements  Future Appointments  No visits were found meeting these conditions  Showing future appointments within next 150 days and meeting all other requirements       The patient was identified by name and date of birth  Rigoberto Lassiter was informed that this is a telemedicine visit and that the visit is being conducted through the [employer]  She agrees to proceed     My office door was closed  No one else was in the room  She acknowledged consent and understanding of privacy and security of the video platform  The patient has agreed to participate and understands they can discontinue the visit at any time  Patient is aware this is a billable service  Ulises Dominguez is a 63-year-old female presenting today for lower abdominal pain  Recently she has been experiencing fatigue, and increased urinary frequency  Sometimes voiding as often as every 5 minutes    She has recently been evaluated by OB/GYN, treated with metronidazole for BV  She is still completing the course  On Friday, 2/25 she developed bilateral lower quadrant abdominal pain  Vomited twice  Had a complete work-up in the emergency room and was determined she had a urinary tract infection and started treatment with Keflex  Today is day 3 of Keflex, symptoms have not changed  She continues to have lower abdominal pain left side greater than right side  She has decreased appetite, and notes she has lost weight over the past week from poor appetite  States her usual weight is upper 130s to 140  Today's weight is 128 pounds  She has no fevers  She has been going about her usual activities, working  No further vomiting  She does have chronic constipation  Cannot remember the last time she had a bowel movement, may have been as long as 2 weeks ago  She has tried over-the-counter products for constipation in the past, admits she just does not stick with them long enough to notice effectiveness  She does try to eat a healthy diet high in fiber and drink fluids           Past Medical History:   Diagnosis Date   • Anemia     resolved    • Anxiety    • Constipation    • Depression    • GERD (gastroesophageal reflux disease)    • Heart murmur    • Heavy menstrual bleeding     resolved    • Hives    • Iron deficiency anemia secondary to inadequate dietary iron intake 10/15/2019   • Migraine     resolved    • Wears glasses        Past Surgical History:   Procedure Laterality Date   • TONSILECTOMY AND ADNOIDECTOMY     • WISDOM TOOTH EXTRACTION         Current Outpatient Medications   Medication Sig Dispense Refill   • ascorbic acid (VITAMIN C) 250 MG CHEW Chew 250 mg daily     • busPIRone (BUSPAR) 10 mg tablet Take 1 tablet (10 mg total) by mouth 3 (three) times a day 270 tablet 0   • cephalexin (KEFLEX) 500 mg capsule Take 1 capsule (500 mg total) by mouth every 8 (eight) hours for 7 days 21 capsule 0   • esomeprazole (NexIUM) 40 MG capsule Take 40 mg by mouth     • FLUoxetine (PROzac) 10 mg capsule TAKE 1 CAPSULE BY MOUTH EVERY DAY 90 capsule 0   • levonorgestrel-ethinyl estradiol (Vienva) 0 1-20 MG-MCG per tablet Take 1 tablet by mouth daily 84 tablet 4   • multivitamin (THERAGRAN) TABS Take 1 tablet by mouth daily     • ondansetron (Zofran ODT) 4 mg disintegrating tablet Take 1 tablet (4 mg total) by mouth every 6 (six) hours as needed for nausea or vomiting 20 tablet 0     No current facility-administered medications for this visit  Allergies   Allergen Reactions   • Pollen Extract        Review of Systems   Constitutional: Positive for appetite change, fatigue and unexpected weight change  Negative for chills, diaphoresis and fever  Gastrointestinal: Positive for abdominal pain, constipation and nausea ( Intermittent nausea)  Negative for abdominal distention, anal bleeding, blood in stool, diarrhea, rectal pain and vomiting  Genitourinary: Positive for frequency and pelvic pain  Negative for difficulty urinating, dysuria and hematuria  Video Exam    Vitals:    03/01/23 1439   Weight: 58 1 kg (128 lb)   Height: 5' 8" (1 727 m)       Physical Exam  Vitals and nursing note reviewed  Constitutional:       General: She is not in acute distress  Appearance: Normal appearance  She is not ill-appearing  Pulmonary:      Effort: Pulmonary effort is normal  No respiratory distress  Abdominal:      Palpations: Abdomen is soft  Comments: Abdomen is soft as per patient report   Neurological:      Mental Status: She is alert     Psychiatric:         Mood and Affect: Mood normal           I spent 20 minutes directly with the patient during this visit

## 2023-03-04 ENCOUNTER — HOSPITAL ENCOUNTER (OUTPATIENT)
Dept: ULTRASOUND IMAGING | Facility: HOSPITAL | Age: 23
Discharge: HOME | End: 2023-03-04

## 2023-03-04 DIAGNOSIS — R10.30 LOWER ABDOMINAL PAIN: ICD-10-CM

## 2023-03-06 ENCOUNTER — TELEPHONE (OUTPATIENT)
Dept: UROLOGY | Facility: AMBULATORY SURGERY CENTER | Age: 23
End: 2023-03-06

## 2023-03-06 NOTE — TELEPHONE ENCOUNTER
Please Triage  New Patient    What is the reason for the patient’s appointment? N32 89 (ICD-10-CM) - Bladder wall thickening   R35 0 (ICD-10-CM) - Urinary frequency       What office location does the patient prefer? [place]     Imaging/Lab Results: yes    Do we accept the patient's insurance or is the patient Self-Pay? Insurance Provider: [employer]   Plan Type/Number:  Member ID#: Has the patient had any previous Urologist(s)? no    Have patient records been requested? If not are records showing in Epic:  No    Has the patient had any outside testing done? no    Does the patient have a personal history of cancer?

## 2023-03-08 ENCOUNTER — OFFICE VISIT (OUTPATIENT)
Dept: PULMONOLOGY | Facility: CLINIC | Age: 23
End: 2023-03-08

## 2023-03-08 VITALS
WEIGHT: 124.7 LBS | TEMPERATURE: 98.5 F | RESPIRATION RATE: 18 BRPM | OXYGEN SATURATION: 99 % | BODY MASS INDEX: 18.9 KG/M2 | HEIGHT: 68 IN | DIASTOLIC BLOOD PRESSURE: 60 MMHG | SYSTOLIC BLOOD PRESSURE: 110 MMHG | HEART RATE: 80 BPM

## 2023-03-08 DIAGNOSIS — G47.00 INSOMNIA, UNSPECIFIED TYPE: ICD-10-CM

## 2023-03-08 DIAGNOSIS — G47.19 EXCESSIVE DAYTIME SLEEPINESS: Primary | ICD-10-CM

## 2023-03-08 DIAGNOSIS — F41.1 GAD (GENERALIZED ANXIETY DISORDER): ICD-10-CM

## 2023-03-08 NOTE — ASSESSMENT & PLAN NOTE
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, she goes home tired and is able to fall asleep and maintain sleep without too much difficulty    I think prior cases of sleep difficulties diagnosis insomnia were more due to issues with sleep hygiene, excessive daytime sleepiness from sleep deprivation, leading to naps during the day, leading to difficulty with sleep initiation and maintenance in the subsequent night  In addition, the patient also had inconsistent sleep schedules, wake times between weekdays and weekends  She was previously treated for possible restless leg syndrome with gabapentin, which did not help her  I do not believe that she actually has restless leg syndrome based on her symptoms  She notes that her leg movements are more due to her restlessness, difficulty getting comfortable in the beginning of the night    She does not have irritable leg sensation, leg cramps, urge to move her legs when sitting still that would suggest restless leg syndrome    I however cannot rule out periodic limb movements based on her history, and would pay attention to this during the polysomnography

## 2023-03-08 NOTE — ASSESSMENT & PLAN NOTE
Based on her history, I think her excessive daytime sleepiness at this time is likely primarily due to sleep deprivation and issues with sleep consistency/hygiene  She does not have major problems with sleep initiation and sleep maintenance if she stays awake during the day without taking naps  However based on her sleep ovidio, she commonly will be asleep between 10 PM to 6 AM in the month of February 2022  This demonstrates a more advanced sleep phase than would typically be seen in patients her age  She seems to desire little bit more than 8 hours of sleep but often does not have the opportunity to do so due to her work schedule and school schedule  I think she would be well rested if she consistently achieved 8-9 hours of sleep  Nevertheless, due to excessive daytime sleepiness in a young patient, sleep disorders leading to excessive daytime sleepiness despite 7-8 hours of sleep such as obstructive sleep apnea, periodic limb movements (especially in the setting of Prozac use) should be ruled out  She is agreeable for polysomnography    This is also going to  become more of a problem when she starts doing night shifts  I recommended that she keeps recording her sleep patterns via sleep ovidio and a sleep diary I gave her    I recommended that she try to keep a fairly consistent sleep schedule despite night shifts  During her off nights she should try to initiate sleep later in the night and sleeping during the day to try to match her night shift sleep pattern  I recommended taking melatonin 3 mg 1 hour before sleep and use a light box after awakening       I also recommended intake of caffeine approximately 1 to 2 hours before feeling  drowsy during the night shift, which can commonly occur around midnight to 1 AM    If she has debilitating drowsiness during the night shift, we may be able to provide her supervisor with a note for a scheduled nap during the middle of the shift if possible    Overall if she cannot adjust to night shift over time, I strongly suggest returning to a day shift schedule since her natural circadian patterns tend to prefer a more advanced sleep phase

## 2023-03-08 NOTE — ASSESSMENT & PLAN NOTE
Her psychiatry provider is Lurdes Carnes  Her medications are Prozac and Buspar which she takes in the morning    Her mood seems well controlled right now

## 2023-03-08 NOTE — PROGRESS NOTES
Sleep Medicine Outpatient Note   Khang Bull 23 y o  female MRN: [id number]  3/8/2023      Referring Physician: Mohini Mccullough PA-C    Reason for Consultation:    Chief Complaint   Patient presents with   • excessive daytime sleepiness       Assessment/Plan:    1  Excessive daytime sleepiness  Assessment & Plan:  Based on her history, I think her excessive daytime sleepiness at this time is likely primarily due to sleep deprivation and issues with sleep consistency/hygiene  She does not have major problems with sleep initiation and sleep maintenance if she stays awake during the day without taking naps  However based on her sleep ovidio, she commonly will be asleep between 10 PM to 6 AM in the month of February 2022  This demonstrates a more advanced sleep phase than would typically be seen in patients her age  She seems to desire little bit more than 8 hours of sleep but often does not have the opportunity to do so due to her work schedule and school schedule  I think she would be well rested if she consistently achieved 8-9 hours of sleep  Nevertheless, due to excessive daytime sleepiness in a young patient, sleep disorders leading to excessive daytime sleepiness despite 7-8 hours of sleep such as obstructive sleep apnea, periodic limb movements (especially in the setting of Prozac use) should be ruled out  She is agreeable for polysomnography    This is also going to  become more of a problem when she starts doing night shifts  I recommended that she keeps recording her sleep patterns via sleep ovidio and a sleep diary I gave her    I recommended that she try to keep a fairly consistent sleep schedule despite night shifts  During her off nights she should try to initiate sleep later in the night and sleeping during the day to try to match her night shift sleep pattern  I recommended taking melatonin 3 mg 1 hour before sleep and use a light box after awakening       I also recommended intake of caffeine approximately 1 to 2 hours before feeling  drowsy during the night shift, which can commonly occur around midnight to 1 AM    If she has debilitating drowsiness during the night shift, we may be able to provide her supervisor with a note for a scheduled nap during the middle of the shift if possible    Overall if she cannot adjust to night shift over time, I strongly suggest returning to a day shift schedule since her natural circadian patterns tend to prefer a more advanced sleep phase  Orders:  -     Diagnostic Sleep Study; Future; Expected date: 03/09/2023    2  Insomnia, unspecified type  Assessment & Plan:  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, she goes home tired and is able to fall asleep and maintain sleep without too much difficulty    I think prior cases of sleep difficulties diagnosis insomnia were more due to issues with sleep hygiene, excessive daytime sleepiness from sleep deprivation, leading to naps during the day, leading to difficulty with sleep initiation and maintenance in the subsequent night  In addition, the patient also had inconsistent sleep schedules, wake times between weekdays and weekends  She was previously treated for possible restless leg syndrome with gabapentin, which did not help her  I do not believe that she actually has restless leg syndrome based on her symptoms  She notes that her leg movements are more due to her restlessness, difficulty getting comfortable in the beginning of the night    She does not have irritable leg sensation, leg cramps, urge to move her legs when sitting still that would suggest restless leg syndrome    I however cannot rule out periodic limb movements based on her history, and would pay attention to this during the polysomnography    Orders:  -     Ambulatory Referral to Sleep Medicine  -     Diagnostic Sleep Study; Future; Expected date: 03/09/2023    3  MARCOS (generalized anxiety disorder)  Assessment & Plan:  Her psychiatry provider is Alcira Lazo  Her medications are Prozac and Buspar which she takes in the morning  Her mood seems well controlled right now          Health Maintenance  Immunization History   Administered Date(s) Administered   • COVID-19 PFIZER VACCINE 0 3 ML IM 05/14/2021, 06/05/2021, 02/20/2022   • DTaP 5 02/13/2003, 04/14/2003, 06/16/2003, 06/29/2004, 03/02/2007   • HPV9 02/03/2017, 08/14/2017   • Hep B, adult 2002, 01/13/2003, 09/16/2003   • Hib (PRP-OMP) 02/13/2003, 04/14/2003, 06/16/2003, 03/25/2004   • INFLUENZA 01/10/2023   • IPV 02/13/2003, 04/14/2003, 06/29/2004, 03/02/2007   • MMR 03/25/2004, 04/05/2007   • Meningococcal MCV4P 08/15/2014, 08/16/2019   • Meningococcal, Unknown Serogroups 08/15/2014   • Pneumococcal Polysaccharide PPV23 02/13/2003   • Tdap 08/15/2014   • Tuberculin Skin Test-PPD Intradermal 06/25/2021   • Varicella 12/30/2003, 04/05/2007        Return in about 3 months (around 6/8/2023)  History of Present Illness   HPI:  Letty Torres is a 23 y o  female who has a past medical history of generalized anxiety disorder, who is presenting for the evaluation of excessive daytime sleepiness    Patient is on buspirone, prozac (originally prescribed by PCP and now a psychiatrist)  She has been on them for 1-2 years  She is on no other medications  She notes that she has always had sleep problems as a child  As a child she had difficulty sleeping alone in her room  As she progressed into teenage and young adult years, she has had trouble with excessive daytime sleepiness requiring long naps during the day  lasting 2 to 3 hours at times  This would in turn cause her to have difficulty with sleep initiation and sleep maintenance overnight    Subsequently she had seen a sleep specialist years ago who thought she had restless leg syndrome and started her on gabapentin  This was ineffective  She had just started a new job 3 to 4 weeks ago  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, 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  Now that she does not have an opportunity to take a nap during the day, she has no issues with sleep initiation and sleep maintenance  Based on her sleep ovidio, she would commonly sleep between 10 PM to 5:45 AM during days where she have to work  On weekends, but she does not have work obligations during the day, she would go to sleep between 11 to 11:30 PM and sleeping until 9 AM to noon  Unfortunately, her job will be a night shift job, 2 nights a week 7 PM to 7 AM   She is starting this tomorrow  She picked a night shift job so she could also take college courses  On Monday and Tuesday she has a biology class from 6:30 PM to 9:30 PM   On Wednesdays, she has a class from 930 to 10:50 AM   Thankfully these classes are virtual     Her current routine, dinner is around 6-7pm   She gets home around 7p-7:30pm (as she is doing orientation)  She showers and tries to go to bed after  She does feel drowsy when she goes to bed at 9:30-10pm  She falls asleep around 20-30 minutes  She wakes up at 5:30-5:45am for work  She feels unrefreshed upon awakening, and often lies in bed for 10 to 15 minutes before getting up  She feels drowsy and fatigued during her day shifts  She does not have significant snoring, gasping, choking during sleep  But she notes that her significant other does    Sleep Pattern:  -Location: bed room  -Bed/Recliner/Wedge: bed  -Bed Partner:  significant other has some snoring  -Bedtime:  during work days - 9:30-11pm - will change as she switches to night shift  -Environmental: comfortable, dark, has her own pillows    Temperature is okay     -Latency: 20-30 minutes  -Awakenings:  2 times              -Reason: unknown              -Duration: minutes  -Fall back asleep easily:  yes  -Wake time:  5:30am during work days currently  -will change as she switches to night shift  -Rise time:  5:45  -Days off: sleeps in until 9 to noon sometime  -Shift Work: will be starting night shift  -Patient's estimate of total sleep time: 7-8 hours  -Sleep aids/stimulants: none  -ETOH before sleeping: no, very occasionally  -Acid reflux: usually eats dinner around 6-7pm        Daytime Symptoms:  -Upon Awakening:  Not refreshed  -Daytime fatigue/sleepiness:  Yes  -Naps:  Not when she has to work during the day, used to take 2 to 3-hour naps during the day  -Driving: Difficulty with sleepiness and driving:  no trouble driving due to drowsiness  -She doesn't think coffee works and she just tries to stay awake during the day  She is on her feet a lot which helps her stay awake  Sleep Review of Symptoms:  -SDB  -snoring, gasping, choking during sleep:   +mild snoring    No witnessed apneas, gasping/choking  -Parasomnias:  --Sleep Walking:  no, occasional sleep talk  --Dream Enactment:  no  --Bruxism:  no  --Headaches: no  -Motor:  --RLS: can't get comfortable but no specific restless leg  --PLMS:  no  - Narcolepsy:  --Hallucinations:  no  --Paralysis:  no  --Cataplexy:  no     Childhood Sleep History: trouble sleep by herself     Family History:  Family history of sleep disorders:  no     Questionnaires:   Strang is 19        Historical Information   Past Medical History:   Diagnosis Date   • Anemia     resolved    • Anxiety    • Constipation    • Depression    • GERD (gastroesophageal reflux disease)    • Heart murmur    • Heavy menstrual bleeding     resolved    • Hives    • Iron deficiency anemia secondary to inadequate dietary iron intake 10/15/2019   • Migraine     resolved    • Wears glasses      Past Surgical History:   Procedure Laterality Date   • TONSILECTOMY AND ADNOIDECTOMY     • WISDOM TOOTH EXTRACTION       Family History   Problem Relation Age of Onset   • Breast cancer Mother 29   • No Known Problems Father    • Breast cancer Maternal Grandfather    • Colon cancer Neg Hx    • Ovarian cancer Neg Hx          Meds/Allergies     Current Outpatient Medications:   •  ascorbic acid (VITAMIN C) 250 MG CHEW, Chew 250 mg daily, Disp: , Rfl:   •  busPIRone (BUSPAR) 10 mg tablet, Take 1 tablet (10 mg total) by mouth 3 (three) times a day, Disp: 270 tablet, Rfl: 0  •  esomeprazole (NexIUM) 40 MG capsule, Take 40 mg by mouth, Disp: , Rfl:   •  FLUoxetine (PROzac) 10 mg capsule, TAKE 1 CAPSULE BY MOUTH EVERY DAY, Disp: 90 capsule, Rfl: 0  •  levonorgestrel-ethinyl estradiol (Vienva) 0 1-20 MG-MCG per tablet, Take 1 tablet by mouth daily, Disp: 84 tablet, Rfl: 4  •  multivitamin (THERAGRAN) TABS, Take 1 tablet by mouth daily, Disp: , Rfl:   •  ondansetron (Zofran ODT) 4 mg disintegrating tablet, Take 1 tablet (4 mg total) by mouth every 6 (six) hours as needed for nausea or vomiting, Disp: 20 tablet, Rfl: 0  Allergies   Allergen Reactions   • Pollen Extract        Vitals: Blood pressure 110/60, pulse 80, temperature 98 5 °F (36 9 °C), temperature source Tympanic, resp  rate 18, height 5' 8" (1 727 m), weight 56 6 kg (124 lb 11 2 oz), last menstrual period 02/14/2023, SpO2 99 %  Body mass index is 18 96 kg/m²  Oxygen Therapy  SpO2: 99 %  Oxygen Therapy: None (Room air)      Physical Exam  Vitals and nursing note reviewed  Constitutional:       General: She is not in acute distress  Appearance: She is well-developed  She is not ill-appearing, toxic-appearing or diaphoretic  HENT:      Head: Normocephalic and atraumatic  Eyes:      General: No scleral icterus  Conjunctiva/sclera: Conjunctivae normal    Cardiovascular:      Rate and Rhythm: Normal rate and regular rhythm  Heart sounds: No murmur heard  Pulmonary:      Effort: Pulmonary effort is normal  No respiratory distress  Breath sounds: Normal breath sounds  Abdominal:      Palpations: Abdomen is soft  Tenderness: There is no abdominal tenderness  Musculoskeletal:         General: No swelling  Cervical back: Normal range of motion and neck supple  No rigidity  Right lower leg: No edema  Left lower leg: No edema  Skin:     General: Skin is warm and dry  Capillary Refill: Capillary refill takes less than 2 seconds  Neurological:      Mental Status: She is alert  Psychiatric:         Mood and Affect: Mood normal        Neck Circumference: 13     Labs: I have personally reviewed pertinent lab results  ABG: No results found for: PHART, TSC7OZP, PO2ART, YGI7MTV, E4MPBEBV, BEART, SOURCE,   BNP: No results found for: BNP,   CBC:  Lab Results   Component Value Date    WBC 7 99 02/26/2023    HGB 13 4 02/26/2023    HCT 41 1 02/26/2023    MCV 89 02/26/2023     02/26/2023    EOSPCT 1 02/26/2023    EOSABS 0 06 02/26/2023    NEUTOPHILPCT 77 (H) 02/26/2023    LYMPHOPCT 15 02/26/2023   ,   CMP:   Lab Results   Component Value Date    SODIUM 138 02/26/2023    K 3 8 02/26/2023     02/26/2023    CO2 28 02/26/2023    BUN 11 02/26/2023    CREATININE 0 78 02/26/2023    CALCIUM 9 1 02/26/2023    AST 16 02/26/2023    ALT 12 02/26/2023    ALKPHOS 55 02/26/2023    PROT 6 6 06/11/2016    BILITOT 2 0 (H) 06/11/2016    EGFR 109 02/26/2023   ,   PT/INR: No results found for: PT, INR,   Ferrtin: No components found for: FERRTIN,  Magensium: No results found for: MAGNESIUM,      Imaging and other studies: I have personally reviewed pertinent reports  and I have personally reviewed pertinent films in PACS  2/26/2023  CT Abd pelvis  IMPRESSION:  Prominent tubular structure in the right lower quadrant/right hemipelvis; does the patient have pyosalpinx or evidence for a tubo-ovarian abscess? Consider pelvic sonogram in the appropriate clinical setting        Appendix not visualized     Circumferential bladder wall thickening concerning for cystitis versus hypertrophy (other causes not excluded)   Recommend correlation with urinalysis and urine culture  Recommend urologic consultation in the appropriate clinical setting  Sleep Study: none      Transthoracic Echo:  10/2020  IMPRESSIONS:  1  Normal four chamber intracardiac anatomy  2  Normal biventricular systolic function  3  Mild mitral valve prolapse with mild mitral valve regurgitation  4  All other valves are normal in structure and function  5  No obvious shunt lesions, although the atrial septum is seen in limited views only  6  Widely patent aortic arch with no evidence of coarctation  Arben Astorga MD  Pulmonary, Critical Care and Sleep Medicine  [facility]     Portions of the record may have been created with voice recognition software  Occasional wrong word or "sound a like" substitutions may have occurred due to the inherent limitations of voice recognition software  Please read the chart carefully and recognize, using context, where substitutions have occurred

## 2023-03-09 ENCOUNTER — RA CDI HCC (OUTPATIENT)
Dept: OTHER | Facility: HOSPITAL | Age: 23
End: 2023-03-09

## 2023-03-09 NOTE — PROGRESS NOTES
Depression, recurrent (HCC)Noted 1/21/2022  [F33 9]      NOT on the BPA- please assess using MEAT for 2023 billing    [facility] [address]  coding opportunities          Chart Reviewed number of suggestions sent to Provider: 1     Patients Insurance        Commercial Insurance: [address] [place] [address]

## 2023-03-13 ENCOUNTER — TELEPHONE (OUTPATIENT)
Dept: FAMILY MEDICINE CLINIC | Facility: CLINIC | Age: 23
End: 2023-03-13

## 2023-03-13 NOTE — TELEPHONE ENCOUNTER
Called patient, no answer  Left message for pt to call back for results  Sent My Chart message also

## 2023-03-13 NOTE — TELEPHONE ENCOUNTER
----- Message from [id number] sent at 3/12/2023  6:34 PM EDT -----  Normal US of appendix  I see she has follow up scheduled with Dr Tamika Rosas on 3/16  Please call with any questions

## 2023-03-14 NOTE — TELEPHONE ENCOUNTER
Called pt to let her know results of US, no answer  Left message informing her of results and letting her know that I sent her a MyChart message

## 2023-03-23 ENCOUNTER — OFFICE VISIT (OUTPATIENT)
Dept: FAMILY MEDICINE CLINIC | Facility: CLINIC | Age: 23
End: 2023-03-23

## 2023-03-23 VITALS
DIASTOLIC BLOOD PRESSURE: 80 MMHG | TEMPERATURE: 97.8 F | OXYGEN SATURATION: 98 % | SYSTOLIC BLOOD PRESSURE: 110 MMHG | RESPIRATION RATE: 16 BRPM | WEIGHT: 124 LBS | HEIGHT: 68 IN | BODY MASS INDEX: 18.79 KG/M2 | HEART RATE: 80 BPM

## 2023-03-23 DIAGNOSIS — R11.0 NAUSEA: ICD-10-CM

## 2023-03-23 DIAGNOSIS — R35.89 POLYURIA: ICD-10-CM

## 2023-03-23 DIAGNOSIS — K21.9 CHRONIC GERD: ICD-10-CM

## 2023-03-23 DIAGNOSIS — R10.30 LOWER ABDOMINAL PAIN: Primary | ICD-10-CM

## 2023-03-23 LAB
SL AMB  POCT GLUCOSE, UA: NORMAL
SL AMB LEUKOCYTE ESTERASE,UA: NORMAL
SL AMB POCT BILIRUBIN,UA: NORMAL
SL AMB POCT BLOOD,UA: NORMAL
SL AMB POCT CLARITY,UA: CLEAR
SL AMB POCT COLOR,UA: NORMAL
SL AMB POCT KETONES,UA: NORMAL
SL AMB POCT NITRITE,UA: NORMAL
SL AMB POCT PH,UA: 5
SL AMB POCT SPECIFIC GRAVITY,UA: 1.02
SL AMB POCT URINE PROTEIN: NORMAL
SL AMB POCT UROBILINOGEN: 0.2

## 2023-03-23 RX ORDER — DICYCLOMINE HCL 20 MG
20 TABLET ORAL 2 TIMES DAILY PRN
Qty: 60 TABLET | Refills: 1 | Status: SHIPPED | OUTPATIENT
Start: 2023-03-23

## 2023-03-23 RX ORDER — ONDANSETRON 4 MG/1
4 TABLET, ORALLY DISINTEGRATING ORAL EVERY 6 HOURS PRN
Qty: 20 TABLET | Refills: 0 | Status: SHIPPED | OUTPATIENT
Start: 2023-03-23

## 2023-03-23 RX ORDER — ESOMEPRAZOLE MAGNESIUM 40 MG/1
40 CAPSULE, DELAYED RELEASE ORAL
Qty: 60 CAPSULE | Refills: 1 | Status: SHIPPED | OUTPATIENT
Start: 2023-03-23

## 2023-03-23 NOTE — PROGRESS NOTES
Name: Yue Connor      : 2000      MRN: [id number]  Encounter Provider: Gato Hernandez MD  Encounter Date: 3/23/2023   Encounter department: [address] [place] [address]     1  Lower abdominal pain  -     POCT urine dip auto non-scope  -     dicyclomine (BENTYL) 20 mg tablet; Take 1 tablet (20 mg total) by mouth 2 (two) times a day as needed (abdominal bloating / lower abdominal pain)  -     Basic metabolic panel; Future    2  Chronic GERD  -     esomeprazole (NexIUM) 40 MG capsule; Take 1 capsule (40 mg total) by mouth 2 (two) times a day before meals    3  Nausea  -     ondansetron (Zofran ODT) 4 mg disintegrating tablet; Take 1 tablet (4 mg total) by mouth every 6 (six) hours as needed for nausea or vomiting    4  Polyuria  -     Hemoglobin A1C; Future  -     TSH, 3rd generation; Future  Presents for evaluation of intermittent stabbing right lower quadrant and left lower quadrant abdominal pain along with polyuria and polydipsia  She was seen in the emergency room on  and had extensive evaluation with CT abdomen and pelvis and pelvic ultrasound  Subsequent ultrasound of appendix was negative  No etiology of her symptoms was determined  No evidence of UTI  Urine dip performed in the office today is negative  Exam reveals mild abdominal distention with hyperactive bowel sounds  I suspect that patient's symptoms might be related to IBS  I recommend trial of Bentyl 20 mg twice a day on an as-needed basis  Patient will proceed with additional blood work including BMP, hemoglobin A1c and TSH  She will follow-up with gastroenterology and urology  Subjective     The patient presents for follow-up  She was recently seen in ER on 23 for lower abdominal pain and nausea  Mild chronic constipation and chronic abdominal pain  Patient complains of urinary frequency - no dysuria the patient goes to the bathroom 2-3 per hour, she also reports nocturia - few times per night  Thirsty and drinking more lately  Seen at [facility] - had CT and pelvic ultrasound  Subsequently patient had appendix ultrasound ordered by our CRNP  All labs and imaging studies reviewed today  Pending OV with urology soon  Patient reports intermittent sharp poking pain in LLQ and RLQ, patient describes pain as stabbing and pinpoints with the finger at her left lower quadrant and right lower quadrant  No gross hematuria, no flank pain, patient is afebrile, no chills  CT AP  IMPRESSION:    Prominent tubular structure in the right lower quadrant/right hemipelvis; does the patient have pyosalpinx or evidence for a tubo-ovarian abscess? Consider pelvic sonogram in the appropriate clinical setting  Appendix not visualized  Circumferential bladder wall thickening concerning for cystitis versus hypertrophy (other causes not excluded)  Recommend correlation with urinalysis and urine culture  Recommend urologic consultation in the appropriate clinical setting  Subsequently patient had pelvic ultrasound that did not indicate tubo-ovarian abscess    Her last GYN exam 12/5/22- Dr Newman's group    Completed  Abx for possible UTI as per ER      Dr Jackson- GI - on nexium 40 mg daily          Urinary Tract Infection   Associated symptoms include frequency  Pertinent negatives include no flank pain, hematuria, nausea or vomiting  Review of Systems   Constitutional: Negative  HENT: Negative  Eyes: Negative  Respiratory: Negative  Cardiovascular: Negative  Gastrointestinal: Positive for abdominal pain and constipation  Negative for blood in stool, nausea and vomiting  Endocrine: Positive for polydipsia and polyuria  Genitourinary: Positive for frequency  Negative for dysuria, flank pain and hematuria  Musculoskeletal: Negative  Neurological: Negative  Hematological: Negative          Past Medical History:   Diagnosis Date   • Anemia     resolved    • Anxiety    • Constipation    • Depression    • GERD (gastroesophageal reflux disease)    • Heart murmur    • Heavy menstrual bleeding     resolved    • Hives    • Iron deficiency anemia secondary to inadequate dietary iron intake 10/15/2019   • Migraine     resolved    • Wears glasses      Past Surgical History:   Procedure Laterality Date   • TONSILECTOMY AND ADNOIDECTOMY     • WISDOM TOOTH EXTRACTION       Family History   Problem Relation Age of Onset   • Breast cancer Mother 29   • No Known Problems Father    • Breast cancer Maternal Grandfather    • Colon cancer Neg Hx    • Ovarian cancer Neg Hx      Social History     Socioeconomic History   • Marital status: Single     Spouse name: None   • Number of children: 0   • Years of education: None   • Highest education level: None   Occupational History   • Occupation: student   Tobacco Use   • Smoking status: Never     Passive exposure: Past   • Smokeless tobacco: Never   Vaping Use   • Vaping Use: Never used   Substance and Sexual Activity   • Alcohol use: Not Currently     Comment: Social   • Drug use: No   • Sexual activity: Yes     Partners: Male     Birth control/protection: OCP   Other Topics Concern   • None   Social History Narrative    Daily caffeine consumption -- coffee 1x weekly    Does not exercise        Who lives in your home: Mom     What type of home do you live in: Other condo     Age of your home: Built 14 yrs ago     How long have you been living there: 2 yrs     Type of heat: Forced hot air    Type of fuel: Electric    What type of naomi is in your bedroom: Carpet    Do you have the following in or near your home:    Air products: Central air and Humidifier    Pests: None    Pets: Dog and Rabbit    Are pets allowed in bedroom: Yes    Open fields, wooded areas nearby: Open fields and Wooded areas    Basement: None    Exposure to second hand smoke: Yes boyfriends house         Habits:    Caffeine: coffee 1 cup daily -    Chocolate: occasionally     Other: "    Social Determinants of Health     Financial Resource Strain: Not on file   Food Insecurity: Not on file   Transportation Needs: Not on file   Physical Activity: Not on file   Stress: Not on file   Social Connections: Not on file   Intimate Partner Violence: Not on file   Housing Stability: Not on file     Current Outpatient Medications on File Prior to Visit   Medication Sig   • ascorbic acid (VITAMIN C) 250 MG CHEW Chew 250 mg daily   • busPIRone (BUSPAR) 10 mg tablet Take 1 tablet (10 mg total) by mouth 3 (three) times a day   • FLUoxetine (PROzac) 10 mg capsule TAKE 1 CAPSULE BY MOUTH EVERY DAY   • levonorgestrel-ethinyl estradiol (Vienva) 0 1-20 MG-MCG per tablet Take 1 tablet by mouth daily   • multivitamin (THERAGRAN) TABS Take 1 tablet by mouth daily     Allergies   Allergen Reactions   • Pollen Extract      Immunization History   Administered Date(s) Administered   • COVID-19 PFIZER VACCINE 0 3 ML IM 05/14/2021, 06/05/2021, 02/20/2022   • DTaP 5 02/13/2003, 04/14/2003, 06/16/2003, 06/29/2004, 03/02/2007   • HPV9 02/03/2017, 08/14/2017   • Hep B, adult 2002, 01/13/2003, 09/16/2003   • Hib (PRP-OMP) 02/13/2003, 04/14/2003, 06/16/2003, 03/25/2004   • INFLUENZA 01/10/2023   • IPV 02/13/2003, 04/14/2003, 06/29/2004, 03/02/2007   • MMR 03/25/2004, 04/05/2007   • Meningococcal MCV4P 08/15/2014, 08/16/2019   • Meningococcal, Unknown Serogroups 08/15/2014   • Pneumococcal Polysaccharide PPV23 02/13/2003   • Tdap 08/15/2014   • Tuberculin Skin Test-PPD Intradermal 06/25/2021   • Varicella 12/30/2003, 04/05/2007       Objective     /80   Pulse 80   Temp 97 8 °F (36 6 °C) (Temporal)   Resp 16   Ht 5' 8\" (1 727 m)   Wt 56 2 kg (124 lb)   LMP 03/14/2023 (Approximate)   SpO2 98%   BMI 18 85 kg/m²     Physical Exam  Vitals and nursing note reviewed  Constitutional:       General: She is not in acute distress  Appearance: Normal appearance  She is well-developed  She is not ill-appearing   " HENT:      Head: Normocephalic and atraumatic  Eyes:      Conjunctiva/sclera: Conjunctivae normal    Neck:      Thyroid: No thyromegaly  Vascular: No carotid bruit  Cardiovascular:      Rate and Rhythm: Normal rate and regular rhythm  Heart sounds: Normal heart sounds  No murmur heard  Pulmonary:      Effort: Pulmonary effort is normal  No respiratory distress  Breath sounds: Normal breath sounds  No wheezing  Abdominal:      General: Abdomen is flat  Bowel sounds are increased  There is distension  There is no abdominal bruit  Palpations: Abdomen is soft  Tenderness: There is no abdominal tenderness  There is no right CVA tenderness or left CVA tenderness  Hernia: No hernia is present  Musculoskeletal:         General: Normal range of motion  Cervical back: Neck supple  Neurological:      Mental Status: She is alert and oriented to person, place, and time  Cranial Nerves: No cranial nerve deficit  Coordination: Coordination normal    Psychiatric:         Mood and Affect: Mood normal          Behavior: Behavior normal          Thought Content:  Thought content normal         Results for orders placed or performed in visit on 03/23/23   POCT urine dip auto non-scope   Result Value Ref Range     COLOR,UA orange     CLARITY,UA clear     SPECIFIC GRAVITY,UA 1 020      PH,UA 5 0     LEUKOCYTE ESTERASE,UA -     NITRITE,UA -     GLUCOSE, UA -     Jannie Yasir -     POCT URINE PROTEIN -     SL AMB POCT UROBILINOGEN 0 2        Paulino Bell MD

## 2023-03-28 ENCOUNTER — APPOINTMENT (OUTPATIENT)
Dept: LAB | Facility: HOSPITAL | Age: 23
End: 2023-03-28
Payer: COMMERCIAL

## 2023-03-28 DIAGNOSIS — R10.30 LOWER ABDOMINAL PAIN: ICD-10-CM

## 2023-03-28 DIAGNOSIS — R35.89 POLYURIA: ICD-10-CM

## 2023-03-28 LAB
ANION GAP SERPL CALCULATED.3IONS-SCNC: 5 MMOL/L (ref 4–13)
BUN SERPL-MCNC: 10 MG/DL (ref 5–25)
CALCIUM SERPL-MCNC: 8.8 MG/DL (ref 8.4–10.2)
CHLORIDE SERPL-SCNC: 102 MMOL/L (ref 96–108)
CO2 SERPL-SCNC: 27 MMOL/L (ref 21–32)
CREAT SERPL-MCNC: 0.7 MG/DL (ref 0.6–1.3)
GFR SERPL CREATININE-BSD FRML MDRD: 125 ML/MIN/1.73SQ M
GLUCOSE P FAST SERPL-MCNC: 84 MG/DL (ref 65–99)
POTASSIUM SERPL-SCNC: 4.3 MMOL/L (ref 3.5–5.3)
SODIUM SERPL-SCNC: 134 MMOL/L (ref 135–147)
TSH SERPL DL<=0.05 MIU/L-ACNC: 0.99 UIU/ML (ref 0.45–4.5)

## 2023-03-28 PROCEDURE — 80048 BASIC METABOLIC PNL TOTAL CA: CPT

## 2023-03-28 PROCEDURE — 84443 ASSAY THYROID STIM HORMONE: CPT

## 2023-03-28 PROCEDURE — 83036 HEMOGLOBIN GLYCOSYLATED A1C: CPT

## 2023-03-28 PROCEDURE — 36415 COLL VENOUS BLD VENIPUNCTURE: CPT

## 2023-03-29 LAB
EST. AVERAGE GLUCOSE BLD GHB EST-MCNC: 100 MG/DL
HBA1C MFR BLD: 5.1 %

## 2023-04-05 ENCOUNTER — OFFICE VISIT (OUTPATIENT)
Dept: UROLOGY | Facility: AMBULATORY SURGERY CENTER | Age: 23
End: 2023-04-05

## 2023-04-05 VITALS
BODY MASS INDEX: 18.7 KG/M2 | DIASTOLIC BLOOD PRESSURE: 78 MMHG | HEART RATE: 71 BPM | OXYGEN SATURATION: 99 % | SYSTOLIC BLOOD PRESSURE: 96 MMHG | WEIGHT: 123 LBS

## 2023-04-05 DIAGNOSIS — N32.89 BLADDER WALL THICKENING: ICD-10-CM

## 2023-04-05 DIAGNOSIS — R35.0 URINARY FREQUENCY: Primary | ICD-10-CM

## 2023-04-05 LAB
POST-VOID RESIDUAL VOLUME, ML POC: 24 ML
SL AMB  POCT GLUCOSE, UA: NORMAL
SL AMB LEUKOCYTE ESTERASE,UA: NORMAL
SL AMB POCT BILIRUBIN,UA: NORMAL
SL AMB POCT BLOOD,UA: NORMAL
SL AMB POCT CLARITY,UA: CLEAR
SL AMB POCT COLOR,UA: NORMAL
SL AMB POCT KETONES,UA: NORMAL
SL AMB POCT NITRITE,UA: NORMAL
SL AMB POCT PH,UA: 6
SL AMB POCT SPECIFIC GRAVITY,UA: 1.03
SL AMB POCT URINE PROTEIN: NORMAL
SL AMB POCT UROBILINOGEN: 0.2

## 2023-04-05 RX ORDER — OXYBUTYNIN CHLORIDE 10 MG/1
10 TABLET, EXTENDED RELEASE ORAL
Qty: 30 TABLET | Refills: 1 | Status: SHIPPED | OUTPATIENT
Start: 2023-04-05

## 2023-04-05 NOTE — PATIENT INSTRUCTIONS
Start Oxybutynin   Schedule cystoscopy   Maintain hydration upwards to 40 oz water per day   Add lemon/lime/cranberry to water   Use baby wipes after urinating to cleanse yourself   Urinate after sexual activity

## 2023-04-05 NOTE — PROGRESS NOTES
4/5/2023      No chief complaint on file  Assessment and Plan    23 y o  female managed by new patient  1   Bladder wall thickening  · CT abdomen pelvis performed 2/26/2023 reveals circumferential bladder wall thickening concerning for cystitis versus hypertrophy  · Cystoscopy ordered    2  Dysuria/urinary frequency  · Urine dip in office unremarkable  · Bladder scan PVR-24 mL  · We discussed the need to maintain adequate duration of support ounces of water intake per day while avoiding bladder irritating foods and beverages  · Ensure complete bladder emptying with urination  · Timed voiding  · Use of cranberry supplementation and vitamin C  · Use of baby wipes after urinating  · Urinate after sexual activity  · Prescription for oxybutynin provided  Side effects discussed  Patient verbalized understanding  · Discussed pelvic floor/Kegel exercises  · Follow-up in the office as scheduled for cystoscopy and further evaluation of symptoms      History of Present Illness  Shelbi Serrano is a 23 y o  female here for follow up evaluation of urinary frequency, urgency and CAT scan findings of bladder wall thickening  Patient reports presented to the emergency department recently with complaints of abdominal pain, urinary frequency, urgency  She reports the urinary frequency was different than onset of urinary tract infections  She reports no suprapubic pressure associated with urinary frequency  Patient reports a previous history of sensation of recurrent urinary tract infections but review of medical record reveals all negative urine testing over the course of the last 1 5 years  Patient has been managed with birth control pills over the course of the last 7 months for which she reports no significant change to her urinary symptoms since initiation of birth control  She currently denies hematuria  She reports sensation of complete emptying with urination  She reports going to the bathroom every 3 hours    She reports use of vitamin water 2-3 bottles per day  She reports occasional intake of coffee  She denies tea and soda intake  She denies smoking and use/abuse of illicit substances and alcohol  She reports no family history significant for  malignancies  Review of Systems   Constitutional: Negative for chills and fever  Respiratory: Negative for cough and shortness of breath  Cardiovascular: Negative for chest pain  Gastrointestinal: Negative for abdominal distention, abdominal pain, blood in stool, nausea and vomiting  Genitourinary: Negative for difficulty urinating, dysuria, enuresis, flank pain, frequency, hematuria and urgency  Skin: Negative for rash  Urinary Incontinence Screening    Flowsheet Row Most Recent Value   Urinary Incontinence    Urinary Incontinence? No   Incomplete emptying? No   Urinary frequency? Yes   Urinary urgency? Yes   Urinary hesitancy? No   Dysuria (painful difficult urination)? No   Nocturia (waking up to use the bathroom)? No   Straining (having to push to go)? No   Weak stream? No   Intermittent stream? No   Post void dribbling? No   Vaginal pressure? No   Vaginal dryness?  Yes                 Past Medical History  Past Medical History:   Diagnosis Date   • Anemia     resolved    • Anxiety    • Constipation    • Depression    • GERD (gastroesophageal reflux disease)    • Heart murmur    • Heavy menstrual bleeding     resolved    • Hives    • Iron deficiency anemia secondary to inadequate dietary iron intake 10/15/2019   • Migraine     resolved    • Wears glasses        Past Social History  Past Surgical History:   Procedure Laterality Date   • TONSILECTOMY AND ADNOIDECTOMY     • WISDOM TOOTH EXTRACTION       Social History     Tobacco Use   Smoking Status Never   • Passive exposure: Past   Smokeless Tobacco Never       Past Family History  Family History   Problem Relation Age of Onset   • Breast cancer Mother 29   • No Known Problems Father    • Breast cancer Maternal Grandfather    • Colon cancer Neg Hx    • Ovarian cancer Neg Hx        Past Social history  Social History     Socioeconomic History   • Marital status: Single     Spouse name: Not on file   • Number of children: 0   • Years of education: Not on file   • Highest education level: Not on file   Occupational History   • Occupation: student   Tobacco Use   • Smoking status: Never     Passive exposure: Past   • Smokeless tobacco: Never   Vaping Use   • Vaping Use: Never used   Substance and Sexual Activity   • Alcohol use: Not Currently     Comment: Social   • Drug use: No   • Sexual activity: Yes     Partners: Male     Birth control/protection: OCP   Other Topics Concern   • Not on file   Social History Narrative    Daily caffeine consumption -- coffee 1x weekly    Does not exercise        Who lives in your home: Mom     What type of home do you live in: Other condo     Age of your home: Built 14 yrs ago     How long have you been living there: 2 yrs     Type of heat: Forced hot air    Type of fuel: Electric    What type of naomi is in your bedroom: Carpet    Do you have the following in or near your home:    Air products: Central air and Humidifier    Pests: None    Pets: Dog and Rabbit    Are pets allowed in bedroom: Yes    Open fields, wooded areas nearby: Open fields and Wooded areas    Basement: None    Exposure to second hand smoke: Yes boyfriends house         Habits:    Caffeine: coffee 1 cup daily -    Chocolate: occasionally     Other:     Social Determinants of Health     Financial Resource Strain: Not on file   Food Insecurity: Not on file   Transportation Needs: Not on file   Physical Activity: Not on file   Stress: Not on file   Social Connections: Not on file   Intimate Partner Violence: Not on file   Housing Stability: Not on file       Current Medications  Current Outpatient Medications   Medication Sig Dispense Refill   • ascorbic acid (VITAMIN C) 250 MG CHEW Chew 250 mg daily     • busPIRone (BUSPAR) 10 mg tablet Take 1 tablet (10 mg total) by mouth 3 (three) times a day 270 tablet 0   • dicyclomine (BENTYL) 20 mg tablet Take 1 tablet (20 mg total) by mouth 2 (two) times a day as needed (abdominal bloating / lower abdominal pain) 60 tablet 1   • esomeprazole (NexIUM) 40 MG capsule Take 1 capsule (40 mg total) by mouth 2 (two) times a day before meals 60 capsule 1   • FLUoxetine (PROzac) 10 mg capsule TAKE 1 CAPSULE BY MOUTH EVERY DAY 90 capsule 0   • levonorgestrel-ethinyl estradiol (Vienva) 0 1-20 MG-MCG per tablet Take 1 tablet by mouth daily 84 tablet 4   • multivitamin (THERAGRAN) TABS Take 1 tablet by mouth daily     • ondansetron (Zofran ODT) 4 mg disintegrating tablet Take 1 tablet (4 mg total) by mouth every 6 (six) hours as needed for nausea or vomiting 20 tablet 0   • oxybutynin (DITROPAN-XL) 10 MG 24 hr tablet Take 1 tablet (10 mg total) by mouth daily at bedtime 30 tablet 1     No current facility-administered medications for this visit  Allergies  Allergies   Allergen Reactions   • Pollen Extract          The following portions of the patient's history were reviewed and updated as appropriate: allergies, current medications, past medical history, past social history, past surgical history and problem list       Vitals  Vitals:    04/05/23 1329   BP: 96/78   BP Location: Left arm   Patient Position: Sitting   Cuff Size: Adult   Pulse: 71   SpO2: 99%   Weight: 55 8 kg (123 lb)           Physical Exam  Physical Exam  Vitals reviewed  Constitutional:       General: She is not in acute distress  Appearance: Normal appearance  Cardiovascular:      Heart sounds: Normal heart sounds  Pulmonary:      Effort: Pulmonary effort is normal  No respiratory distress  Breath sounds: Normal breath sounds  Musculoskeletal:         General: Normal range of motion  Skin:     General: Skin is warm and dry  Neurological:      General: No focal deficit present        Mental Status: She is alert     Psychiatric:         Mood and Affect: Mood normal          Behavior: Behavior normal        Results  Recent Results (from the past 1 hour(s))   POCT Measure PVR    Collection Time: 04/05/23  1:33 PM   Result Value Ref Range    POST-VOID RESIDUAL VOLUME, ML POC 24 mL   POCT urine dip    Collection Time: 04/05/23  1:34 PM   Result Value Ref Range    LEUKOCYTE ESTERASE,UA -     NITRITE,UA -     SL AMB POCT UROBILINOGEN 0 2     POCT URINE PROTEIN -      PH,UA 6 0     BLOOD,UA -     SPECIFIC GRAVITY,UA 1 030     KETONES,UA -     BILIRUBIN,UA small     GLUCOSE, UA -      COLOR,UA ailyn     CLARITY,UA clear    ]  No results found for: PSA  Lab Results   Component Value Date    CALCIUM 8 8 03/28/2023     06/11/2016    K 4 3 03/28/2023    CO2 27 03/28/2023     03/28/2023    BUN 10 03/28/2023    CREATININE 0 70 03/28/2023     Lab Results   Component Value Date    WBC 7 99 02/26/2023    HGB 13 4 02/26/2023    HCT 41 1 02/26/2023    MCV 89 02/26/2023     02/26/2023     Orders  Orders Placed This Encounter   Procedures   • Cystoscopy     Bladder wall thickening on imaging     Standing Status:   Future     Standing Expiration Date:   4/5/2024   • POCT urine dip   • POCT Measure PVR       DOUG Rey

## 2023-05-17 ENCOUNTER — TELEPHONE (OUTPATIENT)
Dept: UROLOGY | Facility: AMBULATORY SURGERY CENTER | Age: 23
End: 2023-05-17

## 2023-05-17 ENCOUNTER — APPOINTMENT (OUTPATIENT)
Dept: LAB | Facility: HOSPITAL | Age: 23
End: 2023-05-17

## 2023-05-17 DIAGNOSIS — R35.0 URINARY FREQUENCY: ICD-10-CM

## 2023-05-17 DIAGNOSIS — R35.0 URINARY FREQUENCY: Primary | ICD-10-CM

## 2023-05-17 LAB
BACTERIA UR QL AUTO: ABNORMAL /HPF
BILIRUB UR QL STRIP: NEGATIVE
CLARITY UR: CLEAR
COLOR UR: YELLOW
GLUCOSE UR STRIP-MCNC: NEGATIVE MG/DL
HGB UR QL STRIP.AUTO: NEGATIVE
KETONES UR STRIP-MCNC: NEGATIVE MG/DL
LEUKOCYTE ESTERASE UR QL STRIP: ABNORMAL
NITRITE UR QL STRIP: NEGATIVE
NON-SQ EPI CELLS URNS QL MICRO: ABNORMAL /HPF
PH UR STRIP.AUTO: 5.5 [PH]
PROT UR STRIP-MCNC: NEGATIVE MG/DL
RBC #/AREA URNS AUTO: ABNORMAL /HPF
SP GR UR STRIP.AUTO: >=1.03 (ref 1–1.03)
UROBILINOGEN UR QL STRIP.AUTO: 0.2 E.U./DL
WBC #/AREA URNS AUTO: ABNORMAL /HPF

## 2023-05-17 NOTE — TELEPHONE ENCOUNTER
----- Message from Emely Barros sent at 5/17/2023  9:31 AM EDT -----  Regarding: FW: Bladder Concern  Contact: [phone]    ----- Message -----  From: Murali Bledsoe  Sent: 5/16/2023   8:26 PM EDT  To: [facility]  Subject: Bladder Concern                                  Yobany Caban,   I have a question… I am a little concerned because I noticed that Levon Ramirest been having a foul odor coming from my bladder  I know I am scheduled to get that procedure done June 22nd but I wasn’t sure if I would need to be seen sooner since I have this odor  I noticed that the odor also is in my urine  I also notice either I been having discharge or if I’m leaking as if I peed myself  I am not sure if there is something I can do in the meantime to help or not     Thanks,  [employer]

## 2023-05-17 NOTE — TELEPHONE ENCOUNTER
Spoke to patient and explained to her Noris Rios put in urine testing for further evaluation to ensure there is no infection or anything  Also, I explained to her that alina recommends to hydrate appropriately with at least 3-4 bottles of water and that she can also add lemon/lime to the water  And as of right now unfortunately I do not see a closer cystoscope available but if there is any cancellations we will let her know  Pt stated that is fine and she will be getting her urine testing completed

## 2023-05-17 NOTE — TELEPHONE ENCOUNTER
Urine testing ordered for evaluation to ensure there is no infection  I would make sure patient is hydrated appropriately with at least 3-4 bottles of water per day, add lemon/lime to water  At this time I do not see any way to get her in sooner for her cystoscopy  If you see that there is a cancellation and we are able to get her in and a sooner date lets do that, please    Thank you

## 2023-05-19 LAB — BACTERIA UR CULT: NORMAL

## 2023-05-25 ENCOUNTER — OFFICE VISIT (OUTPATIENT)
Dept: OBGYN CLINIC | Facility: MEDICAL CENTER | Age: 23
End: 2023-05-25

## 2023-05-25 ENCOUNTER — OFFICE VISIT (OUTPATIENT)
Dept: URGENT CARE | Facility: CLINIC | Age: 23
End: 2023-05-25

## 2023-05-25 ENCOUNTER — TELEPHONE (OUTPATIENT)
Dept: UROLOGY | Facility: AMBULATORY SURGERY CENTER | Age: 23
End: 2023-05-25

## 2023-05-25 VITALS
OXYGEN SATURATION: 98 % | TEMPERATURE: 96.8 F | HEIGHT: 67 IN | DIASTOLIC BLOOD PRESSURE: 68 MMHG | WEIGHT: 124 LBS | HEART RATE: 73 BPM | RESPIRATION RATE: 18 BRPM | SYSTOLIC BLOOD PRESSURE: 116 MMHG | BODY MASS INDEX: 19.46 KG/M2

## 2023-05-25 VITALS — SYSTOLIC BLOOD PRESSURE: 116 MMHG | WEIGHT: 124 LBS | DIASTOLIC BLOOD PRESSURE: 68 MMHG | BODY MASS INDEX: 19.42 KG/M2

## 2023-05-25 DIAGNOSIS — N76.0 BV (BACTERIAL VAGINOSIS): Primary | ICD-10-CM

## 2023-05-25 DIAGNOSIS — B37.31 VAGINAL YEAST INFECTION: ICD-10-CM

## 2023-05-25 DIAGNOSIS — B96.89 BV (BACTERIAL VAGINOSIS): Primary | ICD-10-CM

## 2023-05-25 DIAGNOSIS — R11.0 NAUSEA: ICD-10-CM

## 2023-05-25 DIAGNOSIS — Z11.3 SCREENING FOR STD (SEXUALLY TRANSMITTED DISEASE): ICD-10-CM

## 2023-05-25 LAB
BV WHIFF TEST VAG QL: ABNORMAL
CLUE CELLS SPEC QL WET PREP: ABNORMAL
PH SMN: ABNORMAL [PH]
SL AMB  POCT GLUCOSE, UA: NEGATIVE
SL AMB LEUKOCYTE ESTERASE,UA: NEGATIVE
SL AMB POCT BILIRUBIN,UA: NEGATIVE
SL AMB POCT BLOOD,UA: NEGATIVE
SL AMB POCT CLARITY,UA: CLEAR
SL AMB POCT COLOR,UA: YELLOW
SL AMB POCT KETONES,UA: NEGATIVE
SL AMB POCT NITRITE,UA: NEGATIVE
SL AMB POCT PH,UA: 5
SL AMB POCT SPECIFIC GRAVITY,UA: 1.02
SL AMB POCT URINE HCG: NEGATIVE
SL AMB POCT URINE PROTEIN: NEGATIVE
SL AMB POCT UROBILINOGEN: NORMAL
SL AMB POCT WET MOUNT: ABNORMAL
T VAGINALIS VAG QL WET PREP: ABNORMAL
YEAST VAG QL WET PREP: ABNORMAL

## 2023-05-25 RX ORDER — METRONIDAZOLE 7.5 MG/G
1 GEL VAGINAL DAILY
Qty: 5 G | Refills: 0 | Status: SHIPPED | OUTPATIENT
Start: 2023-05-25 | End: 2023-05-30

## 2023-05-25 RX ORDER — FLUCONAZOLE 150 MG/1
150 TABLET ORAL ONCE
Qty: 1 TABLET | Refills: 0 | Status: SHIPPED | OUTPATIENT
Start: 2023-05-25 | End: 2023-05-25

## 2023-05-25 NOTE — PROGRESS NOTES
"Assessment/Plan:  Bacterial vaginosis and yeast infection noted on wet mount  Rx sent to requested pharmacy  No sex during treatment  Complete all medication as directed  Consider daily probiotic  Healthy diet recommended  Call with any recurrence of symptoms  GC/CT collected today as requested  1  BV (bacterial vaginosis)  -     POCT wet mount  -     metroNIDAZOLE (METROGEL) 0 75 % vaginal gel; Insert 1 application  into the vagina daily for 5 days    2  Vaginal yeast infection  -     POCT wet mount  -     fluconazole (DIFLUCAN) 150 mg tablet; Take 1 tablet (150 mg total) by mouth once for 1 dose    3  Screening for STD (sexually transmitted disease)  -     Chlamydia/GC amplified DNA by PCR               Subjective:      Patient ID: Pippa Randhawa is a 23 y o  female  HPI    Pippa Randhawa is a 23 y o  [place] female who is here today for a problem visit   Had visit with [facility] today for nausea, congestion and chills  Normal temp at the time of that visit  Taking Zofran for nausea  Planned cystocopy for 6/22/23 for thickened bladder wall  Normal UA UC on 5/17/23  Here today with malodor that she originally thought was from her urine but is unsure  States the odor smells strong like urine  Has clear colored vaginal discharge that \"feels like I'm peeing myself\" x 2 weeks  Denies pelvic pain, fever, burning with urination  Does have some urinary frequency  Monthly menses x 5 days with heavy flow x 2 days then mod to light flow  Menses is acceptable  Pippa Randhawa is sexually active with male partner of 6 months  She is  monogamous  She uses DEACON  for contraception  She is interested in STD screening today  She denies vaginal discharge, itching, pelvic pain       The following portions of the patient's history were reviewed and updated as appropriate: allergies, current medications, past family history, past medical history, past social history, past surgical history and problem " list     Review of Systems   Constitutional: Negative  Gastrointestinal: Negative for abdominal pain, constipation, diarrhea, nausea and vomiting  Genitourinary: Positive for vaginal discharge  Negative for decreased urine volume, difficulty urinating, dyspareunia, dysuria, frequency, genital sores, menstrual problem, pelvic pain, urgency, vaginal bleeding and vaginal pain  Musculoskeletal: Negative for arthralgias and myalgias  Skin: Negative  Hematological: Negative for adenopathy  Psychiatric/Behavioral: Negative  All other systems reviewed and are negative  Objective:      /68   Wt 56 2 kg (124 lb)   LMP 05/11/2023   BMI 19 42 kg/m²          Physical Exam  Vitals and nursing note reviewed  Constitutional:       Appearance: Normal appearance  She is well-developed  Genitourinary:     Exam position: Lithotomy position  Labia:         Right: No rash, tenderness, lesion or injury  Left: No rash, tenderness, lesion or injury  Urethra: No prolapse, urethral pain, urethral swelling or urethral lesion  Vagina: No signs of injury and foreign body  Vaginal discharge (copious frothy white discharge with thick curds noted on cervix  ) present  No erythema, tenderness or bleeding  Cervix: No cervical motion tenderness, discharge, friability, lesion, erythema or cervical bleeding  Uterus: Normal        Adnexa: Right adnexa normal and left adnexa normal         Right: No mass, tenderness or fullness  Left: No mass, tenderness or fullness  Rectum: No external hemorrhoid  Comments: Vulvar erythema  Lymphadenopathy:      Lower Body: No right inguinal adenopathy  No left inguinal adenopathy  Skin:     General: Skin is warm and dry  Neurological:      Mental Status: She is alert and oriented to person, place, and time     Psychiatric:         Mood and Affect: Mood normal          Behavior: Behavior normal

## 2023-05-25 NOTE — LETTER
May 25, 2023     Patient: Evin Goldsmith   YOB: 2000   Date of Visit: 5/25/2023       To Whom it May Concern:    Evin Goldsmith was seen in my clinic on 5/25/2023  Please excuse from work on 5/24/2023 and 5/25/2023  If you have any questions or concerns, please don't hesitate to call           Sincerely,          DOUG Licea        CC: Evin Goldsmith

## 2023-05-25 NOTE — PATIENT INSTRUCTIONS
Bacterial vaginosis and yeast infection noted on wet mount  Rx sent to requested pharmacy  No sex during treatment  Complete all medication as directed  Consider daily probiotic  Healthy diet recommended  Call with any recurrence of symptoms  GC/CT collected today as requested

## 2023-05-25 NOTE — PROGRESS NOTES
[facility]        NAME: Elise Galvin is a 23 y o  female  : 2000    MRN: [id number]  DATE: May 27, 2023  TIME: 11:07 AM    Assessment and Plan   No primary diagnosis found  1  Nausea  POCT urine dip    POCT urine HCG    CANCELED: Urine culture            Patient Instructions     Increase you fluids intake  Continue to take your zofran as needed for nausea  Take over the counter allergy medication  Follow up with PCP in 3-5 days  Proceed to  ER if symptoms worsen  Chief Complaint     Chief Complaint   Patient presents with   • Cold Like Symptoms     Pt reports nausea, uri sx, and chills since yesterday  History of Present Illness       Patient is a 7NMZ presenting with nausea, congestion, and chills since yesterday  She took zofran with good relief  She does have a history of seasonal allergies  She is also under the care of urology for issues with her bladder  She denies any fever, cough, vomiting, diarrhea or urinary symptoms  Review of Systems   Review of Systems   Constitutional: Positive for chills  Negative for activity change, appetite change and fever  HENT: Positive for congestion  Respiratory: Negative for cough and shortness of breath  Cardiovascular: Negative for chest pain  Gastrointestinal: Positive for abdominal pain and nausea  Negative for constipation, diarrhea and vomiting  Genitourinary: Negative for dysuria, flank pain and frequency  All other systems reviewed and are negative          Current Medications       Current Outpatient Medications:   •  ascorbic acid (VITAMIN C) 250 MG CHEW, Chew 250 mg daily, Disp: , Rfl:   •  busPIRone (BUSPAR) 10 mg tablet, Take 1 tablet (10 mg total) by mouth 3 (three) times a day, Disp: 270 tablet, Rfl: 0  •  dicyclomine (BENTYL) 20 mg tablet, TAKE 1 TABLET (20 MG TOTAL) BY MOUTH 2 (TWO) TIMES A DAY AS NEEDED (ABDOMINAL BLOATING / LOWER ABDOMINAL PAIN), Disp: 180 tablet, Rfl: 1  •  esomeprazole (NexIUM) 40 MG capsule, TAKE 1 CAPSULE BY MOUTH 2 TIMES A DAY BEFORE MEALS , Disp: 180 capsule, Rfl: 1  •  FLUoxetine (PROzac) 10 mg capsule, TAKE 1 CAPSULE BY MOUTH EVERY DAY, Disp: 90 capsule, Rfl: 0  •  levonorgestrel-ethinyl estradiol (Vienva) 0 1-20 MG-MCG per tablet, Take 1 tablet by mouth daily, Disp: 84 tablet, Rfl: 4  •  multivitamin (THERAGRAN) TABS, Take 1 tablet by mouth daily, Disp: , Rfl:   •  ondansetron (Zofran ODT) 4 mg disintegrating tablet, Take 1 tablet (4 mg total) by mouth every 6 (six) hours as needed for nausea or vomiting, Disp: 20 tablet, Rfl: 0  •  oxybutynin (DITROPAN-XL) 10 MG 24 hr tablet, TAKE 1 TABLET BY MOUTH DAILY AT BEDTIME, Disp: 90 tablet, Rfl: 3  •  metroNIDAZOLE (METROGEL) 0 75 % vaginal gel, Insert 1 application  into the vagina daily for 5 days, Disp: 5 g, Rfl: 0    Current Allergies     Allergies as of 05/25/2023 - Reviewed 05/25/2023   Allergen Reaction Noted   • Pollen extract  12/01/2020            The following portions of the patient's history were reviewed and updated as appropriate: allergies, current medications, past family history, past medical history, past social history, past surgical history and problem list      Past Medical History:   Diagnosis Date   • Anemia     resolved    • Anxiety    • Constipation    • Depression    • GERD (gastroesophageal reflux disease)    • Heart murmur    • Heavy menstrual bleeding     resolved    • Hives    • Iron deficiency anemia secondary to inadequate dietary iron intake 10/15/2019   • Migraine     resolved    • Wears glasses        Past Surgical History:   Procedure Laterality Date   • TONSILECTOMY AND ADNOIDECTOMY     • WISDOM TOOTH EXTRACTION         Family History   Problem Relation Age of Onset   • Breast cancer Mother 29   • No Known Problems Father    • Breast cancer Maternal Grandfather    • Colon cancer Neg Hx    • Ovarian cancer Neg Hx          Medications have been verified          Objective   /68   Pulse 73   Temp (!) "96 8 °F (36 °C)   Resp 18   Ht 5' 7\" (1 702 m)   Wt 56 2 kg (124 lb)   LMP 05/11/2023   SpO2 98%   BMI 19 42 kg/m²        Physical Exam     Physical Exam  Vitals and nursing note reviewed  Constitutional:       General: She is not in acute distress  Appearance: Normal appearance  She is normal weight  She is not ill-appearing or toxic-appearing  HENT:      Right Ear: Tympanic membrane normal       Left Ear: Tympanic membrane normal       Mouth/Throat:      Pharynx: Oropharynx is clear  Cardiovascular:      Rate and Rhythm: Normal rate and regular rhythm  Pulses: Normal pulses  Heart sounds: Normal heart sounds  Pulmonary:      Effort: Pulmonary effort is normal       Breath sounds: Normal breath sounds  Abdominal:      Palpations: Abdomen is soft  Tenderness: There is no abdominal tenderness  There is no right CVA tenderness, left CVA tenderness, guarding or rebound  Skin:     General: Skin is warm and dry  Capillary Refill: Capillary refill takes less than 2 seconds  Neurological:      General: No focal deficit present  Mental Status: She is alert                     "

## 2023-05-25 NOTE — TELEPHONE ENCOUNTER
Spoke to patient and advised of negative urine testing results  Advised to follow up as scheduled for the Cystoscopy  Patient is understanding

## 2023-05-25 NOTE — PATIENT INSTRUCTIONS
Increase you fluids intake  Continue to take your zofran as needed for nausea  Take over the counter allergy medication

## 2023-05-29 LAB
C TRACH DNA SPEC QL NAA+PROBE: NEGATIVE
N GONORRHOEA DNA SPEC QL NAA+PROBE: NEGATIVE

## 2023-06-16 ENCOUNTER — TELEPHONE (OUTPATIENT)
Dept: OBGYN CLINIC | Facility: CLINIC | Age: 23
End: 2023-06-16

## 2023-06-16 DIAGNOSIS — B96.89 BV (BACTERIAL VAGINOSIS): Primary | ICD-10-CM

## 2023-06-16 DIAGNOSIS — N76.0 BV (BACTERIAL VAGINOSIS): Primary | ICD-10-CM

## 2023-06-16 RX ORDER — METRONIDAZOLE 7.5 MG/G
1 GEL VAGINAL DAILY
Qty: 5 G | Refills: 0 | Status: SHIPPED | OUTPATIENT
Start: 2023-06-16 | End: 2023-06-21

## 2023-06-16 NOTE — TELEPHONE ENCOUNTER
Spoke with Rachana  She would like to be treated so she doesn't have to deal with it over the weekend  Aware that she should not have sex during the time she is being treated and if it recurs, will need an appt for a culture  Agreeable  Consider condom use with each coitus after treatment

## 2023-06-16 NOTE — TELEPHONE ENCOUNTER
----- Message from Cecy Weeks sent at 6/16/2023  7:55 AM EDT -----  Regarding: Availability  Contact: [phone]  Yobany Rosa,  I’m still having those symptoms do you by chance have any appointments today around 4-5  I’m sorry if that’s late I work until 3 and I am a bit of a drive away  If not I could do next Thursday since I will be closer in the area     Thanks,  Sun[employer]under

## 2023-06-16 NOTE — TELEPHONE ENCOUNTER
Patient seen 5/25 by Betsey Winters and treated with   metrogel and symptoms resolved  Had intercourse 1 time and vaginal burning and fishy odor   Began again  Requesting an appointment but comes from [place] and needs a late pm appointment    Could find nothing

## 2023-06-19 ENCOUNTER — TELEMEDICINE (OUTPATIENT)
Dept: FAMILY MEDICINE CLINIC | Facility: CLINIC | Age: 23
End: 2023-06-19
Payer: COMMERCIAL

## 2023-06-19 DIAGNOSIS — R11.0 NAUSEA: Primary | ICD-10-CM

## 2023-06-19 DIAGNOSIS — E61.1 IRON DEFICIENCY: ICD-10-CM

## 2023-06-19 PROCEDURE — 99213 OFFICE O/P EST LOW 20 MIN: CPT | Performed by: FAMILY MEDICINE

## 2023-06-19 RX ORDER — ONDANSETRON 4 MG/1
4 TABLET, ORALLY DISINTEGRATING ORAL EVERY 6 HOURS PRN
Qty: 20 TABLET | Refills: 0 | Status: SHIPPED | OUTPATIENT
Start: 2023-06-19

## 2023-06-19 NOTE — LETTER
June 19, 2023     Patient: Jose Francisco Romo  YOB: 2000  Date of Visit: 6/19/2023      To Whom it May Concern:    Jose Francisco Romo is under my professional care  Harshal Marin was seen in my office on 6/19/2023  Harshal Marin may return to work on 6/20/23   If you have any questions or concerns, please don't hesitate to call           Sincerely,          Julio Gordon DO        CC: No Recipients

## 2023-06-19 NOTE — ASSESSMENT & PLAN NOTE
Likely due to poor diet as she has noticed an associated with greasy meals and waking up in the middle of the night with nausea  Advised to avoid triggering foods  Can use Tums and Zofran prn in addition to Omeprazole BID as recommended by GI for her GERD symptoms  She would like nutrition referral to discuss diet which was provided today  Follow up if not improved

## 2023-06-22 ENCOUNTER — PROCEDURE VISIT (OUTPATIENT)
Dept: UROLOGY | Facility: AMBULATORY SURGERY CENTER | Age: 23
End: 2023-06-22
Payer: COMMERCIAL

## 2023-06-22 VITALS
HEART RATE: 92 BPM | DIASTOLIC BLOOD PRESSURE: 68 MMHG | WEIGHT: 124 LBS | OXYGEN SATURATION: 100 % | BODY MASS INDEX: 19.46 KG/M2 | HEIGHT: 67 IN | SYSTOLIC BLOOD PRESSURE: 112 MMHG

## 2023-06-22 DIAGNOSIS — R35.0 URINARY FREQUENCY: ICD-10-CM

## 2023-06-22 DIAGNOSIS — R31.29 MICROSCOPIC HEMATURIA: Primary | ICD-10-CM

## 2023-06-22 LAB
SL AMB  POCT GLUCOSE, UA: NORMAL
SL AMB LEUKOCYTE ESTERASE,UA: NORMAL
SL AMB POCT BILIRUBIN,UA: NORMAL
SL AMB POCT BLOOD,UA: NORMAL
SL AMB POCT CLARITY,UA: CLEAR
SL AMB POCT COLOR,UA: YELLOW
SL AMB POCT KETONES,UA: NORMAL
SL AMB POCT NITRITE,UA: NORMAL
SL AMB POCT PH,UA: 6.5
SL AMB POCT SPECIFIC GRAVITY,UA: 1.01
SL AMB POCT URINE PROTEIN: NORMAL
SL AMB POCT UROBILINOGEN: 0.2

## 2023-06-22 PROCEDURE — 81002 URINALYSIS NONAUTO W/O SCOPE: CPT | Performed by: UROLOGY

## 2023-06-22 PROCEDURE — 52000 CYSTOURETHROSCOPY: CPT | Performed by: UROLOGY

## 2023-06-22 NOTE — ASSESSMENT & PLAN NOTE
The patient has had a negative hematuria workup  Given her young age and that hematuria is already resolved I do not think she requires annual urinalysis testing unless she develops new urinary symptoms

## 2023-06-22 NOTE — ASSESSMENT & PLAN NOTE
This has improved on its own  We will DC Ditropan from her medication list since she never took this medication

## 2023-06-22 NOTE — PROGRESS NOTES
Assessment/Plan:    Microscopic hematuria  The patient has had a negative hematuria workup  Given her young age and that hematuria is already resolved I do not think she requires annual urinalysis testing unless she develops new urinary symptoms  Urinary frequency  This has improved on its own  We will DC Ditropan from her medication list since she never took this medication  Subjective:      Patient ID: Tavon Bean is a 23 y o  female  HPI     Tavon Bean is a 23 y o  female here for follow up evaluation of urinary frequency, urgency  bladder wall thickening on CT scan  Patient was in the ER in February 2023 for abdominal pain, urinary frequency, urgency  CT scan at the time showed bladder wall thickening  Urinalysis at the time showed 4-10 red blood cells per high-powered field but associated urine culture returned <40k mixed contaminants  Patient reported recurrent urinary tract infections but review of medical record reveals all negative urine testing over the course of the last 1 5 years  She was seen in our clinic in April 2023 at which time PVR was 24 cc and she was prescribed Ditropan for possible OAB issues but she never took this     Also discussed Kegel exercises  Urinalysis in May 2023 was without hematuria  She currently denies hematuria  She reports sensation of complete emptying with urination  She reports her overall frequency and urgency have improved  She denies smoking and use/abuse of illicit substances and alcohol  She reports no family history significant for  malignancies        Past Surgical History:   Procedure Laterality Date   • TONSILECTOMY AND ADNOIDECTOMY     • WISDOM TOOTH EXTRACTION          Past Medical History:   Diagnosis Date   • Anemia     resolved    • Anxiety    • Constipation    • Depression    • GERD (gastroesophageal reflux disease)    • Heart murmur    • Heavy menstrual bleeding     resolved    • Hives    • Iron deficiency "anemia secondary to inadequate dietary iron intake 10/15/2019   • Migraine     resolved    • Wears glasses              Review of Systems   Constitutional: Negative for chills and fever  HENT: Negative for ear pain and sore throat  Eyes: Negative for pain and visual disturbance  Respiratory: Negative for cough and shortness of breath  Cardiovascular: Negative for chest pain and palpitations  Gastrointestinal: Negative for abdominal pain and vomiting  Genitourinary: Negative for dysuria and hematuria  Musculoskeletal: Negative for arthralgias and back pain  Skin: Negative for color change and rash  Neurological: Negative for seizures and syncope  All other systems reviewed and are negative  Objective:      /68 (BP Location: Left arm, Patient Position: Sitting, Cuff Size: Adult)   Pulse 92   Ht 5' 7\" (1 702 m)   Wt 56 2 kg (124 lb)   LMP 05/11/2023   SpO2 100%   BMI 19 42 kg/m²     No results found for: \"PSA\"         Physical Exam  Vitals reviewed  Constitutional:       General: She is not in acute distress  Appearance: Normal appearance  She is not ill-appearing, toxic-appearing or diaphoretic  HENT:      Head: Normocephalic and atraumatic  Eyes:      Extraocular Movements: Extraocular movements intact  Pupils: Pupils are equal, round, and reactive to light  Pulmonary:      Effort: Pulmonary effort is normal    Abdominal:      General: Abdomen is flat  There is no distension  Palpations: Abdomen is soft  There is no mass  Tenderness: There is no abdominal tenderness  There is no guarding or rebound  Hernia: No hernia is present  Skin:     General: Skin is warm  Neurological:      General: No focal deficit present  Mental Status: She is alert and oriented to person, place, and time  Mental status is at baseline  Psychiatric:         Mood and Affect: Mood normal          Behavior: Behavior normal          Thought Content:  Thought " "content normal                Cystoscopy     Date/Time 6/22/2023 8:00 AM     Performed by  Russ Fink MD   Authorized by Russ Fink MD     Universal Protocol:  Consent: Written consent obtained  Risks and benefits: risks, benefits and alternatives were discussed  Consent given by: patient  Time out: Immediately prior to procedure a \"time out\" was called to verify the correct patient, procedure, equipment, support staff and site/side marked as required  Patient understanding: patient states understanding of the procedure being performed  Patient consent: the patient's understanding of the procedure matches consent given  Procedure consent: procedure consent matches procedure scheduled  Patient identity confirmed: verbally with patient        Procedure Details:  Procedure type: cystoscopy    Patient tolerance: Patient tolerated the procedure well with no immediate complications    Additional Procedure Details: Prior to cystoscopy I discussed with the patient that we did not have to for the procedures since repeat microscopic urinalysis did not show persistent hematuria and it is unlikely we would find a concerning lesion in a young patient  She wanted to move forward  A female MA was in the room and served as a chaperone and assistant  The patient's male partner was also in the room  The patient's external genitals were sterilely prepped and draped  Viscous lidocaine was introduced into the urethra  A flexible cystoscope was introduced into the urethra    Urethra: Normal  Bladder: No lesions  Ureteral orifices in orthotopic position  No diverticula or trabeculations        CT ABDOMEN AND PELVIS WITH IV CONTRAST     INDICATION:   Generalized abdominal pain, worse in the suprapubic region      COMPARISON:  None      TECHNIQUE:  CT examination of the abdomen and pelvis was performed   Axial, sagittal, and coronal 2D reformatted images were created from the source data and submitted for " interpretation      Radiation dose length product (DLP) for this visit:  598 mGy-cm   This examination, like all CT scans performed in the [facility], was performed utilizing techniques to minimize radiation dose exposure, including the use of iterative   reconstruction and automated exposure control      IV Contrast:  85 mL of iohexol (OMNIPAQUE)  Enteric Contrast:  Enteric contrast was not administered      FINDINGS:     ABDOMEN     LOWER CHEST:  No clinically significant abnormality identified in the visualized lower chest      LIVER/BILIARY TREE:  One or more subcentimeter sharply circumscribed low-density hepatic lesion(s) are noted, too small to accurately characterize, but statistically most likely to represent subcentimeter hepatic cysts  No suspicious solid hepatic   lesion is identified  Hepatic contours are normal   No biliary dilatation      GALLBLADDER:  No calcified gallstones  No pericholecystic inflammatory change      SPLEEN:  Unremarkable      PANCREAS:  Unremarkable      ADRENAL GLANDS:  Unremarkable      KIDNEYS/URETERS:  Unremarkable  No hydronephrosis      STOMACH AND BOWEL:  Unremarkable      APPENDIX:  Appendix not visualized        ABDOMINOPELVIC CAVITY:  No ascites  No pneumoperitoneum  No lymphadenopathy      VESSELS:  Unremarkable for patient's age      PELVIS     REPRODUCTIVE ORGANS:  Prominent tubular structure in the right lower quadrant/right hemipelvis; does the patient have pyosalpinx or evidence for a tubo-ovarian abscess? Consider pelvic sonogram in the appropriate clinical setting        URINARY BLADDER:  Circumferential bladder wall thickening concerning for cystitis versus hypertrophy (other causes not excluded)  Recommend correlation with urinalysis and urine culture   Recommend urologic consultation in the appropriate clinical   setting      ABDOMINAL WALL/INGUINAL REGIONS:  Unremarkable      OSSEOUS STRUCTURES:  No acute fracture or destructive osseous lesion      IMPRESSION:     Prominent tubular structure in the right lower quadrant/right hemipelvis; does the patient have pyosalpinx or evidence for a tubo-ovarian abscess? Consider pelvic sonogram in the appropriate clinical setting        Appendix not visualized     Circumferential bladder wall thickening concerning for cystitis versus hypertrophy (other causes not excluded)  Recommend correlation with urinalysis and urine culture   Recommend urologic consultation in the appropriate clinical setting        Orders  Orders Placed This Encounter   Procedures   • Cystoscopy     This order was created via procedure documentation   • POCT urine dip

## 2023-07-03 ENCOUNTER — APPOINTMENT (OUTPATIENT)
Dept: LAB | Facility: CLINIC | Age: 23
End: 2023-07-03
Payer: COMMERCIAL

## 2023-07-03 ENCOUNTER — OFFICE VISIT (OUTPATIENT)
Dept: URGENT CARE | Facility: CLINIC | Age: 23
End: 2023-07-03
Payer: COMMERCIAL

## 2023-07-03 ENCOUNTER — APPOINTMENT (OUTPATIENT)
Dept: RADIOLOGY | Facility: CLINIC | Age: 23
End: 2023-07-03
Payer: COMMERCIAL

## 2023-07-03 VITALS
SYSTOLIC BLOOD PRESSURE: 116 MMHG | DIASTOLIC BLOOD PRESSURE: 75 MMHG | TEMPERATURE: 99.1 F | HEART RATE: 69 BPM | RESPIRATION RATE: 18 BRPM | OXYGEN SATURATION: 99 % | HEIGHT: 67 IN | WEIGHT: 123 LBS | BODY MASS INDEX: 19.3 KG/M2

## 2023-07-03 DIAGNOSIS — E61.1 IRON DEFICIENCY: ICD-10-CM

## 2023-07-03 DIAGNOSIS — R07.89 CHEST PRESSURE: Primary | ICD-10-CM

## 2023-07-03 DIAGNOSIS — N89.8 VAGINAL IRRITATION: Primary | ICD-10-CM

## 2023-07-03 DIAGNOSIS — R53.83 OTHER FATIGUE: ICD-10-CM

## 2023-07-03 DIAGNOSIS — R07.89 CHEST PRESSURE: ICD-10-CM

## 2023-07-03 DIAGNOSIS — R50.9 SUBJECTIVE FEVER: ICD-10-CM

## 2023-07-03 LAB
ATRIAL RATE: 60 BPM
FERRITIN SERPL-MCNC: 8 NG/ML (ref 11–307)
IRON SATN MFR SERPL: 8 % (ref 15–50)
IRON SERPL-MCNC: 41 UG/DL (ref 50–170)
P AXIS: 52 DEGREES
PR INTERVAL: 174 MS
QRS AXIS: 95 DEGREES
QRSD INTERVAL: 90 MS
QT INTERVAL: 410 MS
QTC INTERVAL: 410 MS
T WAVE AXIS: 49 DEGREES
TIBC SERPL-MCNC: 522 UG/DL (ref 250–450)
VENTRICULAR RATE: 60 BPM

## 2023-07-03 PROCEDURE — 93010 ELECTROCARDIOGRAM REPORT: CPT | Performed by: INTERNAL MEDICINE

## 2023-07-03 PROCEDURE — 83550 IRON BINDING TEST: CPT

## 2023-07-03 PROCEDURE — 93005 ELECTROCARDIOGRAM TRACING: CPT | Performed by: PHYSICIAN ASSISTANT

## 2023-07-03 PROCEDURE — 36415 COLL VENOUS BLD VENIPUNCTURE: CPT

## 2023-07-03 PROCEDURE — 71046 X-RAY EXAM CHEST 2 VIEWS: CPT

## 2023-07-03 PROCEDURE — 99213 OFFICE O/P EST LOW 20 MIN: CPT | Performed by: PHYSICIAN ASSISTANT

## 2023-07-03 PROCEDURE — 82728 ASSAY OF FERRITIN: CPT

## 2023-07-03 PROCEDURE — 83540 ASSAY OF IRON: CPT

## 2023-07-03 RX ORDER — FLUCONAZOLE 150 MG/1
150 TABLET ORAL ONCE
Qty: 1 TABLET | Refills: 0 | Status: SHIPPED | OUTPATIENT
Start: 2023-07-03 | End: 2023-07-03

## 2023-07-03 NOTE — PROGRESS NOTES
[facility]        NAME: Beena Mitchell is a 23 y.o. female  : 2000    MRN: [id number]  DATE: July 3, 2023  TIME: 12:08 PM    Assessment and Plan   Chest pressure [R07.89]  1. Chest pressure  XR chest pa & lateral    ECG 12 lead      2. Subjective fever        3. Other fatigue              Patient Instructions   Monitor symptoms. Drink plenty of fluids. Follow up with PCP in 3-5 days. Proceed to  ER if symptoms worsen. Chief Complaint     Chief Complaint   Patient presents with   • Chest Pain     Pt reports chest "pressure", weakness, and feverish starting 3 days ago         History of Present Illness       Pt is a 82-year-old female with significant past medical history of mitral valve relapse presents the office complaining of fatigue, weakness, subjective fevers and chills, and chest pressure for 3 days. Pressure is located to the sternum with some very mild difficulty with deep inspiration. Admits to headache, mild congestion, and mild cough. Denies abdominal symptoms, rashes, or dizziness. Denies similar symptoms in the past.  She took over-the-counter Tylenol max with very mild relief. Patient had blood work performed today to rule out anemia. Review of Systems   Review of Systems   Constitutional: Positive for chills, fatigue and fever. HENT: Positive for congestion. Negative for sore throat. Respiratory: Positive for cough and shortness of breath. Cardiovascular: Positive for chest pain (pressure). Negative for palpitations and leg swelling. Gastrointestinal: Negative for abdominal pain, diarrhea, nausea and vomiting. Skin: Negative for rash. Neurological: Negative for dizziness, light-headedness and headaches.          Current Medications       Current Outpatient Medications:   •  ascorbic acid (VITAMIN C) 250 MG CHEW, Chew 250 mg daily, Disp: , Rfl:   •  busPIRone (BUSPAR) 10 mg tablet, Take 1 tablet (10 mg total) by mouth 3 (three) times a day, Disp: 270 tablet, Rfl: 0  •  dicyclomine (BENTYL) 20 mg tablet, TAKE 1 TABLET (20 MG TOTAL) BY MOUTH 2 (TWO) TIMES A DAY AS NEEDED (ABDOMINAL BLOATING / LOWER ABDOMINAL PAIN), Disp: 180 tablet, Rfl: 1  •  esomeprazole (NexIUM) 40 MG capsule, TAKE 1 CAPSULE BY MOUTH 2 TIMES A DAY BEFORE MEALS., Disp: 180 capsule, Rfl: 1  •  fluconazole (DIFLUCAN) 150 mg tablet, Take 1 tablet (150 mg total) by mouth once for 1 dose, Disp: 1 tablet, Rfl: 0  •  FLUoxetine (PROzac) 10 mg capsule, TAKE 1 CAPSULE BY MOUTH EVERY DAY, Disp: 90 capsule, Rfl: 0  •  levonorgestrel-ethinyl estradiol (Vienva) 0.1-20 MG-MCG per tablet, Take 1 tablet by mouth daily, Disp: 84 tablet, Rfl: 4  •  multivitamin (THERAGRAN) TABS, Take 1 tablet by mouth daily, Disp: , Rfl:   •  ondansetron (Zofran ODT) 4 mg disintegrating tablet, Take 1 tablet (4 mg total) by mouth every 6 (six) hours as needed for nausea or vomiting, Disp: 20 tablet, Rfl: 0    Current Allergies     Allergies as of 07/03/2023 - Reviewed 07/03/2023   Allergen Reaction Noted   • Pineapple - food allergy Other (See Comments) 07/03/2023   • Pollen extract Sneezing 12/01/2020            The following portions of the patient's history were reviewed and updated as appropriate: allergies, current medications, past family history, past medical history, past social history, past surgical history and problem list.     Past Medical History:   Diagnosis Date   • Anemia     resolved    • Anxiety    • Constipation    • Depression    • GERD (gastroesophageal reflux disease)    • Heart murmur    • Heavy menstrual bleeding     resolved    • Hives    • Iron deficiency anemia secondary to inadequate dietary iron intake 10/15/2019   • Migraine     resolved    • Wears glasses        Past Surgical History:   Procedure Laterality Date   • TONSILECTOMY AND ADNOIDECTOMY     • WISDOM TOOTH EXTRACTION         Family History   Problem Relation Age of Onset   • Breast cancer Mother 29   • No Known Problems Father    • Breast cancer Maternal Grandfather    • Colon cancer Neg Hx    • Ovarian cancer Neg Hx          Medications have been verified. Objective   /75   Pulse 69   Temp 99.1 °F (37.3 °C)   Resp 18   Ht 5' 7" (1.702 m)   Wt 55.8 kg (123 lb)   LMP 06/05/2023 (Within Days)   SpO2 99%   BMI 19.26 kg/m²   Patient's last menstrual period was 06/05/2023 (within days). Physical Exam     Physical Exam  Vitals and nursing note reviewed. Constitutional:       Appearance: Normal appearance. She is well-developed. HENT:      Head: Normocephalic and atraumatic. Right Ear: Tympanic membrane, ear canal and external ear normal.      Left Ear: Tympanic membrane, ear canal and external ear normal.      Nose: Nose normal.      Mouth/Throat:      Pharynx: Uvula midline. Eyes:      General: Lids are normal.      Conjunctiva/sclera: Conjunctivae normal.      Pupils: Pupils are equal, round, and reactive to light. Cardiovascular:      Rate and Rhythm: Normal rate and regular rhythm. Pulses: Normal pulses. Heart sounds: Murmur heard. No friction rub. No gallop. Pulmonary:      Effort: Pulmonary effort is normal.      Breath sounds: Normal breath sounds. No wheezing, rhonchi or rales. Chest:      Chest wall: Tenderness present. Musculoskeletal:         General: Normal range of motion. Cervical back: Neck supple. Lymphadenopathy:      Cervical: No cervical adenopathy. Skin:     General: Skin is warm and dry. Capillary Refill: Capillary refill takes less than 2 seconds. Neurological:      Mental Status: She is alert. EKG: NSR. No FELIX or t wave inversions. Chest XR: No evidence of acute cardiopulmonary etiology.   Radiology interpretation pending

## 2023-07-10 ENCOUNTER — TELEPHONE (OUTPATIENT)
Dept: SLEEP CENTER | Facility: CLINIC | Age: 23
End: 2023-07-10

## 2023-07-10 DIAGNOSIS — G47.19 EXCESSIVE DAYTIME SLEEPINESS: ICD-10-CM

## 2023-07-10 DIAGNOSIS — G47.33 OSA (OBSTRUCTIVE SLEEP APNEA): Primary | ICD-10-CM

## 2023-07-10 NOTE — TELEPHONE ENCOUNTER
Patient's insurance denied the auth for the diagnostic sleep study. Peer to peer can be done by calling [phone], Auth number [id number]. How would you like to proceed?

## 2023-07-10 NOTE — TELEPHONE ENCOUNTER
New order was placed for Home study. Called patient and left message stated of denial and to schedule home study. As well as [facility] [employer] [facility] for this [facility].

## 2023-07-11 ENCOUNTER — OFFICE VISIT (OUTPATIENT)
Dept: FAMILY MEDICINE CLINIC | Facility: CLINIC | Age: 23
End: 2023-07-11
Payer: COMMERCIAL

## 2023-07-11 ENCOUNTER — CLINICAL SUPPORT (OUTPATIENT)
Dept: NUTRITION | Facility: CLINIC | Age: 23
End: 2023-07-11
Payer: COMMERCIAL

## 2023-07-11 VITALS
HEART RATE: 91 BPM | RESPIRATION RATE: 18 BRPM | DIASTOLIC BLOOD PRESSURE: 80 MMHG | SYSTOLIC BLOOD PRESSURE: 100 MMHG | TEMPERATURE: 98.4 F | OXYGEN SATURATION: 98 % | HEIGHT: 67 IN | WEIGHT: 125.25 LBS | BODY MASS INDEX: 19.66 KG/M2

## 2023-07-11 VITALS — BODY MASS INDEX: 19.61 KG/M2 | WEIGHT: 125.2 LBS

## 2023-07-11 DIAGNOSIS — D80.2 SELECTIVE DEFICIENCY OF IMMUNOGLOBULIN A (IGA) (HCC): ICD-10-CM

## 2023-07-11 DIAGNOSIS — R11.0 NAUSEA: ICD-10-CM

## 2023-07-11 DIAGNOSIS — R09.89 UPPER RESPIRATORY SYMPTOM: Primary | ICD-10-CM

## 2023-07-11 DIAGNOSIS — G95.0 SYRINGOMYELIA (HCC): ICD-10-CM

## 2023-07-11 LAB
SARS-COV-2 AG UPPER RESP QL IA: NEGATIVE
VALID CONTROL: NORMAL

## 2023-07-11 PROCEDURE — 87811 SARS-COV-2 COVID19 W/OPTIC: CPT | Performed by: FAMILY MEDICINE

## 2023-07-11 PROCEDURE — 99213 OFFICE O/P EST LOW 20 MIN: CPT | Performed by: FAMILY MEDICINE

## 2023-07-11 PROCEDURE — 97802 MEDICAL NUTRITION INDIV IN: CPT | Performed by: DIETITIAN, REGISTERED

## 2023-07-11 RX ORDER — ALBUTEROL SULFATE 90 UG/1
2 AEROSOL, METERED RESPIRATORY (INHALATION) EVERY 6 HOURS PRN
Qty: 6.7 G | Refills: 0 | Status: SHIPPED | OUTPATIENT
Start: 2023-07-11 | End: 2023-08-02

## 2023-07-11 NOTE — LETTER
July 11, 2023     Patient: Eulene Goldberg  YOB: 2000  Date of Visit: 7/11/2023      To Whom it May Concern:    Eulene Goldberg is under my professional care. Pretty Ferreira was seen in my office on 7/11/2023. Pretty Ferreira may return to work on 7/17/23 . If you have any questions or concerns, please don't hesitate to call.          Sincerely,          Mode Claudio,         CC: No Recipients

## 2023-07-11 NOTE — PROGRESS NOTES
Name: Hugh Noonan      : 2000      MRN: [id number]  Encounter Provider: Altagracia Hendrix DO  Encounter Date: 2023   Encounter department: [address] [place] [address]     1. Upper respiratory symptom  Assessment & Plan:  A few days of symptoms and overall benign exam. Will defer antibiotics for now and treat symptoms supportively. If not improving she will notify me for antibiotics. Continue albuterol prn cough and chest tightness. Recommend supportive care including Mucinex DM. Orders:  -     POCT Rapid Covid Ag  -     albuterol (Proventil HFA) 90 mcg/act inhaler; Inhale 2 puffs every 6 (six) hours as needed for wheezing    2. 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 Congestion, chest pressure, headaches, hot/cold, coughing. She was seen at [facility] 7/3 for chest pressure, xray and ekg WNL at that time. Has been getting worse since then with development of congestion. She has been taking Advil/Tylenol combination with helps her headaches. Has not tried anything for congestion or cough. She felt feverish previously, Tmax 99.3. Chest pressure is improved. Review of Systems as in HPI    Current Outpatient Medications on File Prior to Visit   Medication Sig   • ascorbic acid (VITAMIN C) 250 MG CHEW Chew 250 mg daily   • busPIRone (BUSPAR) 10 mg tablet Take 1 tablet (10 mg total) by mouth 3 (three) times a day   • dicyclomine (BENTYL) 20 mg tablet TAKE 1 TABLET (20 MG TOTAL) BY MOUTH 2 (TWO) TIMES A DAY AS NEEDED (ABDOMINAL BLOATING / LOWER ABDOMINAL PAIN)   • esomeprazole (NexIUM) 40 MG capsule TAKE 1 CAPSULE BY MOUTH 2 TIMES A DAY BEFORE MEALS.    • FLUoxetine (PROzac) 10 mg capsule TAKE 1 CAPSULE BY MOUTH EVERY DAY   • levonorgestrel-ethinyl estradiol (Vienva) 0.1-20 MG-MCG per tablet Take 1 tablet by mouth daily   • multivitamin (THERAGRAN) TABS Take 1 tablet by mouth daily   • ondansetron (Zofran ODT) 4 mg disintegrating tablet Take 1 tablet (4 mg total) by mouth every 6 (six) hours as needed for nausea or vomiting       Objective     /80   Pulse 91   Temp 98.4 °F (36.9 °C)   Resp 18   Ht 5' 7" (1.702 m)   Wt 56.8 kg (125 lb 4 oz)   LMP 06/05/2023 (Within Days)   SpO2 98%   BMI 19.62 kg/m²     Physical Exam  Vitals reviewed. Constitutional:       Appearance: Normal appearance. HENT:      Head: Normocephalic and atraumatic. Right Ear: External ear normal.      Left Ear: External ear normal.      Nose: Congestion present. Mouth/Throat:      Mouth: Mucous membranes are moist.      Pharynx: Oropharynx is clear. Eyes:      Extraocular Movements: Extraocular movements intact. Conjunctiva/sclera: Conjunctivae normal.   Cardiovascular:      Rate and Rhythm: Normal rate and regular rhythm. Heart sounds: Normal heart sounds. Pulmonary:      Effort: Pulmonary effort is normal.      Breath sounds: Normal breath sounds. No wheezing. Abdominal:      General: Abdomen is flat. Skin:     General: Skin is warm and dry. Capillary Refill: Capillary refill takes less than 2 seconds. Neurological:      General: No focal deficit present. Mental Status: She is alert and oriented to person, place, and time.    Psychiatric:         Mood and Affect: Mood normal.         Behavior: Behavior normal.       Melvina Melissa DO

## 2023-07-11 NOTE — PROGRESS NOTES
Nutrition Assessment Form    Patient Name: Marcos Painter    YOB: 2000    Sex: Female     Assessment Date: 7/11/2023  Start Time: 2:48 PM Stop Time: 3:48 PM Total Minutes: 60 min     Data:  Present at session: Self and boyfriend Dylan    Parent/Patient Concerns/reason for visit: "I want to be eating better." Pt feeling fatigued and desiring more energy    Medical Dx/Reason for Referral: Nausea   Past Medical History:   Diagnosis Date   • Anemia     resolved    • Anxiety    • Constipation    • Depression    • GERD (gastroesophageal reflux disease)    • Heart murmur    • Heavy menstrual bleeding     resolved    • Hives    • Iron deficiency anemia secondary to inadequate dietary iron intake 10/15/2019   • Migraine     resolved    • Wears glasses        Current Outpatient Medications   Medication Sig Dispense Refill   • albuterol (Proventil HFA) 90 mcg/act inhaler Inhale 2 puffs every 6 (six) hours as needed for wheezing 6.7 g 0   • ascorbic acid (VITAMIN C) 250 MG CHEW Chew 250 mg daily     • busPIRone (BUSPAR) 10 mg tablet Take 1 tablet (10 mg total) by mouth 3 (three) times a day 270 tablet 0   • dicyclomine (BENTYL) 20 mg tablet TAKE 1 TABLET (20 MG TOTAL) BY MOUTH 2 (TWO) TIMES A DAY AS NEEDED (ABDOMINAL BLOATING / LOWER ABDOMINAL PAIN) 180 tablet 1   • esomeprazole (NexIUM) 40 MG capsule TAKE 1 CAPSULE BY MOUTH 2 TIMES A DAY BEFORE MEALS. 180 capsule 1   • FLUoxetine (PROzac) 10 mg capsule TAKE 1 CAPSULE BY MOUTH EVERY DAY 90 capsule 0   • levonorgestrel-ethinyl estradiol (Vienva) 0.1-20 MG-MCG per tablet Take 1 tablet by mouth daily 84 tablet 4   • multivitamin (THERAGRAN) TABS Take 1 tablet by mouth daily     • ondansetron (Zofran ODT) 4 mg disintegrating tablet Take 1 tablet (4 mg total) by mouth every 6 (six) hours as needed for nausea or vomiting 20 tablet 0     No current facility-administered medications for this visit.         Additional Meds/Supplements: Used to take bentyl though doesn't take anymore, no diarrhea    Taking iron supplement [employer] 325 mg ferrous sulfate   Special Learning Needs/barriers to learning/any new barriers None   Height: HC Readings from Last 5 Encounters:   No data found for [place] [facility].      Weight: Wt Readings from Last 10 Encounters:   07/11/23 56.8 kg (125 lb 4 oz)   07/03/23 55.8 kg (123 lb)   06/22/23 56.2 kg (124 lb)   05/25/23 56.2 kg (124 lb)   05/25/23 56.2 kg (124 lb)   04/05/23 55.8 kg (123 lb)   03/23/23 56.2 kg (124 lb)   03/08/23 56.6 kg (124 lb 11.2 oz)   03/01/23 58.1 kg (128 lb)   02/25/23 58.1 kg (128 lb 1.4 oz)     Estimated body mass index is 19.62 kg/m² as calculated from the following:    Height as of an earlier encounter on 7/11/23: 5' 7" (1.702 m). Weight as of an earlier encounter on 7/11/23: 56.8 kg (125 lb 4 oz). Recent Weight Change: [x]Yes     []No  Amount: Typically 125-140lb range, says fluctuates greatly without known cause      Energy Needs: Manjit- [facility]. Fatima Equation: 2284 kcal (mifflin x 1.3 activity + 500 calories for 1 lb weight gain per week)   Allergies   Allergen Reactions   • Pineapple - Food Allergy Other (See Comments)     Tingling tongue and throat   • Pollen Extract Sneezing     congestion    or intolerances Intolerance to gluten and dairy products, takes lactaid pills if she eats ice cream    Fresh pineapple allergy causing burning/tingling of tongue   Social History     Substance and Sexual Activity   Alcohol Use Not Currently    Comment: Social    None    Social History     Tobacco Use   Smoking Status Never   • Passive exposure: Past   Smokeless Tobacco Never    None    Who shops? boyfriends mother   Who cooks/cooking methods/Eating out/take out habits   boyfriends mother  Cooking methods: bake     Take out: Infrequent  Dining out every night at [facility]   Exercise: No additional activity at this time.        Other: ie: Sleep habits/ stress level/ work habits household-lives with ?/ food security Planning for sleep study in December, never feels rested after night's sleep, toss and turn all night, feels she needs 12 h to get a good night's sleep   Prior Nutritional Counseling? []Yes     [x]No  When:      Why:         Diet Hx:  Breakfast: Diet: None  Typically skips or may have yogurt    Lunch: Pizza rolls or popcorn chicken offered free at work         Dinner: Mac n cheese or hoagie shorti italian  Hamburger helper or chicken with BBQ sauce or shake n bake chicken or stuffed chicken breast if she likes the preparation of them, typically eats rice with broccoli and corn         Snacks: AM -   PM - Banana with peanut butter or dinner leftovers  HS - Hot and spicy noodle bowls    Water or vitamin water or ocean spray cranberry juice   Other Notes/ Initial Assessment:  Pt reports no current GI symptoms. Says she previously had ongoing nausea and diarrhea though these have since resolved. Describes herself as a "picky eater" though only describes not liking tomatoes, peas, and meats with excess fat. Pt says she previously followed a gluten free, lactose free diet when she had GI symptoms and was dx with Celiac disease for 1 mo. Says she felt like she had a lot of energy when she was following this diet though was told by doctor she did not have Celiac disease and stopped this diet. Wants to improve her dietary habits to improve her energy. Pt reports she lives with her boyfriend in his mother's finished basement. They have a small fridge of their own to store any of their foods otherwise fridge space is limited. Boyfriend's mother prepares meals though pt says most of the meats she makes end up being dried out and undesirable. Ends up just eating the side dishes. Pt also states that boyfriend and his family have different food preferences than her which makes dinner time difficult. Pt unsure if she should gain weight, feels her weight is too low at this time.      Updated assessment (Follow up note only):           Nutrition Diagnosis: Undesirable food choices  related to High level of fatigue or other side effect of medical, surgical or radiological therapy as evidenced by Findings consistent with vitamin/mineral deficiency or excess       Any change or new dx since previous visit:     Nutrition Diagnosis:     Medical Nutrition Therapy Intervention:  [x]Individualized Meal Plan--Encouraged pt to start making changes to dietary patterns at breakfast/lunch which are more within her control. Discussed packing lunch such as sandwich on gluten free bread without cheese + avocado with cucumbers with humus, shelf stable fruit cup. To consider use of ensure original or plus supplement at breakfast time as she typically skips this meal due to poor quality sleep and fatigue. Suggested use of soy milk instead of oat milk to increase PRO intake/promote weight gain. Encouraged use of peanut butter for weight gain which she also enjoys. Suggested addition of vitamin C rich foods to increase absorption of iron. Encouraged pt to restart gluten free lactose free diet as she feels she has more energy on this diet, will provide ongoing education regarding these dietary restrictions. []Understanding Lab Values   []Basic Pathophysiology of Disease []Food/Medication Interactions   []Food Diary []Exercise   []Lifestyle/Behavior Modification Techniques []Medication, Mechanism of Action   []Label Reading: CHO/ Na/ Fat/ other_________ []Self Blood Glucose Monitoring   [x]Weight/BMI Goals: Discussed that would be beneficial for pt to gain weight to previous 140lb range as she is nearing the lower end of healthy BMI. Higher weight may help in the event GI symptoms come back and has poor po. Pt agreeable to this.    [x]Other -     Handouts provided: Gluten Free Nutrition Therapy, Lactose Free Nutrition Therapy, Iron Content of Foods handout           Comprehension: []Excellent  [x]Very Good  []Good  []Fair   []Poor    Receptivity: []Excellent  [x]Very Good  []Good  []Fair []Poor    Expected Compliance: []Excellent  [x]Very Good  []Good  []Fair   []Poor        Goals (initial)/ Progress made on previous goals/new goals:  1. Pt to gain 1 lb per week upon follow up. 2. Pt to include at least 2 servings of fruits/vegetables daily upon follow up. 3. Pt to obtain normal Fe level upon next lab reading. No follow-ups on file.   Labs:  CMP  Lab Results   Component Value Date     06/11/2016    K 4.3 03/28/2023     03/28/2023    CO2 27 03/28/2023    BUN 10 03/28/2023    CREATININE 0.70 03/28/2023    GLUF 84 03/28/2023    CALCIUM 8.8 03/28/2023    AST 16 02/26/2023    ALT 12 02/26/2023    ALKPHOS 55 02/26/2023    PROT 6.6 06/11/2016    BILITOT 2.0 (H) 06/11/2016    EGFR 125 03/28/2023       BMP  Lab Results   Component Value Date    CALCIUM 8.8 03/28/2023     06/11/2016    K 4.3 03/28/2023    CO2 27 03/28/2023     03/28/2023    BUN 10 03/28/2023    CREATININE 0.70 03/28/2023       Lipids  No results found for: "CHOL"  No results found for: "HDL"  No results found for: "LDLCALC"  No results found for: "TRIG"  No results found for: "CHOLHDL"    Hemoglobin A1C  Lab Results   Component Value Date    HGBA1C 5.1 03/28/2023       Fasting Glucose  Lab Results   Component Value Date    GLUF 84 03/28/2023       Insulin     Thyroid  Lab Results   Component Value Date    TSH 2.62 10/01/2022       Hepatic Function Panel  Lab Results   Component Value Date    ALT 12 02/26/2023    AST 16 02/26/2023    ALKPHOS 55 02/26/2023    BILITOT 2.0 (H) 06/11/2016       Celiac Disease Antibody Panel  Endomysial IgA   Date Value Ref Range Status   02/14/2020 Negative Negative Final     Gliadin IgA   Date Value Ref Range Status   02/14/2020 3 0 - 19 units Final     Comment:                        Negative                   0 - 19                     Weak Positive             20 - 30                     Moderate to Strong Positive   >30     Gliadin IgG   Date Value Ref Range Status 02/14/2020 4 0 - 19 units Final     Comment:                        Negative                   0 - 19                     Weak Positive             20 - 30                     Moderate to Strong Positive   >30     IGA   Date Value Ref Range Status   03/17/2022 120.0 70.0 - 400.0 mg/dL Final     IgA   Date Value Ref Range Status   02/14/2020 98 87 - 352 mg/dL Final     TISSUE TRANSGLUTAMINASE IGA   Date Value Ref Range Status   03/17/2022 <2 0 - 3 U/mL Final     Comment:                                   Negative        0 -  3                                Weak Positive   4 - 10                                Positive           >10   Tissue Transglutaminase (tTG) has been identified   as the endomysial antigen. Studies have demonstr-   ated that endomysial IgA antibodies have over 99%   specificity for gluten sensitive enteropathy.       Iron  Lab Results   Component Value Date    IRON 41 (L) 07/03/2023    TIBC 522 (H) 07/03/2023    FERRITIN 8 (L) 07/03/2023            Emerita Kemp RD, LDN  [facility]  [address]  [place] [id number]

## 2023-07-14 DIAGNOSIS — J32.9 SINUSITIS, UNSPECIFIED CHRONICITY, UNSPECIFIED LOCATION: Primary | ICD-10-CM

## 2023-07-14 RX ORDER — AZITHROMYCIN 250 MG/1
TABLET, FILM COATED ORAL
Qty: 6 TABLET | Refills: 0 | Status: SHIPPED | OUTPATIENT
Start: 2023-07-14 | End: 2023-07-19

## 2023-07-17 ENCOUNTER — OFFICE VISIT (OUTPATIENT)
Dept: FAMILY MEDICINE CLINIC | Facility: CLINIC | Age: 23
End: 2023-07-17
Payer: COMMERCIAL

## 2023-07-17 VITALS
BODY MASS INDEX: 19.78 KG/M2 | HEIGHT: 67 IN | OXYGEN SATURATION: 98 % | SYSTOLIC BLOOD PRESSURE: 110 MMHG | WEIGHT: 126 LBS | HEART RATE: 106 BPM | RESPIRATION RATE: 16 BRPM | DIASTOLIC BLOOD PRESSURE: 80 MMHG | TEMPERATURE: 98.4 F

## 2023-07-17 DIAGNOSIS — J20.9 ACUTE BRONCHITIS WITH BRONCHOSPASM: ICD-10-CM

## 2023-07-17 DIAGNOSIS — J32.9 RECURRENT SINUSITIS: Primary | ICD-10-CM

## 2023-07-17 PROCEDURE — 99213 OFFICE O/P EST LOW 20 MIN: CPT | Performed by: FAMILY MEDICINE

## 2023-07-17 RX ORDER — CEFUROXIME AXETIL 500 MG/1
500 TABLET ORAL 2 TIMES DAILY
Qty: 14 TABLET | Refills: 0 | Status: SHIPPED | OUTPATIENT
Start: 2023-07-17 | End: 2023-07-24

## 2023-07-17 RX ORDER — METHYLPREDNISOLONE 4 MG/1
TABLET ORAL
Qty: 21 EACH | Refills: 0 | Status: SHIPPED | OUTPATIENT
Start: 2023-07-17

## 2023-07-17 NOTE — PROGRESS NOTES
Name: Tung Nowak      : 2000      MRN: [id number]  Encounter Provider: Mckinley Mcclelland MD  Encounter Date: 2023   Encounter department: [address] [place] [address]     1. Recurrent sinusitis  -     Ambulatory Referral to Allergy; Future  -     Ambulatory Referral to Allergy; Future  -     Ambulatory Referral to Otolaryngology; Future  -     cefuroxime (CEFTIN) 500 mg tablet; Take 1 tablet (500 mg total) by mouth 2 (two) times a day for 7 days    2. Acute bronchitis with bronchospasm  -     methylPREDNISolone 4 MG tablet therapy pack; Use as directed on package    Patient presents for evaluation of acute/recurrent sinusitis. I advised her to complete Zithromax Z-Lamin and overlap it with Ceftin 500 mg twice a day. We will start her on Medrol Dosepak to alleviate symptoms of significant nasal congestion and chest tightness. I recommend patient to restart albuterol HFA 2 puffs every 4-6 hours that should help with cough. Patient will contact me in a few days if symptoms are not improving. I strongly advise to proceed with allergy and ENT consults. Subjective     Patient presents for evaluation of ongoing upper respiratory/sinusitis. She reports cold s/o since 7/3/23  Seen by  -startedonAmoxil - no relief. Subsequently antibiotic with switch to Z-Lamin that patient is taking now without significant improvement. Cough is dry, patient reports symptoms of chest tightness and occasional wheezing. She denies symptoms of fever but feels feverish and very nasally congested. Occasional expectoration of yellow mucus. Constant tickle/postnasal drip and coughing attacks. Patient has tried albuterol with no improvement. She reports significant coughing attacks at night. She is complaining of left frontal/forehead pain/pressure       Review of Systems   Constitutional: Negative. HENT: Positive for congestion, postnasal drip, sinus pressure and sinus pain. Eyes: Negative. Respiratory: Positive for cough, chest tightness and wheezing. Cardiovascular: Negative. Gastrointestinal: Negative. Allergic/Immunologic: Positive for environmental allergies.        Past Medical History:   Diagnosis Date   • Anemia     resolved    • Anxiety    • Constipation    • Depression    • GERD (gastroesophageal reflux disease)    • Headache(784.0)    • Heart murmur    • Heavy menstrual bleeding     resolved    • Hives    • Iron deficiency anemia secondary to inadequate dietary iron intake 10/15/2019   • Migraine     resolved    • Wears glasses      Past Surgical History:   Procedure Laterality Date   • TONSILECTOMY AND ADNOIDECTOMY     • WISDOM TOOTH EXTRACTION       Family History   Problem Relation Age of Onset   • Breast cancer Mother    • No Known Problems Father    • Breast cancer Maternal Grandmother    • Breast cancer Maternal Grandfather    • Depression Maternal Aunt    • Anxiety disorder Maternal Aunt    • Colon cancer Neg Hx    • Ovarian cancer Neg Hx      Social History     Socioeconomic History   • Marital status: Single     Spouse name: None   • Number of children: 0   • Years of education: None   • Highest education level: None   Occupational History   • Occupation: student   Tobacco Use   • Smoking status: Never     Passive exposure: Past   • Smokeless tobacco: Never   Vaping Use   • Vaping Use: Never used   Substance and Sexual Activity   • Alcohol use: Not Currently     Comment: Social   • Drug use: No   • Sexual activity: Yes     Partners: Male     Birth control/protection: OCP   Other Topics Concern   • None   Social History Narrative    Daily caffeine consumption -- coffee 1x weekly    Does not exercise        Who lives in your home: Mom     What type of home do you live in: Other condo     Age of your home: Built 14 yrs ago     How long have you been living there: 2 yrs     Type of heat: Forced hot air    Type of fuel: Electric    What type of naomi is in your bedroom: Carpet    Do you have the following in or near your home:    Air products: Central air and Humidifier    Pests: None    Pets: Dog and Rabbit    Are pets allowed in bedroom: Yes    Open fields, wooded areas nearby: Open fields and Wooded areas    Basement: None    Exposure to second hand smoke: Yes boyfriends house         Habits:    Caffeine: coffee 1 cup daily -    Chocolate: occasionally     Other:     Social Determinants of Health     Financial Resource Strain: Not on file   Food Insecurity: Not on file   Transportation Needs: Not on file   Physical Activity: Insufficiently Active (5/5/2021)    Exercise Vital Sign    • Days of Exercise per Week: 1 day    • Minutes of Exercise per Session: 60 min   Stress: Not on file   Social Connections: Not on file   Intimate Partner Violence: Not on file   Housing Stability: Not on file     Current Outpatient Medications on File Prior to Visit   Medication Sig   • albuterol (Proventil HFA) 90 mcg/act inhaler Inhale 2 puffs every 6 (six) hours as needed for wheezing   • ascorbic acid (VITAMIN C) 250 MG CHEW Chew 250 mg daily   • busPIRone (BUSPAR) 10 mg tablet Take 1 tablet (10 mg total) by mouth 3 (three) times a day   • dicyclomine (BENTYL) 20 mg tablet TAKE 1 TABLET (20 MG TOTAL) BY MOUTH 2 (TWO) TIMES A DAY AS NEEDED (ABDOMINAL BLOATING / LOWER ABDOMINAL PAIN)   • esomeprazole (NexIUM) 40 MG capsule TAKE 1 CAPSULE BY MOUTH 2 TIMES A DAY BEFORE MEALS.    • FLUoxetine (PROzac) 10 mg capsule TAKE 1 CAPSULE BY MOUTH EVERY DAY   • levonorgestrel-ethinyl estradiol (Vienva) 0.1-20 MG-MCG per tablet Take 1 tablet by mouth daily   • multivitamin (THERAGRAN) TABS Take 1 tablet by mouth daily   • ondansetron (Zofran ODT) 4 mg disintegrating tablet Take 1 tablet (4 mg total) by mouth every 6 (six) hours as needed for nausea or vomiting     Allergies   Allergen Reactions   • Pineapple - Food Allergy Other (See Comments)     Tingling tongue and throat   • Pollen Extract Sneezing     congestion     Immunization History   Administered Date(s) Administered   • COVID-19 PFIZER VACCINE 0.3 ML IM 05/14/2021, 06/05/2021, 02/20/2022   • DTaP 5 02/13/2003, 04/14/2003, 06/16/2003, 06/29/2004, 03/02/2007   • HPV9 02/03/2017, 08/14/2017   • Hep B, adult 2002, 01/13/2003, 09/16/2003   • Hib (PRP-OMP) 02/13/2003, 04/14/2003, 06/16/2003, 03/25/2004   • INFLUENZA 01/10/2023   • IPV 02/13/2003, 04/14/2003, 06/29/2004, 03/02/2007   • MMR 03/25/2004, 04/05/2007   • Meningococcal MCV4, Unspecified 08/15/2014, 08/16/2019   • Meningococcal MCV4P 08/15/2014, 08/16/2019   • Meningococcal, Unknown Serogroups 08/15/2014   • Pneumococcal Polysaccharide PPV23 02/13/2003   • Tdap 08/15/2014   • Tuberculin Skin Test-PPD Intradermal 06/25/2021   • Varicella 12/30/2003, 04/05/2007       Objective     /80 (BP Location: Left arm, Patient Position: Sitting, Cuff Size: Standard)   Pulse (!) 106   Temp 98.4 °F (36.9 °C) (Temporal)   Resp 16   Ht 5' 7" (1.702 m)   Wt 57.2 kg (126 lb)   LMP 06/05/2023 (Within Days)   SpO2 98%   BMI 19.73 kg/m²     Physical Exam  Vitals and nursing note reviewed. Constitutional:       General: She is not in acute distress. Appearance: Normal appearance. She is well-developed. She is not ill-appearing. HENT:      Head: Normocephalic and atraumatic. Right Ear: Tympanic membrane normal.      Left Ear: Tympanic membrane normal.      Nose: Mucosal edema present. Mouth/Throat:      Pharynx: Posterior oropharyngeal erythema (copious PND) present. No oropharyngeal exudate. Eyes:      Conjunctiva/sclera: Conjunctivae normal.   Cardiovascular:      Rate and Rhythm: Normal rate and regular rhythm. Heart sounds: Normal heart sounds. Pulmonary:      Effort: Pulmonary effort is normal. No respiratory distress. Breath sounds: Rhonchi present. No wheezing or rales.       Comments: Increased expiratory phase  Musculoskeletal:      Cervical back: Neck supple. Lymphadenopathy:      Cervical: No cervical adenopathy. Neurological:      Mental Status: She is alert and oriented to person, place, and time. Cranial Nerves: No cranial nerve deficit. Deep Tendon Reflexes: Reflexes are normal and symmetric.    Psychiatric:         Mood and Affect: Mood normal.         Behavior: Behavior normal.       Lauren Quintanilla MD

## 2023-07-19 NOTE — PROGRESS NOTES
[facility]  [facility]  Follow up Visit    Patient: Maria A Valdezogllisa Date: 2023   : 1961 Attending: Tigre Peralta MD   62 year old female      Subjective:  Maria A is a 62 year old female with stage 2 vaginal prolapse s/p robot-assisted laparoscopic supracervical hysterectomy, bilateral salpingectomy, sacrocolpopexy, cystoscopy with lysis of adhesions by Dr. Acuña 22, with overactive bladder and urge urinary incontinence managed with InterStim here for follow up    Last seen on 23  Reported vaginal burning and pain above InterStim site  On exam, was noted that mild labial fusion with normal appearing vaginal mucosa  Asked to continue vaginal estrogen   Has Botox scheduled for 23    Called last week reporting vaginal pressure and low back pain  Urine showed gram pos anastacio with neg UA  Asked to come in for exam    Today:   Reports that she has pain in her inguinal area, not vaginal  Notices it more when she stands and while sitting. Does notice it when laying down, but not as much   Feels bloated, swollen    Objective:  Visit Vitals  /80 (BP Location: RUE - Right upper extremity, Patient Position: Sitting)   Pulse 78   Temp 97.5 °F (36.4 °C) (Temporal)   Ht 5' 3\" (1.6 m)   LMP 2016 (Exact Date)   BMI 29.05 kg/m²     General: Alert, cooperative, conversive. No acute distress.  HEENT: NC/AT, EOM intact, no LAD, neck supple  Abdomen: Non-tender. Positive bowel sounds in all four quadrants. No hepatomegaly. No splenomegaly. Slight bulge with tenderness palpated at bilateral inguinal area with Valsalva. Mesh palpated under skin at right inguinal area.  Pelvis: Normal appearing atrophic external genitalia. Normal appearing atrophic vaginal mucosa. No evidence of mesh erosion/exposure.     Impression/Plan:  Maria A is a 62 year old female with bilateral inguinal discomfort, possible recurrent hernias    - We discussed that given the  I have reviewed the notes, assessments, and/or procedures performed by DOUG Wells, I concur with her/his documentation of Martha Hollis  location of her pain, as well as mesh being palpated on the right side, she should follow up with Dr. Acuña. Message sent to him regarding this.     - At this time, she has no vaginal complaints. She is scheduled for Botox in September for her urinary issues.      I spent a total of 30 minutes on the day of the visit.  This includes pre-charting, chart review, documenting and referring/communicating with other health care professionals.     Tigre Peralta MD

## 2023-07-20 ENCOUNTER — TELEPHONE (OUTPATIENT)
Dept: FAMILY MEDICINE CLINIC | Facility: CLINIC | Age: 23
End: 2023-07-20

## 2023-07-20 NOTE — TELEPHONE ENCOUNTER
Patient called and stated that she is feeling better but still has some congestion,  She would like to know if her work note can be extended until Monday?   Please call to advise

## 2023-07-28 PROBLEM — R09.89 UPPER RESPIRATORY SYMPTOM: Status: ACTIVE | Noted: 2023-07-28

## 2023-07-28 NOTE — ASSESSMENT & PLAN NOTE
A few days of symptoms and overall benign exam. Will defer antibiotics for now and treat symptoms supportively. If not improving she will notify me for antibiotics. Continue albuterol prn cough and chest tightness. Recommend supportive care including Mucinex DM.

## 2023-08-02 DIAGNOSIS — R09.89 UPPER RESPIRATORY SYMPTOM: ICD-10-CM

## 2023-08-02 RX ORDER — ALBUTEROL SULFATE 90 UG/1
AEROSOL, METERED RESPIRATORY (INHALATION)
Qty: 6.7 G | Refills: 0 | Status: SHIPPED | OUTPATIENT
Start: 2023-08-02

## 2023-08-15 ENCOUNTER — TELEPHONE (OUTPATIENT)
Dept: FAMILY MEDICINE CLINIC | Facility: CLINIC | Age: 23
End: 2023-08-15

## 2023-08-15 ENCOUNTER — CLINICAL SUPPORT (OUTPATIENT)
Dept: NUTRITION | Facility: CLINIC | Age: 23
End: 2023-08-15
Payer: COMMERCIAL

## 2023-08-15 VITALS — BODY MASS INDEX: 20.08 KG/M2 | WEIGHT: 128.2 LBS

## 2023-08-15 DIAGNOSIS — E61.1 IRON DEFICIENCY: Primary | ICD-10-CM

## 2023-08-15 DIAGNOSIS — R11.0 NAUSEA: ICD-10-CM

## 2023-08-15 DIAGNOSIS — R51.9 NONINTRACTABLE HEADACHE, UNSPECIFIED CHRONICITY PATTERN, UNSPECIFIED HEADACHE TYPE: ICD-10-CM

## 2023-08-15 PROCEDURE — 97803 MED NUTRITION INDIV SUBSEQ: CPT | Performed by: DIETITIAN, REGISTERED

## 2023-08-15 NOTE — TELEPHONE ENCOUNTER
Patient called to see when she should have lab work done again and if it is time she is requesting orders be placed. Please advise.

## 2023-08-15 NOTE — PROGRESS NOTES
Nutrition Assessment Form    Patient Name: Jass Dunham    YOB: 2000    Sex: Female     Assessment Date: 8/15/2023  Start Time: 3:35 PM Stop Time: 4:15 PM Total Minutes: 40 min     Data:  Present at session: Self and boyfriend Dylan    Parent/Patient Concerns/reason for visit: Pt with reportedly worsened fatigue   Medical Dx/Reason for Referral: Nausea   Past Medical History:   Diagnosis Date   • Anemia     resolved    • Anxiety    • Constipation    • Depression    • GERD (gastroesophageal reflux disease)    • Headache(784.0)    • Heart murmur    • Heavy menstrual bleeding     resolved    • Hives    • Iron deficiency anemia secondary to inadequate dietary iron intake 10/15/2019   • Migraine     resolved    • Wears glasses        Current Outpatient Medications   Medication Sig Dispense Refill   • albuterol (PROVENTIL HFA,VENTOLIN HFA) 90 mcg/act inhaler INHALE 2 PUFFS EVERY 6 HOURS AS NEEDED FOR WHEEZING 6.7 g 0   • ascorbic acid (VITAMIN C) 250 MG CHEW Chew 250 mg daily     • busPIRone (BUSPAR) 10 mg tablet Take 1 tablet (10 mg total) by mouth 3 (three) times a day 270 tablet 0   • dicyclomine (BENTYL) 20 mg tablet TAKE 1 TABLET (20 MG TOTAL) BY MOUTH 2 (TWO) TIMES A DAY AS NEEDED (ABDOMINAL BLOATING / LOWER ABDOMINAL PAIN) 180 tablet 1   • esomeprazole (NexIUM) 40 MG capsule TAKE 1 CAPSULE BY MOUTH 2 TIMES A DAY BEFORE MEALS. 180 capsule 1   • FLUoxetine (PROzac) 10 mg capsule TAKE 1 CAPSULE BY MOUTH EVERY DAY 90 capsule 0   • levonorgestrel-ethinyl estradiol (Vienva) 0.1-20 MG-MCG per tablet Take 1 tablet by mouth daily 84 tablet 4   • methylPREDNISolone 4 MG tablet therapy pack Use as directed on package 21 each 0   • multivitamin (THERAGRAN) TABS Take 1 tablet by mouth daily     • ondansetron (Zofran ODT) 4 mg disintegrating tablet Take 1 tablet (4 mg total) by mouth every 6 (six) hours as needed for nausea or vomiting 20 tablet 0     No current facility-administered medications for this visit. Additional Meds/Supplements: Used to take bentyl though doesn't take anymore, no diarrhea    Taking iron supplement [employer] 325 mg ferrous sulfate, takes this at dinner time   Special Learning Needs/barriers to learning/any new barriers None   Height: HC Readings from Last 5 Encounters:   No data found for [facility].      Weight: Wt Readings from Last 10 Encounters:   08/10/23 57.2 kg (126 lb)   07/17/23 57.2 kg (126 lb)   07/11/23 56.8 kg (125 lb 3.2 oz)   07/11/23 56.8 kg (125 lb 4 oz)   07/03/23 55.8 kg (123 lb)   06/22/23 56.2 kg (124 lb)   05/25/23 56.2 kg (124 lb)   05/25/23 56.2 kg (124 lb)   04/05/23 55.8 kg (123 lb)   03/23/23 56.2 kg (124 lb)     Estimated body mass index is 19.73 kg/m² as calculated from the following:    Height as of 8/10/23: 5' 7" (1.702 m). Weight as of 8/10/23: 57.2 kg (126 lb). Recent Weight Change: [x]Yes     []No  Amount: Typically 125-140lb range, says fluctuates greatly without known cause. Slight weight gain to 128lb today from preivous 125lb on initial appointment       Energy Needs: [address] Equation: 2284 kcal (mifflin x 1.3 activity + 500 calories for 1 lb weight gain per week)   Allergies   Allergen Reactions   • Pineapple - Food Allergy Other (See Comments)     Tingling tongue and throat   • Pollen Extract Sneezing     congestion    or intolerances Intolerance to gluten and dairy products, takes lactaid pills if she eats ice cream    Fresh pineapple allergy causing burning/tingling of tongue   Social History     Substance and Sexual Activity   Alcohol Use Not Currently    Comment: Social    None    Social History     Tobacco Use   Smoking Status Never   • Passive exposure: Past   Smokeless Tobacco Never    None    Who shops? boyfriends mother   Who cooks/cooking methods/Eating out/take out habits   boyfriends mother  Cooking methods: bake, uses kitchen upstairs. Now has pantry in the refinished basement she is living in.      Take out: Infrequent  Dining out every night at [facility]   Exercise: No additional activity at this time. Other: ie: Sleep habits/ stress level/ work habits household-lives with ?/ food security Planning for sleep study in December, never feels rested after night's sleep, toss and turn all night, feels she needs 12 h to get a good night's sleep    Reports groceries have noticeably been expensive though does admit to food insecurity    Prior Nutritional Counseling? []Yes     [x]No  When:      Why:         Diet Hx:  Breakfast: Diet: None  Breakfast cereal such as multigrain cheerios with almond milk "but then I'm hungry later soon after"    Lunch: Leftovers from dinner the previous night         Dinner: Gluten free pasta, sometimes with protein option         Snacks: AM -   PM - Banana with peanut butter or dinner leftovers  HS - Hot and spicy noodle bowls    Water or vitamin water or ocean spray cranberry juice   Other Notes/ Initial Assessment:  Pt reports no current GI symptoms. Says she previously had ongoing nausea and diarrhea though these have since resolved. Describes herself as a "picky eater" though only describes not liking tomatoes, peas, and meats with excess fat. Pt says she previously followed a gluten free, lactose free diet when she had GI symptoms and was dx with Celiac disease for 1 mo. Says she felt like she had a lot of energy when she was following this diet though was told by doctor she did not have Celiac disease and stopped this diet. Wants to improve her dietary habits to improve her energy. Pt reports she lives with her boyfriend in his mother's finished basement. They have a small fridge of their own to store any of their foods otherwise fridge space is limited. Boyfriend's mother prepares meals though pt says most of the meats she makes end up being dried out and undesirable. Ends up just eating the side dishes.  Pt also states that boyfriend and his family have different food preferences than her which makes dinner time difficult. Pt unsure if she should gain weight, feels her weight is too low at this time. Updated assessment (Follow up note only):  Pt reports ongoing fatigue. Says she does not want to wait another 3-6 months to get iron panel checked again. Says she previously required IV iron infusions. Noting low ferritin of 8. Pt had difficulties describing iron rich foods during discussion today. Nutrition Diagnosis:   Undesirable food choices  related to High level of fatigue or other side effect of medical, surgical or radiological therapy as evidenced by Findings consistent with vitamin/mineral deficiency or excess       Any change or new dx since previous visit:     Nutrition Diagnosis:     Medical Nutrition Therapy Intervention:  [x]Individualized Meal Plan--Reviewed iron rich foods to include regularly such as lean beef, poultry, iron fortified breakfast cereals, discussed some example dinner recipes such as chicken scampi with bell peppers or chili with beans/tomatoes or lentil/chickpea based pasta with homemade meat sauce, etc. Encouraged vitamin C rich foods with non-heme iron sources. []Understanding Lab Values   []Basic Pathophysiology of Disease []Food/Medication Interactions   []Food Diary []Exercise   []Lifestyle/Behavior Modification Techniques []Medication, Mechanism of Action   []Label Reading: CHO/ Na/ Fat/ other_________ []Self Blood Glucose Monitoring   [x]Weight/BMI Goals: Modest weight gain appropriate. Will aim for goal weight of approximately 130lb upon next appointment.   [x]Other -   Handouts provided today: iron content of foods list (2nd copy)  Handouts previously provided: Gluten Free Nutrition Therapy, Lactose Free Nutrition Therapy, Iron Content of Foods handout           Comprehension: []Excellent  [x]Very Good  []Good  []Fair   []Poor    Receptivity: []Excellent  [x]Very Good  []Good  []Fair   []Poor    Expected Compliance: []Excellent  [x]Very Good  []Good  []Fair   []Poor        Goals (initial)/ Progress made on previous goals/new goals:  1. Pt to gain 0.5-1 lb per week upon follow up. 2. Pt to include at least 2 servings of fruits/vegetables daily upon follow up. 3. Pt to obtain normal Fe level upon next lab reading. No follow-ups on file.   Labs:  CMP  Lab Results   Component Value Date     06/11/2016    K 4.3 03/28/2023     03/28/2023    CO2 27 03/28/2023    BUN 10 03/28/2023    CREATININE 0.70 03/28/2023    GLUF 84 03/28/2023    CALCIUM 8.8 03/28/2023    AST 16 02/26/2023    ALT 12 02/26/2023    ALKPHOS 55 02/26/2023    PROT 6.6 06/11/2016    BILITOT 2.0 (H) 06/11/2016    EGFR 125 03/28/2023       BMP  Lab Results   Component Value Date    CALCIUM 8.8 03/28/2023     06/11/2016    K 4.3 03/28/2023    CO2 27 03/28/2023     03/28/2023    BUN 10 03/28/2023    CREATININE 0.70 03/28/2023       Lipids  No results found for: "CHOL"  No results found for: "HDL"  No results found for: "LDLCALC"  No results found for: "TRIG"  No results found for: "CHOLHDL"    Hemoglobin A1C  Lab Results   Component Value Date    HGBA1C 5.1 03/28/2023       Fasting Glucose  Lab Results   Component Value Date    GLUF 84 03/28/2023       Insulin     Thyroid  Lab Results   Component Value Date    TSH 2.62 10/01/2022       Hepatic Function Panel  Lab Results   Component Value Date    ALT 12 02/26/2023    AST 16 02/26/2023    ALKPHOS 55 02/26/2023    BILITOT 2.0 (H) 06/11/2016       Celiac Disease Antibody Panel  Endomysial IgA   Date Value Ref Range Status   02/14/2020 Negative Negative Final     Gliadin IgA   Date Value Ref Range Status   02/14/2020 3 0 - 19 units Final     Comment:                        Negative                   0 - 19                     Weak Positive             20 - 30                     Moderate to Strong Positive   >30     Gliadin IgG   Date Value Ref Range Status   02/14/2020 4 0 - 19 units Final     Comment: Negative                   0 - 19                     Weak Positive             20 - 30                     Moderate to Strong Positive   >30     IGA   Date Value Ref Range Status   03/17/2022 120.0 70.0 - 400.0 mg/dL Final     IgA   Date Value Ref Range Status   02/14/2020 98 87 - 352 mg/dL Final     TISSUE TRANSGLUTAMINASE IGA   Date Value Ref Range Status   03/17/2022 <2 0 - 3 U/mL Final     Comment:                                   Negative        0 -  3                                Weak Positive   4 - 10                                Positive           >10   Tissue Transglutaminase (tTG) has been identified   as the endomysial antigen. Studies have demonstr-   ated that endomysial IgA antibodies have over 99%   specificity for gluten sensitive enteropathy.       Iron  Lab Results   Component Value Date    IRON 41 (L) 07/03/2023    TIBC 522 (H) 07/03/2023    FERRITIN 8 (L) 07/03/2023            Danielle Silveira RD, LDN  [facility] CLINICAL NUTRITION SERVICES  [address]  [place] [address] [address] [address]

## 2023-08-16 ENCOUNTER — OFFICE VISIT (OUTPATIENT)
Dept: URGENT CARE | Facility: CLINIC | Age: 23
End: 2023-08-16
Payer: COMMERCIAL

## 2023-08-16 VITALS
HEIGHT: 67 IN | BODY MASS INDEX: 20.09 KG/M2 | TEMPERATURE: 99.7 F | HEART RATE: 72 BPM | WEIGHT: 128 LBS | SYSTOLIC BLOOD PRESSURE: 116 MMHG | DIASTOLIC BLOOD PRESSURE: 80 MMHG | RESPIRATION RATE: 16 BRPM | OXYGEN SATURATION: 100 %

## 2023-08-16 DIAGNOSIS — R53.83 OTHER FATIGUE: ICD-10-CM

## 2023-08-16 DIAGNOSIS — R51.9 ACUTE NONINTRACTABLE HEADACHE, UNSPECIFIED HEADACHE TYPE: Primary | ICD-10-CM

## 2023-08-16 PROCEDURE — 99213 OFFICE O/P EST LOW 20 MIN: CPT | Performed by: PHYSICIAN ASSISTANT

## 2023-08-16 RX ORDER — DOXYCYCLINE HYCLATE 50 MG/1
324 CAPSULE, GELATIN COATED ORAL
COMMUNITY

## 2023-08-16 NOTE — LETTER
August 16, 2023     Patient: Pablo Cortez   YOB: 2000   Date of Visit: 8/16/2023       To Whom It May Concern: It is my medical opinion that Pablo Cortez may return to work on 8/21/2023 .          Sincerely,        Fely Moore PA-C

## 2023-08-16 NOTE — PROGRESS NOTES
[facility]        NAME: Og Almanza is a 23 y.o. female  : 2000    MRN: [id number]  DATE: 2023  TIME: 1:32 PM    Assessment and Plan   Acute nonintractable headache, unspecified headache type [R51.9]  1. Acute nonintractable headache, unspecified headache type        2. Other fatigue              Patient Instructions   Plenty of fluids. Eric Capuchin in a dark room. Excedrin. Benadryl. Follow up with PCP in 3-5 days. Proceed to  ER if symptoms worsen. Chief Complaint     Chief Complaint   Patient presents with   • Headache     Fatigue and headaches started 5 days ago. History of Present Illness       Patient is a 28-year-old female with significant past medical history of anemia and anxiety Zentz the office planing of fatigue, headaches, and lightheadedness for 5 days. Headache is located to the left forehead described as 5 out of 10 achy. Denies head trauma. Denies syncope, vision changes, photophobia, nausea, vomiting, abdominal pain, or rashes. Admits to increased anxiety and stress. History of iron deficient anemia and recently started iron supplementation over the last month. Denies other medication changes. Reports normal oral intake. Headaches are not common for her iron deficiency anemia symptoms. Review of Systems   Review of Systems   Constitutional: Positive for fatigue. Negative for chills and fever. HENT: Negative for congestion and sore throat. Respiratory: Negative for cough and shortness of breath. Cardiovascular: Negative for chest pain and palpitations. Gastrointestinal: Negative for abdominal pain, diarrhea, nausea and vomiting. Musculoskeletal: Negative for myalgias. Neurological: Positive for light-headedness and headaches. Negative for dizziness.          Current Medications       Current Outpatient Medications:   •  ascorbic acid (VITAMIN C) 250 MG CHEW, Chew 250 mg daily, Disp: , Rfl:   •  busPIRone (BUSPAR) 10 mg tablet, Take 1 tablet (10 mg total) by mouth 3 (three) times a day, Disp: 270 tablet, Rfl: 0  •  dicyclomine (BENTYL) 20 mg tablet, TAKE 1 TABLET (20 MG TOTAL) BY MOUTH 2 (TWO) TIMES A DAY AS NEEDED (ABDOMINAL BLOATING / LOWER ABDOMINAL PAIN), Disp: 180 tablet, Rfl: 1  •  esomeprazole (NexIUM) 40 MG capsule, TAKE 1 CAPSULE BY MOUTH 2 TIMES A DAY BEFORE MEALS., Disp: 180 capsule, Rfl: 1  •  ferrous gluconate (FERGON) 324 mg tablet, Take 324 mg by mouth daily with breakfast, Disp: , Rfl:   •  FLUoxetine (PROzac) 10 mg capsule, TAKE 1 CAPSULE BY MOUTH EVERY DAY, Disp: 90 capsule, Rfl: 0  •  levonorgestrel-ethinyl estradiol (Vienva) 0.1-20 MG-MCG per tablet, Take 1 tablet by mouth daily, Disp: 84 tablet, Rfl: 4  •  multivitamin (THERAGRAN) TABS, Take 1 tablet by mouth daily, Disp: , Rfl:   •  ondansetron (Zofran ODT) 4 mg disintegrating tablet, Take 1 tablet (4 mg total) by mouth every 6 (six) hours as needed for nausea or vomiting, Disp: 20 tablet, Rfl: 0  •  albuterol (PROVENTIL HFA,VENTOLIN HFA) 90 mcg/act inhaler, INHALE 2 PUFFS EVERY 6 HOURS AS NEEDED FOR WHEEZING, Disp: 6.7 g, Rfl: 0  •  methylPREDNISolone 4 MG tablet therapy pack, Use as directed on package (Patient not taking: Reported on 8/16/2023), Disp: 21 each, Rfl: 0    Current Allergies     Allergies as of 08/16/2023 - Reviewed 08/16/2023   Allergen Reaction Noted   • Pineapple - food allergy Other (See Comments) 07/03/2023   • Pollen extract Sneezing 12/01/2020            The following portions of the patient's history were reviewed and updated as appropriate: allergies, current medications, past family history, past medical history, past social history, past surgical history and problem list.     Past Medical History:   Diagnosis Date   • Anemia     resolved    • Anxiety    • Constipation    • Depression    • GERD (gastroesophageal reflux disease)    • Headache(784.0)    • Heart murmur    • Heavy menstrual bleeding     resolved    • Hives    • Iron deficiency anemia secondary to inadequate dietary iron intake 10/15/2019   • Migraine     resolved    • Wears glasses        Past Surgical History:   Procedure Laterality Date   • TONSILECTOMY AND ADNOIDECTOMY     • WISDOM TOOTH EXTRACTION         Family History   Problem Relation Age of Onset   • Breast cancer Mother    • No Known Problems Father    • Breast cancer Maternal Grandmother    • Breast cancer Maternal Grandfather    • Depression Maternal Aunt    • Anxiety disorder Maternal Aunt    • Colon cancer Neg Hx    • Ovarian cancer Neg Hx          Medications have been verified. Objective   /80   Pulse 72   Temp 99.7 °F (37.6 °C)   Resp 16   Ht 5' 7" (1.702 m)   Wt 58.1 kg (128 lb)   LMP 08/01/2023   SpO2 100%   BMI 20.05 kg/m²   Patient's last menstrual period was 08/01/2023. Physical Exam     Physical Exam  Vitals and nursing note reviewed. Constitutional:       Appearance: Normal appearance. She is well-developed. HENT:      Head: Normocephalic and atraumatic. Right Ear: Tympanic membrane, ear canal and external ear normal.      Left Ear: Tympanic membrane, ear canal and external ear normal.      Nose: Nose normal.      Mouth/Throat:      Pharynx: Uvula midline. Eyes:      General: Lids are normal.      Conjunctiva/sclera: Conjunctivae normal.      Pupils: Pupils are equal, round, and reactive to light. Cardiovascular:      Rate and Rhythm: Normal rate and regular rhythm. Pulses: Normal pulses. Heart sounds: Normal heart sounds. No murmur heard. No friction rub. No gallop. Pulmonary:      Effort: Pulmonary effort is normal.      Breath sounds: Normal breath sounds. No wheezing, rhonchi or rales. Chest:      Chest wall: No tenderness. Abdominal:      General: Bowel sounds are normal.      Palpations: Abdomen is soft. Tenderness: There is no abdominal tenderness. Musculoskeletal:         General: Normal range of motion. Cervical back: Neck supple. Lymphadenopathy:      Cervical: No cervical adenopathy. Skin:     General: Skin is warm and dry. Capillary Refill: Capillary refill takes less than 2 seconds. Neurological:      Mental Status: She is alert.

## 2023-08-21 NOTE — TELEPHONE ENCOUNTER
Please contact patient. I reviewed [employer] message. I did place the order for blood work to retest her CBC, iron, B12 and Lyme test.  If she is feeling poorly-it would be best if we could see her in the office. I can see her at 2 PM on Thursday, August 24 if this time is suitable for her. Otherwise please check with me.   Thank you

## 2023-08-22 ENCOUNTER — APPOINTMENT (OUTPATIENT)
Dept: LAB | Facility: CLINIC | Age: 23
End: 2023-08-22
Payer: COMMERCIAL

## 2023-08-22 DIAGNOSIS — R51.9 NONINTRACTABLE HEADACHE, UNSPECIFIED CHRONICITY PATTERN, UNSPECIFIED HEADACHE TYPE: ICD-10-CM

## 2023-08-22 DIAGNOSIS — E61.1 IRON DEFICIENCY: ICD-10-CM

## 2023-08-22 LAB
B BURGDOR IGG+IGM SER QL IA: NEGATIVE
ERYTHROCYTE [DISTWIDTH] IN BLOOD BY AUTOMATED COUNT: 12.6 % (ref 11.6–15.1)
FERRITIN SERPL-MCNC: 5 NG/ML (ref 11–307)
HCT VFR BLD AUTO: 42.2 % (ref 34.8–46.1)
HGB BLD-MCNC: 13.8 G/DL (ref 11.5–15.4)
MCH RBC QN AUTO: 28.6 PG (ref 26.8–34.3)
MCHC RBC AUTO-ENTMCNC: 32.7 G/DL (ref 31.4–37.4)
MCV RBC AUTO: 87 FL (ref 82–98)
PLATELET # BLD AUTO: 296 THOUSANDS/UL (ref 149–390)
PMV BLD AUTO: 9.4 FL (ref 8.9–12.7)
RBC # BLD AUTO: 4.83 MILLION/UL (ref 3.81–5.12)
VIT B12 SERPL-MCNC: 267 PG/ML (ref 180–914)
WBC # BLD AUTO: 6.8 THOUSAND/UL (ref 4.31–10.16)

## 2023-08-22 PROCEDURE — 82607 VITAMIN B-12: CPT

## 2023-08-22 PROCEDURE — 83550 IRON BINDING TEST: CPT

## 2023-08-22 PROCEDURE — 83540 ASSAY OF IRON: CPT

## 2023-08-22 PROCEDURE — 86618 LYME DISEASE ANTIBODY: CPT

## 2023-08-22 PROCEDURE — 82728 ASSAY OF FERRITIN: CPT

## 2023-08-22 PROCEDURE — 36415 COLL VENOUS BLD VENIPUNCTURE: CPT

## 2023-08-22 PROCEDURE — 85027 COMPLETE CBC AUTOMATED: CPT

## 2023-08-23 LAB
IRON SATN MFR SERPL: 9 % (ref 15–50)
IRON SERPL-MCNC: 68 UG/DL (ref 50–212)
TIBC SERPL-MCNC: 752 UG/DL (ref 250–450)
UIBC SERPL-MCNC: 684 UG/DL (ref 155–355)

## 2023-08-24 ENCOUNTER — OFFICE VISIT (OUTPATIENT)
Dept: FAMILY MEDICINE CLINIC | Facility: CLINIC | Age: 23
End: 2023-08-24
Payer: COMMERCIAL

## 2023-08-24 VITALS
OXYGEN SATURATION: 99 % | HEART RATE: 99 BPM | BODY MASS INDEX: 20.31 KG/M2 | RESPIRATION RATE: 16 BRPM | TEMPERATURE: 98.2 F | WEIGHT: 129.4 LBS | DIASTOLIC BLOOD PRESSURE: 76 MMHG | HEIGHT: 67 IN | SYSTOLIC BLOOD PRESSURE: 120 MMHG

## 2023-08-24 DIAGNOSIS — E53.8 VITAMIN B12 DEFICIENCY: ICD-10-CM

## 2023-08-24 DIAGNOSIS — G47.00 INSOMNIA, UNSPECIFIED TYPE: ICD-10-CM

## 2023-08-24 DIAGNOSIS — I34.1 MITRAL VALVE PROLAPSE: ICD-10-CM

## 2023-08-24 DIAGNOSIS — G44.229 CHRONIC TENSION-TYPE HEADACHE, NOT INTRACTABLE: Primary | ICD-10-CM

## 2023-08-24 DIAGNOSIS — F33.9 DEPRESSION, RECURRENT (HCC): ICD-10-CM

## 2023-08-24 PROBLEM — R35.0 URINARY FREQUENCY: Status: RESOLVED | Noted: 2023-06-22 | Resolved: 2023-08-24

## 2023-08-24 PROBLEM — R09.89 UPPER RESPIRATORY SYMPTOM: Status: RESOLVED | Noted: 2023-07-28 | Resolved: 2023-08-24

## 2023-08-24 PROCEDURE — 99214 OFFICE O/P EST MOD 30 MIN: CPT | Performed by: FAMILY MEDICINE

## 2023-08-24 RX ORDER — NORTRIPTYLINE HYDROCHLORIDE 10 MG/1
CAPSULE ORAL
Qty: 60 CAPSULE | Refills: 2 | Status: SHIPPED | OUTPATIENT
Start: 2023-08-24

## 2023-08-24 RX ORDER — DICLOFENAC SODIUM 75 MG/1
TABLET, DELAYED RELEASE ORAL
Qty: 30 TABLET | Refills: 1 | Status: SHIPPED | OUTPATIENT
Start: 2023-08-24

## 2023-08-24 NOTE — ASSESSMENT & PLAN NOTE
Last echo 2020  Previously evaluated by pediatric cardiology we will refer to adult cardiology now  Patient appears in normal sinus rhythm/normal BP throughout exam.  Her palpitations could be triggered by natural MVP and iron deficiency

## 2023-08-24 NOTE — Clinical Note
Jared Blake [place] patient. She presents with persistent tension headaches. Menses are heavy even on birth control pills. Ferritin level is 5. She recalls significant symptomatic improvement after Venofer in the past.  Can you possibly follow-up with her?  Thank you Odalis Campbell

## 2023-08-24 NOTE — PROGRESS NOTES
Name: Birdie Wayne      : 2000      MRN: [id number]  Encounter Provider: Randolph Day MD  Encounter Date: 2023   Encounter department: [address] [place] [address]     1. Chronic tension-type headache, not intractable  Assessment & Plan:  New onset of persistent tension headaches, partially relieved by ibuprofen. No visual changes or aura, some photosensitivity. No dizziness. Blood work reveals iron deficiency and vitamin B12 deficiency  Start B12 p.o., contact [facility] regarding iron infusions that have significantly helped in the past.  Start Pamelor for prevention of headaches and chronic insomnia    Orders:  -     nortriptyline (PAMELOR) 10 mg capsule; Take 1 capsule at bedtime for 7 days, then increase dose to 2 capsules at bedtime  -     diclofenac (VOLTAREN) 75 mg EC tablet; Take 1 tablet twice a day after food as needed for headache    2. Insomnia, unspecified type  -     nortriptyline (PAMELOR) 10 mg capsule; Take 1 capsule at bedtime for 7 days, then increase dose to 2 capsules at bedtime    3. Vitamin B12 deficiency  Assessment & Plan:  Low level of vitamin B12. Start vitamin B12 OTC 1000 mcg daily      4. Mitral valve prolapse  Assessment & Plan:  Last echo   Previously evaluated by pediatric cardiology we will refer to adult cardiology now  Patient appears in normal sinus rhythm/normal BP throughout exam.  Her palpitations could be triggered by natural MVP and iron deficiency    Orders:  -     TSH, 3rd generation with Free T4 reflex; Future  -     Ambulatory Referral to Cardiology; Future    5. Depression, recurrent ([address])  Assessment & Plan:  Chronic symptoms of depression/anxiety.   Patient is under care of psychiatry, on BuSpar 10 mg 3 times daily and low-dose Prozac 10 mg daily                Patient Instructions   Start Pamelor/nortriptyline, medication that can help with insomnia, headaches, IBS and actually anxiety  Please continue Prozac and BuSpar for now and discuss it with your psychiatrist  Please start taking iron daily  I will reach out to Dr. Alhaji Ivy for iron infusions  Thyroid function test - blood work  Vitamin B12 1000 mcg daily, sublingual  Start diclofenac, medication for headache AS NEEDED , it is anti-inflammatory so please do not take ibuprofen  Please call gynecologist RE : Birth control pill versus IUD, please discuss your heavy cycles     Patient will contact me within next few weeks with an update, she will let me know if headaches are not improving. Consider CNS imaging as the next diagnostic step. Subjective       Headaches x 2 weeks , not her  typical migraine HA    Photosensitivity with HA,no aura    Feels lightheaded and nauseated, no vomiting    Decreased appetite    Palpitations - no chest pain - describes as " faster beats "   Tossing and turning - poor sleep -fatigued in am    Wakes up up with the HA, ibuprofen helps somewhat  Seen at  [facility] - no no recommendations aside from OTC Rx    Recent labs:  Ferritin 5 . TIBC is low   Lyme test is negative. Vitamin B12 level is low at 267. Patient is on BCP- bleeds 5 days per cycle,  3 of those days are pretty heavy   Used IUD in the past     MRI brain at age of 17 - normal      Headache    Review of Systems   Constitutional: Positive for fatigue. HENT: Negative. Eyes: Positive for photophobia. Respiratory: Negative. Cardiovascular: Negative. Gastrointestinal: Negative. Genitourinary: Negative. Musculoskeletal: Negative. Allergic/Immunologic: Negative. Neurological: Positive for headaches. Psychiatric/Behavioral: Positive for sleep disturbance.        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   • Wears glasses      Past Surgical History:   Procedure Laterality Date   • TONSILECTOMY AND ADNOIDECTOMY     • WISDOM TOOTH EXTRACTION       Family History   Problem Relation Age of Onset   • Breast cancer Mother    • No Known Problems Father    • Breast cancer Maternal Grandmother    • Breast cancer Maternal Grandfather    • Depression Maternal Aunt    • Anxiety disorder Maternal Aunt    • Colon cancer Neg Hx    • Ovarian cancer Neg Hx      Social History     Socioeconomic History   • Marital status: Single     Spouse name: None   • Number of children: 0   • Years of education: None   • Highest education level: None   Occupational History   • Occupation: student   Tobacco Use   • Smoking status: Never     Passive exposure: Past   • Smokeless tobacco: Never   Vaping Use   • Vaping Use: Never used   Substance and Sexual Activity   • Alcohol use: Not Currently     Comment: Social   • Drug use: No   • Sexual activity: Yes     Partners: Male     Birth control/protection: OCP   Other Topics Concern   • None   Social History Narrative    Daily caffeine consumption -- coffee 1x weekly    Does not exercise        Who lives in your home: Mom     What type of home do you live in: Other condo     Age of your home: Built 14 yrs ago     How long have you been living there: 2 yrs     Type of heat: Forced hot air    Type of fuel: Electric    What type of naomi is in your bedroom: Carpet    Do you have the following in or near your home:    Air products: Central air and Humidifier    Pests: None    Pets: Dog and Rabbit    Are pets allowed in bedroom: Yes    Open fields, wooded areas nearby: Open fields and Wooded areas    Basement: None    Exposure to second hand smoke: Yes boyfriends house         Habits:    Caffeine: coffee 1 cup daily -    Chocolate: occasionally     Other:     Social Determinants of Health     Financial Resource Strain: Not on file   Food Insecurity: Not on file   Transportation Needs: Not on file   Physical Activity: Insufficiently Active (5/5/2021)    Exercise Vital Sign    • Days of Exercise per Week: 1 day    • Minutes of Exercise per Session: 60 min   Stress: Not on file   Social Connections: Not on file   Intimate Partner Violence: Not on file   Housing Stability: Not on file     Current Outpatient Medications on File Prior to Visit   Medication Sig   • ascorbic acid (VITAMIN C) 250 MG CHEW Chew 250 mg daily   • busPIRone (BUSPAR) 10 mg tablet Take 1 tablet (10 mg total) by mouth 3 (three) times a day   • esomeprazole (NexIUM) 40 MG capsule TAKE 1 CAPSULE BY MOUTH 2 TIMES A DAY BEFORE MEALS.    • FLUoxetine (PROzac) 10 mg capsule TAKE 1 CAPSULE BY MOUTH EVERY DAY   • levonorgestrel-ethinyl estradiol (Vienva) 0.1-20 MG-MCG per tablet Take 1 tablet by mouth daily   • multivitamin (THERAGRAN) TABS Take 1 tablet by mouth daily   • ondansetron (Zofran ODT) 4 mg disintegrating tablet Take 1 tablet (4 mg total) by mouth every 6 (six) hours as needed for nausea or vomiting   • dicyclomine (BENTYL) 20 mg tablet TAKE 1 TABLET (20 MG TOTAL) BY MOUTH 2 (TWO) TIMES A DAY AS NEEDED (ABDOMINAL BLOATING / LOWER ABDOMINAL PAIN) (Patient not taking: Reported on 8/24/2023)   • ferrous gluconate (FERGON) 324 mg tablet Take 324 mg by mouth daily with breakfast (Patient not taking: Reported on 8/24/2023)     Allergies   Allergen Reactions   • Pineapple - Food Allergy Other (See Comments)     Tingling tongue and throat   • Pollen Extract Sneezing     congestion     Immunization History   Administered Date(s) Administered   • COVID-19 PFIZER VACCINE 0.3 ML IM 05/14/2021, 06/05/2021, 02/20/2022   • DTaP 5 02/13/2003, 04/14/2003, 06/16/2003, 06/29/2004, 03/02/2007   • HPV9 02/03/2017, 08/14/2017   • Hep B, adult 2002, 01/13/2003, 09/16/2003   • Hib (PRP-OMP) 02/13/2003, 04/14/2003, 06/16/2003, 03/25/2004   • INFLUENZA 01/10/2023   • IPV 02/13/2003, 04/14/2003, 06/29/2004, 03/02/2007   • MMR 03/25/2004, 04/05/2007   • Meningococcal MCV4, Unspecified 08/15/2014, 08/16/2019   • Meningococcal MCV4P 08/15/2014, 08/16/2019   • Meningococcal, Unknown Serogroups 08/15/2014   • Pneumococcal Polysaccharide PPV23 02/13/2003   • Tdap 08/15/2014   • Tuberculin Skin Test-PPD Intradermal 06/25/2021   • Varicella 12/30/2003, 04/05/2007       Objective     /76 (BP Location: Left arm, Patient Position: Sitting, Cuff Size: Standard)   Pulse 99   Temp 98.2 °F (36.8 °C) (Temporal)   Resp 16   Ht 5' 7" (1.702 m)   Wt 58.7 kg (129 lb 6.4 oz)   LMP 08/01/2023   SpO2 99%   BMI 20.27 kg/m²     Physical Exam  Vitals and nursing note reviewed. Constitutional:       General: She is not in acute distress. Appearance: Normal appearance. She is well-developed. She is not ill-appearing. HENT:      Head: Normocephalic and atraumatic. Eyes:      General: No scleral icterus. Conjunctiva/sclera: Conjunctivae normal.      Pupils: Pupils are equal, round, and reactive to light. Neck:      Thyroid: No thyromegaly. Vascular: No carotid bruit. Cardiovascular:      Rate and Rhythm: Normal rate and regular rhythm. Heart sounds: Normal heart sounds. No murmur heard. Pulmonary:      Effort: Pulmonary effort is normal. No respiratory distress. Breath sounds: Normal breath sounds. No wheezing. Abdominal:      General: There is no abdominal bruit. Musculoskeletal:         General: Normal range of motion. Cervical back: Neck supple. Neurological:      Mental Status: She is alert and oriented to person, place, and time. Cranial Nerves: No cranial nerve deficit. Coordination: Coordination normal.   Psychiatric:         Mood and Affect: Mood normal.         Behavior: Behavior normal.         Thought Content:  Thought content normal.       Jesus Matson MD

## 2023-08-24 NOTE — PATIENT INSTRUCTIONS
Start Pamelor/nortriptyline, medication that can help with insomnia, headaches, IBS and actually anxiety  Please continue Prozac and BuSpar for now and discuss it with your psychiatrist  Please start taking iron daily  I will reach out to Dr. Jessie Ordonez for iron infusions  Thyroid function test - blood work  Vitamin B12 1000 mcg daily, sublingual  Start diclofenac, medication for headache AS NEEDED , it is anti-inflammatory so please do not take ibuprofen  Please call gynecologist RE : Birth control pill versus IUD, please discuss your heavy cycles

## 2023-08-28 PROBLEM — E53.8 VITAMIN B12 DEFICIENCY: Status: ACTIVE | Noted: 2023-08-28

## 2023-08-28 PROBLEM — G44.229 CHRONIC TENSION-TYPE HEADACHE, NOT INTRACTABLE: Status: ACTIVE | Noted: 2023-08-28

## 2023-08-28 PROBLEM — D80.2 SELECTIVE DEFICIENCY OF IMMUNOGLOBULIN A (IGA) (HCC): Status: RESOLVED | Noted: 2019-10-16 | Resolved: 2023-08-28

## 2023-08-28 NOTE — ASSESSMENT & PLAN NOTE
Chronic symptoms of depression/anxiety.   Patient is under care of psychiatry, on BuSpar 10 mg 3 times daily and low-dose Prozac 10 mg daily

## 2023-08-28 NOTE — ASSESSMENT & PLAN NOTE
· New onset of persistent tension headaches, partially relieved by ibuprofen. · No visual changes or aura, some photosensitivity. No dizziness.   · Blood work reveals iron deficiency and vitamin B12 deficiency  · Start B12 p.o., contact [facility] regarding iron infusions that have significantly helped in the past.  · Start Pamelor for prevention of headaches and chronic insomnia

## 2023-08-30 DIAGNOSIS — N92.0 MENORRHAGIA WITH REGULAR CYCLE: Primary | ICD-10-CM

## 2023-08-30 DIAGNOSIS — R79.0 DECREASED FERRITIN: ICD-10-CM

## 2023-08-31 ENCOUNTER — TELEPHONE (OUTPATIENT)
Dept: HEMATOLOGY ONCOLOGY | Facility: CLINIC | Age: 23
End: 2023-08-31

## 2023-08-31 NOTE — TELEPHONE ENCOUNTER
Appointment Schedule   Who are you speaking with? Patient   If it is not the patient, are they listed on an active communication consent form? Yes   Which provider is the appointment scheduled with? Dr. Carlos Chavarria   At which location is the appointment scheduled for? [facility]   When is the appointment scheduled? Please list date and time 9/26/23   What is the reason for this appointment? Referral received. Did patient voice understanding of the details of this appointment? Yes   Was the no show policy reviewed with patient?  Yes

## 2023-09-01 ENCOUNTER — OFFICE VISIT (OUTPATIENT)
Dept: PSYCHIATRY | Facility: CLINIC | Age: 23
End: 2023-09-01

## 2023-09-01 DIAGNOSIS — F33.9 DEPRESSION, RECURRENT (HCC): Primary | ICD-10-CM

## 2023-09-01 DIAGNOSIS — F40.10 SOCIAL ANXIETY DISORDER: ICD-10-CM

## 2023-09-01 DIAGNOSIS — F41.1 GAD (GENERALIZED ANXIETY DISORDER): ICD-10-CM

## 2023-09-01 NOTE — PSYCH
MEDICATION MANAGEMENT NOTE        [address]  [facility] [address]  [facility]  [address] [place] [address] [phone]  This note was not shared with the patient due to reasonable likelihood of causing patient harm          Name and Date of Birth:  Arnold Payne 23 y.o. 2000    Date of Visit: September 1, 2023    SUBJECTIVE:       Chart reviewed. Martha last seen by Daniel 1/23 at which time meds unchanged. DANIEL asked Martha to come back in 4 weeks -just coming back now and has been off meds for some time- "not doing much". Says she took them for at least a month. PCP started nortriptyline 8/24- titrating to 20 mg- Martha has not taken the 20 mg yet- this given for headaches. Martha notes unchanged haadaches and maybe a little better sleep. Martha has been following up with OP providers- PCP, urology, gyne, nutrition and ENT, sleep medicine       At the time of last visit  Martha had started working at [employer] -states she stopped this job unknown amount of time ago secondary to anxiety. Started working at [employer] full time while going to school full time. Became overwhelmed with this exacerbated by parents pressure for her to work and go to school and not understanding the mental health impact it is having on her. Martha states about 2 mths ago she became so overwhelmed she cut self superficially with a razor on arm- denies this was a suicide attempt. Denies SI or any urges to self-injure now. Can feel emotionally numb, hilton during times of stress, this has decreased in past month since she stopped her job at [employer] and is just focusing on school and her physical health.           Primary symptoms reported today: hypersomnia -but sleeping during the day (has seen sleep medicine who discussed sleep hygiene and ordered sleep study); decreased appetite, no energy or motivation, difficulty relaxing at night. Review of Systems   Constitutional: Positive for appetite change. Neurological: Positive for headaches. Psychiatric/Behavioral: Positive for sleep disturbance. Past Psychiatric History     Inpatient:  None  [facility]. No hx of suicide attempts. Outpatient:  Therapist- Claire Mckeonweekly- x 6-7 yrs. This office since April 2022     Med trials: lexapro -more depressed. Trauma/Loss History:           Abandonment by bio father. Mother's illness  (stage 4 metastatic breast cancer)     Family Psychiatric History:      Psychiatric Illness:      Depression- grandfather; anxiety -aunt  Substance Abuse:       none reported  Suicide Attempts:        uncle     Social History:         Student at [employer]- on line classes currently. Had IEP- learning disability- comprehension. Single. Lives with mom. Is student at [employer].            OBJECTIVE:     MENTAL STATUS EXAM  Appearance:  age appropriate, dressed casually   Behavior:  Pleasant & cooperative   Speech:  Normal volume, regular rate and rhythm   Mood:  depressed and anxious   Affect:  mood congruent   Language: intact and appropriate for age, education, and intellect   Thought Process:  goal directed   Associations: intact associations   Thought Content:  normal and appropriate   Perceptual Disturbances: no auditory or visual hallcunations   Risk Potential / Abnormal Thoughts: Suicidal ideation - None  Homicidal ideation - None  Potential for aggression - No       Consciousness:  Alert & Awake   Sensorium:  Grossly oriented   Attention: attention span and concentration are age appropriate       Fund of Knowledge:  Memory: awareness of current events: yes  recent and remote memory grossly intact   Insight:  good   Judgment: good   Muscle Strength Muscle Tone: Grossly normal  normal   Gait/Station: normal gait/station with good balance   Motor Activity: no abnormal movements       Lab Review: I have reviewed all pertinent labs  Lab Results   Component Value Date    HGBA1C 5.1 03/28/2023       Lab Results   Component Value Date     06/11/2016    SODIUM 134 (L) 03/28/2023    K 4.3 03/28/2023     03/28/2023    CO2 27 03/28/2023    AGAP 5 03/28/2023    BUN 10 03/28/2023    CREATININE 0.70 03/28/2023    GLUC 100 02/26/2023    GLUF 84 03/28/2023    CALCIUM 8.8 03/28/2023    AST 16 02/26/2023    ALT 12 02/26/2023    ALKPHOS 55 02/26/2023    PROT 6.6 06/11/2016    TP 6.8 02/26/2023    BILITOT 2.0 (H) 06/11/2016    TBILI 1.16 (H) 02/26/2023    EGFR 125 03/28/2023     Lab Results   Component Value Date    WBC 6.80 08/22/2023    HGB 13.8 08/22/2023    HCT 42.2 08/22/2023    MCV 87 08/22/2023     08/22/2023     Lab Results   Component Value Date    OHXFYGHE39 267 08/22/2023       Lab Results   Component Value Date    QBR7UMFFYILA 0.994 03/28/2023    TSH 2.62 10/01/2022           ASSESSMENT & PLAN          Diagnoses and all orders for this visit:    Depression, recurrent ([address])    MARCOS (generalized anxiety disorder)    Social anxiety disorder      Current Outpatient Medications   Medication Sig Dispense Refill   • ascorbic acid (VITAMIN C) 250 MG CHEW Chew 250 mg daily     • diclofenac (VOLTAREN) 75 mg EC tablet Take 1 tablet twice a day after food as needed for headache 30 tablet 1   • dicyclomine (BENTYL) 20 mg tablet TAKE 1 TABLET (20 MG TOTAL) BY MOUTH 2 (TWO) TIMES A DAY AS NEEDED (ABDOMINAL BLOATING / LOWER ABDOMINAL PAIN) (Patient not taking: Reported on 8/24/2023) 180 tablet 1   • esomeprazole (NexIUM) 40 MG capsule TAKE 1 CAPSULE BY MOUTH 2 TIMES A DAY BEFORE MEALS. 180 capsule 1   • ferrous gluconate (FERGON) 324 mg tablet Take 324 mg by mouth daily with breakfast (Patient not taking: Reported on 8/24/2023)     • levonorgestrel-ethinyl estradiol (Vienva) 0.1-20 MG-MCG per tablet Take 1 tablet by mouth daily 84 tablet 4   • multivitamin (THERAGRAN) TABS Take 1 tablet by mouth daily     • nortriptyline (PAMELOR) 10 mg capsule Take 1 capsule at bedtime for 7 days, then increase dose to 2 capsules at bedtime 60 capsule 2   • ondansetron (Zofran ODT) 4 mg disintegrating tablet Take 1 tablet (4 mg total) by mouth every 6 (six) hours as needed for nausea or vomiting 20 tablet 0     No current facility-administered medications for this visit. Plan:          Just started pamelor 20 mg - rx PCP- we agree to maximize this as tolerated both for tx of anxiety, depression and headaches. Cont 20 mg q bedtime until next visit. Will consider DBT grp in spring. Will cont to f/u with OP ind therapist.   Also showed interest in having Gene Sight Testing. Was given pamphlet and will call her ins co to see if they cover. Ok to make sooner appt for [employer] testing. Reviewed risks, benefits, side effects of medications, including no medication. Patient understands and agrees to treatment plan. F/u PER 10/6/23, sooner prn         Patient has been informed of 24 hours and weekend coverage for urgent situations accessed by calling the main clinic phone number.      Viv Su PA-C   Visit Time    Visit Start Time: 9026  Visit Stop Time: 7086  Total Visit Duration: 25 minutes

## 2023-09-02 DIAGNOSIS — Z87.898 HISTORY OF MOTION SICKNESS: Primary | ICD-10-CM

## 2023-09-02 RX ORDER — SCOLOPAMINE TRANSDERMAL SYSTEM 1 MG/1
1 PATCH, EXTENDED RELEASE TRANSDERMAL
Qty: 4 PATCH | Refills: 0 | Status: SHIPPED | OUTPATIENT
Start: 2023-09-02

## 2023-09-06 ENCOUNTER — APPOINTMENT (OUTPATIENT)
Dept: LAB | Facility: CLINIC | Age: 23
End: 2023-09-06
Payer: COMMERCIAL

## 2023-09-06 DIAGNOSIS — I34.1 MITRAL VALVE PROLAPSE: ICD-10-CM

## 2023-09-06 LAB — TSH SERPL DL<=0.05 MIU/L-ACNC: 1.35 UIU/ML (ref 0.45–4.5)

## 2023-09-06 PROCEDURE — 84443 ASSAY THYROID STIM HORMONE: CPT

## 2023-09-06 PROCEDURE — 36415 COLL VENOUS BLD VENIPUNCTURE: CPT

## 2023-09-11 ENCOUNTER — OFFICE VISIT (OUTPATIENT)
Age: 23
End: 2023-09-11
Payer: COMMERCIAL

## 2023-09-11 VITALS
SYSTOLIC BLOOD PRESSURE: 114 MMHG | WEIGHT: 126 LBS | HEIGHT: 67 IN | DIASTOLIC BLOOD PRESSURE: 72 MMHG | HEART RATE: 105 BPM | BODY MASS INDEX: 19.78 KG/M2

## 2023-09-11 DIAGNOSIS — N89.8 VAGINAL ODOR: ICD-10-CM

## 2023-09-11 DIAGNOSIS — Z30.09 COUNSELING FOR BIRTH CONTROL REGARDING INTRAUTERINE DEVICE (IUD): ICD-10-CM

## 2023-09-11 DIAGNOSIS — N92.0 MENORRHAGIA WITH REGULAR CYCLE: Primary | ICD-10-CM

## 2023-09-11 LAB
BV WHIFF TEST VAG QL: NORMAL
CLUE CELLS SPEC QL WET PREP: NORMAL
PH SMN: 4 [PH]
T VAGINALIS VAG QL WET PREP: NORMAL
YEAST VAG QL WET PREP: NORMAL

## 2023-09-11 PROCEDURE — 87210 SMEAR WET MOUNT SALINE/INK: CPT | Performed by: OBSTETRICS & GYNECOLOGY

## 2023-09-11 PROCEDURE — 99214 OFFICE O/P EST MOD 30 MIN: CPT | Performed by: OBSTETRICS & GYNECOLOGY

## 2023-09-11 RX ORDER — MISOPROSTOL 200 UG/1
TABLET ORAL
Qty: 2 TABLET | Refills: 0 | Status: SHIPPED | OUTPATIENT
Start: 2023-09-11

## 2023-09-11 NOTE — PATIENT INSTRUCTIONS
Intrauterine Device   DISCHARGE INSTRUCTIONS:   Seek care immediately if:   You have severe pain or bleeding during your period. You have a fever and severe abdominal pain. Call your doctor or gynecologist if:   You think you are pregnant. The IUD has come out. You have bleeding from your vagina after you have sex, and it is not your period. You have pain during sex. You cannot feel the IUD string, the string feels longer, or you feel the plastic of the IUD itself. You have vaginal discharge that is green, yellow, or has a foul odor. You have questions or concerns about your condition or care. Medicines:   NSAIDs  help decrease swelling and pain or fever. This medicine is available with or without a doctor's order. NSAIDs can cause stomach bleeding or kidney problems in certain people. If you take blood thinner medicine, always ask your healthcare provider if NSAIDs are safe for you. Always read the medicine label and follow directions. Take your medicine as directed. Contact your healthcare provider if you think your medicine is not helping or if you have side effects. Tell your provider if you are allergic to any medicine. Keep a list of the medicines, vitamins, and herbs you take. Include the amounts, and when and why you take them. Bring the list or the pill bottles to follow-up visits. Carry your medicine list with you in case of an emergency. Self-care:   Apply heat to relieve pain and cramping. Use a heating pad set on low. Apply heat to your lower abdomen for 20 minutes every hour, or as directed. Return to activities as directed. Your healthcare provider will tell you when it is okay to return to work, school, or other activities. Do not use a tampon or have sex  until your provider says it is okay. Make sure your IUD is in place: An IUD has a string that is made of plastic thread. One to 2 inches of this string hangs into your vagina.  You cannot see this string, and it should not cause problems when you have sex. Check your IUD string every 3 days for the first 3 months that you have your IUD. After that, check the string after each monthly period. Do the following to check the placement of your IUD:  Wash your hands with soap and warm water. Dry them with a clean towel. Bend your knees and squat low to the ground. Gently put your index finger inside your vagina. The cervix is at the top of the vagina and feels like the tip of your nose. Feel for the IUD string. Do not pull on the string. You should not be able to feel the firm plastic of the IUD itself. Wash your hands after you check your IUD string. For more information:   [facility] [employer] [facility] [address] [place] [address]  Phone: [phone]  Web Address: https://[employer]. org    Follow up with your doctor as directed:  Write down your questions so you remember to ask them during your visits. © Copyright Loletta Gosselin 2022 Information is for End User's use only and may not be sold, redistributed or otherwise used for commercial purposes. The above information is an  only. It is not intended as medical advice for individual conditions or treatments. Talk to your doctor, nurse or pharmacist before following any medical regimen to see if it is safe and effective for you.

## 2023-09-11 NOTE — PROGRESS NOTES
Assessment/Plan:  Problem List Items Addressed This Visit    None  Visit Diagnoses     Menorrhagia with regular cycle    -  Primary    Relevant Orders    US pelvis complete w transvaginal    Counseling for birth control regarding intrauterine device (IUD)        Relevant Medications    miSOPROStol (Cytotec) 200 mcg tablet    Vaginal odor        Relevant Orders    POCT wet mount (Completed)           Patient with heavy periods on OCPs. Discussed potentially switching to a different formulation versus restarting back on Mirena IUD. Patient previously did well on Mirena IUD and she wishes to have this reinserted. Discussed with patient Mirena IUD, duration of use, risks and potential side effects. Patient advised to complete pelvic ultrasound due to heavy menses prior to IUD insertion to rule out other causes of heavy bleeding such as polyps or fibroids. Patient does have a family history of fibroids. Discussed with patient definitive diagnosis of endometriosis is only via laparoscopy. She was concerned about potential heavy bleeding causing infertility. Discussed no diagnosis of infertility yet at this point until she has tried to get pregnant for at least 1 year without success. She was given Cytotec preprocedure for prior to insertion of IUD. She does report vaginal odor and recurrent bacterial vaginosis. No vaginitis was noted on examination today. Discussed with patient use of condoms, use of probiotics, pH balance wash and potentially boric acid to prevent recurrence of bacterial vaginosis. No antibiotic treatment is indicated at this time. I did discuss slightly increased risk of vaginitis with IUD. Subjective   Patient ID: Jaspreet Ramirez is a 23 y.o. female. Patient is here for a problem visit. Chief Complaint   Patient presents with   • Contraception     Birth control consult. Discuss different method. Hx of anemia. Did have an iud as well. Pt have family hx fibriods.  Having heavy periods while on the pill. Wearing a tampon and pad and will soak through both during period. Last couple 3-4 have been the heaviest. And painful cramping. On Jessie Breaker since 2022  IUD removed due to initial thought to conceive, currently does not desire pill. On the pill, bleeding was not controlled. Did well on MIRENA. She did have the Mirena for 1 year, no periods on Mirena. She has a family h/o fibroids, endometriosis    Periods are heavy, lasting about 7 days, 3 days heavy. NO bleeding in between packs. She has no missed pills. She is sexually active. Same partner x 10 months  STD screening May 2023 was normal.    Hgb 12.8  Ferritin 5  TSH normal    She reports feeling a vaginal odor, some discharge. She had recurring BV,        Menstrual History:  OB History        0    Para   0    Term   0       0    AB   0    Living   0       SAB   0    IAB   0    Ectopic   0    Multiple   0    Live Births   0                Menarche age: 17  Patient's last menstrual period was 2023 (approximate).          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   • Wears glasses        Past Surgical History:   Procedure Laterality Date   • TONSILECTOMY AND ADNOIDECTOMY     • WISDOM TOOTH EXTRACTION         Social History     Tobacco Use   • Smoking status: Never     Passive exposure: Past   • Smokeless tobacco: Never   Vaping Use   • Vaping Use: Never used   Substance Use Topics   • Alcohol use: Not Currently     Comment: Social   • Drug use: No        Allergies   Allergen Reactions   • Pineapple - Food Allergy Other (See Comments)     Tingling tongue and throat   • Pollen Extract Sneezing     congestion         Current Outpatient Medications:   •  ascorbic acid (VITAMIN C) 250 MG CHEW, Chew 250 mg daily, Disp: , Rfl:   •  diclofenac (VOLTAREN) 75 mg EC tablet, Take 1 tablet twice a day after food as needed for headache, Disp: 30 tablet, Rfl: 1  •  esomeprazole (NexIUM) 40 MG capsule, TAKE 1 CAPSULE BY MOUTH 2 TIMES A DAY BEFORE MEALS., Disp: 180 capsule, Rfl: 1  •  levonorgestrel-ethinyl estradiol (Vienva) 0.1-20 MG-MCG per tablet, Take 1 tablet by mouth daily, Disp: 84 tablet, Rfl: 4  •  miSOPROStol (Cytotec) 200 mcg tablet, Take 1 tablet orally the night before the procedure and one table orally the morning of procedure, Disp: 2 tablet, Rfl: 0  •  dicyclomine (BENTYL) 20 mg tablet, TAKE 1 TABLET (20 MG TOTAL) BY MOUTH 2 (TWO) TIMES A DAY AS NEEDED (ABDOMINAL BLOATING / LOWER ABDOMINAL PAIN) (Patient not taking: Reported on 8/24/2023), Disp: 180 tablet, Rfl: 1  •  ferrous gluconate (FERGON) 324 mg tablet, Take 324 mg by mouth daily with breakfast (Patient not taking: Reported on 8/24/2023), Disp: , Rfl:   •  multivitamin (THERAGRAN) TABS, Take 1 tablet by mouth daily, Disp: , Rfl:   •  nortriptyline (PAMELOR) 10 mg capsule, Take 1 capsule at bedtime for 7 days, then increase dose to 2 capsules at bedtime, Disp: 60 capsule, Rfl: 2  •  ondansetron (Zofran ODT) 4 mg disintegrating tablet, Take 1 tablet (4 mg total) by mouth every 6 (six) hours as needed for nausea or vomiting, Disp: 20 tablet, Rfl: 0  •  scopolamine (TRANSDERM-SCOP) 1 mg/3 days TD 72 hr patch, Place 1 patch on the skin over 72 hours every third day, Disp: 4 patch, Rfl: 0      Review of Systems   Constitutional: Negative. HENT: Negative. Eyes: Negative. Respiratory: Negative. Cardiovascular: Negative. Gastrointestinal: Negative. Endocrine: Negative. Genitourinary:        As noted in HPI   Musculoskeletal: Negative. Skin: Negative. Allergic/Immunologic: Negative. Neurological: Negative. Hematological: Negative. Psychiatric/Behavioral: Negative. /72 (BP Location: Right arm, Patient Position: Sitting, Cuff Size: Adult)   Pulse 105   Ht 5' 7" (1.702 m)   Wt 57.2 kg (126 lb)   LMP 08/29/2023 (Approximate)   BMI 19.73 kg/m²       Physical Exam  Constitutional:       General: She is not in acute distress. Appearance: She is well-developed. Genitourinary:      Bladder and urethral meatus normal.      No lesions in the vagina. Right Labia: No rash, tenderness, lesions, skin changes or Bartholin's cyst.     Left Labia: No tenderness, lesions, skin changes, Bartholin's cyst or rash. No vaginal discharge, erythema, tenderness or bleeding. No vaginal prolapse present. No vaginal atrophy present. Right Adnexa: not tender, not full and no mass present. Left Adnexa: not tender, not full and no mass present. No cervical polyp. Uterus is not enlarged or tender. No uterine mass detected. Pelvic exam was performed with patient in the lithotomy position. Rectum:      No tenderness. Cardiovascular:      Rate and Rhythm: Normal rate. Pulmonary:      Effort: Pulmonary effort is normal.   Abdominal:      General: There is no distension. Palpations: Abdomen is soft. Tenderness: There is no abdominal tenderness. Neurological:      Mental Status: She is alert and oriented to person, place, and time. Skin:     General: Skin is warm and dry.    Psychiatric:         Behavior: Behavior normal.               Future Appointments   Date Time Provider [address] [place] [address]   9/14/2023 10:30 AM [facility] Sleep lab QU HOSP   9/26/2023  8:30 AM OW US 1 OW US GSL   9/26/2023  2:00 PM Lincoln Addison MD HEM ONC [facility] Practice-Onc   9/28/2023  9:00 AM Lorraine Brown MD Complete WC Practice-Wom   10/3/2023  3:00 PM OW DIETITIAN OW OP MEDNUT OW MOB   10/6/2023  3:00 PM Vernon Esquivel Olesya Recio PSYCH [facility]   12/11/2023  8:00 AM Juliette David, [address] [place] [address] Ne Self

## 2023-09-14 ENCOUNTER — HOSPITAL ENCOUNTER (OUTPATIENT)
Dept: SLEEP CENTER | Facility: HOSPITAL | Age: 23
Discharge: HOME | End: 2023-09-14
Payer: COMMERCIAL

## 2023-09-14 DIAGNOSIS — G47.19 EXCESSIVE DAYTIME SLEEPINESS: ICD-10-CM

## 2023-09-14 PROCEDURE — G0399 HOME SLEEP TEST/TYPE 3 PORTA: HCPCS

## 2023-09-15 DIAGNOSIS — G47.00 INSOMNIA, UNSPECIFIED TYPE: ICD-10-CM

## 2023-09-15 DIAGNOSIS — G44.229 CHRONIC TENSION-TYPE HEADACHE, NOT INTRACTABLE: ICD-10-CM

## 2023-09-15 RX ORDER — NORTRIPTYLINE HYDROCHLORIDE 10 MG/1
CAPSULE ORAL
Qty: 180 CAPSULE | Refills: 1 | Status: SHIPPED | OUTPATIENT
Start: 2023-09-15

## 2023-09-16 PROCEDURE — G0399 HOME SLEEP TEST/TYPE 3 PORTA: HCPCS | Performed by: INTERNAL MEDICINE

## 2023-09-16 NOTE — PROGRESS NOTES
Home Sleep Study Documentation    HOME STUDY DEVICE: Noxturnal no                                           Larisa G3 yes      Pre-Sleep Home Study:    Set-up and instructions performed by: Brianna Arcos performed demonstration for Patient: yes    Return demonstration performed by Patient: yes    Written instructions provided to Patient: yes    Patient signed consent form: yes        Post-Sleep Home Study:    Additional comments by Patient: none    Home Sleep Study Failed:no:    Failure reason: N/A    Reported or Detected: N/A    Scored by:  ISIDRA Mtz

## 2023-09-26 ENCOUNTER — TELEPHONE (OUTPATIENT)
Dept: HEMATOLOGY ONCOLOGY | Facility: CLINIC | Age: 23
End: 2023-09-26

## 2023-09-26 ENCOUNTER — HOSPITAL ENCOUNTER (OUTPATIENT)
Dept: ULTRASOUND IMAGING | Facility: HOSPITAL | Age: 23
Discharge: HOME | End: 2023-09-26
Attending: OBSTETRICS & GYNECOLOGY
Payer: COMMERCIAL

## 2023-09-26 ENCOUNTER — TELEPHONE (OUTPATIENT)
Dept: OBGYN CLINIC | Facility: CLINIC | Age: 23
End: 2023-09-26

## 2023-09-26 ENCOUNTER — OFFICE VISIT (OUTPATIENT)
Dept: HEMATOLOGY ONCOLOGY | Facility: CLINIC | Age: 23
End: 2023-09-26
Payer: COMMERCIAL

## 2023-09-26 VITALS
RESPIRATION RATE: 17 BRPM | WEIGHT: 126.8 LBS | HEART RATE: 108 BPM | DIASTOLIC BLOOD PRESSURE: 84 MMHG | OXYGEN SATURATION: 99 % | BODY MASS INDEX: 19.9 KG/M2 | HEIGHT: 67 IN | TEMPERATURE: 97.6 F | SYSTOLIC BLOOD PRESSURE: 122 MMHG

## 2023-09-26 DIAGNOSIS — R79.0 LOW SERUM FERRITIN LEVEL: Primary | ICD-10-CM

## 2023-09-26 DIAGNOSIS — Z30.011 ENCOUNTER FOR BCP (BIRTH CONTROL PILLS) INITIAL PRESCRIPTION: Primary | ICD-10-CM

## 2023-09-26 DIAGNOSIS — R79.0 DECREASED FERRITIN: ICD-10-CM

## 2023-09-26 DIAGNOSIS — N92.0 MENORRHAGIA WITH REGULAR CYCLE: ICD-10-CM

## 2023-09-26 PROCEDURE — 76830 TRANSVAGINAL US NON-OB: CPT

## 2023-09-26 PROCEDURE — 76856 US EXAM PELVIC COMPLETE: CPT

## 2023-09-26 PROCEDURE — 99214 OFFICE O/P EST MOD 30 MIN: CPT | Performed by: INTERNAL MEDICINE

## 2023-09-26 RX ORDER — SODIUM CHLORIDE 9 MG/ML
20 INJECTION, SOLUTION INTRAVENOUS ONCE
OUTPATIENT
Start: 2023-10-10

## 2023-09-26 NOTE — TELEPHONE ENCOUNTER
While we try to accommodate patient requests, our priority is to schedule treatment according to Doctor's orders and site availability. Does the Provider use the intake sheet or checkout note? What would be a preferred day of the week that would work best for your infusion appointment? Tuesday   Do you prefer mornings or afternoons for your appointments? Mornings  Are there any days or dates that do not work for your schedule, including any upcoming vacations? We are going to try our best to schedule you at the infusion center closest to your home. In the event that we are unable to what would be your next preferred infusion site or sites? 1. [place] infusion       Do you have transportation to take you to all of your appointments?  Yes

## 2023-09-26 NOTE — TELEPHONE ENCOUNTER
Patient called in asking if you can send her to an endometriosis specialist before she proceeds with the [facility].

## 2023-09-26 NOTE — PROGRESS NOTES
Hematology Outpatient Follow - Up Note  Martha Hollis 23 y.o. female MRN: @ Encounter: [id number]        Date:  9/26/2023        Assessment/ Plan:      57-year-old  female with history of iron deficiency anemia secondary to heavy menses despite oral contraceptive pills, the patient had tried oral iron pills with constipation, nausea and abdominal cramps, she received Venofer in the past with significant improvement now with low ferritin, she is symptomatic with palpitation, lightheadedness 6 when she stands up, fatigue    We will arrange for Venofer 200 mg IV x4 doses    Regarding menorrhagia she is on OCP and she will follow-up with primary care physician      Labs and imaging studies are reviewed by ordering provider once results are available. If there are findings that need immediate attention, you will be contacted when results available. Discussing results and the implication on your healthcare is best discussed in person at your follow-up visit.        HPI:    She was seen initially when she was 57-year-old  female who I took a permission from our  to see her, her mother is a patient of mine and called me regarding fatigue, microcytic anemia with low ferritin     The patient had menses lasting 5 days despite being on oral contraceptive pills for many years, heavy using double pads     She had been feeling fatigued lately, craving for ice chips, unable to concentrate, she took iron pills for the past 2 months without any benefit actually she had abdominal cramps and constipation     History of anxiety disorder     On 11/2019 WBC 4.7, hemoglobin 9.6, MCV 70, RDW 19.4, platelets [id number]     Iron saturation 3%, iron 18, TIBC 552, ferritin 4, negative celiac disease, normal vitamin B12, TSH, Lyme disease profile     Occult blood in the stool was negative    She tried oral iron with constipation    She received IV Venofer with good response    She called about in September 2023 with palpitation, fatigue, lightheadedness, headache was found to have ferritin of 4     Interval History:        Previous Treatment:         Test Results:    Imaging: US pelvis complete w transvaginal    Result Date: 2023  Narrative: PELVIC ULTRASOUND, COMPLETE INDICATION:  The patient is 23years old. N92.0: Excessive and frequent menstruation with regular cycle. COMPARISON: Pelvic ultrasound 2023 TECHNIQUE:   Transabdominal pelvic ultrasound was performed in sagittal and transverse planes with a curvilinear transducer. Additional transvaginal imaging was performed to better evaluate the endometrium and ovaries. Imaging included volumetric sweeps as well as traditional still imaging technique. FINDINGS: UTERUS: The uterus is anteverted in position, measuring 7.0 x 3.0 x 4.2 cm. The uterus has a normal contour and echotexture. Nabothian cyst(s) present, cervix otherwise within normal limits. ENDOMETRIUM: The endometrial echo complex has an AP caliber of 2.0 mm. Its appearance is within normal limits for age and cycle and shows no filling defects. OVARIES/ADNEXA: Right ovary:  1.8 x 2.9 x 3.0 cm. 8.0 mL. Left ovary:  2.2 x 1.4 x 3.0 cm. 4.7 mL. Ovarian Doppler flow is within normal limits. No suspicious ovarian or adnexal abnormality. OTHER: No free fluid or loculated fluid collections. Impression: Normal. Workstation performed: [id number]     Home Study    Result Date: 2023  Narrative: Table formatting from the original result was not included. Images from the original result were not included. Home Study Report Patient Name: Birdie Wayne Patient Number: [id number] Date of Home Study: 2023 Referring Physician: Ally Ann Interpreting Physician: Farhat JHAVERI.: 2000 STUDY FORMAT: The patient was admitted to the sleep laboratory at the [facility] and oriented to the home sleep study testing procedure and equipment.   The home sleep testing study was performed using the [employer] One device. A return demonstration by the patient helped to assure correct usage. The following parameters were monitored: p-flow via nasal cannula, body position, oximetry, pulse rate and respiratory effort via thoracic belt. The sleep study was scored following the rules established by the American Academy of Sleep Medicine (AASM). Hypopneas are defined as a ?30% drop in flow for ? 10 seconds that are associated with ?4% desaturations. WAYLON is the Respiratory Event Index (number of respiratory events per hour) and is a surrogate for the apnea/hypopnea index (AHI). PATIENT HISTORY: 23 y.o. female who has a past medical history of generalized anxiety disorder, who is presenting for the evaluation of excessive daytime sleepiness. TESTING RESULTS: The test results are from [facility]. The total time in bed (analysis time) was 389.4 minutes. The patient had a total of 2 respiratory events made up of 0 obstructive apneas, 0 central apneas, 0 mixed apneas and 2 hypopneas resulting in a respiratory event index (WAYLON) of 0.3. The lowest SpO2 recorded is 92%. Impression: 1. This test does not demonstrate obstructive sleep apnea as the respiratory event index was normal. 2.Baseline oxygen saturation was normal. 3.Periods of tachycardia noted. RECOMMENDATION: A diagnostic polysomnogram can be considered if there is concern for a false negative result, as a home sleep test may underestimate disease severity. Board Certified Sleep Physician  Study Date: 2023 Patient Name: Hermilo Arellano Recording Device: Elizabeth Quiroz Sex: F Height: 67.0 in. : 2000 Weight: 125.0 lbs. Age: 23 years B. M.I: 19.6 lb/in2 Times and Durations Lights off clock time:  11:32:41 PM Total Recording Time (TRT): 389.9 minutes Lights on clock time: 6:02:05 AM Time In Bed (TIB): 389.4 minutes   Monitoring Time (MT): 387.0 minutes Device and Sensor Details The study was recorded on a [employer] device using 1 RIP effort belt and a pressure based flow sensor. The heart rate is derived from the oximeter sensor and the snore signal is derived from the pressure sensor. The device also records body position and uses it to determine the monitoring time (sleep/wake periods). Summary WAYLON 0.3 OAI 0.0 TRANG 0.0 Lowest Desat 92 WAYLON is the number of respiratory events per hour. OAI is the number of obstructive apneas per hour. TRANG is the number of central apneas per hour. Lowest Desat is the lowest blood oxygen level that lasted at least 2 seconds. RESPIRATORY EVENTS  Index (#/hour) Total # of Events Mean duration  (sec) Max duration  (sec) # of Events by Position      Supine Prone Left Right Up Central Apneas 0.0 0 0.0 0.0 0       0 0 Obstructive Apneas 0.0 0 0.0 0.0 0       0 0 Mixed Apneas 0.0 0 0.0 0.0 0       0 0 Hypopneas 0.3 2 24.8 27.0 1       1 0 Apneas + Hypopneas 0.3 2 24.8 27.0 1       1 0 RERAs 0.0 0 0.0 0.0 0       0 0 Total 0.3 2 24.8 27.0 1       1 0 Time in Position 296.0       90.8 0.2 WAYLON in Position 0.2       0.7 0.0 Positional Summary  Supine Non-Supine Total # of Events 1 1.00 Total Duration (minutes) 296.0 91.00 Sleep Duration (minutes) 296.0 91.00 WAYLON in Position 0.2 0.66 REISupine / REINon-Supine Ratio 0.30  Positional Sleep Apnea is indicated if WAYLON (supine) >= 2 x WAYLON (non-supine) per Latonia Suamico, Sleep. 1984;7(2).   Oximetry Summary  Dur. (min) % TIB <90 % 0.0 0.0 <85 % 0.0 0.0 <80 % 0.0 0.0 <70 % 0.0 0.0 Total Dur (min) < 89 0.0 min Average (%) 95 Total # of Desats 2 Desat Index (#/hour) 0.3 Desat Max (%) 4 Desat Max dur (sec) 30.0 Lowest SpO2 % during sleep 92 Duration of Min SpO2 (sec) 7 Highest SpO2 % during sleep 99 Duration of Max SpO2(sec) 12  Heart Rate Stats Mean HR during sleep 76.6 (BPM) Highest HR during sleep 115  (BPM) Highest HR during TIB  115 (BPM) Lowest HR during sleep 51  (BPM) Lowest HR during TIB 51 (BPM) Snoring Summary Total Snoring Episodes 0 Total Duration with Snoring    minutes Mean Duration of Snoring    seconds Percentage of Snoring    %        Labs:   Lab Results   Component Value Date    WBC 6.80 08/22/2023    HGB 13.8 08/22/2023    HCT 42.2 08/22/2023    MCV 87 08/22/2023     08/22/2023     Lab Results   Component Value Date     06/11/2016    K 4.3 03/28/2023     03/28/2023    CO2 27 03/28/2023    BUN 10 03/28/2023    CREATININE 0.70 03/28/2023    GLUF 84 03/28/2023    CALCIUM 8.8 03/28/2023    AST 16 02/26/2023    ALT 12 02/26/2023    ALKPHOS 55 02/26/2023    PROT 6.6 06/11/2016    BILITOT 2.0 (H) 06/11/2016    EGFR 125 03/28/2023       Lab Results   Component Value Date    IRON 68 08/22/2023    TIBC 752 (H) 08/22/2023    FERRITIN 5 (L) 08/22/2023       Lab Results   Component Value Date    YMLJYVAU03 267 08/22/2023         ROS: Review of Systems   Constitutional: Negative for appetite change, chills, diaphoresis, fatigue and unexpected weight change. HENT:   Negative for mouth sores, nosebleeds, sore throat, trouble swallowing and voice change. Eyes: Negative for eye problems and icterus. Respiratory: Negative for chest tightness, cough, hemoptysis and wheezing. Cardiovascular: Negative for chest pain, leg swelling and palpitations. Gastrointestinal: Negative for abdominal distention, abdominal pain, blood in stool, constipation, diarrhea, nausea and vomiting. Endocrine: Negative for hot flashes. Genitourinary: Negative for bladder incontinence, difficulty urinating, dyspareunia, dysuria and frequency. Musculoskeletal: Negative for arthralgias, back pain, gait problem, neck pain and neck stiffness. Skin: Negative for itching and rash. Neurological: Negative for dizziness, gait problem, headaches, numbness, seizures and speech difficulty. Hematological: Negative for adenopathy. Does not bruise/bleed easily.    Psychiatric/Behavioral: Negative for decreased concentration, depression, sleep disturbance and suicidal ideas. The patient is not nervous/anxious. Current Medications: Reviewed  Allergies: Reviewed  PMH/FH/SH:  Reviewed      Physical Exam:    Body surface area is 1.67 meters squared. Wt Readings from Last 3 Encounters:   09/26/23 57.5 kg (126 lb 12.8 oz)   09/11/23 57.2 kg (126 lb)   08/24/23 58.7 kg (129 lb 6.4 oz)        Temp Readings from Last 3 Encounters:   09/26/23 97.6 °F (36.4 °C) (Temporal)   08/24/23 98.2 °F (36.8 °C) (Temporal)   08/16/23 99.7 °F (37.6 °C)        BP Readings from Last 3 Encounters:   09/26/23 122/84   09/11/23 114/72   08/24/23 120/76         Pulse Readings from Last 3 Encounters:   09/26/23 (!) 108   09/11/23 105   08/24/23 99        Physical Exam  Vitals reviewed. Constitutional:       General: She is not in acute distress. Appearance: She is well-developed. She is not diaphoretic. HENT:      Head: Normocephalic and atraumatic. Eyes:      Conjunctiva/sclera: Conjunctivae normal.   Neck:      Trachea: No tracheal deviation. Cardiovascular:      Rate and Rhythm: Normal rate and regular rhythm. Heart sounds: No murmur heard. No friction rub. No gallop. Pulmonary:      Effort: Pulmonary effort is normal. No respiratory distress. Breath sounds: Normal breath sounds. No wheezing or rales. Chest:      Chest wall: No tenderness. Abdominal:      General: There is no distension. Palpations: Abdomen is soft. Tenderness: There is no abdominal tenderness. Musculoskeletal:      Cervical back: Normal range of motion and neck supple. Lymphadenopathy:      Cervical: No cervical adenopathy. Skin:     General: Skin is warm and dry. Coloration: Skin is not pale. Findings: No erythema. Neurological:      Mental Status: She is alert and oriented to person, place, and time. Psychiatric:         Behavior: Behavior normal.         Thought Content:  Thought content normal.         Judgment: Judgment normal.         ECO    Goals and Barriers:  Current Goal: Minimize effects of disease. Barriers: None. Patient's Capacity to Self Care:  Patient is able to self care.     Code Status: @[facility]

## 2023-10-03 ENCOUNTER — CLINICAL SUPPORT (OUTPATIENT)
Dept: NUTRITION | Facility: CLINIC | Age: 23
End: 2023-10-03
Payer: COMMERCIAL

## 2023-10-03 VITALS — WEIGHT: 126 LBS | BODY MASS INDEX: 19.73 KG/M2

## 2023-10-03 DIAGNOSIS — R11.0 NAUSEA: ICD-10-CM

## 2023-10-03 PROCEDURE — 97803 MED NUTRITION INDIV SUBSEQ: CPT | Performed by: DIETITIAN, REGISTERED

## 2023-10-03 NOTE — PROGRESS NOTES
Nutrition Assessment Form    Patient Name: Ligia Smalls    YOB: 2000    Sex: Female     Assessment Date: 10/3/2023  Start Time: 3:11 PM Stop Time: 3:42 PM Total Minutes: 31 min     Data:  Present at session: Self and boyfriend Dylan    Parent/Patient Concerns/reason for visit: "I need help with breakfast and lunch meals."    Medical Dx/Reason for Referral: Nausea   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 180 capsule 1   • levonorgestrel-ethinyl estradiol (Vienva) 0.1-20 MG-MCG per tablet Take 1 tablet by mouth daily 84 tablet 4   • miSOPROStol (Cytotec) 200 mcg tablet Take 1 tablet orally the night before the procedure and one table orally the morning of procedure 2 tablet 0   • multivitamin (THERAGRAN) TABS Take 1 tablet by mouth daily     • nortriptyline (PAMELOR) 10 mg capsule TAKE 1 CAPSULE AT BEDTIME FOR 7 DAYS, THEN INCREASE DOSE TO 2 CAPSULES AT BEDTIME 180 capsule 1   • ondansetron (Zofran ODT) 4 mg disintegrating tablet Take 1 tablet (4 mg total) by mouth every 6 (six) hours as needed for nausea or vomiting 20 tablet 0   • scopolamine (TRANSDERM-SCOP) 1 mg/3 days TD 72 hr patch Place 1 patch on the skin over 72 hours every third day 4 patch 0     No current facility-administered medications for this visit. Additional Meds/Supplements: Used to take bentyl though doesn't take anymore, no diarrhea    Taking iron supplement [employer]'s Made 325 mg ferrous sulfate, takes this at dinner time   Special Learning Needs/barriers to learning/any new barriers None   Height: HC Readings from Last 5 Encounters:   No data found for [facility].      Weight: Wt Readings from Last 10 Encounters:   09/26/23 57.5 kg (126 lb 12.8 oz)   09/11/23 57.2 kg (126 lb)   08/24/23 58.7 kg (129 lb 6.4 oz)   08/16/23 58.1 kg (128 lb)   08/15/23 58.2 kg (128 lb 3.2 oz)   08/10/23 57.2 kg (126 lb)   07/17/23 57.2 kg (126 lb)   07/11/23 56.8 kg (125 lb 3.2 oz)   07/11/23 56.8 kg (125 lb 4 oz)   07/03/23 55.8 kg (123 lb)     Estimated body mass index is 19.86 kg/m² as calculated from the following:    Height as of 9/26/23: 5' 7" (1.702 m). Weight as of 9/26/23: 57.5 kg (126 lb 12.8 oz). Recent Weight Change: [x]Yes     []No  Amount: Typically 125-140lb range, says fluctuates greatly without known cause. Weight relatively unchanged from initial appointment. Energy Needs: [place] Equation: 2284 kcal (mifflin x 1.3 activity + 500 calories for 1 lb weight gain per week)   Allergies   Allergen Reactions   • Pineapple - Food Allergy Other (See Comments)     Tingling tongue and throat   • Pollen Extract Sneezing     congestion    or intolerances Intolerance to gluten and dairy products, takes lactaid pills if she eats ice cream    Fresh pineapple allergy causing burning/tingling of tongue   Social History     Substance and Sexual Activity   Alcohol Use Not Currently    Comment: Social    None    Social History     Tobacco Use   Smoking Status Never   • Passive exposure: Past   Smokeless Tobacco Never    None    Who shops? boyfriends mother   Who cooks/cooking methods/Eating out/take out habits   boyfriends mother  Cooking methods: bake, uses kitchen upstairs.  Now has pantry in the refinished basement she is living in. Take out: Infrequent  Dining out every night at [facility]   Exercise: No additional activity at this time apart from ADLs. Other: ie: Sleep habits/ stress level/ work habits household-lives with ?/ food security Planning for sleep study in December, never feels rested after night's sleep, toss and turn all night, feels she needs 12 h to get a good night's sleep    Reports groceries have noticeably been expensive though does admit to food insecurity    Prior Nutritional Counseling? []Yes     [x]No  When:      Why:         Diet Hx:  Breakfast: Diet: None  Breakfast cereal such as multigrain cheerios with almond milk or eggs with breakfast potatoes   Lunch: Leftovers from dinner the previous night         Dinner: Gluten free pasta, sometimes with protein option         Snacks: AM -   PM - Banana with peanut butter or dinner leftovers  HS -     Water or vitamin water or ocean spray cranberry juice   Other Notes/ Initial Assessment:  Pt reports no current GI symptoms. Says she previously had ongoing nausea and diarrhea though these have since resolved. Describes herself as a "picky eater" though only describes not liking tomatoes, peas, and meats with excess fat. Pt says she previously followed a gluten free, lactose free diet when she had GI symptoms and was dx with Celiac disease for 1 mo. Says she felt like she had a lot of energy when she was following this diet though was told by doctor she did not have Celiac disease and stopped this diet. Wants to improve her dietary habits to improve her energy. Pt reports she lives with her boyfriend in his mother's finished basement. They have a small fridge of their own to store any of their foods otherwise fridge space is limited. Boyfriend's mother prepares meals though pt says most of the meats she makes end up being dried out and undesirable. Ends up just eating the side dishes.  Pt also states that boyfriend and his family have different food preferences than her which makes dinner time difficult. Pt unsure if she should gain weight, feels her weight is too low at this time. Updated assessment (Follow up note only):  10/3/23: Pt scheduled for 4 rounds of IV venofer infusions with low ferritin levels. Pt concerned about heavy menses, ?endometeriosis. Reports she scheduled an appointment with an endometriosis specialist and if dx would like to start treatment asap. Says she is concerned she may become infertile. With iron deficiency, pt with fatigue that is relatively unchanged at this time. 8/15/23: Pt reports ongoing fatigue. Says she does not want to wait another 3-6 months to get iron panel checked again. Says she previously required IV iron infusions. Noting low ferritin of 8. Pt had difficulties describing iron rich foods during discussion today. Nutrition Diagnosis:   Undesirable food choices  related to High level of fatigue or other side effect of medical, surgical or radiological therapy as evidenced by Findings consistent with vitamin/mineral deficiency or excess       Any change or new dx since previous visit:     Nutrition Diagnosis:     Medical Nutrition Therapy Intervention:  [x]Individualized Meal Plan--Pt interested in meal prep to improve diet quality. Discussed home prepared breakfast options like overnight oats with added nuts/seeds/nut butters to increase calories and iron content. Provided with recipes for make ahead breakfast options. Also directed pt to online resources for meal prep recipes that are gluten/dairy free. Discussed addition of extra olive oil to increase kcal content of meals. []Understanding Lab Values   []Basic Pathophysiology of Disease []Food/Medication Interactions   [x]Food Diary--Briefly discussed calorie counting to ensure adequate weight gain. Goal of ~2200 kcal daily for weight gain.  Discussed this would be ~700 calories per meal, aim for at least 4-6 oz protein like chicken or beef per meal for calories/iron content. []Exercise   []Lifestyle/Behavior Modification Techniques []Medication, Mechanism of Action   []Label Reading: CHO/ Na/ Fat/ other_________ []Self Blood Glucose Monitoring   [x]Weight/BMI Goals: Pt's weight is relatively unchanged from initial appointment. Goal of 130lb, modest weight gain remains. [x]Other -   Handouts provided today: recipes for overnight oats, make ahead burritos, charlene seed pudding, energy bites  Handouts previously provided: Gluten Free Nutrition Therapy, Lactose Free Nutrition Therapy, Iron Content of Foods handout           Comprehension: []Excellent  [x]Very Good  []Good  []Fair   []Poor    Receptivity: []Excellent  [x]Very Good  []Good  []Fair   []Poor    Expected Compliance: []Excellent  [x]Very Good  []Good  []Fair   []Poor        Goals (initial)/ Progress made on previous goals/new goals:  1. Pt to gain 0.5-1 lb per week upon follow up. 2. Pt to include at least 2 servings of fruits/vegetables daily upon follow up. 3. Pt to obtain normal Fe level upon next lab reading. No follow-ups on file.   Labs:  CMP  Lab Results   Component Value Date     06/11/2016    K 4.3 03/28/2023     03/28/2023    CO2 27 03/28/2023    BUN 10 03/28/2023    CREATININE 0.70 03/28/2023    GLUF 84 03/28/2023    CALCIUM 8.8 03/28/2023    AST 16 02/26/2023    ALT 12 02/26/2023    ALKPHOS 55 02/26/2023    PROT 6.6 06/11/2016    BILITOT 2.0 (H) 06/11/2016    EGFR 125 03/28/2023       BMP  Lab Results   Component Value Date    CALCIUM 8.8 03/28/2023     06/11/2016    K 4.3 03/28/2023    CO2 27 03/28/2023     03/28/2023    BUN 10 03/28/2023    CREATININE 0.70 03/28/2023       Lipids  No results found for: "CHOL"  No results found for: "HDL"  No results found for: "LDLCALC"  No results found for: "TRIG"  No results found for: "CHOLHDL"    Hemoglobin A1C  Lab Results   Component Value Date    HGBA1C 5.1 03/28/2023       Fasting Glucose  Lab Results Component Value Date    GLUF 84 03/28/2023       Insulin     Thyroid  Lab Results   Component Value Date    TSH 2.62 10/01/2022       Hepatic Function Panel  Lab Results   Component Value Date    ALT 12 02/26/2023    AST 16 02/26/2023    ALKPHOS 55 02/26/2023    BILITOT 2.0 (H) 06/11/2016       Celiac Disease Antibody Panel  Endomysial IgA   Date Value Ref Range Status   02/14/2020 Negative Negative Final     Gliadin IgA   Date Value Ref Range Status   02/14/2020 3 0 - 19 units Final     Comment:                        Negative                   0 - 19                     Weak Positive             20 - 30                     Moderate to Strong Positive   >30     Gliadin IgG   Date Value Ref Range Status   02/14/2020 4 0 - 19 units Final     Comment:                        Negative                   0 - 19                     Weak Positive             20 - 30                     Moderate to Strong Positive   >30     IGA   Date Value Ref Range Status   03/17/2022 120.0 70.0 - 400.0 mg/dL Final     IgA   Date Value Ref Range Status   02/14/2020 98 87 - 352 mg/dL Final     TISSUE TRANSGLUTAMINASE IGA   Date Value Ref Range Status   03/17/2022 <2 0 - 3 U/mL Final     Comment:                                   Negative        0 -  3                                Weak Positive   4 - 10                                Positive           >10   Tissue Transglutaminase (tTG) has been identified   as the endomysial antigen. Studies have demonstr-   ated that endomysial IgA antibodies have over 99%   specificity for gluten sensitive enteropathy.       Iron  Lab Results   Component Value Date    IRON 68 08/22/2023    TIBC 752 (H) 08/22/2023    FERRITIN 5 (L) 08/22/2023            Corrine He RD, LDN  [address] [facility] CLINICAL [facility]UTR[facility]  [address] [address]  [address] [place] [address]

## 2023-10-04 ENCOUNTER — TELEPHONE (OUTPATIENT)
Dept: PSYCHIATRY | Facility: CLINIC | Age: 23
End: 2023-10-04

## 2023-10-04 NOTE — TELEPHONE ENCOUNTER
Martha called the insurance co and was told a prior [facility] is required for [employer] testing

## 2023-10-05 RX ORDER — ACETAMINOPHEN AND CODEINE PHOSPHATE 120; 12 MG/5ML; MG/5ML
1 SOLUTION ORAL DAILY
Qty: 84 TABLET | Refills: 1 | Status: SHIPPED | OUTPATIENT
Start: 2023-10-05

## 2023-10-05 NOTE — TELEPHONE ENCOUNTER
Patient called into the office stating that she has an appointment with an endometriosis specialist 10/19 and is wondering if in the meantime she can get progesterone only birth control because she is worried about it growing.

## 2023-10-06 ENCOUNTER — OFFICE VISIT (OUTPATIENT)
Dept: PSYCHIATRY | Facility: CLINIC | Age: 23
End: 2023-10-06
Payer: COMMERCIAL

## 2023-10-06 DIAGNOSIS — F41.1 GAD (GENERALIZED ANXIETY DISORDER): ICD-10-CM

## 2023-10-06 DIAGNOSIS — F40.10 SOCIAL ANXIETY DISORDER: ICD-10-CM

## 2023-10-06 DIAGNOSIS — F33.9 DEPRESSION, RECURRENT (HCC): Primary | ICD-10-CM

## 2023-10-06 PROCEDURE — 99213 OFFICE O/P EST LOW 20 MIN: CPT | Performed by: PHYSICIAN ASSISTANT

## 2023-10-10 ENCOUNTER — HOSPITAL ENCOUNTER (OUTPATIENT)
Dept: INFUSION CENTER | Facility: CLINIC | Age: 23
Discharge: HOME | End: 2023-10-10
Payer: COMMERCIAL

## 2023-10-10 VITALS
DIASTOLIC BLOOD PRESSURE: 82 MMHG | HEART RATE: 101 BPM | SYSTOLIC BLOOD PRESSURE: 120 MMHG | RESPIRATION RATE: 18 BRPM | TEMPERATURE: 97.4 F

## 2023-10-10 DIAGNOSIS — R79.0 LOW SERUM FERRITIN LEVEL: Primary | ICD-10-CM

## 2023-10-10 PROCEDURE — 96365 THER/PROPH/DIAG IV INF INIT: CPT

## 2023-10-10 RX ORDER — SODIUM CHLORIDE 9 MG/ML
20 INJECTION, SOLUTION INTRAVENOUS ONCE
Status: COMPLETED | OUTPATIENT
Start: 2023-10-10 | End: 2023-10-10

## 2023-10-10 RX ORDER — SODIUM CHLORIDE 9 MG/ML
20 INJECTION, SOLUTION INTRAVENOUS ONCE
Status: CANCELLED | OUTPATIENT
Start: 2023-10-17

## 2023-10-10 RX ADMIN — SODIUM CHLORIDE 200 MG: 9 INJECTION, SOLUTION INTRAVENOUS at 09:17

## 2023-10-10 RX ADMIN — SODIUM CHLORIDE 20 ML/HR: 0.9 INJECTION, SOLUTION INTRAVENOUS at 09:06

## 2023-10-11 ENCOUNTER — TELEPHONE (OUTPATIENT)
Dept: OBGYN CLINIC | Facility: CLINIC | Age: 23
End: 2023-10-11

## 2023-10-11 NOTE — TELEPHONE ENCOUNTER
Patient calling in regards to stating she started her progesterone pills last Friday. Patient state yesterday she noticed brown spotting and today is more like a period with brown discharge today. Patient is not soaking through a pad within an hour and patient only has minor cramping. Patient wanted to make sure this was normal. Patient also mentioned she started iron infusions yesterday and not sure if this had any connection as to why she would be experiencing this.  Please advise

## 2023-10-17 ENCOUNTER — HOSPITAL ENCOUNTER (OUTPATIENT)
Dept: INFUSION CENTER | Facility: CLINIC | Age: 23
Discharge: HOME | End: 2023-10-17
Payer: COMMERCIAL

## 2023-10-17 DIAGNOSIS — R79.0 LOW SERUM FERRITIN LEVEL: Primary | ICD-10-CM

## 2023-10-17 RX ORDER — SODIUM CHLORIDE 9 MG/ML
20 INJECTION, SOLUTION INTRAVENOUS ONCE
OUTPATIENT
Start: 2023-10-24

## 2023-10-17 RX ORDER — SODIUM CHLORIDE 9 MG/ML
20 INJECTION, SOLUTION INTRAVENOUS ONCE
Status: COMPLETED | OUTPATIENT
Start: 2023-10-17 | End: 2023-10-17

## 2023-10-17 RX ADMIN — SODIUM CHLORIDE 200 MG: 9 INJECTION, SOLUTION INTRAVENOUS at 11:16

## 2023-10-17 RX ADMIN — SODIUM CHLORIDE 20 ML/HR: 0.9 INJECTION, SOLUTION INTRAVENOUS at 11:13

## 2023-10-18 ENCOUNTER — OFFICE VISIT (OUTPATIENT)
Dept: URGENT CARE | Facility: CLINIC | Age: 23
End: 2023-10-18
Payer: COMMERCIAL

## 2023-10-18 VITALS
SYSTOLIC BLOOD PRESSURE: 118 MMHG | DIASTOLIC BLOOD PRESSURE: 78 MMHG | HEIGHT: 67 IN | TEMPERATURE: 97 F | WEIGHT: 126 LBS | RESPIRATION RATE: 18 BRPM | BODY MASS INDEX: 19.78 KG/M2 | OXYGEN SATURATION: 98 % | HEART RATE: 100 BPM

## 2023-10-18 DIAGNOSIS — J01.00 ACUTE NON-RECURRENT MAXILLARY SINUSITIS: Primary | ICD-10-CM

## 2023-10-18 PROCEDURE — 99213 OFFICE O/P EST LOW 20 MIN: CPT | Performed by: PHYSICIAN ASSISTANT

## 2023-10-18 RX ORDER — AMOXICILLIN AND CLAVULANATE POTASSIUM 875; 125 MG/1; MG/1
1 TABLET, FILM COATED ORAL EVERY 12 HOURS SCHEDULED
Qty: 14 TABLET | Refills: 0 | Status: SHIPPED | OUTPATIENT
Start: 2023-10-18 | End: 2023-10-20 | Stop reason: ALTCHOICE

## 2023-10-18 NOTE — PROGRESS NOTES
[facility]        NAME: Tez Johns is a 23 y.o. female  : 2000    MRN: [id number]  DATE: 2023  TIME: 2:55 PM    Assessment and Plan   Acute non-recurrent maxillary sinusitis [J01.00]  1. Acute non-recurrent maxillary sinusitis  amoxicillin-clavulanate (AUGMENTIN) 875-125 mg per tablet            Patient Instructions   Drink plenty of fluids. May use over the counter cold medications for symptomatic treatment. Do not use medications with Pseudoephedrine or Phenylphrine if you have high blood pressure because it may worsen your blood pressure. Follow up with your PCP in 3-5 days if your symptoms do not improve or if you have any concerns. Go to the ER if symptoms become severe. Follow up with PCP in 3-5 days. Proceed to  ER if symptoms worsen. Chief Complaint     Chief Complaint   Patient presents with    URI     Sore throat, congestion, mild cough x 6         History of Present Illness       Patient is a 57-year-old female with no significant past medical history presents the office complaining of sore throat for couple days that resolved and now has congestion with postnasal drip and mild cough for 6 days. Symptoms are getting worse. Review of Systems   Review of Systems   Constitutional:  Negative for fever. HENT:  Positive for congestion, postnasal drip, rhinorrhea and sore throat. Respiratory:  Positive for cough. Negative for shortness of breath. Cardiovascular:  Negative for chest pain and palpitations. Gastrointestinal:  Negative for abdominal pain, diarrhea, nausea and vomiting. Musculoskeletal:  Negative for myalgias. Skin:  Negative for rash. Neurological:  Negative for headaches.          Current Medications       Current Outpatient Medications:     amoxicillin-clavulanate (AUGMENTIN) 875-125 mg per tablet, Take 1 tablet by mouth every 12 (twelve) hours for 7 days, Disp: 14 tablet, Rfl: 0    ascorbic acid (VITAMIN C) 250 MG CHEW, Chew 250 mg daily, Disp: , Rfl:     esomeprazole (NexIUM) 40 MG capsule, TAKE 1 CAPSULE BY MOUTH 2 TIMES A DAY BEFORE MEALS., Disp: 180 capsule, Rfl: 1    multivitamin (THERAGRAN) TABS, Take 1 tablet by mouth daily, Disp: , Rfl:     norethindrone (Ortho Micronor) 0.35 MG tablet, Take 1 tablet (0.35 mg total) by mouth daily, Disp: 84 tablet, Rfl: 1    nortriptyline (PAMELOR) 10 mg capsule, TAKE 1 CAPSULE AT BEDTIME FOR 7 DAYS, THEN INCREASE DOSE TO 2 CAPSULES AT BEDTIME, Disp: 180 capsule, Rfl: 1    ondansetron (Zofran ODT) 4 mg disintegrating tablet, Take 1 tablet (4 mg total) by mouth every 6 (six) hours as needed for nausea or vomiting, Disp: 20 tablet, Rfl: 0    scopolamine (TRANSDERM-SCOP) 1 mg/3 days TD 72 hr patch, Place 1 patch on the skin over 72 hours every third day, Disp: 4 patch, Rfl: 0    diclofenac (VOLTAREN) 75 mg EC tablet, Take 1 tablet twice a day after food as needed for headache (Patient not taking: Reported on 10/18/2023), Disp: 30 tablet, Rfl: 1    levonorgestrel-ethinyl estradiol (Vienva) 0.1-20 MG-MCG per tablet, Take 1 tablet by mouth daily (Patient not taking: Reported on 10/18/2023), Disp: 84 tablet, Rfl: 4    miSOPROStol (Cytotec) 200 mcg tablet, Take 1 tablet orally the night before the procedure and one table orally the morning of procedure, Disp: 2 tablet, Rfl: 0    Current Allergies     Allergies as of 10/18/2023 - Reviewed 10/18/2023   Allergen Reaction Noted    Pineapple - food allergy Other (See Comments) 07/03/2023    Pollen extract Sneezing 12/01/2020            The following portions of the patient's history were reviewed and updated as appropriate: allergies, current medications, past family history, past medical history, past social history, past surgical history and problem list.     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 Objective   /78   Pulse 100   Temp (!) 97 °F (36.1 °C)   Resp 18   Ht 5' 7" (1.702 m)   Wt 57.2 kg (126 lb)   LMP 09/27/2023   SpO2 98%   BMI 19.73 kg/m²   Patient's last menstrual period was 09/27/2023. Physical Exam     Physical Exam  Vitals and nursing note reviewed. Constitutional:       Appearance: Normal appearance. She is well-developed. HENT:      Head: Normocephalic and atraumatic. Right Ear: Tympanic membrane, ear canal and external ear normal.      Left Ear: Tympanic membrane, ear canal and external ear normal.      Nose: Congestion and rhinorrhea present. Mouth/Throat:      Mouth: Mucous membranes are moist.      Pharynx: Oropharynx is clear. Uvula midline. Eyes:      General: Lids are normal.      Conjunctiva/sclera: Conjunctivae normal.      Pupils: Pupils are equal, round, and reactive to light. Cardiovascular:      Rate and Rhythm: Normal rate and regular rhythm. Pulses: Normal pulses. Heart sounds: Normal heart sounds. No murmur heard. No friction rub. No gallop. Pulmonary:      Effort: Pulmonary effort is normal.      Breath sounds: Normal breath sounds. No wheezing, rhonchi or rales.    Musculoskeletal:         General: Normal range of motion. Cervical back: Neck supple. Lymphadenopathy:      Cervical: No cervical adenopathy. Skin:     General: Skin is warm and dry. Capillary Refill: Capillary refill takes less than 2 seconds. Neurological:      Mental Status: She is alert.

## 2023-10-19 ENCOUNTER — OFFICE VISIT (OUTPATIENT)
Dept: GYNECOLOGY | Facility: CLINIC | Age: 23
End: 2023-10-19

## 2023-10-19 VITALS
DIASTOLIC BLOOD PRESSURE: 78 MMHG | HEIGHT: 67 IN | SYSTOLIC BLOOD PRESSURE: 118 MMHG | BODY MASS INDEX: 19.78 KG/M2 | HEART RATE: 100 BPM | WEIGHT: 126 LBS

## 2023-10-19 DIAGNOSIS — N94.6 DYSMENORRHEA: ICD-10-CM

## 2023-10-19 DIAGNOSIS — N92.0 MENORRHAGIA WITH REGULAR CYCLE: Primary | ICD-10-CM

## 2023-10-19 DIAGNOSIS — Z84.2 FAMILY HISTORY OF ENDOMETRIOSIS: ICD-10-CM

## 2023-10-19 DIAGNOSIS — Z80.3 FAMILY HISTORY OF BREAST CANCER: ICD-10-CM

## 2023-10-19 NOTE — PROGRESS NOTES
Assessment/Plan:       Diagnoses and all orders for this visit:    Menorrhagia with regular cycle; since Mirena controlled the bleeding and dysmenorrhea in the past my recommendation was to resume progesterone-based IUD. She has been referred back to Dr. Konstantin Hatfield    Family history of endometriosis; discussed with patient that the only way to truly diagnose endometriosis is via laparoscopy. Family history of breast cancer      Subjective:      Patient ID: Johanne Boxer is a 23 y.o. female. HPI G0, new patient, presents to office of second opinion/consult. Saw Dr Adolph Lemus in September. When seen by Dr. Adolph Lemus on September 11 she presented complaining of heavy menses while on oral contraceptives. Patient states she has had a several year history of heavy manses with previous iron deficiency anemia. She had had iron infusions approximately 2 years ago. Recently she has resumed iron infusions. She just completed her second of 4 Venofer infusions. Years ago she was placed on Mirena to control the bleeding. While on Mirena she had no manses and no pelvic pain or dysmenorrhea. She was on this for approximately a year to year and a half. This was removed due to initial thought to conceive. At this point she does not desire conception. Prior to Mirena she had been on oral contraceptives for 4 years which did not control the bleeding. Manses are now lasting up to 7 days with very heavy flow for 3 to 4 days associated with passage of large clots. Blood work done on August 22 revealed iron saturation of 9, total iron-binding capacity 752 and serum iron level 68. Franciso Marinas Hemoglobin 13.8    Recent ultrasound on September 26 revealed the uterus to measure 7 x 3 x 4.2 cm the endometrium was 2 mm and the ovaries were normal.    Patient is concerned that she may have endometriosis. Of note is that she has a family history of breast cancer.   Her mother was diagnosed at age 29 and maternal grandmother with breast cancer. Mother is BRCA negative. 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 She   Patient Active Problem List    Diagnosis Date Noted    Low serum ferritin level 09/26/2023    AGNIESZKA (obstructive sleep apnea)     Chronic tension-type headache, not intractable 08/28/2023    Vitamin B12 deficiency 08/28/2023    Microscopic hematuria 06/22/2023    Insomnia 03/08/2023    Excessive daytime sleepiness 03/08/2023    Nausea 02/24/2023    Migraine without aura and without status migrainosus, not intractable 09/18/2022    PONV (postoperative nausea and vomiting) 06/08/2022    Social anxiety disorder 04/27/2022    Chronic midline thoracic back pain 01/23/2022    Depression, recurrent ([address]) 69/24/9429    Periumbilical abdominal pain 07/21/2021    Constipation 05/17/2021    Chronic seasonal allergic rhinitis due to pollen 05/05/2021    Mitral valve prolapse 10/05/2020    MARCOS (generalized anxiety disorder) 03/11/2020    Elevated bilirubin 10/16/2019    Allergic rhinitis 03/12/2018    Pectus excavatum 06/06/2016    Syringomyelia ([address]) 10/08/2012     She  has a past surgical history that includes TONSILECTOMY AND ADNOIDECTOMY and [place] tooth extraction. Her family history includes Anxiety disorder in her maternal aunt; Breast cancer in her maternal grandfather, maternal grandmother, and mother; Depression in her maternal aunt; No Known Problems in her father. She  reports that she has never smoked. She has been exposed to tobacco smoke.  She has never used smokeless tobacco. She reports that she does not currently use alcohol. She reports that she does not use drugs. Current Outpatient Medications   Medication Sig Dispense Refill    amoxicillin-clavulanate (AUGMENTIN) 875-125 mg per tablet Take 1 tablet by mouth every 12 (twelve) hours for 7 days 14 tablet 0    ascorbic acid (VITAMIN C) 250 MG CHEW Chew 250 mg daily      diclofenac (VOLTAREN) 75 mg EC tablet Take 1 tablet twice a day after food as needed for headache (Patient not taking: Reported on 10/18/2023) 30 tablet 1    esomeprazole (NexIUM) 40 MG capsule TAKE 1 CAPSULE BY MOUTH 2 TIMES A DAY BEFORE MEALS. 180 capsule 1    levonorgestrel-ethinyl estradiol (Vienva) 0.1-20 MG-MCG per tablet Take 1 tablet by mouth daily (Patient not taking: Reported on 10/18/2023) 84 tablet 4    miSOPROStol (Cytotec) 200 mcg tablet Take 1 tablet orally the night before the procedure and one table orally the morning of procedure 2 tablet 0    multivitamin (THERAGRAN) TABS Take 1 tablet by mouth daily      norethindrone (Ortho Micronor) 0.35 MG tablet Take 1 tablet (0.35 mg total) by mouth daily 84 tablet 1    nortriptyline (PAMELOR) 10 mg capsule TAKE 1 CAPSULE AT BEDTIME FOR 7 DAYS, THEN INCREASE DOSE TO 2 CAPSULES AT BEDTIME 180 capsule 1    ondansetron (Zofran ODT) 4 mg disintegrating tablet Take 1 tablet (4 mg total) by mouth every 6 (six) hours as needed for nausea or vomiting 20 tablet 0    scopolamine (TRANSDERM-SCOP) 1 mg/3 days TD 72 hr patch Place 1 patch on the skin over 72 hours every third day 4 patch 0     No current facility-administered medications for this visit.      Current Outpatient Medications on File Prior to Visit   Medication Sig    amoxicillin-clavulanate (AUGMENTIN) 875-125 mg per tablet Take 1 tablet by mouth every 12 (twelve) hours for 7 days    ascorbic acid (VITAMIN C) 250 MG CHEW Chew 250 mg daily    diclofenac (VOLTAREN) 75 mg EC tablet Take 1 tablet twice a day after food as needed for headache (Patient not taking: Reported on 10/18/2023) esomeprazole (NexIUM) 40 MG capsule TAKE 1 CAPSULE BY MOUTH 2 TIMES A DAY BEFORE MEALS.    levonorgestrel-ethinyl estradiol (Vienva) 0.1-20 MG-MCG per tablet Take 1 tablet by mouth daily (Patient not taking: Reported on 10/18/2023)    miSOPROStol (Cytotec) 200 mcg tablet Take 1 tablet orally the night before the procedure and one table orally the morning of procedure    multivitamin (THERAGRAN) TABS Take 1 tablet by mouth daily    norethindrone (Ortho Micronor) 0.35 MG tablet Take 1 tablet (0.35 mg total) by mouth daily    nortriptyline (PAMELOR) 10 mg capsule TAKE 1 CAPSULE AT BEDTIME FOR 7 DAYS, THEN INCREASE DOSE TO 2 CAPSULES AT BEDTIME    ondansetron (Zofran ODT) 4 mg disintegrating tablet Take 1 tablet (4 mg total) by mouth every 6 (six) hours as needed for nausea or vomiting    scopolamine (TRANSDERM-SCOP) 1 mg/3 days TD 72 hr patch Place 1 patch on the skin over 72 hours every third day     No current facility-administered medications on file prior to visit. She is allergic to pineapple - food allergy and pollen extract. .    Review of Systems      Objective:      /78   Pulse 100   Ht 5' 7" (1.702 m)   Wt 57.2 kg (126 lb)   LMP 09/27/2023   BMI 19.73 kg/m²          Physical Exam

## 2023-10-20 ENCOUNTER — OFFICE VISIT (OUTPATIENT)
Dept: FAMILY MEDICINE CLINIC | Facility: CLINIC | Age: 23
End: 2023-10-20

## 2023-10-20 VITALS
SYSTOLIC BLOOD PRESSURE: 128 MMHG | HEIGHT: 67 IN | HEART RATE: 116 BPM | TEMPERATURE: 98.5 F | OXYGEN SATURATION: 94 % | RESPIRATION RATE: 16 BRPM | BODY MASS INDEX: 19.78 KG/M2 | WEIGHT: 126 LBS | DIASTOLIC BLOOD PRESSURE: 84 MMHG

## 2023-10-20 DIAGNOSIS — J32.9 SINUSITIS, UNSPECIFIED CHRONICITY, UNSPECIFIED LOCATION: Primary | ICD-10-CM

## 2023-10-20 RX ORDER — CEFUROXIME AXETIL 500 MG/1
500 TABLET ORAL EVERY 12 HOURS SCHEDULED
Qty: 14 TABLET | Refills: 0 | Status: SHIPPED | OUTPATIENT
Start: 2023-10-20 | End: 2023-10-27

## 2023-10-20 NOTE — PROGRESS NOTES
FAMILY PRACTICE OFFICE VISIT       NAME: Martha Hollis  AGE: 23 y.o. SEX: female       : 2000        MRN: [id number]    Assessment and Plan   1. Sinusitis, unspecified chronicity, unspecified location  -     cefuroxime (CEFTIN) 500 mg tablet; Take 1 tablet (500 mg total) by mouth every 12 (twelve) hours for 7 days       Start Ceftin. Continue supportive care. Call for persisting or worsening symptoms. Tachycardic on exam today, reports last few weeks her heart rate has been high. Has cardiology consultation on . Chief Complaint     Chief Complaint   Patient presents with    Cold Like Symptoms     Sinus pressure & pain/ congestion/ sore throat/ chest congestion x7 days         History of Present Illness         Octavio Melgar is a 23year old female presenting today for URI symptoms. Started 1 week ago. Babysits, the children were sick. Symptoms include:  Nasal congestion, cough, post nasal drip. Cough feels like it is from throat irritation from post nasal drip. Worse night time. Sore throat. Chest pressure. Not short of breath or wheezing. No fevers. Headaches yesterday. OTC medication:Excedrin. Has cardiology appointment . Review of Systems   Review of Systems   Constitutional:  Positive for fatigue. Negative for chills, diaphoresis and fever. HENT:  Positive for congestion, postnasal drip, sinus pressure and sore throat. Negative for ear pain. Respiratory:  Positive for cough and chest tightness. Negative for shortness of breath and wheezing. Cardiovascular: Negative. Gastrointestinal: Negative. Neurological:  Positive for headaches. I have reviewed the patient's medical history in detail; there are no changes to the history as noted in the electronic medical record.     Objective     Vitals:    10/20/23 1409 10/20/23 1425   BP: 128/84    Pulse: (!) 124 (!) 116   Resp: 16    Temp: 98.5 °F (36.9 °C)    TempSrc: Temporal SpO2: 94%    Weight: 57.2 kg (126 lb)    Height: 5' 7" (1.702 m)      Wt Readings from Last 3 Encounters:   10/20/23 57.2 kg (126 lb)   10/19/23 57.2 kg (126 lb)   10/18/23 57.2 kg (126 lb)     Physical Exam  Constitutional:       General: She is not in acute distress. Appearance: Normal appearance. She is not ill-appearing. HENT:      Head: Atraumatic. Right Ear: Tympanic membrane normal.      Left Ear: Tympanic membrane normal.      Nose: Nose normal.      Mouth/Throat:      Mouth: Mucous membranes are moist.      Pharynx: Oropharynx is clear. No oropharyngeal exudate or posterior oropharyngeal erythema. Cardiovascular:      Rate and Rhythm: Regular rhythm. Tachycardia present. Heart sounds: No murmur heard. Pulmonary:      Effort: Pulmonary effort is normal.      Breath sounds: Normal breath sounds. Musculoskeletal:      Cervical back: Normal range of motion and neck supple. Lymphadenopathy:      Cervical: No cervical adenopathy. Neurological:      Mental Status: She is alert.    Psychiatric:         Mood and Affect: Mood normal.            ALLERGIES:  Allergies   Allergen Reactions    Pineapple - Food Allergy Other (See Comments)     Tingling tongue and throat    Pollen Extract Sneezing     congestion       Current Medications     Current Outpatient Medications   Medication Sig Dispense Refill    ascorbic acid (VITAMIN C) 250 MG CHEW Chew 250 mg daily      cefuroxime (CEFTIN) 500 mg tablet Take 1 tablet (500 mg total) by mouth every 12 (twelve) hours for 7 days 14 tablet 0    diclofenac (VOLTAREN) 75 mg EC tablet Take 1 tablet twice a day after food as needed for headache 30 tablet 1    esomeprazole (NexIUM) 40 MG capsule TAKE 1 CAPSULE BY MOUTH 2 TIMES A DAY BEFORE MEALS. 180 capsule 1    multivitamin (THERAGRAN) TABS Take 1 tablet by mouth daily      norethindrone (Ortho Micronor) 0.35 MG tablet Take 1 tablet (0.35 mg total) by mouth daily 84 tablet 1    nortriptyline (PAMELOR) 10 mg capsule TAKE 1 CAPSULE AT BEDTIME FOR 7 DAYS, THEN INCREASE DOSE TO 2 CAPSULES AT BEDTIME 180 capsule 1    ondansetron (Zofran ODT) 4 mg disintegrating tablet Take 1 tablet (4 mg total) by mouth every 6 (six) hours as needed for nausea or vomiting 20 tablet 0    levonorgestrel-ethinyl estradiol (Vienva) 0.1-20 MG-MCG per tablet Take 1 tablet by mouth daily 84 tablet 4    miSOPROStol (Cytotec) 200 mcg tablet Take 1 tablet orally the night before the procedure and one table orally the morning of procedure (Patient not taking: Reported on 10/20/2023) 2 tablet 0    scopolamine (TRANSDERM-SCOP) 1 mg/3 days TD 72 hr patch Place 1 patch on the skin over 72 hours every third day (Patient not taking: Reported on 10/20/2023) 4 patch 0     No current facility-administered medications for this visit.          Health Maintenance     Health Maintenance   Topic Date Due    Hepatitis C Screening  Never done    Pneumococcal Vaccine: Pediatrics (0 to 5 Years) and At-Risk Patients (6 to 59 Years) (1 - PCV) 11/27/2008    COVID-19 Vaccine (4 - Booster for Pfizer series) 04/17/2022    Annual Physical  08/30/2022    Influenza Vaccine (1) 10/27/2023 (Originally 9/1/2023)    Depression Remission PHQ  04/18/2024    Chlamydia Screening  05/26/2024    DTaP,Tdap,and Td Vaccines (7 - Td or Tdap) 08/15/2024    BMI: Adult  10/19/2024    HIV Screening  Completed    HIB Vaccine  Completed    IPV Vaccine  Completed    Meningococcal ACWY Vaccine  Completed    HPV Vaccine  Completed    Hepatitis A Vaccine  Aged Out     Immunization History   Administered Date(s) Administered    COVID-19 PFIZER VACCINE 0.3 ML IM 05/14/2021, 06/05/2021, 02/20/2022    DTaP 5 02/13/2003, 04/14/2003, 06/16/2003, 06/29/2004, 03/02/2007    HPV9 02/03/2017, 08/14/2017    Hep B, adult 2000, 01/13/2003, 09/16/2003    Hib (PRP-OMP) 02/13/2003, 04/14/2003, 06/16/2003, 03/25/2004    INFLUENZA 01/10/2023    IPV 02/13/2003, 04/14/2003, 06/29/2004, 03/02/2007    MMR 03/25/2004, 04/05/2007    Meningococcal MCV4, Unspecified 08/15/2014, 08/16/2019    Meningococcal MCV4P 08/15/2014, 08/16/2019    Meningococcal, Unknown Serogroups 08/15/2014    Pneumococcal Polysaccharide PPV23 02/13/2003    Tdap 08/15/2014    Tuberculin Skin Test-PPD Intradermal 06/25/2021    Varicella 12/30/2003, 04/05/2007       DOUG Osei

## 2023-10-22 RX ORDER — FLUOXETINE 10 MG/1
10 CAPSULE ORAL DAILY
Qty: 90 CAPSULE | OUTPATIENT
Start: 2023-10-22

## 2023-10-23 ENCOUNTER — TELEMEDICINE (OUTPATIENT)
Dept: FAMILY MEDICINE CLINIC | Facility: CLINIC | Age: 23
End: 2023-10-23
Payer: COMMERCIAL

## 2023-10-23 VITALS — WEIGHT: 126 LBS | BODY MASS INDEX: 19.78 KG/M2 | HEIGHT: 67 IN | TEMPERATURE: 101.4 F

## 2023-10-23 DIAGNOSIS — U07.1 COVID-19: Primary | ICD-10-CM

## 2023-10-23 PROCEDURE — 99213 OFFICE O/P EST LOW 20 MIN: CPT | Performed by: NURSE PRACTITIONER

## 2023-10-23 NOTE — PROGRESS NOTES
COVID-19 Outpatient Progress Note    Assessment/Plan:    Problem List Items Addressed This Visit    None  Visit Diagnoses       COVID-19    -  Primary           Disposition:     Patient with asymptomatic/mild COVID-19: They were recommended to isolate for at least 5 days (day 0 is the day symptoms appeared or the date the specimen was collected for the positive test for people who are asymptomatic). If they are asymptomatic or symptoms are improving with no fevers in the past 24 hours, isolation may be ended followed by 5 days of wearing a high quality mask when around others to minimize risk of infecting others. They should wear a mask through day 10 and a test-based strategy may be used to remove a mask sooner. Discussed symptom directed medication options with patient. Discussed vitamin D, vitamin C, and/or zinc supplementation with patient. I have spent a total time of 9 minutes on the day of the encounter for this patient including     Advised to call for any persisting or worsening of symptoms. Encounter provider: DOUG Reyna     Provider located at: [address] [place] [address] Road [id number]     Recent Visits  Date Type Provider Dept   10/20/23 Office Visit Thao Melara, [address] [place] [address] recent visits within past 7 days and meeting all other requirements  Today's Visits  Date Type Provider Dept   10/23/23 Telemedicine Thao Melara [address] [place] [address] today's visits and meeting all other requirements  Future Appointments  No visits were found meeting these conditions. Showing future appointments within next 150 days and meeting all other requirements     This virtual check-in was done via [address] [place] [address] and patient was informed that this is a secure, HIPAA-compliant platform. She agrees to proceed.     Patient agrees to participate in a virtual check in via telephone or video visit instead of presenting to the office to address urgent/immediate medical needs. Patient is aware this is a billable service. She acknowledged consent and understanding of privacy and security of the video platform. The patient has agreed to participate and understands they can discontinue the visit at any time. After connecting through [facility], the patient was identified by name and date of birth. Hermilo Arellano was informed that this was a telemedicine visit and that the exam was being conducted confidentially over secure lines. My office door was closed. No one else was in the room. Hermilo Arellano acknowledged consent and understanding of privacy and security of the telemedicine visit. I informed the patient that I have reviewed her record in Epic and presented the opportunity for her to ask any questions regarding the visit today. The patient agreed to participate. Verification of patient location:  Patient is located in the following state in which I hold an active license: [place]    Subjective:   Hermilo Arellano is a 23 y.o. female who has been screened for COVID-19. Symptom change since last report: unchanged. Patient's symptoms include fever, chills, fatigue, malaise, nasal congestion, rhinorrhea, cough, shortness of breath (mild), nausea and myalgias. Patient denies sore throat, anosmia, loss of taste, chest tightness, abdominal pain, vomiting, diarrhea and headaches. - Date of symptom onset: 10/21/2023  - Date of positive COVID-19 test: 10/23/2023. Type of test: Home antigen. Patient with typical symptoms of COVID-19 and they attest that they were positive on home rapid antigen testing. Image of positive result is not able to be uploaded into their chart. COVID-19 vaccination status: Fully vaccinated with booster    Took Nyquil.     Lab Results   Component Value Date    SARSCOV2 Negative 11/10/2022    SARSCOV2 Detected (A) 12/15/2020    SARSCOVAG Negative 07/11/2023       Review of Systems   Constitutional:  Positive for chills, fatigue and fever. HENT:  Positive for congestion and rhinorrhea. Negative for sore throat. Respiratory:  Positive for cough and shortness of breath (mild). Negative for chest tightness. Gastrointestinal:  Positive for nausea. Negative for abdominal pain, diarrhea and vomiting. Musculoskeletal:  Positive for myalgias. Neurological:  Negative for headaches. Current Outpatient Medications on File Prior to Visit   Medication Sig    ascorbic acid (VITAMIN C) 250 MG CHEW Chew 250 mg daily    cefuroxime (CEFTIN) 500 mg tablet Take 1 tablet (500 mg total) by mouth every 12 (twelve) hours for 7 days    diclofenac (VOLTAREN) 75 mg EC tablet Take 1 tablet twice a day after food as needed for headache    esomeprazole (NexIUM) 40 MG capsule TAKE 1 CAPSULE BY MOUTH 2 TIMES A DAY BEFORE MEALS.    multivitamin (THERAGRAN) TABS Take 1 tablet by mouth daily    norethindrone (Ortho Micronor) 0.35 MG tablet Take 1 tablet (0.35 mg total) by mouth daily    nortriptyline (PAMELOR) 10 mg capsule TAKE 1 CAPSULE AT BEDTIME FOR 7 DAYS, THEN INCREASE DOSE TO 2 CAPSULES AT BEDTIME    ondansetron (Zofran ODT) 4 mg disintegrating tablet Take 1 tablet (4 mg total) by mouth every 6 (six) hours as needed for nausea or vomiting    [DISCONTINUED] levonorgestrel-ethinyl estradiol (Vienva) 0.1-20 MG-MCG per tablet Take 1 tablet by mouth daily    [DISCONTINUED] miSOPROStol (Cytotec) 200 mcg tablet Take 1 tablet orally the night before the procedure and one table orally the morning of procedure (Patient not taking: Reported on 10/20/2023)    [DISCONTINUED] scopolamine (TRANSDERM-SCOP) 1 mg/3 days TD 72 hr patch Place 1 patch on the skin over 72 hours every third day (Patient not taking: Reported on 10/20/2023)       Objective:    Temp (!) 101.4 °F (38.6 °C)   Ht 5' 7" (1.702 m)   Wt 57.2 kg (126 lb)   LMP 09/27/2023 (Exact Date)   BMI 19.73 kg/m²        Physical Exam  Vitals and nursing note reviewed.    Constitutional:       General: She is not in acute distress. Appearance: Normal appearance. She is ill-appearing. Pulmonary:      Effort: Pulmonary effort is normal. No respiratory distress. Comments: Moist cough  Neurological:      Mental Status: She is alert.    Psychiatric:         Mood and Affect: Mood normal.       Thao Riggins

## 2023-10-24 ENCOUNTER — HOSPITAL ENCOUNTER (OUTPATIENT)
Dept: INFUSION CENTER | Facility: CLINIC | Age: 23
End: 2023-10-24
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
## 2023-10-31 ENCOUNTER — HOSPITAL ENCOUNTER (OUTPATIENT)
Dept: INFUSION CENTER | Facility: CLINIC | Age: 23
Discharge: HOME | End: 2023-10-31
Payer: COMMERCIAL

## 2023-10-31 VITALS
HEART RATE: 87 BPM | SYSTOLIC BLOOD PRESSURE: 118 MMHG | RESPIRATION RATE: 18 BRPM | DIASTOLIC BLOOD PRESSURE: 80 MMHG | TEMPERATURE: 97.6 F

## 2023-10-31 DIAGNOSIS — R79.0 LOW SERUM FERRITIN LEVEL: Primary | ICD-10-CM

## 2023-10-31 PROCEDURE — 96365 THER/PROPH/DIAG IV INF INIT: CPT

## 2023-10-31 RX ORDER — SODIUM CHLORIDE 9 MG/ML
20 INJECTION, SOLUTION INTRAVENOUS ONCE
OUTPATIENT
Start: 2023-11-07

## 2023-10-31 RX ORDER — SODIUM CHLORIDE 9 MG/ML
20 INJECTION, SOLUTION INTRAVENOUS ONCE
Status: COMPLETED | OUTPATIENT
Start: 2023-10-31 | End: 2023-10-31

## 2023-10-31 RX ADMIN — SODIUM CHLORIDE 200 MG: 9 INJECTION, SOLUTION INTRAVENOUS at 10:00

## 2023-10-31 RX ADMIN — SODIUM CHLORIDE 20 ML/HR: 0.9 INJECTION, SOLUTION INTRAVENOUS at 10:00

## 2023-10-31 NOTE — PROGRESS NOTES
Patient tolerated Venofer infusion without incident. Patient declined AVS but is aware of future appts.

## 2023-11-07 ENCOUNTER — HOSPITAL ENCOUNTER (OUTPATIENT)
Dept: INFUSION CENTER | Facility: CLINIC | Age: 23
Discharge: HOME | End: 2023-11-07
Payer: COMMERCIAL

## 2023-11-07 VITALS
TEMPERATURE: 98.2 F | RESPIRATION RATE: 18 BRPM | HEART RATE: 112 BPM | DIASTOLIC BLOOD PRESSURE: 83 MMHG | SYSTOLIC BLOOD PRESSURE: 124 MMHG

## 2023-11-07 DIAGNOSIS — R79.0 LOW SERUM FERRITIN LEVEL: Primary | ICD-10-CM

## 2023-11-07 PROCEDURE — 96365 THER/PROPH/DIAG IV INF INIT: CPT

## 2023-11-07 RX ORDER — SODIUM CHLORIDE 9 MG/ML
20 INJECTION, SOLUTION INTRAVENOUS ONCE
Status: COMPLETED | OUTPATIENT
Start: 2023-11-07 | End: 2023-11-07

## 2023-11-07 RX ORDER — SODIUM CHLORIDE 9 MG/ML
20 INJECTION, SOLUTION INTRAVENOUS ONCE
Status: CANCELLED | OUTPATIENT
Start: 2023-11-14

## 2023-11-07 RX ADMIN — IRON SUCROSE 200 MG: 20 INJECTION, SOLUTION INTRAVENOUS at 13:41

## 2023-11-07 RX ADMIN — SODIUM CHLORIDE 20 ML/HR: 0.9 INJECTION, SOLUTION INTRAVENOUS at 13:33

## 2023-11-13 ENCOUNTER — OFFICE VISIT (OUTPATIENT)
Dept: FAMILY MEDICINE CLINIC | Facility: CLINIC | Age: 23
End: 2023-11-13
Payer: COMMERCIAL

## 2023-11-13 VITALS
WEIGHT: 126.38 LBS | SYSTOLIC BLOOD PRESSURE: 110 MMHG | BODY MASS INDEX: 19.83 KG/M2 | HEART RATE: 96 BPM | OXYGEN SATURATION: 98 % | TEMPERATURE: 98.6 F | DIASTOLIC BLOOD PRESSURE: 80 MMHG | RESPIRATION RATE: 18 BRPM | HEIGHT: 67 IN

## 2023-11-13 DIAGNOSIS — M54.6 ACUTE BILATERAL THORACIC BACK PAIN: Primary | ICD-10-CM
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
## 2023-11-13 DIAGNOSIS — R11.0 NAUSEA: ICD-10-CM

## 2023-11-13 PROCEDURE — 99214 OFFICE O/P EST MOD 30 MIN: CPT | Performed by: FAMILY MEDICINE

## 2023-11-13 RX ORDER — METHOCARBAMOL 500 MG/1
TABLET, FILM COATED ORAL
Qty: 10 TABLET | Refills: 0 | Status: SHIPPED | OUTPATIENT
Start: 2023-11-13

## 2023-11-13 RX ORDER — ONDANSETRON 4 MG/1
4 TABLET, ORALLY DISINTEGRATING ORAL EVERY 6 HOURS PRN
Qty: 20 TABLET | Refills: 1 | Status: SHIPPED | OUTPATIENT
Start: 2023-11-13
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
## 2023-12-12 ENCOUNTER — OFFICE VISIT (OUTPATIENT)
Dept: PSYCHIATRY | Facility: CLINIC | Age: 23
End: 2023-12-12
Payer: COMMERCIAL

## 2023-12-12 DIAGNOSIS — F41.1 GAD (GENERALIZED ANXIETY DISORDER): ICD-10-CM

## 2023-12-12 DIAGNOSIS — F40.10 SOCIAL ANXIETY DISORDER: ICD-10-CM

## 2023-12-12 DIAGNOSIS — F33.9 DEPRESSION, RECURRENT (HCC): Primary | ICD-10-CM

## 2023-12-12 PROCEDURE — 99213 OFFICE O/P EST LOW 20 MIN: CPT | Performed by: PHYSICIAN ASSISTANT

## 2023-12-12 RX ORDER — DIVALPROEX SODIUM 125 MG/1
125 CAPSULE, COATED PELLETS ORAL 2 TIMES DAILY
Qty: 60 CAPSULE | Refills: 0 | Status: SHIPPED | OUTPATIENT
Start: 2023-12-12
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
## 2024-01-05 ENCOUNTER — OFFICE VISIT (OUTPATIENT)
Dept: CARDIOLOGY CLINIC | Facility: CLINIC | Age: 24
End: 2024-01-05
Payer: COMMERCIAL

## 2024-01-05 VITALS
HEART RATE: 70 BPM | WEIGHT: 136.3 LBS | SYSTOLIC BLOOD PRESSURE: 120 MMHG | HEIGHT: 68 IN | DIASTOLIC BLOOD PRESSURE: 82 MMHG | OXYGEN SATURATION: 98 % | BODY MASS INDEX: 20.66 KG/M2

## 2024-01-05 DIAGNOSIS — R00.2 PALPITATIONS: ICD-10-CM

## 2024-01-05 DIAGNOSIS — I34.1 MITRAL VALVE PROLAPSE: Primary | ICD-10-CM

## 2024-01-05 PROCEDURE — 93000 ELECTROCARDIOGRAM COMPLETE: CPT | Performed by: INTERNAL MEDICINE

## 2024-01-05 PROCEDURE — 99204 OFFICE O/P NEW MOD 45 MIN: CPT | Performed by: INTERNAL MEDICINE

## 2024-01-05 NOTE — LETTER
January 7, 2024     Roya Chan MD  [address] [facility] [address]    Patient: Martha Hollis   YOB: 2000   Date of Visit: 1/5/2024       Dear Dr. Chan:    Thank you for referring Martha Hollis to me for evaluation. Below are my notes for this consultation.    If you have questions, please do not hesitate to call me. I look forward to following your patient along with you.         Sincerely,        Leonel Constantino MD        CC: No Recipients    Leonel Constantino MD  1/7/2024 10:46 PM  Sign when Signing Visit  Cardiology   Martha Hollis 23 y.o. female MRN: [id number]   Encounter: [id number]        Reason for Consult / Principal Problem:Palpitations    Physician Requesting Consult: Roya Chan MD    PCP: Roya Chan MD        Assessment/Plan:    >> Palpitations  >> Mitral valve prolapse  >> Hx of Endometriosis    Plan:    - Check echo to evaluate for structural heart disease and evaluate severity of mitral valve prolapse/mitral regurgitation..  - Check Holter monitor to evaluate for arrhythmia/ectopy  -Counseled on cutting back on caffeine/alcohol.  -Return to office in 3 to 6 months      CC: Palpitations, history of mitral valve prolapse    HPI  23 y.o. female presents with a history of mitral valve prolapse.  She is here to establish care with an adult cardiologist.  She only complaint is of intermittent palpitations.  They do occur daily.  They are not associated with chest pain or syncope.    Her palpitations have been going on for several years.  No change in frequency.    She does not drink excessive alcohol, does not drink energy drinks or sodas.  Drinks 1 to 2 cups of coffee per day.      The patient denies chest pain, shortness of breath, orthopnea, PND, pedal edema, syncope, presyncope, diaphoresis, nausea/vomiting       The ASCVD Risk score (Ryan DK, et al., 2019) failed to calculate for the following reasons:    The 2019 ASCVD risk score is only valid for  "ages 40 to 79            Physical exam  Objective  Vitals: Blood pressure 120/82, pulse 70, height 5' 8\" (1.727 m), weight 61.8 kg (136 lb 4.8 oz), SpO2 98%.    General:  AO x3, no acute distress  Cardiac:  S1-S2 normal, 2/6 systolic murmur in the mitral area. No rubs or gallops, JVP: normal  Lungs:  Clear to auscultation bilaterally, no wheezing or crackles.  Abdomen:  Soft, nontender, nondistended.  Extremities:  Warm, well perfused, pulses palpable, no ulcers or rashes. Edema:absent  Neuro: Grossly nonfocal        ======================================================  TREADMILL STRESS  Results for orders placed during the hospital encounter of 17    Stress test only, [place] [address]  [phone]    Stress Electrocardiography during Exercise    Name: NARA WILD  MR #: [id number]  Account #: [id number]  Study date: 2017  : 2000  Age: 16 years  Gender: Female  Height: 67 in  Weight: 108 lb  BSA: 1.56 m squared    Allergies: NO KNOWN ALLERGIES    Diagnosis: R00.2 - Palpitations    Primary Physician:  Roya Chan MD  Referring Physician:  Clau Falk MD  CC:  Thao Logan MD  Technician:  ANABELA Carolina  Interpreting Physician:  Clau Falk MD    CLINICAL QUESTION: Arrhythmia.    HISTORY: The patient is a 16 year old  female. Chest pain status: no chest pain. Other symptoms: palpitations. PVC's noted on EKG and Holter monitor. Coronary artery disease risk factors: none. Cardiovascular history: none  significant.    REST ECG: Normal sinus rhythm. The ECG showed occasional premature ventricular contractions.    PROCEDURE: Treadmill exercise testing was performed, using the Tim protocol. Stress electrocardiographic evaluation was performed.    TIM PROTOCOL:  HR bpm SBP mmHg DBP mmHg Symptoms ST change Rhythm/conduct  Baseline 115 100 68 none normal ST-T occasional " PVC's  Stage 1 117 104 68 none normal ST-T occasional PVC's  Stage 2 146 110 70 none normal ST-T NSR, no ectopy  Stage 3 176 116 70 none normal ST-T NSR, no ectopy  Stage 4 200 122 70 fatigue, leg pain normal ST-T rare PVC's  Immediate 203 126 72 same as above same as above NSR, no ventricular ectopy  Recovery 1 153 120 70 none -- same as above  Recovery 2 127 116 70 none -- NSR, occasional PVC's    STRESS SUMMARY: Pre exercise POX 99% and peak exercise POX 96%. Duration of exercise was 12 min and 24 sec. The patient exercised to protocol stage 5. Maximal work rate was 14.1 METs. Maximal heart rate during stress was 203 bpm ( 99 % of  maximal predicted heart rate). The heart rate response to stress was normal. There was normal resting blood pressure with an appropriate response to stress. The rate-pressure product for the peak heart rate and blood pressure was 65149.  There was no chest pain or palpitations during stress. Ventricular ectopy suppressed with exercise. The stress test was terminated due to leg pain and fatigue. Arrhythmia during stress: Occasional isolated premature ventricular beats were  seen at baseline and early exercise stages. Suppressed in Stage 2, 3, and 4 but 1 isolated premature ventricular beat was seen at peak exercise. Occasional isolated premature ventricular beats were seen in the final stage of recovery.    SUMMARY:  -  Stress results: Duration of exercise was 12 min and 24 sec. Target heart rate was achieved. There was no chest pain or palpitations during stress. Ventricular ectopy suppressed with exercise.    IMPRESSIONS: Normal study after maximal exercise.    Prepared and signed by    Clau Falk MD  Signed 06/10/2017 22:31:27     ----------------------------------------------------------------------------------------------  NUCLEAR STRESS TEST: No results found for this or any previous visit.    No results found for this or any previous  visit.      --------------------------------------------------------------------------------  CATH:  No results found for this or any previous visit.    --------------------------------------------------------------------------------  ECHO:   Results for orders placed during the hospital encounter of 16    Echo complete with contrast if indicated    [place] [address]  [phone]    Transthoracic Echocardiogram  2D, M-mode, Doppler, and Color Doppler    Study date:  2016    Patient: NARA WILD  MR number: [id number]  Account number: [id number]  : 2000  Age: 15 years  Gender: Female  Status: Outpatient  Location: [address]  Height: 63 in / 160 cm  Weight: 104.9 lb / 47.7 kg  BSA: 1.47 m squared    Indications: Murmur.  Diagnosis:   R01.1 - Cardiac murmur, unspecified    Sonagrapher:  JO ANN Washington  Ordering Physician:  Roya Chan MD  Group:  [facility]  Interpreting Physician:  Christiano Alberts MD    IMPRESSIONS:  No significant congenital heart disease identified.    PROCEDURE: The study was performed in the [address] [place] [address].  This was a routine study. The transthoracic approach was used. The study  included complete 2D imaging, M-mode, complete spectral Doppler, and color  Doppler. The heart rate was 79 bpm.    ANATOMIC RELATIONSHIPS: Visceral situs: normal. Left sided cardiac apex  (levocardia). Normal atrial situs (atrial situs solitus). Normal appendage  morphology and laterality. Concordant atrioventricular alignment. Ventricular  d-loop. Normal infundibular anatomy. Concordant ventriculoarterial connection.  Normally related great vessels.    SYSTEMIC VEINS: SVC: The superior vena cava size was normal. IVC: The inferior  vena cava was normal in size and course.    PULMONARY VEINS: The pulmonary veins drained normally to the left  atrium.  DOPPLER: Doppler flow pattern was normal in the pulmonary vein(s).    RIGHT ATRIUM: Size was normal.    LEFT ATRIUM: Size was normal.    ATRIAL SEPTUM: No defect or patent foramen ovale was identified.    TRICUSPID VALVE: The valve structure was normal. DOPPLER: The transtricuspid  velocity was within the normal range. There was no evidence for tricuspid  stenosis. There was no regurgitation.    MITRAL VALVE: Valve structure was normal. DOPPLER: The transmitral velocity was  within the normal range. There was no evidence for stenosis. There was no  regurgitation.    LEFT VENTRICLE: The cavity size was normal. Wall thickness was normal. Systolic  function was normal. There were no regional wall motion abnormalities.    PULMONIC VALVE: Leaflets exhibited normal thickness and normal cuspal  separation. DOPPLER: The transpulmonic velocity was within the normal range.  There was no regurgitation.    AORTIC VALVE: The valve was trileaflet. Leaflets exhibited normal thickness and  normal cuspal separation. DOPPLER: Transaortic velocity was within the normal  range. There was no stenosis. There was no regurgitation.    AORTA: There was a normal-sized left aortic arch with normal brachiocephalic  branching.    EXTRACARDIAC SHUNTING: No ductal shunt was detected by Doppler.    PERICARDIUM: There was no pericardial effusion. The pericardium was normal in  appearance.    SYSTEM MEASUREMENT TABLES  MM  %FS: 28.2 %  Ao Diam: 1.8 cm  D-E [place]: 1.4 cm  E-F [place]: 0.1 m/s  EDV(Teich): 70.7 ml  EF(Teich): 55 %  ESV(Teich): 31.9 ml  IVSd: 0.6 cm  LA Diam: 1.6 cm  LA/Ao: 0.9  LVIDd: 4 cm  LVIDs: 2.9 cm  LVPWd: 0.6 cm  LVPWs: 0 cm  SV(Teich): 38.9 ml    Prepared and electronically signed by    Christiano Alberts MD  Signed 17-Jun-2016 08:43:44    No results found for this or any previous visit.    --------------------------------------------------------------------------------  HOLTER  No results found for this or any  previous visit.    --------------------------------------------------------------------------------  CAROTIDS  No results found for this or any previous visit.       Diagnoses and all orders for this visit:    Mitral valve prolapse  -     POCT ECG  -     Echo complete w/ contrast if indicated; Future  -     Holter monitor; Future    Palpitations  -     Echo complete w/ contrast if indicated; Future  -     Holter monitor; Future       ======================================================          Review of Systems  ROS as noted above, otherwise 12 point review of systems was performed and is negative.     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   • Wears glasses      Past Surgical History:   Procedure Laterality Date   • TONSILECTOMY AND ADNOIDECTOMY     • WISDOM TOOTH EXTRACTION       Social History     Substance and Sexual Activity   Alcohol Use Not Currently    Comment: Social     Social History     Substance and Sexual Activity   Drug Use No     Social History     Tobacco Use   Smoking Status Never   • Passive exposure: Past   Smokeless Tobacco Never     Family History   Problem Relation Age of Onset   • Breast cancer Mother 28   • No Known Problems Father    • Breast cancer Maternal Grandmother    • Breast cancer Maternal Grandfather    • Depression Maternal Aunt    • Anxiety disorder Maternal Aunt    • Colon cancer Neg Hx    • Ovarian cancer Neg Hx        Meds/Allergies  Hospital Medications:   No current facility-administered medications for this visit.     Home Medications: (Not  "in a hospital admission)      Allergies   Allergen Reactions   • Pineapple - Food Allergy Other (See Comments)     Tingling tongue and throat   • Pollen Extract Sneezing     congestion   • Bee Pollen Sneezing     congestion         Portions of the record may have been created with voice recognition software.  Occasional wrong words or \"sound a like\" substitutions may have occurred due to the inherent limitations of voice recognition software.  Read the chart carefully and recognize, using context, where substitutions have occurred.        I have spent a total of 50 min in reviewing and/or ordering tests, medications, or procedures, performing an examination or evaluation, reviewing pertinent history, counseling and educating the patient, referring and/or communicating with other health care professionals, documenting in the EMR and general coordination of care of the patient today.               "

## 2024-01-08 NOTE — PROGRESS NOTES
"Cardiology   Martha Bunny 23 y.o. female MRN: [id number]   Encounter: [id number]        Reason for Consult / Principal Problem:Palpitations    Physician Requesting Consult: Roya Chan MD    PCP: Roya Chan MD        Assessment/Plan:    >> Palpitations  >> Mitral valve prolapse  >> Hx of Endometriosis    Plan:    - Check echo to evaluate for structural heart disease and evaluate severity of mitral valve prolapse/mitral regurgitation..  - Check Holter monitor to evaluate for arrhythmia/ectopy  -Counseled on cutting back on caffeine/alcohol.  -Return to office in 3 to 6 months      CC: Palpitations, history of mitral valve prolapse    HPI  23 y.o. female presents with a history of mitral valve prolapse.  She is here to establish care with an adult cardiologist.  She only complaint is of intermittent palpitations.  They do occur daily.  They are not associated with chest pain or syncope.    Her palpitations have been going on for several years.  No change in frequency.    She does not drink excessive alcohol, does not drink energy drinks or sodas.  Drinks 1 to 2 cups of coffee per day.      The patient denies chest pain, shortness of breath, orthopnea, PND, pedal edema, syncope, presyncope, diaphoresis, nausea/vomiting       The ASCVD Risk score (Simms DK, et al., 2019) failed to calculate for the following reasons:    The 2019 ASCVD risk score is only valid for ages 40 to 79            Physical exam  Objective   Vitals: Blood pressure 120/82, pulse 70, height 5' 8\" (1.727 m), weight 61.8 kg (136 lb 4.8 oz), SpO2 98%.    General:  AO x3, no acute distress  Cardiac:  S1-S2 normal, 2/6 systolic murmur in the mitral area. No rubs or gallops, JVP: normal  Lungs:  Clear to auscultation bilaterally, no wheezing or crackles.  Abdomen:  Soft, nontender, nondistended.  Extremities:  Warm, well perfused, pulses palpable, no ulcers or rashes. Edema:absent  Neuro: Grossly " nonfocal        ======================================================  TREADMILL STRESS  Results for orders placed during the hospital encounter of 17    Stress test only, exercise    Narrative  [place] [id number] [phone]    Stress Electrocardiography during Exercise    Name: NARA WILD  MR #: [id number]  Account #: [id number]  Study date: 2017  : 2000  Age: 16 years  Gender: Female  Height: 67 in  Weight: 108 lb  BSA: 1.56 m squared    Allergies: NO KNOWN ALLERGIES    Diagnosis: R00.2 - Palpitations    Primary Physician:  Roya Chan MD  Referring Physician:  Clau Falk MD  CC:  Thao Logan MD  Technician:  ANABELA Carolina  Interpreting Physician:  Clau Falk MD    CLINICAL QUESTION: Arrhythmia.    HISTORY: The patient is a 16 year old  female. Chest pain status: no chest pain. Other symptoms: palpitations. PVC's noted on EKG and Holter monitor. Coronary artery disease risk factors: none. Cardiovascular history: none  significant.    REST ECG: Normal sinus rhythm. The ECG showed occasional premature ventricular contractions.    PROCEDURE: Treadmill exercise testing was performed, using the Tim protocol. Stress electrocardiographic evaluation was performed.    TIM PROTOCOL:  HR bpm SBP mmHg DBP mmHg Symptoms ST change Rhythm/conduct  Baseline 115 100 68 none normal ST-T occasional PVC's  Stage 1 117 104 68 none normal ST-T occasional PVC's  Stage 2 146 110 70 none normal ST-T NSR, no ectopy  Stage 3 176 116 70 none normal ST-T NSR, no ectopy  Stage 4 200 122 70 fatigue, leg pain normal ST-T rare PVC's  Immediate 203 126 72 same as above same as above NSR, no ventricular ectopy  Recovery 1 153 120 70 none -- same as above  Recovery 2 127 116 70 none -- NSR, occasional PVC's    STRESS SUMMARY: Pre exercise POX 99% and peak exercise POX 96%. Duration of exercise was 12 min and 24 sec. The  patient exercised to protocol stage 5. Maximal work rate was 14.1 METs. Maximal heart rate during stress was 203 bpm ( 99 % of  maximal predicted heart rate). The heart rate response to stress was normal. There was normal resting blood pressure with an appropriate response to stress. The rate-pressure product for the peak heart rate and blood pressure was 94114.  There was no chest pain or palpitations during stress. Ventricular ectopy suppressed with exercise. The stress test was terminated due to leg pain and fatigue. Arrhythmia during stress: Occasional isolated premature ventricular beats were  seen at baseline and early exercise stages. Suppressed in Stage 2, 3, and 4 but 1 isolated premature ventricular beat was seen at peak exercise. Occasional isolated premature ventricular beats were seen in the final stage of recovery.    SUMMARY:  -  Stress results: Duration of exercise was 12 min and 24 sec. Target heart rate was achieved. There was no chest pain or palpitations during stress. Ventricular ectopy suppressed with exercise.    IMPRESSIONS: Normal study after maximal exercise.    Prepared and signed by    Clau Falk MD  Signed 06/10/2017 22:31:27     ----------------------------------------------------------------------------------------------  NUCLEAR STRESS TEST: No results found for this or any previous visit.    No results found for this or any previous visit.      --------------------------------------------------------------------------------  CATH:  No results found for this or any previous visit.    --------------------------------------------------------------------------------  ECHO:   Results for orders placed during the hospital encounter of 06/16/16    Echo complete with contrast if indicated    Narrative  [address] [address] [phone]    Transthoracic Echocardiogram  2D, M-mode, Doppler, and Color Doppler    Study date:   2016    Patient: NARA WILD  MR number: [id number]  Account number: [id number]  : 2000  Age: 15 years  Gender: Female  Status: Outpatient  Location: [address] [facility]  Height: 63 in / 160 cm  Weight: 104.9 lb / 47.7 kg  BSA: 1.47 m squared    Indications: Murmur.  Diagnosis:   R01.1 - Cardiac murmur, unspecified    Sonagrapher:  JO ANN Washington  Ordering Physician:  Roya Chan MD  Group:  [facility]  Interpreting Physician:  Christiano Alberts MD    IMPRESSIONS:  No significant congenital heart disease identified.    PROCEDURE: The study was performed in the [address] [place] [address] [facility].  This was a routine study. The transthoracic approach was used. The study  included complete 2D imaging, M-mode, complete spectral Doppler, and color  Doppler. The heart rate was 79 bpm.    ANATOMIC RELATIONSHIPS: Visceral situs: normal. Left sided cardiac apex  (levocardia). Normal atrial situs (atrial situs solitus). Normal appendage  morphology and laterality. Concordant atrioventricular alignment. Ventricular  d-loop. Normal infundibular anatomy. Concordant ventriculoarterial connection.  Normally related great vessels.    SYSTEMIC VEINS: SVC: The superior vena cava size was normal. IVC: The inferior  vena cava was normal in size and course.    PULMONARY VEINS: The pulmonary veins drained normally to the left atrium.  DOPPLER: Doppler flow pattern was normal in the pulmonary vein(s).    RIGHT ATRIUM: Size was normal.    LEFT ATRIUM: Size was normal.    ATRIAL SEPTUM: No defect or patent foramen ovale was identified.    TRICUSPID VALVE: The valve structure was normal. DOPPLER: The transtricuspid  velocity was within the normal range. There was no evidence for tricuspid  stenosis. There was no regurgitation.    MITRAL VALVE: Valve structure was normal. DOPPLER: The transmitral velocity was  within the normal range. There was no evidence for stenosis. There  was no  regurgitation.    LEFT VENTRICLE: The cavity size was normal. Wall thickness was normal. Systolic  function was normal. There were no regional wall motion abnormalities.    PULMONIC VALVE: Leaflets exhibited normal thickness and normal cuspal  separation. DOPPLER: The transpulmonic velocity was within the normal range.  There was no regurgitation.    AORTIC VALVE: The valve was trileaflet. Leaflets exhibited normal thickness and  normal cuspal separation. DOPPLER: Transaortic velocity was within the normal  range. There was no stenosis. There was no regurgitation.    AORTA: There was a normal-sized left aortic arch with normal brachiocephalic  branching.    EXTRACARDIAC SHUNTING: No ductal shunt was detected by Doppler.    PERICARDIUM: There was no pericardial effusion. The pericardium was normal in  appearance.    SYSTEM MEASUREMENT TABLES  MM  %FS: 28.2 %  Ao Diam: 1.8 cm  D-E [place]: 1.4 cm  E-F [place]: 0.1 m/s  EDV(Teich): 70.7 ml  EF(Teich): 55 %  ESV(Teich): 31.9 ml  IVSd: 0.6 cm  LA Diam: 1.6 cm  LA/Ao: 0.9  LVIDd: 4 cm  LVIDs: 2.9 cm  LVPWd: 0.6 cm  LVPWs: 0 cm  SV(Teich): 38.9 ml    Prepared and electronically signed by    Christiano Alberts MD  Signed 17-Jun-2016 08:43:44    No results found for this or any previous visit.    --------------------------------------------------------------------------------  HOLTER  No results found for this or any previous visit.    --------------------------------------------------------------------------------  CAROTIDS  No results found for this or any previous visit.       Diagnoses and all orders for this visit:    Mitral valve prolapse  -     POCT ECG  -     Echo complete w/ contrast if indicated; Future  -     Holter monitor; Future    Palpitations  -     Echo complete w/ contrast if indicated; Future  -     Holter monitor; Future       ======================================================          Review of Systems  ROS as noted above, otherwise 12 point  "review of systems was performed and is negative.     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    Wears glasses      Past Surgical History:   Procedure Laterality Date    TONSILECTOMY AND ADNOIDECTOMY      WISDOM TOOTH EXTRACTION       Social History     Substance and Sexual Activity   Alcohol Use Not Currently    Comment: Social     Social History     Substance and Sexual Activity   Drug Use No     Social History     Tobacco Use   Smoking Status Never    Passive exposure: Past   Smokeless Tobacco Never     Family History   Problem Relation Age of Onset    Breast cancer Mother 28    No Known Problems Father     Breast cancer Maternal Grandmother     Breast cancer Maternal Grandfather     Depression Maternal Aunt     Anxiety disorder Maternal Aunt     Colon cancer Neg Hx     Ovarian cancer Neg Hx        Meds/Allergies   Hospital Medications:   No current facility-administered medications for this visit.     Home Medications: (Not in a hospital admission)      Allergies   Allergen Reactions    Pineapple - Food Allergy Other (See Comments)     Tingling tongue and throat    Pollen Extract Sneezing     congestion    Bee Pollen Sneezing     congestion         Portions of the record may have been created with voice recognition software.  Occasional wrong words or \"sound a like\" substitutions may have occurred due to the inherent limitations of voice recognition software.  Read the chart carefully and recognize, using context, where substitutions have occurred.        I have spent a total of 50 min in " reviewing and/or ordering tests, medications, or procedures, performing an examination or evaluation, reviewing pertinent history, counseling and educating the patient, referring and/or communicating with other health care professionals, documenting in the EMR and general coordination of care of the patient today.

## 2024-01-09 ENCOUNTER — CLINICAL SUPPORT (OUTPATIENT)
Dept: NUTRITION | Facility: CLINIC | Age: 24
End: 2024-01-09
Payer: COMMERCIAL

## 2024-01-09 VITALS — BODY MASS INDEX: 20.41 KG/M2 | WEIGHT: 134.2 LBS

## 2024-01-09 DIAGNOSIS — R11.0 NAUSEA: Primary | ICD-10-CM

## 2024-01-09 PROCEDURE — 97803 MED NUTRITION INDIV SUBSEQ: CPT | Performed by: DIETITIAN, REGISTERED
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
## 2024-01-24 ENCOUNTER — TELEMEDICINE (OUTPATIENT)
Dept: FAMILY MEDICINE CLINIC | Facility: CLINIC | Age: 24
End: 2024-01-24
Payer: COMMERCIAL

## 2024-01-24 VITALS — BODY MASS INDEX: 20.31 KG/M2 | TEMPERATURE: 98.5 F | HEIGHT: 68 IN | WEIGHT: 134 LBS

## 2024-01-24 DIAGNOSIS — J11.1 INFLUENZA: Primary | ICD-10-CM

## 2024-01-24 PROCEDURE — 99213 OFFICE O/P EST LOW 20 MIN: CPT | Performed by: NURSE PRACTITIONER

## 2024-01-24 RX ORDER — OXYCODONE HYDROCHLORIDE 5 MG/1
TABLET ORAL
COMMUNITY
Start: 2024-01-12 | End: 2024-01-24 | Stop reason: ALTCHOICE

## 2024-01-24 NOTE — PROGRESS NOTES
Virtual Regular Visit    Verification of patient location:    Patient is located at Home in the following state in which I hold an active license [place]      Assessment/Plan:    Problem List Items Addressed This Visit    None  Visit Diagnoses       Influenza    -  Primary          Upper respiratory symptoms started 4-5 days ago, with exposure to influenza.   Suspected influenza, past time for tamiflu to be of benefit.   Continue supportive care, rest, fluids, warm tea, honey.   May use OTC Mucinex or Robitussin as needed, Tylenol or ibuprofen as needed. Cepacol lozenges as needed.   Suspect symptoms will start to improve over the next 2-3 days, may take cough and fatigue 1-2 weeks to resolve.   Call if symptoms are worsening or persisting more than one week.          Reason for visit is   Chief Complaint   Patient presents with    Cold Like Symptoms     Cough, congestion, fevers 6 + days    Virtual Regular Visit        Encounter provider DOUG Cabrales    Provider located at [address] [place] [id number]      Recent Visits  No visits were found meeting these conditions.  Showing recent visits within past 7 days and meeting all other requirements  Today's Visits  Date Type Provider Dept   01/24/24 Telemedicine DOUG Cabrales [facility]   Showing today's visits and meeting all other requirements  Future Appointments  No visits were found meeting these conditions.  Showing future appointments within next 150 days and meeting all other requirements       The patient was identified by name and date of birth. Martha Hollis was informed that this is a telemedicine visit and that the visit is being conducted through the Epic Embedded platform. She agrees to proceed..  My office door was closed. No one else was in the room.  She acknowledged consent and understanding of privacy and security of the video platform. The patient has agreed to participate and  understands they can discontinue the visit at any time.    Patient is aware this is a billable service.     Subjective        Martha Hollis is a 23 year old female presenting today for flu like symptoms.     Symptoms started 4-5 days ago.   Last 3 days has had fever 100-102.     Symptoms include:  Nasal congestion  Rhinorrhea  Sore throat  Body aches  Fatigue  Cough    Initially had nausea, vomiting once.     Chest hurts with cough.   Mild shortness of breath.     Just stated using Tylenol yesterday.   Is using ibuprofen, Robitussin, Mucinex day/night cold medication.     Did not take a COVID-19 test.     Her boyfriend and his mother have been sick with similar symptoms.   Her boyfriend's mother went to the doctor one week ago, and tested positive for influenza.            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 180 capsule 1    multivitamin (THERAGRAN) TABS Take 1 tablet by mouth daily      norethindrone (Ortho Micronor) 0.35 MG tablet Take 1 tablet (0.35 mg total) by mouth daily 84 tablet 1    nortriptyline (PAMELOR) 10 mg capsule TAKE 1 CAPSULE AT BEDTIME  "FOR 7 DAYS, THEN INCREASE DOSE TO 2 CAPSULES AT BEDTIME 180 capsule 1    ondansetron (Zofran ODT) 4 mg disintegrating tablet Take 1 tablet (4 mg total) by mouth every 6 (six) hours as needed for nausea or vomiting 20 tablet 1     No current facility-administered medications for this visit.        Allergies   Allergen Reactions    Pineapple - Food Allergy Other (See Comments)     Tingling tongue and throat    Pollen Extract Sneezing     congestion    Bee Pollen Sneezing     congestion       Review of Systems   Constitutional:  Positive for chills, diaphoresis, fatigue and fever.   HENT:  Positive for congestion, postnasal drip, rhinorrhea and sore throat. Negative for ear pain.    Respiratory:  Positive for cough and shortness of breath. Negative for chest tightness and wheezing.    Cardiovascular:  Positive for chest pain (chest hurts with cough).   Gastrointestinal:  Positive for nausea and vomiting. Negative for abdominal pain and diarrhea.   Musculoskeletal:  Positive for myalgias.   Neurological:  Negative for headaches.       Video Exam    Vitals:    01/24/24 0907   Temp: 98.5 °F (36.9 °C)   TempSrc: Oral   Weight: 60.8 kg (134 lb)   Height: 5' 8\" (1.727 m)       Physical Exam  Vitals and nursing note reviewed.   Constitutional:       Appearance: She is ill-appearing.   Pulmonary:      Effort: Pulmonary effort is normal. No respiratory distress.   Neurological:      Mental Status: She is alert.   Psychiatric:         Mood and Affect: Mood normal.          Visit Time  Total Visit Duration: 8 minutes        "

## 2024-01-27 ENCOUNTER — OFFICE VISIT (OUTPATIENT)
Dept: URGENT CARE | Facility: CLINIC | Age: 24
End: 2024-01-27
Payer: COMMERCIAL

## 2024-01-27 VITALS
OXYGEN SATURATION: 99 % | RESPIRATION RATE: 16 BRPM | DIASTOLIC BLOOD PRESSURE: 73 MMHG | HEIGHT: 68 IN | HEART RATE: 107 BPM | TEMPERATURE: 99 F | SYSTOLIC BLOOD PRESSURE: 115 MMHG | BODY MASS INDEX: 18.79 KG/M2 | WEIGHT: 124 LBS

## 2024-01-27 DIAGNOSIS — J20.9 ACUTE BRONCHITIS, UNSPECIFIED ORGANISM: ICD-10-CM

## 2024-01-27 DIAGNOSIS — J02.9 SORE THROAT: Primary | ICD-10-CM

## 2024-01-27 LAB — S PYO AG THROAT QL: NEGATIVE

## 2024-01-27 PROCEDURE — 87880 STREP A ASSAY W/OPTIC: CPT

## 2024-01-27 PROCEDURE — 99213 OFFICE O/P EST LOW 20 MIN: CPT

## 2024-01-27 RX ORDER — ALBUTEROL SULFATE 90 UG/1
2 AEROSOL, METERED RESPIRATORY (INHALATION) EVERY 6 HOURS PRN
Qty: 8.5 G | Refills: 0 | Status: SHIPPED | OUTPATIENT
Start: 2024-01-27

## 2024-01-27 RX ORDER — AZITHROMYCIN 250 MG/1
TABLET, FILM COATED ORAL
Qty: 6 TABLET | Refills: 0 | Status: SHIPPED | OUTPATIENT
Start: 2024-01-27 | End: 2024-01-31

## 2024-01-27 NOTE — PATIENT INSTRUCTIONS
Take the antibiotics with food.  Finish the entire course of antibiotics.  Use  the inhaler as needed for shortness of breath and wheezing  Use a warm mist humidifier or vaporizer  Hot tea with honey.   Warm saline gargle or throat lozenge may help with a sore throat.  OTC saline nasal sprays   Drink plenty of fluids.  .

## 2024-01-27 NOTE — PROGRESS NOTES
[facility]        NAME: Martha Hollsi is a 23 y.o. female  : 2000    MRN: [id number]  DATE: 2024  TIME: 2:25 PM    Assessment and Plan   Sore throat [J02.9]  1. Sore throat  POCT rapid strepA      2. Acute bronchitis, unspecified organism  azithromycin (ZITHROMAX) 250 mg tablet    albuterol (ProAir HFA) 90 mcg/act inhaler            Patient Instructions     Take the antibiotics with food.  Finish the entire course of antibiotics.  Use  the inhaler as needed for shortness of breath and wheezing  Use a warm mist humidifier or vaporizer  Hot tea with honey.   Warm saline gargle or throat lozenge may help with a sore throat.  OTC saline nasal sprays   Drink plenty of fluids.  Follow up with PCP in 3-5 days.  Proceed to  ER if symptoms worsen.    Chief Complaint     Chief Complaint   Patient presents with    Cold Like Symptoms     Sore throat, cough for about 1 week. Fever resolved, vomiting and diarrhea resolved.         History of Present Illness       Cough  This is a new problem. The current episode started 1 to 4 weeks ago. The cough is Productive of sputum. Associated symptoms include chills, a fever, nasal congestion, postnasal drip, a sore throat, shortness of breath and wheezing. Pertinent negatives include no chest pain.       Review of Systems   Review of Systems   Constitutional:  Positive for activity change, appetite change, chills and fever.   HENT:  Positive for congestion, postnasal drip and sore throat.    Respiratory:  Positive for cough, chest tightness, shortness of breath and wheezing.    Cardiovascular:  Negative for chest pain and palpitations.   Gastrointestinal:  Negative for abdominal pain, nausea and vomiting.   All other systems reviewed and are negative.        Current Medications       Current Outpatient Medications:     albuterol (ProAir HFA) 90 mcg/act inhaler, Inhale 2 puffs every 6 (six) hours as needed for wheezing, Disp: 8.5 g, Rfl: 0    ascorbic acid  (VITAMIN C) 250 MG CHEW, Chew 250 mg daily, Disp: , Rfl:     azithromycin (ZITHROMAX) 250 mg tablet, Take 2 tablets today then 1 tablet daily x 4 days, Disp: 6 tablet, Rfl: 0    divalproex sodium (DEPAKOTE SPRINKLE) 125 MG capsule, Take 1 capsule (125 mg total) by mouth 2 (two) times a day, Disp: 60 capsule, Rfl: 0    esomeprazole (NexIUM) 40 MG capsule, TAKE 1 CAPSULE BY MOUTH 2 TIMES A DAY BEFORE MEALS., Disp: 180 capsule, Rfl: 1    multivitamin (THERAGRAN) TABS, Take 1 tablet by mouth daily, Disp: , Rfl:     norethindrone (Ortho Micronor) 0.35 MG tablet, Take 1 tablet (0.35 mg total) by mouth daily, Disp: 84 tablet, Rfl: 1    nortriptyline (PAMELOR) 10 mg capsule, TAKE 1 CAPSULE AT BEDTIME FOR 7 DAYS, THEN INCREASE DOSE TO 2 CAPSULES AT BEDTIME, Disp: 180 capsule, Rfl: 1    ondansetron (Zofran ODT) 4 mg disintegrating tablet, Take 1 tablet (4 mg total) by mouth every 6 (six) hours as needed for nausea or vomiting, Disp: 20 tablet, Rfl: 1    Current Allergies     Allergies as of 01/27/2024 - Reviewed 01/27/2024   Allergen Reaction Noted    Pineapple - food allergy Other (See Comments) 07/03/2023    Pollen extract Sneezing 12/01/2020    Bee pollen Sneezing 12/01/2020            The following portions of the patient's history were reviewed and updated as appropriate: allergies, current medications, past family history, past medical history, past social history, past surgical history and problem list.     Past Medical History:   Diagnosis Date    Anemia     resolved     Anxiety     Constipation     Endometriosis     GERD (gastroesophageal reflux disease)     Headache(784.0)     Heart murmur     Heavy menstrual bleeding     resolved     Hives     Iron deficiency anemia secondary to inadequate dietary iron intake 10/15/2019    Migraine     resolved     Mitral valve prolapse     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) 10/16/2019    Wears glasses        Past Surgical History:   Procedure Laterality Date     "LAPAROSCOPIC ENDOMETRIOSIS FULGURATION      TONSILECTOMY AND ADNOIDECTOMY      WISDOM TOOTH EXTRACTION         Family History   Problem Relation Age of Onset    Breast cancer Mother 28    No Known Problems Father     Breast cancer Maternal Grandmother     Breast cancer Maternal Grandfather     Depression Maternal Aunt     Anxiety disorder Maternal Aunt     Colon cancer Neg Hx     Ovarian cancer Neg Hx          Medications have been verified.        Objective   /73   Pulse (!) 107   Temp 99 °F (37.2 °C)   Resp 16   Ht 5' 8\" (1.727 m)   Wt 56.2 kg (124 lb)   SpO2 99%   BMI 18.85 kg/m²        Physical Exam     Physical Exam  Vitals and nursing note reviewed.   Constitutional:       General: She is not in acute distress.     Appearance: Normal appearance. She is normal weight. She is not ill-appearing or toxic-appearing.   HENT:      Right Ear: Tympanic membrane normal.      Left Ear: Tympanic membrane normal.      Nose: Congestion present.      Mouth/Throat:      Pharynx: Oropharynx is clear. Posterior oropharyngeal erythema present.   Cardiovascular:      Rate and Rhythm: Normal rate and regular rhythm.      Pulses: Normal pulses.      Heart sounds: Normal heart sounds.   Pulmonary:      Effort: Pulmonary effort is normal.      Breath sounds: Wheezing and rhonchi present.   Skin:     General: Skin is warm and dry.      Capillary Refill: Capillary refill takes less than 2 seconds.   Neurological:      General: No focal deficit present.      Mental Status: She is alert and oriented to person, place, and time.                   "

## 2024-01-29 ENCOUNTER — HOSPITAL ENCOUNTER (OUTPATIENT)
Dept: NON INVASIVE DIAGNOSTICS | Facility: HOSPITAL | Age: 24
Discharge: HOME | End: 2024-01-29
Attending: INTERNAL MEDICINE
Payer: COMMERCIAL

## 2024-01-29 ENCOUNTER — HOSPITAL ENCOUNTER (OUTPATIENT)
Dept: NON INVASIVE DIAGNOSTICS | Facility: HOSPITAL | Age: 24
Discharge: HOME | End: 2024-01-29
Payer: COMMERCIAL

## 2024-01-29 VITALS
HEART RATE: 70 BPM | BODY MASS INDEX: 18.79 KG/M2 | SYSTOLIC BLOOD PRESSURE: 115 MMHG | WEIGHT: 124 LBS | DIASTOLIC BLOOD PRESSURE: 73 MMHG | HEIGHT: 68 IN

## 2024-01-29 DIAGNOSIS — I34.1 MITRAL VALVE PROLAPSE: ICD-10-CM

## 2024-01-29 DIAGNOSIS — R00.2 PALPITATIONS: ICD-10-CM

## 2024-01-29 LAB
AORTIC ROOT: 3.1 CM
AORTIC VALVE MEAN VELOCITY: 7.2 M/S
APICAL FOUR CHAMBER EJECTION FRACTION: 53 %
ASCENDING AORTA: 2.6 CM
AV AREA BY CONTINUOUS VTI: 3.4 CM2
AV AREA PEAK VELOCITY: 3 CM2
AV LVOT MEAN GRADIENT: 1 MMHG
AV LVOT PEAK GRADIENT: 2 MMHG
AV MEAN GRADIENT: 2 MMHG
AV PEAK GRADIENT: 5 MMHG
AV VALVE AREA: 3.36 CM2
AV VELOCITY RATIO: 0.72
BSA FOR ECHO PROCEDURE: 1.67 M2
DOP CALC AO PEAK VEL: 1.07 M/S
DOP CALC AO VTI: 20.06 CM
DOP CALC LVOT AREA: 4.15 CM2
DOP CALC LVOT CARDIAC INDEX: 3.57 L/MIN/M2
DOP CALC LVOT CARDIAC OUTPUT: 5.95 L/MIN
DOP CALC LVOT DIAMETER: 2.3 CM
DOP CALC LVOT PEAK VEL VTI: 16.23 CM
DOP CALC LVOT PEAK VEL: 0.77 M/S
DOP CALC LVOT STROKE INDEX: 39.5 ML/M2
DOP CALC LVOT STROKE VOLUME: 67.4
E WAVE DECELERATION TIME: 120 MS
E/A RATIO: 1.21
FRACTIONAL SHORTENING: 41 (ref 28–44)
INTERVENTRICULAR SEPTUM IN DIASTOLE (PARASTERNAL SHORT AXIS VIEW): 0.6 CM
INTERVENTRICULAR SEPTUM: 0.6 CM (ref 0.6–1.1)
LAAS-AP2: 13.7 CM2
LAAS-AP4: 16.5 CM2
LEFT ATRIUM SIZE: 2.7 CM
LEFT ATRIUM VOLUME (MOD BIPLANE): 28 ML
LEFT ATRIUM VOLUME INDEX (MOD BIPLANE): 16.8 ML/M2
LEFT INTERNAL DIMENSION IN SYSTOLE: 2.6 CM (ref 2.1–4)
LEFT VENTRICLE DIASTOLIC VOLUME (MOD BIPLANE): 72 ML
LEFT VENTRICLE DIASTOLIC VOLUME INDEX (MOD BIPLANE): 43.1 ML/M2
LEFT VENTRICLE SYSTOLIC VOLUME (MOD BIPLANE): 33 ML
LEFT VENTRICLE SYSTOLIC VOLUME INDEX (MOD BIPLANE): 19.8 ML/M2
LEFT VENTRICULAR INTERNAL DIMENSION IN DIASTOLE: 4.4 CM (ref 3.5–6)
LEFT VENTRICULAR POSTERIOR WALL IN END DIASTOLE: 1 CM
LEFT VENTRICULAR STROKE VOLUME: 65 ML
LV EF: 54 %
LVSV (TEICH): 65 ML
MV E'TISSUE VEL-LAT: 15 CM/S
MV E'TISSUE VEL-SEP: 13 CM/S
MV PEAK A VEL: 0.57 M/S
MV PEAK E VEL: 69 CM/S
MV STENOSIS PRESSURE HALF TIME: 35 MS
MV VALVE AREA P 1/2 METHOD: 6.29
RIGHT ATRIUM AREA SYSTOLE A4C: 10.5 CM2
RIGHT VENTRICLE ID DIMENSION: 2.5 CM
SL CV LEFT ATRIUM LENGTH A2C: 5.6 CM
SL CV LV EF: 55
SL CV PED ECHO LEFT VENTRICLE DIASTOLIC VOLUME (MOD BIPLANE) 2D: 89 ML
SL CV PED ECHO LEFT VENTRICLE SYSTOLIC VOLUME (MOD BIPLANE) 2D: 24 ML
TRICUSPID ANNULAR PLANE SYSTOLIC EXCURSION: 2.4 CM

## 2024-01-29 PROCEDURE — 93226 XTRNL ECG REC<48 HR SCAN A/R: CPT

## 2024-01-29 PROCEDURE — 93306 TTE W/DOPPLER COMPLETE: CPT | Performed by: INTERNAL MEDICINE

## 2024-01-29 PROCEDURE — 93306 TTE W/DOPPLER COMPLETE: CPT

## 2024-01-29 PROCEDURE — 93225 XTRNL ECG REC<48 HRS REC: CPT

## 2024-02-01 PROCEDURE — 93227 XTRNL ECG REC<48 HR R&I: CPT | Performed by: INTERNAL MEDICINE

## 2024-02-02 ENCOUNTER — TELEPHONE (OUTPATIENT)
Dept: CARDIOLOGY CLINIC | Facility: CLINIC | Age: 24
End: 2024-02-02

## 2024-02-02 NOTE — TELEPHONE ENCOUNTER
----- Message from Leonel Constantino MD sent at 2/2/2024  8:56 AM EST -----  Please let her know that her Holter monitor was normal, the echocardiogram showed mild mitral valve prolapse and everything else was normal.  Nothing to do at the moment.  We will discuss this at her next visit.  Please have her set up follow-up with me- earliest available (but not an emergency).  ----- Message -----  From: Tanvi Salinas DO  Sent: 2/1/2024  12:17 PM EST  To: Leonel Constantino MD       [FreeTextEntry1] : Recommend Mucinex in addition to antibiotics. Return for follow up in 1-2 wks. Albuterol nebulization treatment every 6 hours.  If there is shortness of breath, rapid breathing, retractions, nasal breathing, go to ED immediately.

## 2024-02-02 NOTE — TELEPHONE ENCOUNTER
I called the patient and l/m to call the office for her test results. Also sent appt request via TEAMS.

## 2024-02-02 NOTE — TELEPHONE ENCOUNTER
----- Message from Leonel Constantino MD sent at 2/2/2024  8:56 AM EST -----  Please let her know that her Holter monitor was normal, the echocardiogram showed mild mitral valve prolapse and everything else was normal.  Nothing to do at the moment.  We will discuss this at her next visit.  Please have her set up follow-up with me- earliest available (but not an emergency).  ----- Message -----  From: Tanvi Salinas DO  Sent: 2/1/2024  12:17 PM EST  To: Leonel Constantino MD

## 2024-03-08 ENCOUNTER — PATIENT MESSAGE (OUTPATIENT)
Dept: FAMILY MEDICINE CLINIC | Facility: CLINIC | Age: 24
End: 2024-03-08

## 2024-03-27 ENCOUNTER — RA CDI HCC (OUTPATIENT)
Dept: OTHER | Facility: HOSPITAL | Age: 24
End: 2024-03-27

## 2024-04-03 ENCOUNTER — APPOINTMENT (OUTPATIENT)
Dept: LAB | Facility: CLINIC | Age: 24
End: 2024-04-03
Payer: COMMERCIAL

## 2024-04-03 ENCOUNTER — OFFICE VISIT (OUTPATIENT)
Dept: FAMILY MEDICINE CLINIC | Facility: CLINIC | Age: 24
End: 2024-04-03
Payer: COMMERCIAL

## 2024-04-03 VITALS
OXYGEN SATURATION: 99 % | RESPIRATION RATE: 16 BRPM | WEIGHT: 125.4 LBS | SYSTOLIC BLOOD PRESSURE: 118 MMHG | HEART RATE: 111 BPM | TEMPERATURE: 98.4 F | BODY MASS INDEX: 19 KG/M2 | DIASTOLIC BLOOD PRESSURE: 72 MMHG | HEIGHT: 68 IN

## 2024-04-03 DIAGNOSIS — R79.0 LOW SERUM FERRITIN LEVEL: ICD-10-CM

## 2024-04-03 DIAGNOSIS — G95.0 SYRINGOMYELIA (HCC): ICD-10-CM

## 2024-04-03 DIAGNOSIS — G43.009 MIGRAINE WITHOUT AURA AND WITHOUT STATUS MIGRAINOSUS, NOT INTRACTABLE: ICD-10-CM

## 2024-04-03 DIAGNOSIS — J30.9 ALLERGIC RHINITIS, UNSPECIFIED SEASONALITY, UNSPECIFIED TRIGGER: ICD-10-CM

## 2024-04-03 DIAGNOSIS — R53.83 OTHER FATIGUE: ICD-10-CM

## 2024-04-03 DIAGNOSIS — K21.9 CHRONIC GERD: ICD-10-CM

## 2024-04-03 DIAGNOSIS — G47.19 EXCESSIVE DAYTIME SLEEPINESS: ICD-10-CM

## 2024-04-03 DIAGNOSIS — N80.9 ENDOMETRIOSIS: ICD-10-CM

## 2024-04-03 DIAGNOSIS — R53.83 OTHER FATIGUE: Primary | ICD-10-CM

## 2024-04-03 LAB
ALBUMIN SERPL BCP-MCNC: 4.6 G/DL (ref 3.5–5)
ALP SERPL-CCNC: 68 U/L (ref 34–104)
ALT SERPL W P-5'-P-CCNC: 17 U/L (ref 7–52)
ANION GAP SERPL CALCULATED.3IONS-SCNC: 10 MMOL/L (ref 4–13)
AST SERPL W P-5'-P-CCNC: 17 U/L (ref 13–39)
BASOPHILS # BLD AUTO: 0.05 THOUSANDS/ÂΜL (ref 0–0.1)
BASOPHILS NFR BLD AUTO: 1 % (ref 0–1)
BILIRUB SERPL-MCNC: 1.26 MG/DL (ref 0.2–1)
BUN SERPL-MCNC: 14 MG/DL (ref 5–25)
CALCIUM SERPL-MCNC: 9.6 MG/DL (ref 8.4–10.2)
CHLORIDE SERPL-SCNC: 103 MMOL/L (ref 96–108)
CO2 SERPL-SCNC: 28 MMOL/L (ref 21–32)
CREAT SERPL-MCNC: 0.72 MG/DL (ref 0.6–1.3)
EOSINOPHIL # BLD AUTO: 0.25 THOUSAND/ÂΜL (ref 0–0.61)
EOSINOPHIL NFR BLD AUTO: 4 % (ref 0–6)
ERYTHROCYTE [DISTWIDTH] IN BLOOD BY AUTOMATED COUNT: 11.8 % (ref 11.6–15.1)
FERRITIN SERPL-MCNC: 82 NG/ML (ref 11–307)
GFR SERPL CREATININE-BSD FRML MDRD: 120 ML/MIN/1.73SQ M
GLUCOSE P FAST SERPL-MCNC: 95 MG/DL (ref 65–99)
HCT VFR BLD AUTO: 43.7 % (ref 34.8–46.1)
HGB BLD-MCNC: 15.1 G/DL (ref 11.5–15.4)
IMM GRANULOCYTES # BLD AUTO: 0.02 THOUSAND/UL (ref 0–0.2)
IMM GRANULOCYTES NFR BLD AUTO: 0 % (ref 0–2)
IRON SATN MFR SERPL: 25 % (ref 15–50)
IRON SERPL-MCNC: 82 UG/DL (ref 50–212)
LYMPHOCYTES # BLD AUTO: 1.96 THOUSANDS/ÂΜL (ref 0.6–4.47)
LYMPHOCYTES NFR BLD AUTO: 30 % (ref 14–44)
MCH RBC QN AUTO: 32.2 PG (ref 26.8–34.3)
MCHC RBC AUTO-ENTMCNC: 34.6 G/DL (ref 31.4–37.4)
MCV RBC AUTO: 93 FL (ref 82–98)
MONOCYTES # BLD AUTO: 0.71 THOUSAND/ÂΜL (ref 0.17–1.22)
MONOCYTES NFR BLD AUTO: 11 % (ref 4–12)
NEUTROPHILS # BLD AUTO: 3.62 THOUSANDS/ÂΜL (ref 1.85–7.62)
NEUTS SEG NFR BLD AUTO: 54 % (ref 43–75)
NRBC BLD AUTO-RTO: 0 /100 WBCS
PLATELET # BLD AUTO: 228 THOUSANDS/UL (ref 149–390)
PMV BLD AUTO: 9.7 FL (ref 8.9–12.7)
POTASSIUM SERPL-SCNC: 3.8 MMOL/L (ref 3.5–5.3)
PROT SERPL-MCNC: 7.3 G/DL (ref 6.4–8.4)
RBC # BLD AUTO: 4.69 MILLION/UL (ref 3.81–5.12)
SODIUM SERPL-SCNC: 141 MMOL/L (ref 135–147)
TIBC SERPL-MCNC: 329 UG/DL (ref 250–450)
TSH SERPL DL<=0.05 MIU/L-ACNC: 2.17 UIU/ML (ref 0.45–4.5)
UIBC SERPL-MCNC: 247 UG/DL (ref 155–355)
WBC # BLD AUTO: 6.61 THOUSAND/UL (ref 4.31–10.16)

## 2024-04-03 PROCEDURE — 85025 COMPLETE CBC W/AUTO DIFF WBC: CPT

## 2024-04-03 PROCEDURE — 82728 ASSAY OF FERRITIN: CPT

## 2024-04-03 PROCEDURE — 83540 ASSAY OF IRON: CPT

## 2024-04-03 PROCEDURE — 84443 ASSAY THYROID STIM HORMONE: CPT

## 2024-04-03 PROCEDURE — 99214 OFFICE O/P EST MOD 30 MIN: CPT | Performed by: FAMILY MEDICINE

## 2024-04-03 PROCEDURE — 80053 COMPREHEN METABOLIC PANEL: CPT

## 2024-04-03 PROCEDURE — 36415 COLL VENOUS BLD VENIPUNCTURE: CPT

## 2024-04-03 PROCEDURE — 83550 IRON BINDING TEST: CPT

## 2024-04-03 RX ORDER — LEVOCETIRIZINE DIHYDROCHLORIDE 5 MG/1
5 TABLET, FILM COATED ORAL EVERY EVENING
Qty: 90 TABLET | Refills: 1 | Status: SHIPPED | OUTPATIENT
Start: 2024-04-03

## 2024-04-03 RX ORDER — ESOMEPRAZOLE MAGNESIUM 40 MG/1
40 CAPSULE, DELAYED RELEASE ORAL DAILY
Qty: 90 CAPSULE | Refills: 1 | Status: SHIPPED | OUTPATIENT
Start: 2024-04-03

## 2024-04-03 NOTE — PROGRESS NOTES
Name: Martha Hollis      : 2000      MRN: [id number]  Encounter Provider: Roya Chan MD  Encounter Date: 4/3/2024   Encounter department: [facility]    Assessment & Plan     1. Other fatigue  -     CBC and differential; Future  -     Comprehensive metabolic panel; Future  -     TSH, 3rd generation; Future    2. Low serum ferritin level  Assessment & Plan:  History of iron deficiency due to menorrhagia.  Most recent Venofer infusions in 2023.  Patient reports significant daytime fatigue and is concerned about recurrence of iron deficiency anemia.  Proceed with blood work.  Finalize treatment plans based on those results.    Orders:  -     Iron Panel (Includes Ferritin, Iron Sat%, Iron, and TIBC); Future    3. Chronic GERD  -     esomeprazole (NexIUM) 40 MG capsule; Take 1 capsule (40 mg total) by mouth daily    4. Syringomyelia (HCC)  Assessment & Plan:  No complaints of back pain, diagnosed as a child      5. Allergic rhinitis, unspecified seasonality, unspecified trigger  -     levocetirizine (XYZAL) 5 MG tablet; Take 1 tablet (5 mg total) by mouth every evening    6. Endometriosis  Assessment & Plan:  Diagnosed by GYN at [facility].  Status post diagnostic laparoscopy and endometrial lesion excision 2024  Started on Slynd/progesterone only BCP      7. Migraine without aura and without status migrainosus, not intractable  Assessment & Plan:  Pamelor 20 mg qhs  Migraine headaches have been well-controlled on Rx.      8. Excessive daytime sleepiness  Assessment & Plan:  Previously evaluated by sleep medicine.  Home sleep study 2023: No evidence of AGNIESZKA, normal O2      Persistent daytime fatigue.  Negative sleep study 2023.  Headaches/migraines have been well-controlled with start of nortriptyline.  No complaints of GI symptoms.  No Rx for anxiety/depression, patient reports good symptom control.  Proceed with labs.  May consider reducing  dose of nortriptyline if no etiology of symptoms by blood work.         Subjective     Patient presents for evaluation of persistent fatigue.  She is concerned about recurrence of iron deficiency anemia.  Status post GYN  procedure in January at [facility],hysteroscopy and  Dx laporoscopy with endometriosis lesion excision    Dx of endometriosis back in 1/2024  Reports that she experienced vaginal bleeding postop that has lasted a few weeks.  She was started on progesterone only BCPS shortly after.  Previously she used Micronor and Rx caused mood swings and irregular bleeding, currently she is on Slynd     No iron supplements.  Uses multivitamin daily.  Complains of chronic pelvic pain.  She is on a second cycle of Slynd and will follow-up with GYN regarding her progress.  Sleep is normal.  Appetite is good.  Occasional burping and belching, not excessive.  Patient is concerned about possible iron deficiency.  She received 4 iron infusions back in August 2023, she reports that her hemoglobin was reportedly low at the time of GYN surgery in January 2024.    She remains on Zyrtec-D for chronic nasal congestion.    Symptoms of IBS have been well-controlled on nortriptyline, patient takes 2 tabs (20 mg) nightly.    Patient denies symptoms of depression/anxiety.          Fatigue  Associated symptoms include fatigue.     Review of Systems   Constitutional:  Positive for fatigue. Negative for appetite change.   Eyes: Negative.    Respiratory: Negative.     Cardiovascular: Negative.    Endocrine: Negative.    Genitourinary:  Positive for pelvic pain.   Musculoskeletal: Negative.    Allergic/Immunologic: Negative.    Neurological: Negative.    Hematological: Negative.    Psychiatric/Behavioral: Negative.         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   • Wears glasses      Past Surgical History:   Procedure Laterality Date   • LAPAROSCOPIC ENDOMETRIOSIS FULGURATION     • TONSILECTOMY AND ADNOIDECTOMY     • WISDOM TOOTH EXTRACTION       Family History   Problem Relation Age of Onset   • Breast cancer Mother 28   • No Known Problems Father    • Breast cancer Maternal Grandmother    • Breast cancer Maternal Grandfather    • Depression Maternal Aunt    • Anxiety disorder Maternal Aunt    • Colon cancer Neg Hx    • Ovarian cancer Neg Hx      Social History     Socioeconomic History   • Marital status: Single     Spouse name: None   • Number of children: 0   • Years of education: None   • Highest education level: None   Occupational History   • Occupation: student   Tobacco Use   • Smoking status: Never     Passive exposure: Past   • Smokeless tobacco: Never   Vaping Use   • Vaping status: Never Used   Substance and Sexual Activity   • Alcohol use: Not Currently     Comment: Social   • Drug use: No   • Sexual activity: Yes     Partners: Male     Birth control/protection: OCP   Other Topics Concern   • None   Social History Narrative    Daily caffeine consumption -- coffee 1x weekly    Does not exercise        Who lives in your home: Mom     What type of home do you live in: Other condo     Age of your home: Built 14 yrs ago     How long have you been living there: 2 yrs     Type of heat: Forced hot air    Type of fuel: Electric    What type of naomi is in your bedroom: Carpet    Do you have the following in or near your home:    Air products: Central air and Humidifier    Pests: None    Pets: Dog and Rabbit    Are pets allowed in bedroom: Yes    Open fields, wooded areas nearby: Open fields and Wooded areas    Basement:  None    Exposure to second hand smoke: Yes boyfriends house         Habits:    Caffeine: coffee 1 cup daily -    Chocolate: occasionally     Other:     Social Determinants of Health     Financial Resource Strain: Not on file   Food Insecurity: Not on file   Transportation Needs: Not on file   Physical Activity: Insufficiently Active (5/5/2021)    Exercise Vital Sign    • Days of Exercise per Week: 1 day    • Minutes of Exercise per Session: 60 min   Stress: Not on file   Social Connections: Not on file   Intimate Partner Violence: Unknown (7/28/2022)    Received from [facility]    Intimate Partner Violence    • Within the last year, have you been afraid of your partner, ex-partner or family member?: Not on file    • Within the last year, have you been humiliated or emotionally abused in other ways by your partner, ex-partner or family member?: Not on file    • Within the last year, have you been kicked, hit, slapped, or otherwise physically hurt by your partner, ex-partner or family member?: Not on file    • Within the last year, have you been raped or forced to have any kind of sexual activity by your partner, ex-partner or family member?: Not on file   Housing Stability: Not on file     Current Outpatient Medications on File Prior to Visit   Medication Sig   • albuterol (ProAir HFA) 90 mcg/act inhaler Inhale 2 puffs every 6 (six) hours as needed for wheezing   • ascorbic acid (VITAMIN C) 250 MG CHEW Chew 250 mg daily   • divalproex sodium (DEPAKOTE SPRINKLE) 125 MG capsule Take 1 capsule (125 mg total) by mouth 2 (two) times a day   • Drospirenone (SLYND PO) Take by mouth   • multivitamin (THERAGRAN) TABS Take 1 tablet by mouth daily   • nortriptyline (PAMELOR) 10 mg capsule TAKE 1 CAPSULE AT BEDTIME FOR 7 DAYS, THEN INCREASE DOSE TO 2 CAPSULES AT BEDTIME   • ondansetron (Zofran ODT) 4 mg disintegrating tablet Take 1 tablet (4 mg total) by mouth every 6 (six) hours as needed for nausea or vomiting  "    Allergies   Allergen Reactions   • Pineapple - Food Allergy Other (See Comments)     Tingling tongue and throat   • Pollen Extract Sneezing     congestion   • Bee Pollen Sneezing     congestion     Immunization History   Administered Date(s) Administered   • COVID-19 PFIZER VACCINE 0.3 ML IM 05/14/2021, 06/05/2021, 02/20/2022   • COVID-19 Pfizer vac (Robinson-sucrose, gray cap) 12 yr+ IM 02/20/2022   • DTaP 5 02/13/2003, 04/14/2003, 06/16/2003, 06/29/2004, 03/02/2007   • HPV9 02/03/2017, 08/14/2017   • Hep B, adult 2000, 01/13/2003, 09/16/2003   • Hib (PRP-OMP) 02/13/2003, 04/14/2003, 06/16/2003, 03/25/2004   • INFLUENZA 01/10/2023   • IPV 02/13/2003, 04/14/2003, 06/29/2004, 03/02/2007   • MMR 03/25/2004, 04/05/2007   • Meningococcal MCV4, Unspecified 08/15/2014, 08/16/2019   • Meningococcal MCV4P 08/15/2014, 08/16/2019   • Meningococcal, Unknown Serogroups 08/15/2014   • Pneumococcal Polysaccharide PPV23 02/13/2003   • Tdap 08/15/2014   • Tuberculin Skin Test-PPD Intradermal 06/25/2021   • Varicella 12/30/2003, 04/05/2007       Objective     /72 (BP Location: Left arm, Patient Position: Sitting, Cuff Size: Standard)   Pulse (!) 111   Temp 98.4 °F (36.9 °C) (Temporal)   Resp 16   Ht 5' 8\" (1.727 m)   Wt 56.9 kg (125 lb 6.4 oz)   LMP  (LMP Unknown)   SpO2 99%   BMI 19.07 kg/m²     Physical Exam  Vitals and nursing note reviewed.   Constitutional:       General: She is not in acute distress.     Appearance: Normal appearance. She is well-developed. She is not ill-appearing.   HENT:      Head: Normocephalic and atraumatic.   Eyes:      Conjunctiva/sclera: Conjunctivae normal.   Neck:      Thyroid: No thyromegaly.      Vascular: No carotid bruit.   Cardiovascular:      Rate and Rhythm: Normal rate and regular rhythm.      Heart sounds: Normal heart sounds. No murmur heard.  Pulmonary:      Effort: Pulmonary effort is normal. No respiratory distress.      Breath sounds: Normal breath sounds. No " wheezing.   Abdominal:      General: There is no abdominal bruit.   Musculoskeletal:         General: Normal range of motion.      Cervical back: Neck supple.   Neurological:      General: No focal deficit present.      Mental Status: She is alert and oriented to person, place, and time.      Cranial Nerves: No cranial nerve deficit.      Coordination: Coordination normal.   Psychiatric:         Mood and Affect: Mood normal.         Behavior: Behavior normal.       Roya Chan MD

## 2024-04-06 PROBLEM — R11.2 PONV (POSTOPERATIVE NAUSEA AND VOMITING): Status: RESOLVED | Noted: 2022-06-08 | Resolved: 2024-04-06

## 2024-04-06 PROBLEM — Z98.890 PONV (POSTOPERATIVE NAUSEA AND VOMITING): Status: RESOLVED | Noted: 2022-06-08 | Resolved: 2024-04-06

## 2024-04-06 NOTE — ASSESSMENT & PLAN NOTE
Previously evaluated by sleep medicine.  Home sleep study September 2023: No evidence of AGNIESZKA, normal O2

## 2024-04-06 NOTE — ASSESSMENT & PLAN NOTE
History of iron deficiency due to menorrhagia.  Most recent Venofer infusions in August 2023.  Patient reports significant daytime fatigue and is concerned about recurrence of iron deficiency anemia.  Proceed with blood work.  Finalize treatment plans based on those results.

## 2024-04-06 NOTE — ASSESSMENT & PLAN NOTE
Diagnosed by GYN at [facility].  Status post diagnostic laparoscopy and endometrial lesion excision January 2024  Started on Slynd/progesterone only BCP

## 2024-04-08 ENCOUNTER — TELEPHONE (OUTPATIENT)
Dept: FAMILY MEDICINE CLINIC | Facility: CLINIC | Age: 24
End: 2024-04-08

## 2024-04-09 NOTE — TELEPHONE ENCOUNTER
Please contact patient.    Blood work was normal.  Specifically there is no evidence of anemia or iron deficiency.  No indication for iron infusions.    I recommend to reduce dose of nortriptyline from 2 capsules at bedtime to 1 capsule at bedtime.  If she starts experiencing  increased frequency of headaches or GI symptoms on the lower dose of nortriptyline-please ask her to let me know.    If patient will not notice any improvement of tiredness on lower dose of nortriptyline-please ask her to message me in 2 to 3 weeks.    Thank you

## 2024-04-09 NOTE — TELEPHONE ENCOUNTER
Called patient, no answer. Left message to contact office back for results and changes.   Going to send my chart message also.

## 2024-04-18 ENCOUNTER — PATIENT MESSAGE (OUTPATIENT)
Dept: FAMILY MEDICINE CLINIC | Facility: CLINIC | Age: 24
End: 2024-04-18

## 2024-04-19 NOTE — ASSESSMENT & PLAN NOTE
Chronic intermittent constipation  Long discussion about importance of high-fiber diet  Trial of MiraLax and probiotic 
Doing well on Prozac 40 mg daily
· Persistent alteration of taste and  smell   · ENT eval
room air

## 2024-04-22 DIAGNOSIS — R53.83 OTHER FATIGUE: Primary | ICD-10-CM

## 2024-05-01 ENCOUNTER — APPOINTMENT (OUTPATIENT)
Age: 24
End: 2024-05-01
Payer: COMMERCIAL

## 2024-05-01 DIAGNOSIS — R53.83 OTHER FATIGUE: ICD-10-CM

## 2024-05-01 LAB
25(OH)D3 SERPL-MCNC: 27 NG/ML (ref 30–100)
ANA SER QL IA: NEGATIVE
B BURGDOR IGG+IGM SER QL IA: NEGATIVE
CRP SERPL QL: <1 MG/L
ERYTHROCYTE [SEDIMENTATION RATE] IN BLOOD: 6 MM/HOUR (ref 0–19)
VIT B12 SERPL-MCNC: 290 PG/ML (ref 180–914)

## 2024-05-01 PROCEDURE — 86140 C-REACTIVE PROTEIN: CPT

## 2024-05-01 PROCEDURE — 36415 COLL VENOUS BLD VENIPUNCTURE: CPT

## 2024-05-01 PROCEDURE — 86235 NUCLEAR ANTIGEN ANTIBODY: CPT

## 2024-05-01 PROCEDURE — 86038 ANTINUCLEAR ANTIBODIES: CPT

## 2024-05-01 PROCEDURE — 86364 TISS TRNSGLTMNASE EA IG CLAS: CPT

## 2024-05-01 PROCEDURE — 85652 RBC SED RATE AUTOMATED: CPT

## 2024-05-01 PROCEDURE — 86618 LYME DISEASE ANTIBODY: CPT

## 2024-05-01 PROCEDURE — 86258 DGP ANTIBODY EACH IG CLASS: CPT

## 2024-05-01 PROCEDURE — 82784 ASSAY IGA/IGD/IGG/IGM EACH: CPT

## 2024-05-01 PROCEDURE — 82306 VITAMIN D 25 HYDROXY: CPT

## 2024-05-01 PROCEDURE — 86231 EMA EACH IG CLASS: CPT

## 2024-05-01 PROCEDURE — 82607 VITAMIN B-12: CPT

## 2024-05-01 PROCEDURE — 86430 RHEUMATOID FACTOR TEST QUAL: CPT

## 2024-05-02 LAB
ENA SS-A AB SER-ACNC: <0.2 AI (ref 0–0.9)
ENA SS-B AB SER-ACNC: <0.2 AI (ref 0–0.9)
ENDOMYSIUM IGA SER QL: NEGATIVE
GLIADIN PEPTIDE IGA SER-ACNC: 3 UNITS (ref 0–19)
GLIADIN PEPTIDE IGG SER-ACNC: 3 UNITS (ref 0–19)
IGA SERPL-MCNC: 112 MG/DL (ref 87–352)
RHEUMATOID FACT SER QL LA: NEGATIVE
TTG IGA SER-ACNC: <2 U/ML (ref 0–3)
TTG IGG SER-ACNC: 4 U/ML (ref 0–5)

## 2024-05-06 ENCOUNTER — OFFICE VISIT (OUTPATIENT)
Dept: PSYCHIATRY | Facility: CLINIC | Age: 24
End: 2024-05-06
Payer: COMMERCIAL

## 2024-05-06 DIAGNOSIS — F33.9 DEPRESSION, RECURRENT (HCC): Primary | ICD-10-CM

## 2024-05-06 DIAGNOSIS — F40.10 SOCIAL ANXIETY DISORDER: ICD-10-CM

## 2024-05-06 DIAGNOSIS — F41.1 GAD (GENERALIZED ANXIETY DISORDER): ICD-10-CM

## 2024-05-06 PROCEDURE — 99214 OFFICE O/P EST MOD 30 MIN: CPT | Performed by: PHYSICIAN ASSISTANT

## 2024-05-06 RX ORDER — DIVALPROEX SODIUM 125 MG/1
125 CAPSULE, COATED PELLETS ORAL 2 TIMES DAILY
Qty: 60 CAPSULE | Refills: 0 | Status: SHIPPED | OUTPATIENT
Start: 2024-05-06

## 2024-05-07 NOTE — PSYCH
"MEDICATION MANAGEMENT NOTE        [facility] - [facility]YCHI[facility]RIC [facility]OCIATES   [facility] [place]  [address]   [facility] [place] [id number] [phone]  This note was not shared with the patient due to this is a psychotherapy note        Name and Date of Birth:  Martha Hollis 23 y.o. 2000    Date of Visit: May 6, 2024/seen with Temitope Whiting with pt's permission    SUBJECTIVE:       Martha last seen by Daniel 12/12/23 at which time pamelor cont and low dose depakote added.  Martha did have gyne procedure for endometriosis in Jan and had IUD placed couple of weeks ago secondary to ongoing excessive menstrual bleeding and iron def. Continues to struggle with mood.  Just had intake with new therapist in [place] who she will be seeing regularly and in person.         Primary symptoms reported today: mood reactivity accompanied by anger, throwing things and crying; muscle tension, emotional sensitivity, hopelessness, helplessness, non-refreshing sleep (\"I can't get comfortable\") with daytime drowsiness; excessive worry/ruminations/can;t shut mind off.  Denies nightmares.  Denies SI.  Denies urges to self-injure and has not done so in several months.  Is struggling in school.  Recent conflict with boyfriend's family.        Thought depakote was effective to help control mood last visit.  Tolerated it.     Review of Systems   Constitutional:  Positive for fatigue. Negative for activity change.   Genitourinary:         Has IUD   Psychiatric/Behavioral:  Positive for sleep disturbance.        Past Psychiatric History     Inpatient:  None  [facility]    No hx of suicide attempts.      Outpatient:  Therapist- Abby Martínez -[place]- x 6-7 yrs.   [facility]  This office since April 2022     Med trials: lexapro -more depressed.       Trauma/Loss History:           Abandonment by bio " father.  Mother's illness  (stage 4 metastatic breast cancer)     Family Psychiatric History:      Psychiatric Illness:      Depression- grandfather; anxiety -aunt  Substance Abuse:       none reported  Suicide Attempts:        uncle     Social History:         Student at [employer]- on line classes currently.   Had IEP- learning disability- comprehension.  Single.  Living with boyfriend.       OBJECTIVE:     MENTAL STATUS EXAM  Appearance:  age appropriate, dressed casually   Behavior:  cooperative   Speech:  Normal volume, regular rate and rhythm   Mood:  Low, anxious   Affect:  constricted   Language: intact and appropriate for age, education, and intellect   Thought Process:  goal directed   Associations: intact associations   Thought Content:  negative thinking and cognitive distortions, no overt delusions   Perceptual Disturbances: no auditory or visual hallcunations   Risk Potential / Abnormal Thoughts: Suicidal ideation - None  Homicidal ideation - None  Potential for aggression - No       Consciousness:  Alert & Awake   Sensorium:  Grossly oriented   Attention: attention span and concentration are age appropriate       Fund of Knowledge:  Memory: awareness of current events: yes  recent and remote memory grossly intact   Insight:  fair   Judgment: intact   Muscle Strength Muscle Tone: Grossly normal  normal   Gait/Station: normal gait/station with good balance   Motor Activity: no abnormal movements       Lab Review: I have reviewed all pertinent labs    Lab Results   Component Value Date     06/11/2016    SODIUM 141 04/03/2024    K 3.8 04/03/2024     04/03/2024    CO2 28 04/03/2024    AGAP 10 04/03/2024    BUN 14 04/03/2024    CREATININE 0.72 04/03/2024    GLUC 100 02/26/2023    GLUF 95 04/03/2024    CALCIUM 9.6 04/03/2024    AST 17 04/03/2024    ALT 17 04/03/2024    ALKPHOS 68 04/03/2024    PROT 6.6 06/11/2016    TP 7.3 04/03/2024    BILITOT 2.0 (H) 06/11/2016    TBILI 1.26 (H) 04/03/2024    EGFR  120 04/03/2024     Lab Results   Component Value Date    WBC 6.61 04/03/2024    HGB 15.1 04/03/2024    HCT 43.7 04/03/2024    MCV 93 04/03/2024     04/03/2024     Lab Results   Component Value Date    TVNETAPN76 290 05/01/2024     Lab Results   Component Value Date    TYN2QZSGQPEK 2.166 04/03/2024    TSH 2.62 10/01/2022           ASSESSMENT & PLAN          Diagnoses and all orders for this visit:    Depression, recurrent (HCC)  -     divalproex sodium (DEPAKOTE SPRINKLE) 125 MG capsule; Take 1 capsule (125 mg total) by mouth 2 (two) times a day    MARCOS (generalized anxiety disorder)    Social anxiety disorder        Current Outpatient Medications   Medication Sig Dispense Refill    divalproex sodium (DEPAKOTE SPRINKLE) 125 MG capsule Take 1 capsule (125 mg total) by mouth 2 (two) times a day 60 capsule 0    albuterol (ProAir HFA) 90 mcg/act inhaler Inhale 2 puffs every 6 (six) hours as needed for wheezing 8.5 g 0    ascorbic acid (VITAMIN C) 250 MG CHEW Chew 250 mg daily      Drospirenone (SLYND PO) Take by mouth      esomeprazole (NexIUM) 40 MG capsule Take 1 capsule (40 mg total) by mouth daily 90 capsule 1    levocetirizine (XYZAL) 5 MG tablet Take 1 tablet (5 mg total) by mouth every evening 90 tablet 1    multivitamin (THERAGRAN) TABS Take 1 tablet by mouth daily      nortriptyline (PAMELOR) 10 mg capsule TAKE 1 CAPSULE AT BEDTIME FOR 7 DAYS, THEN INCREASE DOSE TO 2 CAPSULES AT BEDTIME 180 capsule 1    ondansetron (Zofran ODT) 4 mg disintegrating tablet Take 1 tablet (4 mg total) by mouth every 6 (six) hours as needed for nausea or vomiting 20 tablet 1     No current facility-administered medications for this visit.              Plan:        Cont pamelor 20 mg q bedtime and restart depakote 125 mg bid for reactive mood.   Cont f/u with OP therapist    Reviewed risks, benefits, side effects of medications, including no medication.  Patient understands and agrees to treatment plan.   F/u Pa[facility] 4 weeks,  sooner prn       Patient has been informed of 24 hours and weekend coverage for urgent situations accessed by calling the main clinic phone number.     Minna Corona PA-C

## 2024-05-29 DIAGNOSIS — F33.9 DEPRESSION, RECURRENT (HCC): ICD-10-CM

## 2024-06-11 ENCOUNTER — TELEMEDICINE (OUTPATIENT)
Dept: PSYCHIATRY | Facility: CLINIC | Age: 24
End: 2024-06-11

## 2024-06-11 DIAGNOSIS — G47.00 INSOMNIA, UNSPECIFIED TYPE: ICD-10-CM

## 2024-06-11 DIAGNOSIS — F33.9 DEPRESSION, RECURRENT (HCC): Primary | ICD-10-CM

## 2024-06-11 DIAGNOSIS — F40.10 SOCIAL ANXIETY DISORDER: ICD-10-CM

## 2024-06-11 DIAGNOSIS — F41.1 GAD (GENERALIZED ANXIETY DISORDER): ICD-10-CM

## 2024-06-11 DIAGNOSIS — G44.229 CHRONIC TENSION-TYPE HEADACHE, NOT INTRACTABLE: ICD-10-CM

## 2024-06-11 RX ORDER — MELATONIN 10 MG/ML
DROPS ORAL
COMMUNITY
Start: 2024-03-06

## 2024-06-11 RX ORDER — MAGNESIUM 200 MG
TABLET ORAL
COMMUNITY
Start: 2024-03-06

## 2024-06-11 RX ORDER — IBUPROFEN 600 MG/1
TABLET ORAL
COMMUNITY
Start: 2024-05-22

## 2024-06-11 RX ORDER — PROPRANOLOL HYDROCHLORIDE 10 MG/1
10 TABLET ORAL 3 TIMES DAILY
Qty: 90 TABLET | Refills: 0 | Status: SHIPPED | OUTPATIENT
Start: 2024-06-11

## 2024-06-11 RX ORDER — DIVALPROEX SODIUM 125 MG/1
125 CAPSULE, COATED PELLETS ORAL 2 TIMES DAILY
Qty: 180 CAPSULE | Refills: 0 | Status: SHIPPED | OUTPATIENT
Start: 2024-06-11

## 2024-06-11 RX ORDER — NORTRIPTYLINE HYDROCHLORIDE 10 MG/1
CAPSULE ORAL
Qty: 180 CAPSULE | Refills: 1 | Status: SHIPPED | OUTPATIENT
Start: 2024-06-11

## 2024-06-11 NOTE — PSYCH
"  Virtual Regular Visit    Verification of patient location:    Patient is located at Home in the following state in which I hold an active license [place]               Reason for visit is   Chief Complaint   Patient presents with    Virtual Regular Visit          Encounter provider Minna Corona PA-C      Recent Visits  Date Type Provider Dept   06/11/24 Telemedicine [facility]   Showing recent visits within past 7 days and meeting all other requirements  Future Appointments  No visits were found meeting these conditions.  Showing future appointments within next 150 days and meeting all other requirements       The patient was identified by name and date of birth. Martha Hollis was informed that this is a telemedicine visit and that the visit is being conducted throughthe Epic Embedded platform. She agrees to proceed..  My office door was closed. The patient was notified the following individuals were present in the room Temitope Moses.  She acknowledged consent and understanding of privacy and security of the video platform. The patient has agreed to participate and understands they can discontinue the visit at any time.    Patient is aware this is a billable service.         MEDICATION MANAGEMENT NOTE        [facility] - PSYCHIATRIC ASSOCIATES   PS[facility]CH[facility]ATRIC ASSOC [facility]  [address] [facility] [address]  [phone]    This note was not shared with the patient due to this is a psychotherapy note      Name and Date of Birth:  Martha Hollis 23 y.o. 2000    Date of Visit: June 11, 2024    SUBJECTIVE:     Martha last seen by Daniel 5/6 at which time low dose depakote restarted. Martha reports feeling a lot better. Minimal mood reactivity.  Does feel \"emotionally sensitive\" at times and has ongoing concerns about her physical health.  Continues to work with gynecology about ongoing vaginal " "bleeding despite tx for endometriosis. Can get overwhelmed with stress at times and \"shuts down\" and has crying spell.  Chief stressors are her health and finances. Is working as  this summer.  This is going well but hours are variable.  States high anxiety in social settings prevents her from working another type of job- has anticipatory anxiety when has to be in social setting; anxious to go to the store by herself fearing social interactions- anxiety accompanied by shakiness, shortness of breath, nausea, sweatiness.     Review of Systems   Constitutional:  Positive for appetite change.        Decreased   Gastrointestinal:  Negative for abdominal pain, nausea and vomiting.   Genitourinary:  Positive for vaginal bleeding.        Working with gyne       Past Psychiatric History     Inpatient:  None  [facility]    No hx of suicide attempts.      Outpatient:  Therapist- Abby Martínez -[place]- x 6-7 yrs.   Current therapist- [facility], [place].  This office since April 2022     Med trials: lexapro -more depressed.       Trauma/Loss History:           Abandonment by bio father.  Mother's illness  (stage 4 metastatic breast cancer)     Family Psychiatric History:      Psychiatric Illness:      Depression- grandfather; anxiety -aunt  Substance Abuse:       none reported  Suicide Attempts:        uncle     Social History:         Student at [employer]- on line classes currently.   Had IEP- learning disability- comprehension.  Single.  Living with boyfriend and boyfriend's family.       OBJECTIVE:     MENTAL STATUS EXAM  Appearance:  age appropriate, dressed casually   Behavior:  Pleasant & cooperative   Speech:  Normal volume, regular rate and rhythm   Mood:  Low, anxious   Affect:  constricted   Language: intact and appropriate for age, education, and intellect   Thought Process:  goal directed   Associations: intact associations   Thought Content:  negative ruminations   Perceptual " Disturbances: no auditory or visual hallcunations   Risk Potential / Abnormal Thoughts: Suicidal ideation - None  Homicidal ideation - None  Potential for aggression - No       Consciousness:  Alert & Awake   Sensorium:  Grossly oriented   Attention: attention span and concentration are age appropriate       Fund of Knowledge:  Memory: awareness of current events: yes  recent and remote memory grossly intact   Insight:  intact   Judgment: intact     Labs reviewed.     Lab Results   Component Value Date     06/11/2016    SODIUM 141 04/03/2024    K 3.8 04/03/2024     04/03/2024    CO2 28 04/03/2024    AGAP 10 04/03/2024    BUN 14 04/03/2024    CREATININE 0.72 04/03/2024    GLUC 100 02/26/2023    GLUF 95 04/03/2024    CALCIUM 9.6 04/03/2024    AST 17 04/03/2024    ALT 17 04/03/2024    ALKPHOS 68 04/03/2024    PROT 6.6 06/11/2016    TP 7.3 04/03/2024    BILITOT 2.0 (H) 06/11/2016    TBILI 1.26 (H) 04/03/2024    EGFR 120 04/03/2024     Lab Results   Component Value Date    WBC 6.61 04/03/2024    HGB 15.1 04/03/2024    HCT 43.7 04/03/2024    MCV 93 04/03/2024     04/03/2024     Lab Results   Component Value Date    ODQHYVZO57 290 05/01/2024       Lab Results   Component Value Date    FMZ5MDTZALSJ 2.166 04/03/2024    TSH 2.62 10/01/2022           ASSESSMENT & PLAN          Diagnoses and all orders for this visit:    Depression, recurrent (HCC)    Social anxiety disorder  -     propranolol (INDERAL) 10 mg tablet; Take 1 tablet (10 mg total) by mouth 3 (three) times a day    MARCOS (generalized anxiety disorder)    Other orders  -     Cholecalciferol (D3) 50 MCG (2000 UT) CHEW  -     Cyanocobalamin (B-12) 1000 MCG SUBL  -     ibuprofen (MOTRIN) 600 mg tablet; TAKE 1 TABLET BY MOUTH 4 TIMES A DAY FOR 7 DAYS, AS NEEDED FOR HEADACHE  -     Levonorgestrel (MIRENA) 20 MCG/DAY IUD; 1 each by Intrauterine Device route Once every 8 years      Current Outpatient Medications   Medication Sig Dispense Refill     Cholecalciferol (D3) 50 MCG (2000 UT) CHEW       Cyanocobalamin (B-12) 1000 MCG SUBL       ibuprofen (MOTRIN) 600 mg tablet TAKE 1 TABLET BY MOUTH 4 TIMES A DAY FOR 7 DAYS, AS NEEDED FOR HEADACHE      Levonorgestrel (MIRENA) 20 MCG/DAY IUD 1 each by Intrauterine Device route Once every 8 years      propranolol (INDERAL) 10 mg tablet Take 1 tablet (10 mg total) by mouth 3 (three) times a day 90 tablet 0    albuterol (ProAir HFA) 90 mcg/act inhaler Inhale 2 puffs every 6 (six) hours as needed for wheezing 8.5 g 0    azithromycin (ZITHROMAX) 250 mg tablet Take 2 tablets today then 1 tablet daily x 4 days 6 tablet 0    divalproex sodium (DEPAKOTE SPRINKLE) 125 MG capsule TAKE 1 CAPSULE BY MOUTH 2 TIMES A  capsule 0    Drospirenone (SLYND PO) Take by mouth (Patient not taking: Reported on 6/12/2024)      esomeprazole (NexIUM) 40 MG capsule Take 1 capsule (40 mg total) by mouth daily 90 capsule 1    levocetirizine (XYZAL) 5 MG tablet Take 1 tablet (5 mg total) by mouth every evening 90 tablet 1    meloxicam (MOBIC) 7.5 mg tablet Take 7.5 mg by mouth daily      methylPREDNISolone 4 MG tablet therapy pack Use as directed on package 21 each 0    multivitamin (THERAGRAN) TABS Take 1 tablet by mouth daily      nortriptyline (PAMELOR) 10 mg capsule TAKE 1 CAPSULE AT BEDTIME FOR 7 DAYS, THEN INCREASE DOSE TO 2 CAPSULES AT BEDTIME 180 capsule 1    ondansetron (Zofran ODT) 4 mg disintegrating tablet Take 1 tablet (4 mg total) by mouth every 6 (six) hours as needed for nausea or vomiting 20 tablet 1     No current facility-administered medications for this visit.                Plan:       Mood symptoms improved but social anxiety symptoms significant.  Will add propranolol 10 mg tid and cont pamelor 20 mg q bedtime, depakote 125 mg bid.  Cont f/u with ind therapist.    Reviewed risks, benefits, side effects of medications, including no medication.  Patient understands and agrees to treatment plan.   F/u Daniel 7/9/24 1:30,  sooner prn         Patient has been informed of 24 hours and weekend coverage for urgent situations accessed by calling the main clinic phone number.     Minna Corona PA-C

## 2024-06-12 ENCOUNTER — OFFICE VISIT (OUTPATIENT)
Age: 24
End: 2024-06-12
Payer: COMMERCIAL

## 2024-06-12 VITALS
DIASTOLIC BLOOD PRESSURE: 62 MMHG | RESPIRATION RATE: 18 BRPM | WEIGHT: 133.4 LBS | OXYGEN SATURATION: 99 % | HEART RATE: 103 BPM | BODY MASS INDEX: 20.22 KG/M2 | SYSTOLIC BLOOD PRESSURE: 106 MMHG | HEIGHT: 68 IN | TEMPERATURE: 98.3 F

## 2024-06-12 DIAGNOSIS — J01.00 ACUTE NON-RECURRENT MAXILLARY SINUSITIS: Primary | ICD-10-CM

## 2024-06-12 PROCEDURE — S9083 URGENT CARE CENTER GLOBAL: HCPCS | Performed by: NURSE PRACTITIONER

## 2024-06-12 PROCEDURE — G0382 LEV 3 HOSP TYPE B ED VISIT: HCPCS | Performed by: NURSE PRACTITIONER

## 2024-06-12 RX ORDER — AZITHROMYCIN 250 MG/1
TABLET, FILM COATED ORAL
Qty: 6 TABLET | Refills: 0 | Status: SHIPPED | OUTPATIENT
Start: 2024-06-12 | End: 2024-06-16

## 2024-06-12 RX ORDER — METHYLPREDNISOLONE 4 MG/1
TABLET ORAL
Qty: 21 EACH | Refills: 0 | Status: SHIPPED | OUTPATIENT
Start: 2024-06-12

## 2024-06-12 RX ORDER — MELOXICAM 7.5 MG/1
7.5 TABLET ORAL DAILY
COMMUNITY
Start: 2024-04-24

## 2024-06-12 NOTE — PROGRESS NOTES
[facility]        NAME: Martha Hollis is a 23 y.o. female  : 2000    MRN: [id number]  DATE: 2024  TIME: 3:12 PM    Assessment and Plan   Acute non-recurrent maxillary sinusitis [J01.00]  1. Acute non-recurrent maxillary sinusitis  methylPREDNISolone 4 MG tablet therapy pack    azithromycin (ZITHROMAX) 250 mg tablet        Acute symptomatic not responding conservative treatment started Saturday.  And seems to be worsening.  Discussed with patient most likely viral in origin no recommendation at this point for an antibiotic.  Will start Medrol Dosepak educated on side effects proper use of medication increase fluids.  And provided printed paper prescription for Z-Lamin if no improvement throughout the weekend can start taking.  Follow-up with PCP as needed    Patient Instructions       Follow up with PCP in 3-5 days.  Proceed to  ER if symptoms worsen.    If tests have been performed at [facility], our office will contact you with results if changes need to be made to the care plan discussed with you at the visit.  You can review your full results on [facility].    Chief Complaint     Chief Complaint   Patient presents with   • Cold Like Symptoms     Symptoms started Saturday, sinus congestion, PND, cough, headache, neck glands sore. OTC Mucinex decongestant with minimal relief.         History of Present Illness       Sinusitis  This is a new problem. The current episode started in the past 7 days. The problem has been gradually worsening since onset. There has been no fever. The pain is moderate. Associated symptoms include congestion, coughing, headaches, a hoarse voice, sinus pressure, a sore throat and swollen glands. Pertinent negatives include no chills, ear pain, neck pain, shortness of breath or sneezing. Treatments tried: mucinex. The treatment provided mild relief.       Review of Systems   Review of Systems   Constitutional:  Negative for activity change, appetite change,  chills, fatigue and fever.   HENT:  Positive for congestion, hoarse voice, postnasal drip, sinus pressure, sinus pain, sore throat and voice change. Negative for ear pain, rhinorrhea and sneezing.    Respiratory:  Positive for cough. Negative for chest tightness, shortness of breath and wheezing.    Cardiovascular:  Negative for chest pain and palpitations.   Gastrointestinal:  Negative for abdominal pain, constipation, diarrhea, nausea and vomiting.   Musculoskeletal:  Negative for arthralgias, myalgias and neck pain.   Skin:  Negative for color change, pallor and rash.   Neurological:  Positive for headaches. Negative for dizziness, weakness and light-headedness.   Hematological:  Negative for adenopathy.   Psychiatric/Behavioral:  Negative for agitation and confusion.          Current Medications       Current Outpatient Medications:   •  albuterol (ProAir HFA) 90 mcg/act inhaler, Inhale 2 puffs every 6 (six) hours as needed for wheezing, Disp: 8.5 g, Rfl: 0  •  azithromycin (ZITHROMAX) 250 mg tablet, Take 2 tablets today then 1 tablet daily x 4 days, Disp: 6 tablet, Rfl: 0  •  Cholecalciferol (D3) 50 MCG (2000 UT) CHEW, , Disp: , Rfl:   •  Cyanocobalamin (B-12) 1000 MCG SUBL, , Disp: , Rfl:   •  divalproex sodium (DEPAKOTE SPRINKLE) 125 MG capsule, TAKE 1 CAPSULE BY MOUTH 2 TIMES A DAY, Disp: 180 capsule, Rfl: 0  •  esomeprazole (NexIUM) 40 MG capsule, Take 1 capsule (40 mg total) by mouth daily, Disp: 90 capsule, Rfl: 1  •  ibuprofen (MOTRIN) 600 mg tablet, TAKE 1 TABLET BY MOUTH 4 TIMES A DAY FOR 7 DAYS, AS NEEDED FOR HEADACHE, Disp: , Rfl:   •  levocetirizine (XYZAL) 5 MG tablet, Take 1 tablet (5 mg total) by mouth every evening, Disp: 90 tablet, Rfl: 1  •  Levonorgestrel (MIRENA) 20 MCG/DAY IUD, 1 each by Intrauterine Device route Once every 8 years, Disp: , Rfl:   •  meloxicam (MOBIC) 7.5 mg tablet, Take 7.5 mg by mouth daily, Disp: , Rfl:   •  methylPREDNISolone 4 MG tablet therapy pack, Use as directed on  package, Disp: 21 each, Rfl: 0  •  multivitamin (THERAGRAN) TABS, Take 1 tablet by mouth daily, Disp: , Rfl:   •  nortriptyline (PAMELOR) 10 mg capsule, TAKE 1 CAPSULE AT BEDTIME FOR 7 DAYS, THEN INCREASE DOSE TO 2 CAPSULES AT BEDTIME, Disp: 180 capsule, Rfl: 1  •  ondansetron (Zofran ODT) 4 mg disintegrating tablet, Take 1 tablet (4 mg total) by mouth every 6 (six) hours as needed for nausea or vomiting, Disp: 20 tablet, Rfl: 1  •  propranolol (INDERAL) 10 mg tablet, Take 1 tablet (10 mg total) by mouth 3 (three) times a day, Disp: 90 tablet, Rfl: 0  •  Drospirenone (SLYND PO), Take by mouth (Patient not taking: Reported on 6/12/2024), Disp: , Rfl:     Current Allergies     Allergies as of 06/12/2024 - Reviewed 06/12/2024   Allergen Reaction Noted   • Pineapple - food allergy Other (See Comments) 07/03/2023   • Pollen extract Sneezing 12/01/2020   • Bee pollen Sneezing 12/01/2020            The following portions of the patient's history were reviewed and updated as appropriate: allergies, current medications, past family history, past medical history, past social history, past surgical history and problem list.     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        Objective   /62   Pulse 103   Temp 98.3 °F (36.8 °C) (Tympanic)   Resp 18   Ht 5' 8\" (1.727 m)   Wt 60.5 kg (133 lb 6.4 oz)   SpO2 99%   BMI 20.28 kg/m²   No LMP recorded. (Menstrual status: Birth Control).       Physical Exam     Physical Exam  Vitals and nursing note reviewed.   Constitutional:       General: She is not in acute distress.     Appearance: Normal appearance. She is ill-appearing. She is not diaphoretic.   HENT:      Head: Normocephalic and atraumatic.      Right Ear: Tympanic membrane, ear canal and external ear normal. There is no impacted cerumen.      Left Ear: Tympanic membrane, ear canal and external ear normal. There is no impacted cerumen.      Nose: Congestion present. No rhinorrhea.      Right Sinus: Maxillary sinus tenderness and frontal sinus tenderness present.      Left Sinus: Maxillary sinus tenderness and frontal sinus tenderness present.      Mouth/Throat:      Pharynx: Posterior oropharyngeal erythema present.   Eyes:      General: No scleral icterus.        Right eye: No discharge.         Left eye: No discharge.      Conjunctiva/sclera: Conjunctivae normal.   Cardiovascular:      Rate and Rhythm: Normal rate and regular rhythm.   Pulmonary:      Effort: Pulmonary effort is normal. No respiratory distress.      Breath sounds: Normal breath sounds. No stridor. No wheezing, rhonchi or rales.   Musculoskeletal:         General: Normal range of motion.      Cervical back: Normal range of motion.   Lymphadenopathy:      Cervical: Cervical adenopathy present.   Skin:     Coloration: Skin is not jaundiced or pale.      Findings: No bruising, erythema or rash.   Neurological:      General: No focal deficit present.      Mental Status: She is alert and oriented to person, place, and time. "   Psychiatric:         Mood and Affect: Mood normal.         Behavior: Behavior normal.         Thought Content: Thought content normal.         Judgment: Judgment normal.

## 2024-06-17 ENCOUNTER — NURSE TRIAGE (OUTPATIENT)
Age: 24
End: 2024-06-17

## 2024-06-17 NOTE — TELEPHONE ENCOUNTER
"Patient called office c/o brown vaginal discharge and vaginal odor that she has had for \"a while\" , she is unsure when it started. She also has vaginal irritation/itching on the outside of her vagina and redness as well as frequent urination. She denies fever , abdominal pain, pain with urination. Appointment scheduled for tomorrow in office. Advised her to call back if symptoms worsen or fever occurs.           Reason for Disposition   Bad smelling vaginal discharge    Answer Assessment - Initial Assessment Questions  1. DISCHARGE: \"Describe the discharge.\" (e.g., white, yellow, green, gray, foamy, cottage cheese-like)      Brown   2. ODOR: \"Is there a bad odor?\"      yes  3. ONSET: \"When did the discharge begin?\"      Unsure ( its been a while)   4. RASH: \"Is there a rash in that area?\" If Yes, ask: \"Describe it.\" (e.g., redness, blisters, sores, bumps) no rash, just red        5. ABDOMINAL PAIN: \"Are you having any abdominal pain?\" If Yes, ask: \"What does it feel like? \" (e.g., crampy, dull, intermittent, constant)       NO   6. ABDOMINAL PAIN SEVERITY: If present, ask: \"How bad is it?\"  (e.g., mild, moderate, severe)   - MILD - doesn't interfere with normal activities    - MODERATE - interferes with normal activities or awakens from sleep    - SEVERE - patient doesn't want to move (R/O peritonitis)       None   7. CAUSE: \"What do you think is causing the discharge?\" \"Have you had the same problem before? What happened then?\"      Unknown   8. OTHER SYMPTOMS: \"Do you have any other symptoms?\" (e.g., fever, itching, vaginal bleeding, pain with urination, injury to genital area, vaginal foreign body)      Vaginal irritation / itching on outside of vagina , redness on vagina.  Frequent urination.   9. PREGNANCY: \"Is there any chance you are pregnant?\" \"When was your last menstrual period?\"      LMP: beginning of January    Protocols used: Vaginal Discharge-ADULT-[place]    "

## 2024-06-18 ENCOUNTER — OFFICE VISIT (OUTPATIENT)
Dept: OBGYN CLINIC | Facility: CLINIC | Age: 24
End: 2024-06-18
Payer: COMMERCIAL

## 2024-06-18 VITALS
BODY MASS INDEX: 19.91 KG/M2 | DIASTOLIC BLOOD PRESSURE: 58 MMHG | HEIGHT: 68 IN | WEIGHT: 131.4 LBS | SYSTOLIC BLOOD PRESSURE: 110 MMHG | HEART RATE: 83 BPM | OXYGEN SATURATION: 100 %

## 2024-06-18 DIAGNOSIS — R39.9 UTI SYMPTOMS: ICD-10-CM

## 2024-06-18 DIAGNOSIS — N76.0 VULVOVAGINITIS: Primary | ICD-10-CM

## 2024-06-18 LAB
BACTERIA UR QL AUTO: ABNORMAL /HPF
BILIRUB UR QL STRIP: NEGATIVE
BV WHIFF TEST VAG QL: NORMAL
CLARITY UR: CLEAR
CLUE CELLS SPEC QL WET PREP: NORMAL
COLOR UR: YELLOW
GLUCOSE UR STRIP-MCNC: NEGATIVE MG/DL
HGB UR QL STRIP.AUTO: ABNORMAL
KETONES UR STRIP-MCNC: NEGATIVE MG/DL
LEUKOCYTE ESTERASE UR QL STRIP: NEGATIVE
MUCOUS THREADS UR QL AUTO: ABNORMAL
NITRITE UR QL STRIP: NEGATIVE
NON-SQ EPI CELLS URNS QL MICRO: ABNORMAL /HPF
PH SMN: 5 [PH]
PH UR STRIP.AUTO: 6.5 [PH]
PROT UR STRIP-MCNC: NEGATIVE MG/DL
RBC #/AREA URNS AUTO: ABNORMAL /HPF
SP GR UR STRIP.AUTO: 1.02 (ref 1–1.03)
T VAGINALIS VAG QL WET PREP: NORMAL
UROBILINOGEN UR STRIP-ACNC: <2 MG/DL
WBC #/AREA URNS AUTO: ABNORMAL /HPF
YEAST VAG QL WET PREP: NORMAL

## 2024-06-18 PROCEDURE — 87086 URINE CULTURE/COLONY COUNT: CPT | Performed by: PHYSICIAN ASSISTANT

## 2024-06-18 PROCEDURE — 87660 TRICHOMONAS VAGIN DIR PROBE: CPT | Performed by: PHYSICIAN ASSISTANT

## 2024-06-18 PROCEDURE — 87480 CANDIDA DNA DIR PROBE: CPT | Performed by: PHYSICIAN ASSISTANT

## 2024-06-18 PROCEDURE — 87510 GARDNER VAG DNA DIR PROBE: CPT | Performed by: PHYSICIAN ASSISTANT

## 2024-06-18 PROCEDURE — 99214 OFFICE O/P EST MOD 30 MIN: CPT | Performed by: PHYSICIAN ASSISTANT

## 2024-06-18 PROCEDURE — 81001 URINALYSIS AUTO W/SCOPE: CPT | Performed by: PHYSICIAN ASSISTANT

## 2024-06-18 PROCEDURE — 87210 SMEAR WET MOUNT SALINE/INK: CPT | Performed by: PHYSICIAN ASSISTANT

## 2024-06-18 RX ORDER — CLOTRIMAZOLE AND BETAMETHASONE DIPROPIONATE 10; .64 MG/G; MG/G
CREAM TOPICAL 2 TIMES DAILY
Qty: 15 G | Refills: 0 | Status: SHIPPED | OUTPATIENT
Start: 2024-06-18

## 2024-06-18 NOTE — PROGRESS NOTES
Patient complaining of vaginal discharge for 2-3 weeks ago.      Mirena IUD inserted 5/2024 with [facility], saw a specialist there for her endometriosis.  Says she also had pap completed and has f/u.   Recommend she have those records sent to us as unable to view thru epic.    + odor, + itch, thin liquidly brownish discharge, no pelvic pain, + urinary frequency, no urinary urgency, no urinary burning. Patient is with same partner.  She has no STD concerns.  She declines gonorrhea and chlamydia testing. There has been no recent antibiotic use.  She has not changed any soaps or detergents recently.      Current Outpatient Medications on File Prior to Visit   Medication Sig Dispense Refill    albuterol (ProAir HFA) 90 mcg/act inhaler Inhale 2 puffs every 6 (six) hours as needed for wheezing 8.5 g 0    Cholecalciferol (D3) 50 MCG (2000 UT) CHEW       Cyanocobalamin (B-12) 1000 MCG SUBL       divalproex sodium (DEPAKOTE SPRINKLE) 125 MG capsule TAKE 1 CAPSULE BY MOUTH 2 TIMES A  capsule 0    Drospirenone (SLYND PO) Take by mouth (Patient not taking: Reported on 6/12/2024)      esomeprazole (NexIUM) 40 MG capsule Take 1 capsule (40 mg total) by mouth daily 90 capsule 1    ibuprofen (MOTRIN) 600 mg tablet TAKE 1 TABLET BY MOUTH 4 TIMES A DAY FOR 7 DAYS, AS NEEDED FOR HEADACHE      levocetirizine (XYZAL) 5 MG tablet Take 1 tablet (5 mg total) by mouth every evening 90 tablet 1    Levonorgestrel (MIRENA) 20 MCG/DAY IUD 1 each by Intrauterine Device route Once every 8 years      meloxicam (MOBIC) 7.5 mg tablet Take 7.5 mg by mouth daily      methylPREDNISolone 4 MG tablet therapy pack Use as directed on package 21 each 0    multivitamin (THERAGRAN) TABS Take 1 tablet by mouth daily      nortriptyline (PAMELOR) 10 mg capsule TAKE 1 CAPSULE AT BEDTIME FOR 7 DAYS, THEN INCREASE DOSE TO 2 CAPSULES AT BEDTIME 180 capsule 1    ondansetron (Zofran ODT) 4 mg disintegrating tablet Take 1 tablet (4 mg total) by mouth every 6  (six) hours as needed for nausea or vomiting 20 tablet 1    propranolol (INDERAL) 10 mg tablet Take 1 tablet (10 mg total) by mouth 3 (three) times a day 90 tablet 0     No current facility-administered medications on file prior to visit.       Allergies   Allergen Reactions    Pineapple - Food Allergy Other (See Comments)     Tingling tongue and throat    Pollen Extract Sneezing     congestion    Bee Pollen Sneezing     congestion         Vitals:    06/18/24 0929   BP: 110/58   Pulse: 83   SpO2: 100%       Wet mount results:  Trichomonas present: neg  Candida present: neg  Gardnerella present: neg      External genitalia: no lesions, + erythema  Urethra: no discharge or erythema  Bladder: nontender, good support  Vagina: brownish discharge, no lesions  Cervix: no lesions, no cervical motion tenderness, IUD strings visualized  Uterus: NT, normal size and shape  Adnexa: nontender, no masses    Assessment:  Vulvovaginitis  Urinary frequency    Plan:  Suspect bleeding from recent mirena IUD and now irritated wearing daily liners.   affirm done  Patient declines GC/ chlamydia culture   Lotrisone cream BID externally  Stop using always brand liners  Await results  Urine UA and C&S sent due to urinary frequency  Follow if no improvement or worsening symptoms

## 2024-06-19 LAB
BACTERIA UR CULT: NORMAL
CANDIDA RRNA VAG QL PROBE: NOT DETECTED
G VAGINALIS RRNA GENITAL QL PROBE: NOT DETECTED
T VAGINALIS RRNA GENITAL QL PROBE: NOT DETECTED

## 2024-07-04 DIAGNOSIS — F40.10 SOCIAL ANXIETY DISORDER: ICD-10-CM

## 2024-07-09 ENCOUNTER — TELEMEDICINE (OUTPATIENT)
Dept: PSYCHIATRY | Facility: CLINIC | Age: 24
End: 2024-07-09
Payer: COMMERCIAL

## 2024-07-09 DIAGNOSIS — F41.1 GAD (GENERALIZED ANXIETY DISORDER): ICD-10-CM

## 2024-07-09 DIAGNOSIS — G47.00 INSOMNIA, UNSPECIFIED TYPE: ICD-10-CM

## 2024-07-09 DIAGNOSIS — F40.10 SOCIAL ANXIETY DISORDER: ICD-10-CM

## 2024-07-09 DIAGNOSIS — G44.229 CHRONIC TENSION-TYPE HEADACHE, NOT INTRACTABLE: ICD-10-CM

## 2024-07-09 DIAGNOSIS — F33.9 DEPRESSION, RECURRENT (HCC): Primary | ICD-10-CM

## 2024-07-09 PROCEDURE — 99214 OFFICE O/P EST MOD 30 MIN: CPT | Performed by: PHYSICIAN ASSISTANT

## 2024-07-09 RX ORDER — NORTRIPTYLINE HYDROCHLORIDE 10 MG/1
30 CAPSULE ORAL
COMMUNITY
Start: 2024-07-09

## 2024-07-09 RX ORDER — PROPRANOLOL HYDROCHLORIDE 10 MG/1
10 TABLET ORAL 3 TIMES DAILY
Qty: 270 TABLET | Refills: 0 | Status: SHIPPED | OUTPATIENT
Start: 2024-07-09

## 2024-07-09 NOTE — PSYCH
Virtual Regular Visit    Verification of patient location:    Patient is located at Other in the following state in which I hold an active license [place]           Reason for visit is   Chief Complaint   Patient presents with    Virtual Regular Visit          Encounter provider Minna Corona PA-C      Recent Visits  No visits were found meeting these conditions.  Showing recent visits within past 7 days and meeting all other requirements  Today's Visits  Date Type Provider Dept   07/09/24 Telemedicine Minna Corona PA-C  [facility]   Showing today's visits and meeting all other requirements  Future Appointments  No visits were found meeting these conditions.  Showing future appointments within next 150 days and meeting all other requirements       The patient was identified by name and date of birth. Martha Hollis was informed that this is a telemedicine visit and that the visit is being conducted throughthe Epic Embedded platform. She agrees to proceed..  My office door was closed. The patient was notified the following individuals were present in the room MARIEL Alexandre.  She acknowledged consent and understanding of privacy and security of the video platform. The patient has agreed to participate and understands they can discontinue the visit at any time.    Patient is aware this is a billable service.             MEDICATION MANAGEMENT NOTE        [facility]   [facility] [place]  [address] [facility] [id number] [phone]    This note was not shared with the patient due to this is a psychotherapy note      Name and Date of Birth:  Martha Hollis 23 y.o. 2000    Date of Visit: July 9, 2024    SUBJECTIVE:     Martha seen by Daniel 6/11 at which time propranolol added for anxiety. Martha says anxiety is a bit less but can't be more specific.  Still with apprehension,  worry, anxiety accompanied by right-sided chest discomfort and shortness of breath as well as other physical symptoms noted at last visit.  Still feeling particularly emotional and sensitive.  Says she is not get restful sleep and energy is low.  Feels very stressed overall.      Review of Systems   Constitutional:  Positive for fatigue. Negative for activity change.   Respiratory:          When anxious   Cardiovascular:  Positive for chest pain.        Right side when anxious   Psychiatric/Behavioral:  Positive for sleep disturbance.      Past Psychiatric History     Inpatient:  None  [facility]    No hx of suicide attempts.      Outpatient:  Therapist- Abby puentes- x 6-7 yrs.   Current therapist- [facility], [place].  This office since April 2022     Med trials: lexapro -more depressed.       Trauma/Loss History:           Abandonment by bio father.  Mother's illness  (stage 4 metastatic breast cancer)     Family Psychiatric History:      Psychiatric Illness:      Depression- grandfather; anxiety -aunt  Substance Abuse:       none reported  Suicide Attempts:        uncle     Social History:         Student at [employer]- on line classes currently.   Had IEP- learning disability- comprehension.  Single.  Living with boyfriend and boyfriend's family.                                   OBJECTIVE:     MENTAL STATUS EXAM  Appearance:  age appropriate, dressed casually   Behavior:  Pleasant & cooperative   Speech:  Normal volume, regular rate and rhythm   Mood:  anxious   Affect:  mood congruent   Language: intact and appropriate for age, education, and intellect   Thought Process:  goal directed   Associations: intact associations   Thought Content:  negative thinking and cognitive distortions   Perceptual Disturbances: no auditory or visual hallcunations   Risk Potential / Abnormal Thoughts: Suicidal ideation - None  Homicidal ideation - None  Potential for aggression - No        Consciousness:  Alert & Awake   Sensorium:  Grossly oriented   Attention: attention span and concentration are age appropriate       Fund of Knowledge:  Memory: awareness of current events: yes  recent and remote memory grossly intact   Insight:  intact   Judgment: intact     Lab Review: I have reviewed all pertinent labs    Lab Results   Component Value Date     06/11/2016    SODIUM 141 04/03/2024    K 3.8 04/03/2024     04/03/2024    CO2 28 04/03/2024    AGAP 10 04/03/2024    BUN 14 04/03/2024    CREATININE 0.72 04/03/2024    GLUC 100 02/26/2023    GLUF 95 04/03/2024    CALCIUM 9.6 04/03/2024    AST 17 04/03/2024    ALT 17 04/03/2024    ALKPHOS 68 04/03/2024    PROT 6.6 06/11/2016    TP 7.3 04/03/2024    BILITOT 2.0 (H) 06/11/2016    TBILI 1.26 (H) 04/03/2024    EGFR 120 04/03/2024     Lab Results   Component Value Date    WBC 6.61 04/03/2024    HGB 15.1 04/03/2024    HCT 43.7 04/03/2024    MCV 93 04/03/2024     04/03/2024     Lab Results   Component Value Date    QUBLXAAK85 290 05/01/2024     Lab Results   Component Value Date    GMU5OWJCAHNI 2.166 04/03/2024    TSH 2.62 10/01/2022           ASSESSMENT & PLAN          Diagnoses and all orders for this visit:    Depression, recurrent (HCC)    MARCOS (generalized anxiety disorder)    Social anxiety disorder    Insomnia, unspecified type  -     nortriptyline (PAMELOR) 10 mg capsule; Take 3 capsules (30 mg total) by mouth daily at bedtime    Chronic tension-type headache, not intractable  -     nortriptyline (PAMELOR) 10 mg capsule; Take 3 capsules (30 mg total) by mouth daily at bedtime      Current Outpatient Medications   Medication Sig Dispense Refill    nortriptyline (PAMELOR) 10 mg capsule Take 3 capsules (30 mg total) by mouth daily at bedtime      albuterol (ProAir HFA) 90 mcg/act inhaler Inhale 2 puffs every 6 (six) hours as needed for wheezing 8.5 g 0    Cholecalciferol (D3) 50 MCG (2000 UT) CHEW       clotrimazole-betamethasone  (LOTRISONE) 1-0.05 % cream Apply topically 2 (two) times a day 15 g 0    Cyanocobalamin (B-12) 1000 MCG SUBL       divalproex sodium (DEPAKOTE SPRINKLE) 125 MG capsule TAKE 1 CAPSULE BY MOUTH 2 TIMES A  capsule 0    esomeprazole (NexIUM) 40 MG capsule Take 1 capsule (40 mg total) by mouth daily 90 capsule 1    ibuprofen (MOTRIN) 600 mg tablet TAKE 1 TABLET BY MOUTH 4 TIMES A DAY FOR 7 DAYS, AS NEEDED FOR HEADACHE      levocetirizine (XYZAL) 5 MG tablet Take 1 tablet (5 mg total) by mouth every evening 90 tablet 1    Levonorgestrel (MIRENA) 20 MCG/DAY IUD 1 each by Intrauterine Device route Once every 8 years      meloxicam (MOBIC) 7.5 mg tablet Take 7.5 mg by mouth daily      methylPREDNISolone 4 MG tablet therapy pack Use as directed on package 21 each 0    multivitamin (THERAGRAN) TABS Take 1 tablet by mouth daily      ondansetron (Zofran ODT) 4 mg disintegrating tablet Take 1 tablet (4 mg total) by mouth every 6 (six) hours as needed for nausea or vomiting 20 tablet 1    propranolol (INDERAL) 10 mg tablet TAKE 1 TABLET BY MOUTH THREE TIMES A  tablet 0     No current facility-administered medications for this visit.                Plan:        Increase nortriptyline for mood and anxiety.  Cont f/u with OP therapist.  Cont depakote 125 mg bid and inderal 10 mg tid    Reviewed risks, benefits, side effects of medications, including no medication.  Patient understands and agrees to treatment plan.  and Reviewed risks of teratogenicty with depakote  F/u Shelly 8/6/24 11am, sooner prn       Patient has been informed of 24 hours and weekend coverage for urgent situations accessed by calling the main clinic phone number.     Minna Corona PA-C

## 2024-07-15 ENCOUNTER — OFFICE VISIT (OUTPATIENT)
Age: 24
End: 2024-07-15
Payer: COMMERCIAL

## 2024-07-15 VITALS
SYSTOLIC BLOOD PRESSURE: 119 MMHG | WEIGHT: 137.2 LBS | HEART RATE: 95 BPM | TEMPERATURE: 97.7 F | BODY MASS INDEX: 20.79 KG/M2 | DIASTOLIC BLOOD PRESSURE: 78 MMHG | OXYGEN SATURATION: 99 % | RESPIRATION RATE: 14 BRPM | HEIGHT: 68 IN

## 2024-07-15 DIAGNOSIS — R39.89 SUSPECTED UTI: Primary | ICD-10-CM

## 2024-07-15 LAB
SL AMB  POCT GLUCOSE, UA: NEGATIVE
SL AMB LEUKOCYTE ESTERASE,UA: ABNORMAL
SL AMB POCT BILIRUBIN,UA: NEGATIVE
SL AMB POCT BLOOD,UA: ABNORMAL
SL AMB POCT CLARITY,UA: ABNORMAL
SL AMB POCT COLOR,UA: YELLOW
SL AMB POCT KETONES,UA: NEGATIVE
SL AMB POCT NITRITE,UA: NEGATIVE
SL AMB POCT PH,UA: 6
SL AMB POCT SPECIFIC GRAVITY,UA: 1.03
SL AMB POCT URINE PROTEIN: NEGATIVE
SL AMB POCT UROBILINOGEN: 0.2

## 2024-07-15 PROCEDURE — 81002 URINALYSIS NONAUTO W/O SCOPE: CPT | Performed by: NURSE PRACTITIONER

## 2024-07-15 PROCEDURE — S9083 URGENT CARE CENTER GLOBAL: HCPCS | Performed by: NURSE PRACTITIONER

## 2024-07-15 PROCEDURE — 87086 URINE CULTURE/COLONY COUNT: CPT | Performed by: NURSE PRACTITIONER

## 2024-07-15 PROCEDURE — G0382 LEV 3 HOSP TYPE B ED VISIT: HCPCS | Performed by: NURSE PRACTITIONER

## 2024-07-15 RX ORDER — CEPHALEXIN 500 MG/1
500 CAPSULE ORAL EVERY 6 HOURS SCHEDULED
Qty: 28 CAPSULE | Refills: 0 | Status: SHIPPED | OUTPATIENT
Start: 2024-07-15 | End: 2024-07-22

## 2024-07-15 NOTE — PROGRESS NOTES
[facility]        NAME: Martha Hollis is a 23 y.o. female  : 2000    MRN: [id number]  DATE: July 15, 2024  TIME: 6:37 PM    Assessment and Plan   Suspected UTI [R39.89]  1. Suspected UTI  POCT urine dip    cephalexin (KEFLEX) 500 mg capsule    Urine culture        Acute symptomatic started yesterday and worsening today.  POCT urine dip did show positive for trace blood small leuks will send urine culture.  Patient is requesting capsule not a pill.  Will start Keflex 3 times daily x 7 days educated on side effects proper use medication follow-up with primary care with worsening symptoms no improvement    Patient Instructions       Follow up with PCP in 3-5 days.  Proceed to  ER if symptoms worsen.    If tests have been performed at [facility], our office will contact you with results if changes need to be made to the care plan discussed with you at the visit.  You can review your full results on [facility].    Chief Complaint     Chief Complaint   Patient presents with   • Urinary Tract Infection     Started yesterday, burning, pressure, frequency.  Has not taken any OTC medications         History of Present Illness       Urinary Tract Infection   This is a new problem. The current episode started yesterday. The problem occurs every urination. The problem has been gradually worsening. The quality of the pain is described as burning. The pain is moderate. There has been no fever. She is Sexually active. Associated symptoms include frequency and urgency. Pertinent negatives include no chills, flank pain, hematuria, hesitancy, nausea, sweats or vomiting. She has tried nothing for the symptoms. The treatment provided no relief.       Review of Systems   Review of Systems   Constitutional:  Negative for activity change, appetite change, chills, fatigue and fever.   HENT:  Negative for congestion, postnasal drip, rhinorrhea, sneezing and sore throat.    Respiratory:  Negative for cough, chest  tightness, shortness of breath and wheezing.    Cardiovascular:  Negative for chest pain and palpitations.   Gastrointestinal:  Negative for abdominal pain, constipation, diarrhea, nausea and vomiting.   Genitourinary:  Positive for dysuria, frequency and urgency. Negative for flank pain, hematuria and hesitancy.   Musculoskeletal:  Negative for arthralgias and myalgias.   Skin:  Negative for color change, pallor and rash.   Neurological:  Negative for dizziness, weakness, light-headedness and headaches.   Hematological:  Negative for adenopathy.   Psychiatric/Behavioral:  Negative for agitation and confusion.          Current Medications       Current Outpatient Medications:   •  albuterol (ProAir HFA) 90 mcg/act inhaler, Inhale 2 puffs every 6 (six) hours as needed for wheezing, Disp: 8.5 g, Rfl: 0  •  cephalexin (KEFLEX) 500 mg capsule, Take 1 capsule (500 mg total) by mouth every 6 (six) hours for 7 days, Disp: 28 capsule, Rfl: 0  •  Cholecalciferol (D3) 50 MCG (2000 UT) CHEW, , Disp: , Rfl:   •  Cyanocobalamin (B-12) 1000 MCG SUBL, , Disp: , Rfl:   •  divalproex sodium (DEPAKOTE SPRINKLE) 125 MG capsule, TAKE 1 CAPSULE BY MOUTH 2 TIMES A DAY, Disp: 180 capsule, Rfl: 0  •  esomeprazole (NexIUM) 40 MG capsule, Take 1 capsule (40 mg total) by mouth daily, Disp: 90 capsule, Rfl: 1  •  ibuprofen (MOTRIN) 600 mg tablet, TAKE 1 TABLET BY MOUTH 4 TIMES A DAY FOR 7 DAYS, AS NEEDED FOR HEADACHE, Disp: , Rfl:   •  levocetirizine (XYZAL) 5 MG tablet, Take 1 tablet (5 mg total) by mouth every evening, Disp: 90 tablet, Rfl: 1  •  Levonorgestrel (MIRENA) 20 MCG/DAY IUD, 1 each by Intrauterine Device route Once every 8 years, Disp: , Rfl:   •  meloxicam (MOBIC) 7.5 mg tablet, Take 7.5 mg by mouth daily, Disp: , Rfl:   •  multivitamin (THERAGRAN) TABS, Take 1 tablet by mouth daily, Disp: , Rfl:   •  nortriptyline (PAMELOR) 10 mg capsule, Take 3 capsules (30 mg total) by mouth daily at bedtime, Disp: , Rfl:   •  ondansetron  (Zofran ODT) 4 mg disintegrating tablet, Take 1 tablet (4 mg total) by mouth every 6 (six) hours as needed for nausea or vomiting, Disp: 20 tablet, Rfl: 1  •  propranolol (INDERAL) 10 mg tablet, TAKE 1 TABLET BY MOUTH THREE TIMES A DAY, Disp: 270 tablet, Rfl: 0  •  clotrimazole-betamethasone (LOTRISONE) 1-0.05 % cream, Apply topically 2 (two) times a day, Disp: 15 g, Rfl: 0  •  methylPREDNISolone 4 MG tablet therapy pack, Use as directed on package, Disp: 21 each, Rfl: 0    Current Allergies     Allergies as of 07/15/2024 - Reviewed 07/15/2024   Allergen Reaction Noted   • Pineapple - food allergy Other (See Comments) 07/03/2023   • Pollen extract Sneezing 12/01/2020   • Bee pollen Sneezing 12/01/2020            The following portions of the patient's history were reviewed and updated as appropriate: allergies, current medications, past family history, past medical history, past social history, past surgical history and problem list.     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        Objective   /78   Pulse 95   Temp 97.7 °F (36.5 °C) (Tympanic)   Resp 14   Ht 5' 8\" (1.727 m)   Wt 62.2 kg (137 lb 3.2 oz)   LMP  (LMP Unknown)   SpO2 99%   BMI 20.86 kg/m²   No LMP recorded (lmp unknown). (Menstrual status: Birth Control).       Physical Exam     Physical Exam  Vitals and nursing note reviewed.   Constitutional:       General: She is not in acute distress.     Appearance: Normal appearance. She is not ill-appearing or diaphoretic.   HENT:      Head: Normocephalic and atraumatic.      Nose: No congestion or rhinorrhea.   Eyes:      General: No scleral icterus.        Right eye: No discharge.         Left eye: No discharge.   Cardiovascular:      Rate and Rhythm: Normal rate and regular rhythm.   Pulmonary:      Effort: Pulmonary effort is normal. No respiratory distress.   Abdominal:      Palpations: There is no mass.      Tenderness: There is abdominal tenderness (suprapubic). There is no right CVA tenderness, left CVA tenderness or guarding.   Musculoskeletal:         General: Normal range of motion.      Cervical back: Normal range of motion.   Skin:     Coloration: Skin is not jaundiced or pale.      Findings: No bruising, erythema or rash.   Neurological:      General: No focal deficit present.      Mental Status: She is alert and oriented to person, place, and time.   Psychiatric:         Mood and Affect: Mood normal.         Behavior: Behavior normal.         Thought Content: Thought content normal.         Judgment: Judgment normal.                   "

## 2024-07-16 LAB — BACTERIA UR CULT: NORMAL

## 2024-07-25 ENCOUNTER — TELEPHONE (OUTPATIENT)
Age: 24
End: 2024-07-25

## 2024-07-25 NOTE — TELEPHONE ENCOUNTER
Recommend following up with OB/GYN regarding the spotting and vaginal irritation.  Regarding the bothersome urinary symptoms I reviewed her urine culture and it is negative for any bacterial infection.  It has been a little over a year since she has last seen us and if she wishes to discuss her urinary symptoms further I would recommend making a follow-up appointment.  In the meantime she should increase her water and fluid intake, avoid bladder irritants such as caffeine and alcohol.

## 2024-07-25 NOTE — TELEPHONE ENCOUNTER
"Patient with history of recurrent urinary tract infections, sent a message via my chart and she started about a week ago with urinary frequency and urgency. She is voiding well, pressure is gone in the lower abdomen. No flank pain, no nausea, vomiting, fevers or chills. Denies any painful urination, no hematuria or dysuria. She has some \"spotting\" of blood but she feels that is IUD related and she is seeing her OB/GYN for this concern. She states some of the genital skin seems irritated also with redness and swelling to that area. Slight constipation. She is hydrating well with water. She is currently on Keflex from urgent care even though her urine culture form 7/15/2024 was negative.   "

## 2024-07-26 NOTE — TELEPHONE ENCOUNTER
Called and spoke with patient. Informed on AP's note. She states she drinks one cup of coffee and about 32 oz of water a day. Informed to increase her water intake to 64 or more oz of water and see if that helps. She wishes to try the recommendations first and if they don't improve, then she will set up a appt.

## 2024-07-29 ENCOUNTER — NURSE TRIAGE (OUTPATIENT)
Age: 24
End: 2024-07-29

## 2024-07-29 NOTE — TELEPHONE ENCOUNTER
Regarding: Possible infection  ----- Message from Ambrocio RICHMOND sent at 7/29/2024  8:29 AM EDT -----  Patient is having worsening symptoms of itching, warm sensation, burning, and swelling. Patient feels like she now has an infection, and could possibly be with her IUD please advise

## 2024-07-30 NOTE — TELEPHONE ENCOUNTER
Reason for Disposition  • Message left on unidentified voice mail. Phone number verified.     L/M to return call x 2    Answer Assessment - Initial Assessment Questions  N/A  L/M x two, per protocol    Protocols used: No Contact or Duplicate Contact Call-ADULT-[place]

## 2024-07-30 NOTE — TELEPHONE ENCOUNTER
"Reason for Disposition  • All other vaginal symptoms (Exception: feels like prior yeast infection, minor abrasion, mild rash < 24 hour duration, mild itching)    Answer Assessment - Initial Assessment Questions  1. SYMPTOM: \"What's the main symptom you're concerned about?\" (e.g., pain, itching, dryness)      Itching, warm sensation, burning, swelling. Increased urination. Discharge - clumpy and white. Odor - fishy. Increased frequency of urination.     2. LOCATION: \"Where is the  symptom located?\" (e.g., inside/outside, left/right)      Vaginal area    3. ONSET: \"When did the  symptoms  start?\"      A few days-1 week ago. Unsure of exact date     4. PAIN: \"Is there any pain?\" If Yes, ask: \"How bad is it?\" (Scale: 1-10; mild, moderate, severe)      Vaginal burning     5. ITCHING: \"Is there any itching?\" If Yes, ask: \"How bad is it?\" (Scale: 1-10; mild, moderate, severe)      Itching    6. CAUSE: \"What do you think is causing the discharge?\" \"Have you had the same problem before? What happened then?\"      Unknown    7. OTHER SYMPTOMS: \"Do you have any other symptoms?\" (e.g., fever, itching, vaginal bleeding, pain with urination, injury to genital area, vaginal foreign body)      Increased urinary frequency     8. PREGNANCY: \"Is there any chance you are pregnant?\" \"When was your last menstrual period?\"      LMP - unknown due to irregular menses    Protocols used: Vaginal Symptoms-ADULT-[place]    "

## 2024-07-30 NOTE — TELEPHONE ENCOUNTER
Outgoing call to patient. States that she is having vaginal itching, warm sensation, burning and swelling of vaginal area. Also having increased urination and increased discharge that is white a clumpy. Also having fishy vaginal odor.     Denies pain with urination, back pain, fever/chills. Recently treated for presumed UTI with Keflex. Culture came back with mixed contaminants. Patient did finish full course of Keflex.     Patient concerned that IUD could be causing these issues. Would like to discuss at office visit. Visit scheduled for evaluation tomorrow, 7/31.

## 2024-07-31 ENCOUNTER — OFFICE VISIT (OUTPATIENT)
Dept: FAMILY MEDICINE CLINIC | Facility: CLINIC | Age: 24
End: 2024-07-31
Payer: COMMERCIAL

## 2024-07-31 ENCOUNTER — OFFICE VISIT (OUTPATIENT)
Dept: OBGYN CLINIC | Facility: CLINIC | Age: 24
End: 2024-07-31
Payer: COMMERCIAL

## 2024-07-31 VITALS
HEART RATE: 71 BPM | SYSTOLIC BLOOD PRESSURE: 118 MMHG | WEIGHT: 138 LBS | BODY MASS INDEX: 20.92 KG/M2 | DIASTOLIC BLOOD PRESSURE: 70 MMHG | RESPIRATION RATE: 18 BRPM | OXYGEN SATURATION: 99 % | TEMPERATURE: 98 F | HEIGHT: 68 IN

## 2024-07-31 VITALS
HEIGHT: 68 IN | WEIGHT: 138 LBS | DIASTOLIC BLOOD PRESSURE: 80 MMHG | BODY MASS INDEX: 20.92 KG/M2 | SYSTOLIC BLOOD PRESSURE: 114 MMHG

## 2024-07-31 DIAGNOSIS — T75.3XXA MOTION SICKNESS, INITIAL ENCOUNTER: ICD-10-CM

## 2024-07-31 DIAGNOSIS — F33.9 EPISODE OF RECURRENT MAJOR DEPRESSIVE DISORDER, UNSPECIFIED DEPRESSION EPISODE SEVERITY (HCC): ICD-10-CM

## 2024-07-31 DIAGNOSIS — L28.0 LICHEN SIMPLEX CHRONICUS: Primary | ICD-10-CM

## 2024-07-31 DIAGNOSIS — R11.0 NAUSEA: Primary | ICD-10-CM

## 2024-07-31 DIAGNOSIS — Z11.3 SCREENING FOR STD (SEXUALLY TRANSMITTED DISEASE): ICD-10-CM

## 2024-07-31 DIAGNOSIS — N76.0 RECURRENT VAGINITIS: ICD-10-CM

## 2024-07-31 DIAGNOSIS — K52.9 GASTROENTERITIS: ICD-10-CM

## 2024-07-31 PROCEDURE — 87491 CHLMYD TRACH DNA AMP PROBE: CPT | Performed by: OBSTETRICS & GYNECOLOGY

## 2024-07-31 PROCEDURE — 87591 N.GONORRHOEAE DNA AMP PROB: CPT | Performed by: OBSTETRICS & GYNECOLOGY

## 2024-07-31 PROCEDURE — 81514 NFCT DS BV&VAGINITIS DNA ALG: CPT | Performed by: OBSTETRICS & GYNECOLOGY

## 2024-07-31 PROCEDURE — 99214 OFFICE O/P EST MOD 30 MIN: CPT | Performed by: OBSTETRICS & GYNECOLOGY

## 2024-07-31 PROCEDURE — 99214 OFFICE O/P EST MOD 30 MIN: CPT | Performed by: FAMILY MEDICINE

## 2024-07-31 RX ORDER — CLOBETASOL PROPIONATE 0.5 MG/G
OINTMENT TOPICAL
Qty: 45 G | Refills: 0 | Status: SHIPPED | OUTPATIENT
Start: 2024-07-31

## 2024-07-31 RX ORDER — ONDANSETRON 4 MG/1
4 TABLET, ORALLY DISINTEGRATING ORAL EVERY 6 HOURS PRN
Qty: 20 TABLET | Refills: 1 | Status: SHIPPED | OUTPATIENT
Start: 2024-07-31

## 2024-07-31 RX ORDER — SCOLOPAMINE TRANSDERMAL SYSTEM 1 MG/1
1 PATCH, EXTENDED RELEASE TRANSDERMAL
Qty: 4 PATCH | Refills: 1 | Status: SHIPPED | OUTPATIENT
Start: 2024-07-31

## 2024-07-31 NOTE — PATIENT INSTRUCTIONS
Recommended the following vulvar hygiene measures:  Avoid scented products (pads, tampons, soaps, detergents, perfumes, sprays, bubble baths, wipes)  Avoid unnecessary prolonged usage of pads  Wear cotton underwear and avoid tight fitting pants/tights/leotards  Change out of damp exercise clothes and swimsuits as soon as possible  Sleep without underwear  No douching  If you use the Always brand pad, try switching to another brand or tampons instead - go with all cotton/natural products such as [employer] Generation and Natracare  Do not use a washcloth or soap directly on the vagina

## 2024-07-31 NOTE — PROGRESS NOTES
"    Subjective   Patient ID: Martha Hollis is a 23 y.o. female.    Patient is here for a problem visit.    Chief Complaint   Patient presents with    Vaginitis     1 week of sx  Irritation, itching, intermittent burning, clumpy white discharge with odor   Denies pelvic pain   No urinary sx  Feels like sx cleared up after  visit and returned worse   \"Warm\" sensation around vaginal opening, some skin redness     She was seen in  with similar sx. Affirm swab neg. No new partners, declined STI screening. Last testing 2023 GC/CT neg     Has a Mirena IUD in place since May 2024     Menstrual History:  OB History          0    Para   0    Term   0       0    AB   0    Living   0         SAB   0    IAB   0    Ectopic   0    Multiple   0    Live Births   0                  No LMP recorded. (Menstrual status: Birth Control).         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    Wears glasses        Past Surgical History:   Procedure Laterality Date    LAPAROSCOPIC ENDOMETRIOSIS FULGURATION      TONSILECTOMY AND ADNOIDECTOMY      WISDOM TOOTH EXTRACTION         Social History     Tobacco Use    Smoking status: Never     Passive exposure: Past    Smokeless tobacco: Never   Vaping Use    Vaping status: Never Used   Substance Use Topics    Alcohol use: Not Currently     Comment: Social    Drug use: No        Allergies   Allergen Reactions    Pineapple - Food Allergy Other (See Comments)     Tingling tongue and throat    Pollen Extract Sneezing     congestion    Bee Pollen Sneezing     congestion         Current " Outpatient Medications:     albuterol (ProAir HFA) 90 mcg/act inhaler, Inhale 2 puffs every 6 (six) hours as needed for wheezing, Disp: 8.5 g, Rfl: 0    Cholecalciferol (D3) 50 MCG (2000 UT) CHEW, , Disp: , Rfl:     clotrimazole-betamethasone (LOTRISONE) 1-0.05 % cream, Apply topically 2 (two) times a day, Disp: 15 g, Rfl: 0    Cyanocobalamin (B-12) 1000 MCG SUBL, , Disp: , Rfl:     divalproex sodium (DEPAKOTE SPRINKLE) 125 MG capsule, TAKE 1 CAPSULE BY MOUTH 2 TIMES A DAY, Disp: 180 capsule, Rfl: 0    esomeprazole (NexIUM) 40 MG capsule, Take 1 capsule (40 mg total) by mouth daily, Disp: 90 capsule, Rfl: 1    ibuprofen (MOTRIN) 600 mg tablet, TAKE 1 TABLET BY MOUTH 4 TIMES A DAY FOR 7 DAYS, AS NEEDED FOR HEADACHE, Disp: , Rfl:     levocetirizine (XYZAL) 5 MG tablet, Take 1 tablet (5 mg total) by mouth every evening, Disp: 90 tablet, Rfl: 1    Levonorgestrel (MIRENA) 20 MCG/DAY IUD, 1 each by Intrauterine Device route Once every 8 years, Disp: , Rfl:     meloxicam (MOBIC) 7.5 mg tablet, Take 7.5 mg by mouth daily, Disp: , Rfl:     methylPREDNISolone 4 MG tablet therapy pack, Use as directed on package, Disp: 21 each, Rfl: 0    multivitamin (THERAGRAN) TABS, Take 1 tablet by mouth daily, Disp: , Rfl:     nortriptyline (PAMELOR) 10 mg capsule, Take 3 capsules (30 mg total) by mouth daily at bedtime, Disp: , Rfl:     ondansetron (Zofran ODT) 4 mg disintegrating tablet, Take 1 tablet (4 mg total) by mouth every 6 (six) hours as needed for nausea or vomiting, Disp: 20 tablet, Rfl: 1    propranolol (INDERAL) 10 mg tablet, TAKE 1 TABLET BY MOUTH THREE TIMES A DAY, Disp: 270 tablet, Rfl: 0      Review of Systems   Constitutional:  Negative for appetite change, chills and fever.   Eyes:  Negative for visual disturbance.   Respiratory:  Negative for cough, chest tightness and shortness of breath.    Cardiovascular:  Negative for chest pain.   Gastrointestinal:  Negative for abdominal distention, abdominal pain, constipation,  "diarrhea, nausea and vomiting.   Endocrine: Negative for cold intolerance and heat intolerance.   Genitourinary:  Positive for vaginal discharge and vaginal pain. Negative for difficulty urinating, dyspareunia, dysuria, frequency, genital sores, pelvic pain, urgency and vaginal bleeding.   Musculoskeletal:  Negative for arthralgias.   Neurological:  Negative for light-headedness and headaches.   Hematological:  Does not bruise/bleed easily.   Psychiatric/Behavioral:  Negative for behavioral problems.    All other systems reviewed and are negative.        /80   Ht 5' 8\" (1.727 m)   Wt 62.6 kg (138 lb)   BMI 20.98 kg/m²       Physical Exam  Constitutional:       General: She is not in acute distress.     Appearance: Normal appearance. She is not ill-appearing.   Genitourinary:      Bladder and urethral meatus normal.      Genitourinary Comments: Well demarcated erythema or bilateral labia major, no herpetic or other lesions       Right Labia: skin changes.      Right Labia: No rash, tenderness or lesions.     Left Labia: skin changes.      Left Labia: No tenderness, lesions or rash.     No labial fusion noted.      No inguinal adenopathy present in the right or left side.     Vaginal discharge present.      No vaginal erythema, tenderness, bleeding or ulceration.      Vaginal exam comments: Moderate thick white discharge .        Right Adnexa: not tender, not full and no mass present.     Left Adnexa: not tender, not full and no mass present.     No cervical motion tenderness, discharge, friability, lesion, polyp or eversion.      Uterus is not enlarged, fixed, tender or irregular.      No uterine mass detected.     Uterus is anteverted.      Pelvic exam was performed with patient in the lithotomy position.   HENT:      Head: Normocephalic.   Cardiovascular:      Rate and Rhythm: Normal rate.      Heart sounds: Normal heart sounds.   Pulmonary:      Effort: Pulmonary effort is normal. No accessory muscle " usage or respiratory distress.   Abdominal:      General: There is no distension.      Palpations: Abdomen is soft. There is no mass.      Tenderness: There is no abdominal tenderness. There is no guarding or rebound.   Musculoskeletal:         General: Normal range of motion.      Cervical back: No rigidity.   Lymphadenopathy:      Lower Body: No right inguinal adenopathy. No left inguinal adenopathy.   Neurological:      General: No focal deficit present.      Mental Status: She is alert. Mental status is at baseline.   Skin:     General: Skin is warm and dry.   Psychiatric:         Mood and Affect: Mood normal.         Behavior: Behavior normal.   Vitals and nursing note reviewed. Exam conducted with a chaperone present.               Appropriate laboratory testing, imaging studies, and prior external records were reviewed:     Assessment/Plan:       Problem List Items Addressed This Visit       Lichen simplex chronicus - Primary     Skin changes and frequent liner usage (she wears liners when she has any vaginal discharge) likely contributing to dermatitis type picture. Discussed clobetasol taper, vulvar hygiene in detail. Given recurrent sx will send molecular vaginal panel, GC/CT testing as well. Advised f/u if no improvement within next 2 weeks          Relevant Medications    clobetasol (TEMOVATE) 0.05 % ointment     Other Visit Diagnoses       Screening for STD (sexually transmitted disease)        Relevant Orders    Chlamydia/GC amplified DNA by PCR    Recurrent vaginitis        Relevant Orders    Molecular Vaginal Panel

## 2024-07-31 NOTE — ASSESSMENT & PLAN NOTE
Skin changes and frequent liner usage (she wears liners when she has any vaginal discharge) likely contributing to dermatitis type picture. Discussed clobetasol taper, vulvar hygiene in detail. Given recurrent sx will send molecular vaginal panel, GC/CT testing as well. Advised f/u if no improvement within next 2 weeks

## 2024-07-31 NOTE — ASSESSMENT & PLAN NOTE
Likely due to eating contaminated food. Recommend Zofran prn. Can use Imodium as needed. Ensure adequate hydration. Eat a binding diet. Take a probiotic. Follow up if not improved.

## 2024-07-31 NOTE — PROGRESS NOTES
"Ambulatory Visit  Name: Martha Hollis      : 2000      MRN: [id number]  Encounter Provider: Howard Sexton DO  Encounter Date: 2024   Encounter department: [facility]    Assessment & Plan   1. Nausea  Assessment & Plan:  Going on a cruise next week, recommend Scopalamine patch and looking at [facility]. Follow up as needed.  Orders:  -     ondansetron (Zofran ODT) 4 mg disintegrating tablet; Take 1 tablet (4 mg total) by mouth every 6 (six) hours as needed for nausea or vomiting  2. Episode of recurrent major depressive disorder, unspecified depression episode severity (HCC)  3. Motion sickness, initial encounter  Assessment & Plan:  Going on a cruise next week, recommend Scopalamine patch and looking at [facility]. Follow up as needed.  Orders:  -     scopolamine (TRANSDERM-SCOP) 1 mg/3 days TD 72 hr patch; Place 1 patch on the skin over 72 hours every third day  4. Gastroenteritis  Assessment & Plan:  Likely due to eating contaminated food. Recommend Zofran prn. Can use Imodium as needed. Ensure adequate hydration. Eat a binding diet. Take a probiotic. Follow up if not improved.     History of Present Illness     HPI  Ate something with mold on it 4 days ago. Nausea, difficulty eating and drinking. No vomiting. Does have frequent loose stool. Tried Ibuprofen for lower abdominal pain. Some abdominal tenderness above and below the umbilicus. No pain on the right side of the abdomen. No fever, chills, or myalgias.     Review of Systems    Objective     /70   Pulse 71   Temp 98 °F (36.7 °C)   Resp 18   Ht 5' 8\" (1.727 m)   Wt 62.6 kg (138 lb)   SpO2 99%   BMI 20.98 kg/m²     Physical Exam  Vitals reviewed.   Constitutional:       Appearance: Normal appearance.   HENT:      Mouth/Throat:      Mouth: Mucous membranes are moist.      Pharynx: Oropharynx is clear.   Eyes:      Extraocular Movements: Extraocular movements intact.      Conjunctiva/sclera: Conjunctivae normal. "   Cardiovascular:      Rate and Rhythm: Normal rate.   Pulmonary:      Effort: Pulmonary effort is normal.   Abdominal:      General: Abdomen is flat. There is no distension.      Palpations: Abdomen is soft.      Tenderness: There is no abdominal tenderness.   Skin:     General: Skin is warm and dry.   Neurological:      Mental Status: She is alert.   Psychiatric:         Mood and Affect: Mood normal.         Behavior: Behavior normal.       Administrative Statements

## 2024-07-31 NOTE — ASSESSMENT & PLAN NOTE
Going on a cruise next week, recommend Scopalamine patch and looking at [facility]. Follow up as needed.

## 2024-08-01 ENCOUNTER — TELEPHONE (OUTPATIENT)
Age: 24
End: 2024-08-01

## 2024-08-01 DIAGNOSIS — B37.31 YEAST INFECTION INVOLVING THE VAGINA AND SURROUNDING AREA: Primary | ICD-10-CM

## 2024-08-01 LAB
C GLABRATA DNA VAG QL NAA+PROBE: NEGATIVE
C KRUSEI DNA VAG QL NAA+PROBE: NEGATIVE
C TRACH DNA SPEC QL NAA+PROBE: NEGATIVE
CANDIDA SP 6 PNL VAG NAA+PROBE: POSITIVE
N GONORRHOEA DNA SPEC QL NAA+PROBE: NEGATIVE
T VAGINALIS DNA VAG QL NAA+PROBE: NEGATIVE
VAGINOSIS/ITIS DNA PNL VAG PROBE+SIG AMP: NEGATIVE

## 2024-08-01 RX ORDER — FLUCONAZOLE 150 MG/1
TABLET ORAL
Qty: 2 TABLET | Refills: 0 | Status: SHIPPED | OUTPATIENT
Start: 2024-08-01 | End: 2024-08-04

## 2024-08-01 NOTE — TELEPHONE ENCOUNTER
Patient called to cancel her appointment on 8/6 and she did say she paid her copay but is wondering if it could be sent back to her asa refund.

## 2024-08-06 ENCOUNTER — TELEMEDICINE (OUTPATIENT)
Dept: PSYCHIATRY | Facility: CLINIC | Age: 24
End: 2024-08-06
Payer: COMMERCIAL

## 2024-08-06 DIAGNOSIS — F33.9 EPISODE OF RECURRENT MAJOR DEPRESSIVE DISORDER, UNSPECIFIED DEPRESSION EPISODE SEVERITY (HCC): Primary | ICD-10-CM

## 2024-08-06 DIAGNOSIS — F40.10 SOCIAL ANXIETY DISORDER: ICD-10-CM

## 2024-08-06 DIAGNOSIS — F41.1 GAD (GENERALIZED ANXIETY DISORDER): ICD-10-CM

## 2024-08-06 PROCEDURE — 99214 OFFICE O/P EST MOD 30 MIN: CPT | Performed by: PHYSICIAN ASSISTANT

## 2024-08-06 NOTE — PSYCH
"  Virtual Regular Visit    Verification of patient location:    Patient is located at Home in the following state in which I hold an active license [place]             Reason for visit is   Chief Complaint   Patient presents with    Virtual Regular Visit          Encounter provider Minna Corona PA-C      Recent Visits  No visits were found meeting these conditions.  Showing recent visits within past 7 days and meeting all other requirements  Today's Visits  Date Type Provider Dept   08/06/24 Telemedicine Minna Corona PA-C  [facility]   Showing today's visits and meeting all other requirements  Future Appointments  No visits were found meeting these conditions.  Showing future appointments within next 150 days and meeting all other requirements       The patient was identified by name and date of birth. Martha Hollis was informed that this is a telemedicine visit and that the visit is being conducted throughthe Epic Embedded platform. She agrees to proceed..  My office door was closed. No one else was in the room.  She acknowledged consent and understanding of privacy and security of the video platform. The patient has agreed to participate and understands they can discontinue the visit at any time.    Patient is aware this is a billable service.         MEDICATION MANAGEMENT NOTE        [facility]   [facility] [place]  [address] [facility] [id number] [phone]        Name and Date of Birth:  Martha Hollis 23 y.o. 2000    Date of Visit: August 6, 2024    SUBJECTIVE:     Martha last seen by Shelly 7/9 at which time nortriptyline increased to 30 mg. Martha reports feeling calmer, not anxious or depressed.  \"I'm working on what I need to work on\". Trying to improve self-care. No sleep problems- worries maybe she sleeps too much.  Still with fatigue. Still gets anxious in crowded " social settings.  Has excessive worry, hyperarousal when in a motor vehicle and sometimes fears food poisoning.  States she has been nauseated since she ate a piece of moldy bread. Denies SI. Is able to drive.     Review of Systems   Constitutional:  Positive for fatigue. Negative for unexpected weight change.   Gastrointestinal:  Positive for nausea. Negative for abdominal pain and vomiting.   Neurological:  Negative for dizziness.     Past Psychiatric History     Inpatient:  None  [facility]    No hx of suicide attempts.      Outpatient:  Therapist- Abby puentes- x 6-7 yrs.   Current therapist- [facility], [place].  This office since April 2022     Med trials: lexapro -more depressed.       Trauma/Loss History:           Abandonment by bio father.  Mother's illness  (stage 4 metastatic breast cancer)     Family Psychiatric History:      Psychiatric Illness:      Depression- grandfather; anxiety -aunt  Substance Abuse:       none reported  Suicide Attempts:        uncle     Social History:         Student at [employer]- on line classes currently.   Had IEP- learning disability- comprehension.  Single.  Living with boyfriend and boyfriend's family.                                   OBJECTIVE:     MENTAL STATUS EXAM  Appearance:  age appropriate   Behavior:  Pleasant & cooperative   Speech:  Normal volume, regular rate and rhythm   Mood:  Mildly anxious   Affect:  mood congruent   Language: intact and appropriate for age, education, and intellect   Thought Process:  goal directed   Associations: intact associations   Thought Content:  negative thinking and cognitive distortions   Perceptual Disturbances: no auditory or visual hallcunations   Risk Potential / Abnormal Thoughts: Suicidal ideation - None  Homicidal ideation - None  Potential for aggression - No       Consciousness:  Alert & Awake   Sensorium:  Grossly oriented   Attention: attention span and concentration are age  appropriate       Fund of Knowledge:  Memory: awareness of current events: yes  recent and remote memory grossly intact   Insight:  intact   Judgment: intact     Lab Review: I have reviewed all pertinent labs    Lab Results   Component Value Date     06/11/2016    SODIUM 141 04/03/2024    K 3.8 04/03/2024     04/03/2024    CO2 28 04/03/2024    AGAP 10 04/03/2024    BUN 14 04/03/2024    CREATININE 0.72 04/03/2024    GLUC 100 02/26/2023    GLUF 95 04/03/2024    CALCIUM 9.6 04/03/2024    AST 17 04/03/2024    ALT 17 04/03/2024    ALKPHOS 68 04/03/2024    PROT 6.6 06/11/2016    TP 7.3 04/03/2024    BILITOT 2.0 (H) 06/11/2016    TBILI 1.26 (H) 04/03/2024    EGFR 120 04/03/2024     Lab Results   Component Value Date    WBC 6.61 04/03/2024    HGB 15.1 04/03/2024    HCT 43.7 04/03/2024    MCV 93 04/03/2024     04/03/2024     Lab Results   Component Value Date    YWQEGCNL32 290 05/01/2024     Lab Results   Component Value Date    BRT0AMPJFNFW 2.166 04/03/2024    TSH 2.62 10/01/2022           ASSESSMENT & PLAN          Diagnoses and all orders for this visit:    Episode of recurrent major depressive disorder, unspecified depression episode severity (HCC)    MARCOS (generalized anxiety disorder)    Social anxiety disorder      Current Outpatient Medications   Medication Sig Dispense Refill    albuterol (ProAir HFA) 90 mcg/act inhaler Inhale 2 puffs every 6 (six) hours as needed for wheezing 8.5 g 0    Cholecalciferol (D3) 50 MCG (2000 UT) CHEW       clobetasol (TEMOVATE) 0.05 % ointment Apply 1/2 fingertip unit to vaginal area twice daily for 2 weeks, then once daily for 2 weeks, then twice weekly for 2 weeks 45 g 0    clotrimazole-betamethasone (LOTRISONE) 1-0.05 % cream Apply topically 2 (two) times a day 15 g 0    Cyanocobalamin (B-12) 1000 MCG SUBL       divalproex sodium (DEPAKOTE SPRINKLE) 125 MG capsule TAKE 1 CAPSULE BY MOUTH 2 TIMES A  capsule 0    esomeprazole (NexIUM) 40 MG capsule Take 1  capsule (40 mg total) by mouth daily 90 capsule 1    ibuprofen (MOTRIN) 600 mg tablet TAKE 1 TABLET BY MOUTH 4 TIMES A DAY FOR 7 DAYS, AS NEEDED FOR HEADACHE      levocetirizine (XYZAL) 5 MG tablet Take 1 tablet (5 mg total) by mouth every evening 90 tablet 1    Levonorgestrel (MIRENA) 20 MCG/DAY IUD 1 each by Intrauterine Device route Once every 8 years      meloxicam (MOBIC) 7.5 mg tablet Take 7.5 mg by mouth daily      methylPREDNISolone 4 MG tablet therapy pack Use as directed on package 21 each 0    multivitamin (THERAGRAN) TABS Take 1 tablet by mouth daily      nortriptyline (PAMELOR) 10 mg capsule Take 3 capsules (30 mg total) by mouth daily at bedtime      ondansetron (Zofran ODT) 4 mg disintegrating tablet Take 1 tablet (4 mg total) by mouth every 6 (six) hours as needed for nausea or vomiting 20 tablet 1    propranolol (INDERAL) 10 mg tablet TAKE 1 TABLET BY MOUTH THREE TIMES A  tablet 0    scopolamine (TRANSDERM-SCOP) 1 mg/3 days TD 72 hr patch Place 1 patch on the skin over 72 hours every third day 4 patch 1     No current facility-administered medications for this visit.                Plan:       Improved.  Cont f/u with OP therapist.  No med change- cont nortriptyline 30 mg q bedtime, depakote 125 mg bid, inderal 10 mg tid.     Reviewed risks, benefits, side effects of medications, including no medication.  Patient understands and agrees to treatment plan.  and Reviewed risks of teratogenicity with depakote   F/u Daniel 9/12/24, 1 pm, sooner prn         Patient has been informed of 24 hours and weekend coverage for urgent situations accessed by calling the main clinic phone number.     Minna Corona PA-C

## 2024-08-16 ENCOUNTER — OFFICE VISIT (OUTPATIENT)
Age: 24
End: 2024-08-16
Payer: COMMERCIAL

## 2024-08-16 ENCOUNTER — NURSE TRIAGE (OUTPATIENT)
Age: 24
End: 2024-08-16

## 2024-08-16 VITALS
WEIGHT: 140.2 LBS | SYSTOLIC BLOOD PRESSURE: 98 MMHG | HEIGHT: 68 IN | BODY MASS INDEX: 21.25 KG/M2 | DIASTOLIC BLOOD PRESSURE: 60 MMHG | RESPIRATION RATE: 20 BRPM | TEMPERATURE: 97.6 F | HEART RATE: 124 BPM | OXYGEN SATURATION: 97 %

## 2024-08-16 DIAGNOSIS — R42 DIZZINESS AND GIDDINESS: Primary | ICD-10-CM

## 2024-08-16 PROCEDURE — S9083 URGENT CARE CENTER GLOBAL: HCPCS | Performed by: PHYSICIAN ASSISTANT

## 2024-08-16 PROCEDURE — G0383 LEV 4 HOSP TYPE B ED VISIT: HCPCS | Performed by: PHYSICIAN ASSISTANT

## 2024-08-16 NOTE — TELEPHONE ENCOUNTER
"Starting three days ago patient noticed she was dizzy most of the day. This has been ongoing. Even when sittin she is dizzy. She is drinking plenty of fluids, and eating well. Denies any other symptoms at this time. Did mention that a day or two ago she had one episode of a BM that had blood in it and was darker in color however has not happened since. She is on propanolol and unsure if that could be affecting her BP. She does not have a way to check it at home. Advised her to go to urgent care to be evaluated. She does have an appointment this week however with ongoing dizziness I advised her to be seen. Patient in agreement. States she has a history of anemia and is insure if maybe that is contributing to it. Please advise.       Reason for Disposition   Lightheadedness (dizziness) present now, after 2 hours of rest and fluids    Answer Assessment - Initial Assessment Questions  1. DESCRIPTION: \"Describe your dizziness.\"      constant  2. LIGHTHEADED: \"Do you feel lightheaded?\" (e.g., somewhat faint, woozy, weak upon standing)      yes  3. VERTIGO: \"Do you feel like either you or the room is spinning or tilting?\" (i.e. vertigo)      denies  4. SEVERITY: \"How bad is it?\"  \"Do you feel like you are going to faint?\" \"Can you stand and walk?\"    - MILD: Feels slightly dizzy, but walking normally.    - MODERATE: Feels very unsteady when walking, but not falling; interferes with normal activities (e.g., school, work) .    - SEVERE: Unable to walk without falling, or requires assistance to walk without falling; feels like passing out now.       moderate  5. ONSET:  \"When did the dizziness begin?\"      Three days ago  6. AGGRAVATING FACTORS: \"Does anything make it worse?\" (e.g., standing, change in head position)      mvoement  7. HEART RATE: \"Can you tell me your heart rate?\" \"How many beats in 15 seconds?\"  (Note: not all patients can do this)        unsure  8. CAUSE: \"What do you think is causing the dizziness?\"      " "unsure  9. RECURRENT SYMPTOM: \"Have you had dizziness before?\" If Yes, ask: \"When was the last time?\" \"What happened that time?\"      denies  10. OTHER SYMPTOMS: \"Do you have any other symptoms?\" (e.g., fever, chest pain, vomiting, diarrhea, bleeding)        denies  11. PREGNANCY: \"Is there any chance you are pregnant?\" \"When was your last menstrual period?\"        N.a    Protocols used: Dizziness-ADULT-[place]    "

## 2024-08-20 NOTE — PROGRESS NOTES
[facility]        NAME: Martha Hollis is a 23 y.o. female  : 2000    MRN: [id number]  DATE: 2024  TIME: 2:19 PM    Assessment and Plan   Dizziness and giddiness [R42]  1. Dizziness and giddiness              Patient Instructions     Pt has had recent onset of dizziness. Her exam today is overall benign. She has recently been placed on Propranolol for anxiety so this may be causing her BP to drop too low. It is 98/60 in the clinic today. She also has hx of anemia. I rec she F/U with PCP for labs and with her prescribing provider to discuss the Propranolol further. If condition worsens in the interim, then she should proceed to the ER. She voiced understanding and agreed with plan.   Follow up with PCP in 3-5 days.  Proceed to  ER if symptoms worsen.    If tests have been performed at [facility], our office will contact you with results if changes need to be made to the care plan discussed with you at the visit.  You can review your full results on [facility].    Chief Complaint     Chief Complaint   Patient presents with    Dizziness     Lightheaded and dizziness that started 3 days ago. Eating and drinking okay. Started taking propranolol.          History of Present Illness       Pt presents with 3 day hx of intermittent dizziness. Occurs for minutes at at time and can be at rest. She describes it as a lightheadedness or a feeling like she is going to pass out, not a room-spinning sensation. She was recently started on Propanolol for anxiety. She also has hx of anemia. She denies any other symptoms or complaints today.         Review of Systems   Review of Systems   Constitutional: Negative.    Eyes: Negative.    Respiratory: Negative.     Cardiovascular: Negative.    Gastrointestinal: Negative.    Genitourinary: Negative.    Neurological:  Positive for dizziness and light-headedness. Negative for syncope.         Current Medications       Current Outpatient Medications:      albuterol (ProAir HFA) 90 mcg/act inhaler, Inhale 2 puffs every 6 (six) hours as needed for wheezing, Disp: 8.5 g, Rfl: 0    Cholecalciferol (D3) 50 MCG (2000 UT) CHEW, , Disp: , Rfl:     clobetasol (TEMOVATE) 0.05 % ointment, Apply 1/2 fingertip unit to vaginal area twice daily for 2 weeks, then once daily for 2 weeks, then twice weekly for 2 weeks, Disp: 45 g, Rfl: 0    clotrimazole-betamethasone (LOTRISONE) 1-0.05 % cream, Apply topically 2 (two) times a day, Disp: 15 g, Rfl: 0    Cyanocobalamin (B-12) 1000 MCG SUBL, , Disp: , Rfl:     divalproex sodium (DEPAKOTE SPRINKLE) 125 MG capsule, TAKE 1 CAPSULE BY MOUTH 2 TIMES A DAY, Disp: 180 capsule, Rfl: 0    esomeprazole (NexIUM) 40 MG capsule, Take 1 capsule (40 mg total) by mouth daily, Disp: 90 capsule, Rfl: 1    ibuprofen (MOTRIN) 600 mg tablet, TAKE 1 TABLET BY MOUTH 4 TIMES A DAY FOR 7 DAYS, AS NEEDED FOR HEADACHE, Disp: , Rfl:     levocetirizine (XYZAL) 5 MG tablet, Take 1 tablet (5 mg total) by mouth every evening, Disp: 90 tablet, Rfl: 1    Levonorgestrel (MIRENA) 20 MCG/DAY IUD, 1 each by Intrauterine Device route Once every 8 years, Disp: , Rfl:     meloxicam (MOBIC) 7.5 mg tablet, Take 7.5 mg by mouth daily, Disp: , Rfl:     methylPREDNISolone 4 MG tablet therapy pack, Use as directed on package, Disp: 21 each, Rfl: 0    multivitamin (THERAGRAN) TABS, Take 1 tablet by mouth daily, Disp: , Rfl:     nortriptyline (PAMELOR) 10 mg capsule, Take 3 capsules (30 mg total) by mouth daily at bedtime, Disp: , Rfl:     ondansetron (Zofran ODT) 4 mg disintegrating tablet, Take 1 tablet (4 mg total) by mouth every 6 (six) hours as needed for nausea or vomiting, Disp: 20 tablet, Rfl: 1    propranolol (INDERAL) 10 mg tablet, TAKE 1 TABLET BY MOUTH THREE TIMES A DAY, Disp: 270 tablet, Rfl: 0    scopolamine (TRANSDERM-SCOP) 1 mg/3 days TD 72 hr patch, Place 1 patch on the skin over 72 hours every third day, Disp: 4 patch, Rfl: 1    Current Allergies     Allergies as of  "08/16/2024 - Reviewed 08/16/2024   Allergen Reaction Noted    Pineapple - food allergy Other (See Comments) 07/03/2023    Pollen extract Sneezing 12/01/2020    Bee pollen Sneezing 12/01/2020            The following portions of the patient's history were reviewed and updated as appropriate: allergies, current medications, past family history, past medical history, past social history, past surgical history and problem list.     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        Objective   BP 98/60   Pulse (!) 124   Temp 97.6 °F (36.4 °C)   Resp 20   Ht 5' 8\" (1.727 m)   Wt 63.6 kg (140 lb 3.2 oz)   LMP  (LMP Unknown)   SpO2 97%   BMI 21.32 kg/m²   No LMP recorded (lmp unknown).       Physical Exam     Physical Exam  Vitals reviewed.   Constitutional:       General: She is not in acute distress.     Appearance: She is well-developed.   HENT:      Right Ear: Tympanic membrane, ear canal and external ear normal.      Left Ear: Tympanic membrane, ear canal and external ear normal.      Mouth/Throat:      " Mouth: Mucous membranes are moist.      Pharynx: Oropharynx is clear.   Eyes:      Extraocular Movements: Extraocular movements intact.      Pupils: Pupils are equal, round, and reactive to light.      Comments: No nystagmus or photophobia   Neck:      Vascular: No carotid bruit.   Cardiovascular:      Rate and Rhythm: Normal rate and regular rhythm.      Pulses: Normal pulses.      Heart sounds: Normal heart sounds. No murmur heard.  Pulmonary:      Effort: Pulmonary effort is normal. No respiratory distress.      Breath sounds: Normal breath sounds.   Musculoskeletal:      Cervical back: Neck supple.   Lymphadenopathy:      Cervical: No cervical adenopathy.   Neurological:      General: No focal deficit present.      Mental Status: She is alert and oriented to person, place, and time.      Cranial Nerves: No cranial nerve deficit.      Coordination: Coordination normal.      Gait: Gait normal.

## 2024-08-22 ENCOUNTER — OFFICE VISIT (OUTPATIENT)
Dept: FAMILY MEDICINE CLINIC | Facility: CLINIC | Age: 24
End: 2024-08-22
Payer: COMMERCIAL

## 2024-08-22 VITALS
OXYGEN SATURATION: 98 % | HEART RATE: 121 BPM | HEIGHT: 68 IN | TEMPERATURE: 98.4 F | SYSTOLIC BLOOD PRESSURE: 110 MMHG | BODY MASS INDEX: 21.31 KG/M2 | DIASTOLIC BLOOD PRESSURE: 78 MMHG | WEIGHT: 140.6 LBS | RESPIRATION RATE: 16 BRPM

## 2024-08-22 DIAGNOSIS — R42 VERTIGO: ICD-10-CM

## 2024-08-22 DIAGNOSIS — F41.1 GAD (GENERALIZED ANXIETY DISORDER): ICD-10-CM

## 2024-08-22 DIAGNOSIS — I34.1 MITRAL VALVE PROLAPSE: ICD-10-CM

## 2024-08-22 DIAGNOSIS — R00.2 PALPITATIONS: Primary | ICD-10-CM

## 2024-08-22 PROCEDURE — 99214 OFFICE O/P EST MOD 30 MIN: CPT | Performed by: FAMILY MEDICINE

## 2024-08-22 RX ORDER — MECLIZINE HCL 12.5 MG 12.5 MG/1
12.5 TABLET ORAL 3 TIMES DAILY PRN
Qty: 20 TABLET | Refills: 0 | Status: SHIPPED | OUTPATIENT
Start: 2024-08-22

## 2024-08-22 NOTE — PATIENT INSTRUCTIONS
Blood work  Propranolol 2 tabs once a day in am - ok to repeat second dose of 2 tabs in pm if needed  Vestibular PT for touch of vertigo  Please discuss anxiety meds with psychiatry  Try meclizine as needed for vertigo - may make you tired

## 2024-08-22 NOTE — Clinical Note
I took the liberty of restarting propranolol.  I suspect patient's vertigo is primarily due to BPV rather than orthostasis.  Blood work ordered.  Patient had echo and Holter earlier this year, essentially unremarkable.  She was seen by cardiology.  If palpitations persist-I will pursue option of ambulatory monitor.  I suspect though that her palpitations are related to persistent anxiety.  Thank you

## 2024-08-22 NOTE — PROGRESS NOTES
Ambulatory Visit  Name: Martha Hollis      : 2000      MRN: [id number]  Encounter Provider: Roya Chan MD  Encounter Date: 2024   Encounter department: [facility]    Assessment & Plan   1. Palpitations  Assessment & Plan:  Patient reports intermittent palpitations.  Reports that propranolol was helpful but Rx was discontinued due to concern of lightheadedness.  I recommend to restart propranolol 20 mg in the morning every day and additional 20 mg in the evening as needed   Cardiac workup performed in 2024 included echo with normal LVEF and known MVP and unremarkable Holter.  Patient was eval by [facility] cardiology.  Proceed with blood work.  If palpitations persist-consider reevaluation by cardiology for consideration of extended ambulatory monitor.  I suspect though that her symptoms are primarily triggered by anxiety.  Patient follows closely with psychiatry.  Orders:  -     CBC and differential; Future  -     Comprehensive metabolic panel; Future  -     Iron Panel (Includes Ferritin, Iron Sat%, Iron, and TIBC); Future  -     TSH, 3rd generation; Future  -     Magnesium; Future  2. Vertigo  Comments:  Suspect BPV.  Referral to vestibular PT.  Trial of meclizine.  Patient will contact me if symptoms are not improving  Orders:  -     Ambulatory referral to Physical Therapy; Future  -     meclizine (ANTIVERT) 12.5 MG tablet; Take 1 tablet (12.5 mg total) by mouth 3 (three) times a day as needed for dizziness  3. Mitral valve prolapse  Assessment & Plan:  Echo 2024: LVEF 55%, mild bileaflet MVP  4. MARCOS (generalized anxiety disorder)  Assessment & Plan:  Currently on nortriptyline 30 mg nightly.  Previous trials of SSRI therapy-unsuccessful.  Restart propranolol 20 mg daily or twice daily.  Patient remains under care of psychiatry.    Patient Instructions   Blood work  Propranolol 2 tabs once a day in am - ok to repeat second dose of 2 tabs in pm if  needed  Vestibular PT for touch of vertigo  Please discuss anxiety meds with psychiatry  Try meclizine as needed for vertigo - may make you tired       History of Present Illness     Patient presents for evaluation of lightheadedness and occasional dizziness.  Symptoms started a few weeks ago after start of low-dose propranolol 10 mg 3 times daily by psychiatry.  Rx was prescribed for treatment of anxiety and intermittent palpitations.  Patient was evaluated for those symptoms earlier this year with essentially normal Holter and unremarkable echocardiogram in January 2024 (echo confirmed known mitral valve prolapse, LVEF was normal, no other valvular abnormalities).  Patient was evaluated by cardiology (), no further recommendations.    Patient subsequently discontinued propranolol but symptoms of lightheadedness and dizziness are still reoccurring.  Patient said that propranolol was actually helpful in relieving her chronic anxiety symptoms.  She complains of occasional palpitations and shortness of breath, comes and goes, random, nonexertional.  No chest pain.   Patient has gained 15 pounds since April 2024.     Patient states that she developed headaches as of yesterday, they are mild.  She is afebrile.  No URI symptoms.  No recent travel.       Dizziness  Associated symptoms include fatigue and headaches.     Review of Systems   Constitutional:  Positive for fatigue.   HENT: Negative.     Respiratory: Negative.     Cardiovascular: Negative.    Gastrointestinal: Negative.    Musculoskeletal: Negative.    Neurological:  Positive for dizziness and headaches.   Hematological: Negative.    Psychiatric/Behavioral: Negative.       Past Medical History:   Diagnosis Date   • Acute bilateral thoracic back pain 11/13/2023   • Anemia     resolved    • Anxiety    • Constipation    • Endometriosis    • GERD (gastroesophageal reflux disease)    • Headache(784.0)    • Heart murmur    • Heavy menstrual bleeding      resolved    • Hives    • Iron deficiency anemia secondary to inadequate dietary iron intake 10/15/2019   • Migraine     resolved    • Mitral valve prolapse    • Selective deficiency of immunoglobulin a (iga) ([facility]) 10/16/2019   • Wears glasses      Past Surgical History:   Procedure Laterality Date   • LAPAROSCOPIC ENDOMETRIOSIS FULGURATION     • TONSILECTOMY AND ADNOIDECTOMY     • WISDOM TOOTH EXTRACTION       Family History   Problem Relation Age of Onset   • Breast cancer Mother 28   • No Known Problems Father    • Breast cancer Maternal Grandmother    • Breast cancer Maternal Grandfather    • Depression Maternal Aunt    • Anxiety disorder Maternal Aunt    • Colon cancer Neg Hx    • Ovarian cancer Neg Hx      Social History     Tobacco Use   • Smoking status: Never     Passive exposure: Past   • Smokeless tobacco: Never   Vaping Use   • Vaping status: Never Used   Substance and Sexual Activity   • Alcohol use: Not Currently     Comment: Social   • Drug use: No   • Sexual activity: Yes     Partners: Male     Birth control/protection: I.U.D.     Current Outpatient Medications on File Prior to Visit   Medication Sig   • albuterol (ProAir HFA) 90 mcg/act inhaler Inhale 2 puffs every 6 (six) hours as needed for wheezing   • Cholecalciferol (D3) 50 MCG (2000 UT) CHEW    • clobetasol (TEMOVATE) 0.05 % ointment Apply 1/2 fingertip unit to vaginal area twice daily for 2 weeks, then once daily for 2 weeks, then twice weekly for 2 weeks   • clotrimazole-betamethasone (LOTRISONE) 1-0.05 % cream Apply topically 2 (two) times a day   • Cyanocobalamin (B-12) 1000 MCG SUBL    • divalproex sodium (DEPAKOTE SPRINKLE) 125 MG capsule TAKE 1 CAPSULE BY MOUTH 2 TIMES A DAY   • esomeprazole (NexIUM) 40 MG capsule Take 1 capsule (40 mg total) by mouth daily   • ibuprofen (MOTRIN) 600 mg tablet TAKE 1 TABLET BY MOUTH 4 TIMES A DAY FOR 7 DAYS, AS NEEDED FOR HEADACHE   • levocetirizine (XYZAL) 5 MG tablet Take 1 tablet (5 mg total) by mouth  "every evening   • Levonorgestrel (MIRENA) 20 MCG/DAY IUD 1 each by Intrauterine Device route Once every 8 years   • meloxicam (MOBIC) 7.5 mg tablet Take 7.5 mg by mouth daily   • multivitamin (THERAGRAN) TABS Take 1 tablet by mouth daily   • nortriptyline (PAMELOR) 10 mg capsule Take 3 capsules (30 mg total) by mouth daily at bedtime   • ondansetron (Zofran ODT) 4 mg disintegrating tablet Take 1 tablet (4 mg total) by mouth every 6 (six) hours as needed for nausea or vomiting   • propranolol (INDERAL) 10 mg tablet TAKE 1 TABLET BY MOUTH THREE TIMES A DAY   • scopolamine (TRANSDERM-SCOP) 1 mg/3 days TD 72 hr patch Place 1 patch on the skin over 72 hours every third day     Allergies   Allergen Reactions   • Pineapple - Food Allergy Other (See Comments)     Tingling tongue and throat   • Pollen Extract Sneezing     congestion   • Bee Pollen Sneezing     congestion     Immunization History   Administered Date(s) Administered   • COVID-19 PFIZER VACCINE 0.3 ML IM 05/14/2021, 06/05/2021, 02/20/2022   • COVID-19 Pfizer vac (Robinson-sucrose, gray cap) 12 yr+ IM 02/20/2022   • DTaP 5 02/13/2003, 04/14/2003, 06/16/2003, 06/29/2004, 03/02/2007   • HPV9 02/03/2017, 08/14/2017   • Hep B, adult 2000, 01/13/2003, 09/16/2003   • Hib (PRP-OMP) 02/13/2003, 04/14/2003, 06/16/2003, 03/25/2004   • INFLUENZA 01/10/2023   • IPV 02/13/2003, 04/14/2003, 06/29/2004, 03/02/2007   • MMR 03/25/2004, 04/05/2007   • Meningococcal MCV4, Unspecified 08/15/2014, 08/16/2019   • Meningococcal MCV4P 08/15/2014, 08/16/2019   • Meningococcal, Unknown Serogroups 08/15/2014   • Pneumococcal Polysaccharide PPV23 02/13/2003   • Tdap 08/15/2014   • Tuberculin Skin Test-PPD Intradermal 06/25/2021   • Varicella 12/30/2003, 04/05/2007     Objective     /78 (BP Location: Right arm, Patient Position: Sitting, Cuff Size: Standard)   Pulse (!) 121   Temp 98.4 °F (36.9 °C) (Temporal)   Resp 16   Ht 5' 8\" (1.727 m)   Wt 63.8 kg (140 lb 9.6 oz)   LMP  " (LMP Unknown)   SpO2 98%   BMI 21.38 kg/m²     Physical Exam  Vitals and nursing note reviewed.   Constitutional:       General: She is not in acute distress.     Appearance: Normal appearance. She is well-developed. She is not ill-appearing.   HENT:      Head: Normocephalic and atraumatic.   Eyes:      Conjunctiva/sclera: Conjunctivae normal.      Pupils: Pupils are equal, round, and reactive to light.      Comments: Horizontal nystagmus   Neck:      Thyroid: No thyromegaly.      Vascular: No carotid bruit.   Cardiovascular:      Rate and Rhythm: Regular rhythm. Tachycardia present.      Heart sounds: Normal heart sounds. No murmur heard.  Pulmonary:      Effort: Pulmonary effort is normal. No respiratory distress.      Breath sounds: Normal breath sounds. No wheezing.      Comments: Heart rate  bpm, regular  Abdominal:      General: There is no abdominal bruit.   Musculoskeletal:         General: Normal range of motion.      Cervical back: Neck supple.   Neurological:      General: No focal deficit present.      Mental Status: She is alert and oriented to person, place, and time.      Cranial Nerves: No cranial nerve deficit.      Coordination: Romberg sign positive.      Comments: Positive Romberg to the right   Psychiatric:         Mood and Affect: Mood is anxious.         Behavior: Behavior normal.

## 2024-08-23 ENCOUNTER — APPOINTMENT (OUTPATIENT)
Age: 24
End: 2024-08-23
Payer: COMMERCIAL

## 2024-08-23 DIAGNOSIS — R00.2 PALPITATIONS: ICD-10-CM

## 2024-08-23 LAB
ALBUMIN SERPL BCG-MCNC: 4.6 G/DL (ref 3.5–5)
ALP SERPL-CCNC: 67 U/L (ref 34–104)
ALT SERPL W P-5'-P-CCNC: 10 U/L (ref 7–52)
ANION GAP SERPL CALCULATED.3IONS-SCNC: 7 MMOL/L (ref 4–13)
AST SERPL W P-5'-P-CCNC: 16 U/L (ref 13–39)
BASOPHILS # BLD AUTO: 0.04 THOUSANDS/ÂΜL (ref 0–0.1)
BASOPHILS NFR BLD AUTO: 1 % (ref 0–1)
BILIRUB SERPL-MCNC: 1.71 MG/DL (ref 0.2–1)
BUN SERPL-MCNC: 15 MG/DL (ref 5–25)
CALCIUM SERPL-MCNC: 9.9 MG/DL (ref 8.4–10.2)
CHLORIDE SERPL-SCNC: 101 MMOL/L (ref 96–108)
CO2 SERPL-SCNC: 30 MMOL/L (ref 21–32)
CREAT SERPL-MCNC: 0.7 MG/DL (ref 0.6–1.3)
EOSINOPHIL # BLD AUTO: 0.28 THOUSAND/ÂΜL (ref 0–0.61)
EOSINOPHIL NFR BLD AUTO: 5 % (ref 0–6)
ERYTHROCYTE [DISTWIDTH] IN BLOOD BY AUTOMATED COUNT: 11.8 % (ref 11.6–15.1)
FERRITIN SERPL-MCNC: 56 NG/ML (ref 11–307)
GFR SERPL CREATININE-BSD FRML MDRD: 124 ML/MIN/1.73SQ M
GLUCOSE P FAST SERPL-MCNC: 96 MG/DL (ref 65–99)
HCT VFR BLD AUTO: 44.4 % (ref 34.8–46.1)
HGB BLD-MCNC: 14.7 G/DL (ref 11.5–15.4)
IMM GRANULOCYTES # BLD AUTO: 0.01 THOUSAND/UL (ref 0–0.2)
IMM GRANULOCYTES NFR BLD AUTO: 0 % (ref 0–2)
IRON SATN MFR SERPL: 28 % (ref 15–50)
IRON SERPL-MCNC: 115 UG/DL (ref 50–212)
LYMPHOCYTES # BLD AUTO: 1.76 THOUSANDS/ÂΜL (ref 0.6–4.47)
LYMPHOCYTES NFR BLD AUTO: 31 % (ref 14–44)
MAGNESIUM SERPL-MCNC: 2.2 MG/DL (ref 1.9–2.7)
MCH RBC QN AUTO: 30.7 PG (ref 26.8–34.3)
MCHC RBC AUTO-ENTMCNC: 33.1 G/DL (ref 31.4–37.4)
MCV RBC AUTO: 93 FL (ref 82–98)
MONOCYTES # BLD AUTO: 0.64 THOUSAND/ÂΜL (ref 0.17–1.22)
MONOCYTES NFR BLD AUTO: 11 % (ref 4–12)
NEUTROPHILS # BLD AUTO: 2.88 THOUSANDS/ÂΜL (ref 1.85–7.62)
NEUTS SEG NFR BLD AUTO: 52 % (ref 43–75)
NRBC BLD AUTO-RTO: 0 /100 WBCS
PLATELET # BLD AUTO: 210 THOUSANDS/UL (ref 149–390)
PMV BLD AUTO: 9.8 FL (ref 8.9–12.7)
POTASSIUM SERPL-SCNC: 4.3 MMOL/L (ref 3.5–5.3)
PROT SERPL-MCNC: 7.6 G/DL (ref 6.4–8.4)
RBC # BLD AUTO: 4.79 MILLION/UL (ref 3.81–5.12)
SODIUM SERPL-SCNC: 138 MMOL/L (ref 135–147)
TIBC SERPL-MCNC: 411 UG/DL (ref 250–450)
TSH SERPL DL<=0.05 MIU/L-ACNC: 2.27 UIU/ML (ref 0.45–4.5)
UIBC SERPL-MCNC: 296 UG/DL (ref 155–355)
WBC # BLD AUTO: 5.61 THOUSAND/UL (ref 4.31–10.16)

## 2024-08-23 PROCEDURE — 83735 ASSAY OF MAGNESIUM: CPT

## 2024-08-23 PROCEDURE — 36415 COLL VENOUS BLD VENIPUNCTURE: CPT

## 2024-08-23 PROCEDURE — 82728 ASSAY OF FERRITIN: CPT

## 2024-08-23 PROCEDURE — 80053 COMPREHEN METABOLIC PANEL: CPT

## 2024-08-23 PROCEDURE — 83540 ASSAY OF IRON: CPT

## 2024-08-23 PROCEDURE — 85025 COMPLETE CBC W/AUTO DIFF WBC: CPT

## 2024-08-23 PROCEDURE — 83550 IRON BINDING TEST: CPT

## 2024-08-23 PROCEDURE — 84443 ASSAY THYROID STIM HORMONE: CPT

## 2024-08-26 ENCOUNTER — NURSE TRIAGE (OUTPATIENT)
Age: 24
End: 2024-08-26

## 2024-08-26 ENCOUNTER — OFFICE VISIT (OUTPATIENT)
Age: 24
End: 2024-08-26
Payer: COMMERCIAL

## 2024-08-26 VITALS
DIASTOLIC BLOOD PRESSURE: 77 MMHG | BODY MASS INDEX: 21.13 KG/M2 | HEART RATE: 114 BPM | TEMPERATURE: 98.5 F | HEIGHT: 68 IN | SYSTOLIC BLOOD PRESSURE: 112 MMHG | WEIGHT: 139.4 LBS | RESPIRATION RATE: 18 BRPM | OXYGEN SATURATION: 95 %

## 2024-08-26 DIAGNOSIS — S16.1XXA STRAIN OF MUSCLE, FASCIA AND TENDON AT NECK LEVEL, INITIAL ENCOUNTER: Primary | ICD-10-CM

## 2024-08-26 PROBLEM — M54.6 ACUTE BILATERAL THORACIC BACK PAIN: Status: RESOLVED | Noted: 2023-11-13 | Resolved: 2024-08-26

## 2024-08-26 PROCEDURE — G0382 LEV 3 HOSP TYPE B ED VISIT: HCPCS | Performed by: NURSE PRACTITIONER

## 2024-08-26 PROCEDURE — S9083 URGENT CARE CENTER GLOBAL: HCPCS | Performed by: NURSE PRACTITIONER

## 2024-08-26 RX ORDER — METHOCARBAMOL 500 MG/1
500 TABLET, FILM COATED ORAL 2 TIMES DAILY PRN
Qty: 20 TABLET | Refills: 0 | Status: SHIPPED | OUTPATIENT
Start: 2024-08-26 | End: 2024-09-05

## 2024-08-26 RX ORDER — NAPROXEN 500 MG/1
500 TABLET ORAL 2 TIMES DAILY WITH MEALS
Qty: 20 TABLET | Refills: 0 | Status: SHIPPED | OUTPATIENT
Start: 2024-08-26 | End: 2024-09-05

## 2024-08-26 NOTE — ASSESSMENT & PLAN NOTE
Patient reports intermittent palpitations.  Reports that propranolol was helpful but Rx was discontinued due to concern of lightheadedness.  I recommend to restart propranolol 20 mg in the morning every day and additional 20 mg in the evening as needed   Cardiac workup performed in January 2024 included echo with normal LVEF and known MVP and unremarkable Holter.  Patient was eval by [facility] cardiology.  Proceed with blood work.  If palpitations persist-consider reevaluation by cardiology for consideration of extended ambulatory monitor.  I suspect though that her symptoms are primarily triggered by anxiety.  Patient follows closely with psychiatry.

## 2024-08-26 NOTE — TELEPHONE ENCOUNTER
"Pt called, transferred from [place] to [facility] for c/o neck pain. Triage completed. Advised needs to be seen today. No same day appts at the office. Advised Pt to go to nearest Urgent Care now. Pt verbalized understanding and is agreeable.     Office updated.      Reason for Disposition   SEVERE pain (e.g., excruciating, unable to do any normal activities)    Answer Assessment - Initial Assessment Questions  1. ONSET: \"When did the pain begin?\"       Pt states that she woke up with pain this morning  2. LOCATION: \"Where does it hurt?\"       Left neck \"shooting\" into left head. Denies radiation into BUE.  3. PATTERN \"Does the pain come and go, or has it been constant since it started?\"       constant  4. SEVERITY: \"How bad is the pain?\"  (Scale 1-10; or mild, moderate, severe)    - NO PAIN (0): no pain or only slight stiffness     - MILD (1-3): doesn't interfere with normal activities     - MODERATE (4-7): interferes with normal activities or awakens from sleep     - SEVERE (8-10):  excruciating pain, unable to do any normal activities       Severe, Pt rates 8-9/10  5. RADIATION: \"Does the pain go anywhere else, shoot into your arms?\"      denies  6. CORD SYMPTOMS: \"Any weakness or numbness of the arms or legs?\"      denies  7. CAUSE: \"What do you think is causing the neck pain?\"      Unsure, possible positioning over night  8. NECK OVERUSE: \"Any recent activities that involved turning or twisting the neck?\"      Does not reports. Pt denies recent head or neck injury  9. OTHER SYMPTOMS: \"Do you have any other symptoms?\" (e.g., headache, fever, chest pain, difficulty breathing, neck swelling)      Difficulty turning head to left, other wise denies fever or other symptoms    Protocols used: Neck Pain or Stiffness-ADULT-[place]    "

## 2024-08-26 NOTE — PROGRESS NOTES
[facility]        NAME: Martha Hollis is a 23 y.o. female  : 2000    MRN: [id number]  DATE: 2024  TIME: 5:31 PM    Assessment and Plan   Strain of muscle, fascia and tendon at neck level, initial encounter [S16.1XXA]  1. Strain of muscle, fascia and tendon at neck level, initial encounter  methocarbamol (ROBAXIN) 500 mg tablet    naproxen (Naprosyn) 500 mg tablet        Acute symptomatic no red flags on exam has not tried anything without relief.  Will start Robaxin 500 mg twice daily and naproxen 500 mg twice daily educated on side effects proper use of medication should follow-up with PCP with any worsening symptoms no improvement.    Patient Instructions       Follow up with PCP in 3-5 days.  Proceed to  ER if symptoms worsen.    If tests have been performed at [facility], our office will contact you with results if changes need to be made to the care plan discussed with you at the visit.  You can review your full [facility] on [facility].    Chief Complaint     Chief Complaint   Patient presents with   • Neck Pain     Left sided neck pain radiating into left ear causing ear pain. Recently diagnosed with vertigo. Started this afternoon.          History of Present Illness       Patient is a 23-year-old female arrives with complaints of left-sided neck pain shoots up towards the head.  Patient reports woke up with the pain now having difficulty with range of motion related to pain.  Denies any numbness tingling changes of bowel bladder habits saddle anesthesia.  Pain is constant however worsens when she turns her head.  Denies any injury or trauma to the area.  Denies any past medical history of spinal injuries or illnesses.  Denies blood blurry vision double vision.  Does report having diagnosis of vertigo recently.  Does have slight headache.        Review of Systems   Review of Systems   Constitutional:  Negative for activity change, appetite change, chills, fatigue and fever.    HENT:  Negative for congestion, postnasal drip, rhinorrhea, sneezing and sore throat.    Respiratory:  Negative for cough, chest tightness, shortness of breath and wheezing.    Cardiovascular:  Negative for chest pain and palpitations.   Gastrointestinal:  Negative for abdominal pain, constipation, diarrhea, nausea and vomiting.   Musculoskeletal:  Positive for neck pain and neck stiffness. Negative for arthralgias and myalgias.   Skin:  Negative for color change, pallor and rash.   Neurological:  Negative for dizziness, weakness, light-headedness and headaches.   Hematological:  Negative for adenopathy.   Psychiatric/Behavioral:  Negative for agitation and confusion.          Current Medications       Current Outpatient Medications:   •  Cholecalciferol (D3) 50 MCG (2000 UT) CHEW, , Disp: , Rfl:   •  clobetasol (TEMOVATE) 0.05 % ointment, Apply 1/2 fingertip unit to vaginal area twice daily for 2 weeks, then once daily for 2 weeks, then twice weekly for 2 weeks, Disp: 45 g, Rfl: 0  •  Cyanocobalamin (B-12) 1000 MCG SUBL, , Disp: , Rfl:   •  divalproex sodium (DEPAKOTE SPRINKLE) 125 MG capsule, TAKE 1 CAPSULE BY MOUTH 2 TIMES A DAY, Disp: 180 capsule, Rfl: 0  •  esomeprazole (NexIUM) 40 MG capsule, Take 1 capsule (40 mg total) by mouth daily, Disp: 90 capsule, Rfl: 1  •  ibuprofen (MOTRIN) 600 mg tablet, TAKE 1 TABLET BY MOUTH 4 TIMES A DAY FOR 7 DAYS, AS NEEDED FOR HEADACHE, Disp: , Rfl:   •  levocetirizine (XYZAL) 5 MG tablet, Take 1 tablet (5 mg total) by mouth every evening, Disp: 90 tablet, Rfl: 1  •  Levonorgestrel (MIRENA) 20 MCG/DAY IUD, 1 each by Intrauterine Device route Once every 8 years, Disp: , Rfl:   •  meclizine (ANTIVERT) 12.5 MG tablet, Take 1 tablet (12.5 mg total) by mouth 3 (three) times a day as needed for dizziness, Disp: 20 tablet, Rfl: 0  •  meloxicam (MOBIC) 7.5 mg tablet, Take 7.5 mg by mouth daily, Disp: , Rfl:   •  methocarbamol (ROBAXIN) 500 mg tablet, Take 1 tablet (500 mg total) by  mouth 2 (two) times a day as needed for muscle spasms for up to 10 days, Disp: 20 tablet, Rfl: 0  •  multivitamin (THERAGRAN) TABS, Take 1 tablet by mouth daily, Disp: , Rfl:   •  naproxen (Naprosyn) 500 mg tablet, Take 1 tablet (500 mg total) by mouth 2 (two) times a day with meals for 10 days, Disp: 20 tablet, Rfl: 0  •  nortriptyline (PAMELOR) 10 mg capsule, Take 3 capsules (30 mg total) by mouth daily at bedtime, Disp: , Rfl:   •  ondansetron (Zofran ODT) 4 mg disintegrating tablet, Take 1 tablet (4 mg total) by mouth every 6 (six) hours as needed for nausea or vomiting, Disp: 20 tablet, Rfl: 1  •  propranolol (INDERAL) 10 mg tablet, TAKE 1 TABLET BY MOUTH THREE TIMES A DAY, Disp: 270 tablet, Rfl: 0  •  scopolamine (TRANSDERM-SCOP) 1 mg/3 days TD 72 hr patch, Place 1 patch on the skin over 72 hours every third day, Disp: 4 patch, Rfl: 1  •  albuterol (ProAir HFA) 90 mcg/act inhaler, Inhale 2 puffs every 6 (six) hours as needed for wheezing (Patient not taking: Reported on 8/26/2024), Disp: 8.5 g, Rfl: 0  •  clotrimazole-betamethasone (LOTRISONE) 1-0.05 % cream, Apply topically 2 (two) times a day (Patient not taking: Reported on 8/26/2024), Disp: 15 g, Rfl: 0    Current Allergies     Allergies as of 08/26/2024 - Reviewed 08/26/2024   Allergen Reaction Noted   • Pineapple - food allergy Other (See Comments) 07/03/2023   • Pollen extract Sneezing 12/01/2020   • Bee pollen Sneezing 12/01/2020            The following portions of the patient's history were reviewed and updated as appropriate: allergies, current medications, past family history, past medical history, past social history, past surgical history and problem list.     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        Objective   /77   Pulse (!) 114   Temp 98.5 °F (36.9 °C) (Tympanic)   Resp 18   Ht 5' 8\" (1.727 m)   Wt 63.2 kg (139 lb 6.4 oz)   LMP  (LMP Unknown)   SpO2 95%   BMI 21.20 kg/m²   No LMP recorded (lmp unknown).       Physical Exam     Physical Exam  Vitals and nursing note reviewed.   Constitutional:       General: She is not in acute distress.     Appearance: Normal appearance. She is not ill-appearing or diaphoretic.   HENT:      Head: Normocephalic and atraumatic.      Nose: No congestion or rhinorrhea.   Eyes:      General: No scleral icterus.        Right eye: No discharge.         Left eye: No discharge.   Pulmonary:      Effort: Pulmonary effort is normal. No respiratory distress.   Musculoskeletal:         General: Tenderness present. No swelling or signs of injury.      Cervical back: Spasms and tenderness present. No swelling, edema, erythema, signs of trauma, bony tenderness or crepitus. Pain with movement present. Decreased range of motion.   Skin:     Coloration: Skin is not jaundiced or pale.      Findings: No bruising, erythema or rash.   Neurological:      General: No focal deficit present.      Mental Status: She is alert and oriented to " person, place, and time.   Psychiatric:         Mood and Affect: Mood normal.         Behavior: Behavior normal.         Thought Content: Thought content normal.         Judgment: Judgment normal.

## 2024-08-26 NOTE — ASSESSMENT & PLAN NOTE
Currently on nortriptyline 30 mg nightly.  Previous trials of SSRI therapy-unsuccessful.  Restart propranolol 20 mg daily or twice daily.  Patient remains under care of psychiatry.

## 2024-08-30 ENCOUNTER — EVALUATION (OUTPATIENT)
Dept: PHYSICAL THERAPY | Facility: REHABILITATION | Age: 24
End: 2024-08-30
Payer: COMMERCIAL

## 2024-08-30 DIAGNOSIS — R42 VERTIGO: ICD-10-CM

## 2024-08-30 PROBLEM — K52.9 GASTROENTERITIS: Status: RESOLVED | Noted: 2024-07-31 | Resolved: 2024-08-30

## 2024-08-30 PROCEDURE — 97163 PT EVAL HIGH COMPLEX 45 MIN: CPT
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
## 2024-09-03 ENCOUNTER — TELEPHONE (OUTPATIENT)
Dept: FAMILY MEDICINE CLINIC | Facility: CLINIC | Age: 24
End: 2024-09-03

## 2024-09-03 DIAGNOSIS — R42 DIZZINESS: ICD-10-CM

## 2024-09-03 DIAGNOSIS — G43.009 MIGRAINE WITHOUT AURA AND WITHOUT STATUS MIGRAINOSUS, NOT INTRACTABLE: Primary | ICD-10-CM

## 2024-09-03 NOTE — TELEPHONE ENCOUNTER
Please contact the patient.  I received a note from physical therapy concerning ongoing symptoms of dizziness.    I recommend to proceed with brain MRI.  I wonder if patient may be presenting with what we call vestibular migraines rather than pure dizziness based on PT evaluation.    MRI orders placed.  Please ask her to proceed.  Thank you

## 2024-09-04 ENCOUNTER — OFFICE VISIT (OUTPATIENT)
Dept: PHYSICAL THERAPY | Facility: REHABILITATION | Age: 24
End: 2024-09-04
Payer: COMMERCIAL

## 2024-09-04 DIAGNOSIS — R42 VERTIGO: Primary | ICD-10-CM

## 2024-09-04 PROCEDURE — 97140 MANUAL THERAPY 1/> REGIONS: CPT

## 2024-09-04 NOTE — PROGRESS NOTES
Daily Note     Today's date: 2024  Patient name: Martha Hollis  : 2000  MRN: [id number]  Referring provider: Roya Chan MD  Dx:   Encounter Diagnosis     ICD-10-CM    1. Vertigo  R42           Start Time: 08  Stop Time: 0840  Total time in clinic (min): 35 minutes    Subjective: Martha says she hasn't taken her meclizine and feels her symptoms more. She had an increase in symptoms the other day when she was just sitting on the couch. She says her neck really bothered her as well, and had neck pain around the time she got dizzy.       Objective: See treatment diary below    Zachery Hallpike: Increase in dizziness bilaterally L>R. No nystagmus observed.     Cervical spine palpation    Restriction through left suboccipital musculature, with pain produced  Moderate limitation with right side glide of left C2-C3 with localized symptoms produced    Flexion rotation testing:   Left rotation ~50 degrees with symptom increase  Right rotation ~40 degrees with symptom increase    OC-C1 downglide  Equal bilateraly, no neck pain or dizziness reproduced.     Assessment: [place]-Hallpike produces symptoms on the left, but no nystagmus observed and symptoms increase with prolonged positioning. Potential for cervical spine dysfunction to be related to current episode. Incorporated non thrust joint mobilization and soft tissue mobilization to address dysfunction with appropriate response. No significant change in dizziness with assessment treatment of cervical spine. Prescribed self soft tissue mobilization to address soft tissue dysfunction with adequate return demonstration. Recommended patient resume meclizine as prescribed by physician to reduce symptom intensity.       Plan: Continue per plan of care.      Precautions: Standard, Anxiety  : DHI 60.     Manuals            Assessment  LM           STM  L. Suboccipital release LM           C/s mobs  Gr II right side glide c2-c3 supine LM                         Neuro Re-Ed             VOR x1 HEP Reviewed                                                                                         Ther Ex             Self STM  HEP                                                                                                      Ther Activity                                       Gait Training                                       Modalities

## 2024-09-04 NOTE — PROGRESS NOTES
Daily Note     Today's date: 2024  Patient name: Martha Hollis  : 2000  MRN: [id number]  Referring provider: Roya Chan MD  Dx: No diagnosis found.               Subjective: ***      Objective: See treatment diary below      Assessment: Tolerated treatment {Tolerated treatment :[id number]}. Patient {assessment:[id number]}      Plan: {PLAN:[id number]}     Precautions: Standard, Anxiety  : DHI 60.     Manuals                                                                 Neuro Re-Ed             VOR x1 HEP                                                                                          Ther Ex                                                                                                                     Ther Activity                                       Gait Training                                       Modalities

## 2024-09-06 ENCOUNTER — OFFICE VISIT (OUTPATIENT)
Dept: PHYSICAL THERAPY | Facility: REHABILITATION | Age: 24
End: 2024-09-06
Payer: COMMERCIAL

## 2024-09-06 DIAGNOSIS — R42 VERTIGO: Primary | ICD-10-CM

## 2024-09-06 PROCEDURE — 97140 MANUAL THERAPY 1/> REGIONS: CPT

## 2024-09-06 PROCEDURE — 97112 NEUROMUSCULAR REEDUCATION: CPT

## 2024-09-06 NOTE — PROGRESS NOTES
Daily Note     Today's date: 2024  Patient name: Martha Hollis  : 2000  MRN: [id number]  Referring provider: Roya Chan MD  Dx:   Encounter Diagnosis     ICD-10-CM    1. Vertigo  R42           Start Time: 1015  Stop Time: 1045  Total time in clinic (min): 30 minutes    Subjective: Martha says her neck was a little sore after last visit, and had some referral to her left shoulder. She feels better today and feels like her neck pain and dizziness are better today.       Objective: See treatment diary below    Joint position error testing: positive    Assessment: Incorporated thoracic spine HVLA mobilization to reduce neck pain and improve spinal mobility. Incorporated cervical spine METs to address movement dysfunction with improvement in segmental mobility within session. Patient exhibits cervical spine proprioception deficits with positive joint position error testing. Updated patient's HEP to include cervical spine proprioception interventions with mild symptom onset with intervention. Reviewed VOR intervention with good return demonstration and verbalization of appropriate dosage. Tolerated treatment well. Patient exhibited good technique with therapeutic exercises and would benefit from continued PT      Plan: Continue per plan of care.      Precautions: Standard, Anxiety  : DHI 60.     Manuals           Assessment  LM LM          STM  L. Suboccipital release LM           C/s mobs  Gr II right side glide c2-c3 supine LM Gr II-III R side glide c2-c3 supine          C/s MET   R. Sb with R. Side glide LM 2x          T/s mobs   Supine HVLA T 4          Neuro Re-Ed             VOR x1 HEP Reviewed 1x seated   1xstanding          Cervical proprioception   X15 H ea & vertical seatd + HEP                                                                           Ther Ex             Self STM  HEP Reviewed                                                                                                      Ther Activity                                       Gait Training                                       Modalities

## 2024-09-12 ENCOUNTER — TELEMEDICINE (OUTPATIENT)
Dept: PSYCHIATRY | Facility: CLINIC | Age: 24
End: 2024-09-12
Payer: COMMERCIAL

## 2024-09-12 ENCOUNTER — PATIENT MESSAGE (OUTPATIENT)
Dept: FAMILY MEDICINE CLINIC | Facility: CLINIC | Age: 24
End: 2024-09-12

## 2024-09-12 DIAGNOSIS — R42 VERTIGO: ICD-10-CM

## 2024-09-12 DIAGNOSIS — Z79.899 ENCOUNTER FOR LONG-TERM (CURRENT) USE OF OTHER MEDICATIONS: ICD-10-CM

## 2024-09-12 DIAGNOSIS — F41.1 GAD (GENERALIZED ANXIETY DISORDER): ICD-10-CM

## 2024-09-12 DIAGNOSIS — F40.10 SOCIAL ANXIETY DISORDER: ICD-10-CM

## 2024-09-12 DIAGNOSIS — G47.00 INSOMNIA, UNSPECIFIED TYPE: ICD-10-CM

## 2024-09-12 DIAGNOSIS — G44.229 CHRONIC TENSION-TYPE HEADACHE, NOT INTRACTABLE: ICD-10-CM

## 2024-09-12 DIAGNOSIS — F33.9 DEPRESSION, RECURRENT (HCC): Primary | ICD-10-CM

## 2024-09-12 PROCEDURE — 99214 OFFICE O/P EST MOD 30 MIN: CPT | Performed by: PHYSICIAN ASSISTANT

## 2024-09-12 RX ORDER — NORTRIPTYLINE HCL 10 MG
30 CAPSULE ORAL
Qty: 90 CAPSULE | Refills: 0 | Status: SHIPPED | OUTPATIENT
Start: 2024-09-12

## 2024-09-12 RX ORDER — PROPRANOLOL HYDROCHLORIDE 10 MG/1
20 TABLET ORAL 2 TIMES DAILY
COMMUNITY
Start: 2024-09-12

## 2024-09-12 RX ORDER — DIVALPROEX SODIUM 125 MG/1
125 CAPSULE, COATED PELLETS ORAL 2 TIMES DAILY
Qty: 180 CAPSULE | Refills: 0 | Status: SHIPPED | OUTPATIENT
Start: 2024-09-12

## 2024-09-12 RX ORDER — MECLIZINE HCL 12.5 MG 12.5 MG/1
12.5 TABLET ORAL 3 TIMES DAILY PRN
Qty: 20 TABLET | Refills: 5 | Status: SHIPPED | OUTPATIENT
Start: 2024-09-12

## 2024-09-12 NOTE — PSYCH
Virtual Regular Visit    Verification of patient location:    Patient is located at Home in the following state in which I hold an active license [place]             Reason for visit is   Chief Complaint   Patient presents with    Virtual Regular Visit          Encounter provider Minna Corona PA-C      Recent Visits  No visits were found meeting these conditions.  Showing recent visits within past 7 days and meeting all other requirements  Today's Visits  Date Type Provider Dept   09/12/24 Telemedicine Minna Corona PA-C  [facility]   Showing today's visits and meeting all other requirements  Future Appointments  No visits were found meeting these conditions.  Showing future appointments within next 150 days and meeting all other requirements       The patient was identified by name and date of birth. Martha Hollis was informed that this is a telemedicine visit and that the visit is being conducted throughthe Epic Embedded platform. She agrees to proceed..  My office door was closed. No one else was in the room.  She acknowledged consent and understanding of privacy and security of the video platform. The patient has agreed to participate and understands they can discontinue the visit at any time.    Patient is aware this is a billable service.              MEDICATION MANAGEMENT NOTE        [facility]   [facility] [place]  [address] [facility] [address] [phone]  This note was not shared with the patient due to this is a psychotherapy note        Name and Date of Birth:  Martha Hollis 23 y.o. 2000    Date of Visit: September 12, 2024    SUBJECTIVE:    Martha last seen by Zara BONILLA 8/6 at which time meds unchanged. Martha states she just got back from a week long cruise which has exacerbated her vertigo.  Is currently receiving PT for this.  Was seen in urgent care 8/16 for  "dizziness and inderal held.  Seen by PCP 8/22 at which time inderal restarted and dose increased for perception of palpitations and anxiety.  Seen in urgent care 8/26 for neck pain and remains in PT for both vertigo and neck pain.  Martha states her anxiety is high - feels off balance and lightheaded- \"like I'm on a boat constantly\".  Appetite is done without weight loss.  States she worries excessively but cannot articulate what she is worrying about.  Had a difficult time on ivana with the dizziness as well as the social environment.  States she has been babysitting and visiting her mom whose health continues to deteriorate.  Martha is thinking about moving in with her step-dad to be closer to her mom- this is also causing anxiety and Martha admits to not liking \"big changes\".  Continues to work with therapist.  Mood has been stable. Denies SI.     Review of Systems   Constitutional:  Positive for appetite change. Negative for unexpected weight change.   Genitourinary:         IUD   Neurological:  Positive for dizziness and light-headedness. Negative for syncope.     Past Psychiatric History     Inpatient:  None  [facility].    No hx of suicide attempts.      Outpatient:  Therapist- Abby Martínez -[facility]- x 6-7 yrs.   Current therapist- [facility], [place].  This office since April 2022     Med trials: lexapro -more depressed.       Trauma/Loss History:           Abandonment by bio father.  Mother's illness  (stage 4 metastatic breast cancer)     Family Psychiatric History:      Psychiatric Illness:      Depression- grandfather; anxiety -aunt  Substance Abuse:       none reported  Suicide Attempts:        uncle     Social History:         Student at [employer]- on line classes currently.   Had IEP- learning disability- comprehension.  Single.  Living with boyfriend and boyfriend's family.                                   OBJECTIVE:     MENTAL STATUS EXAM  Appearance:  age appropriate "   Behavior:  Pleasant & cooperative   Speech:  Normal volume, regular rate and rhythm   Mood:  anxious   Affect:  mood congruent   Language: intact and appropriate for age, education, and intellect   Thought Process:  goal directed   Associations: intact associations   Thought Content:  negative ruminations   Perceptual Disturbances: no auditory or visual hallcunations   Risk Potential / Abnormal Thoughts: Suicidal ideation - None  Homicidal ideation - None  Potential for aggression - No       Consciousness:  Alert & Awake   Sensorium:  Grossly oriented   Attention: attention span and concentration are age appropriate       Fund of Knowledge:  Memory: awareness of current events: yes  recent and remote memory grossly intact   Insight:  iintact   Judgment: intact     Lab Review: I have reviewed all pertinent labs    Lab Results   Component Value Date     06/11/2016    SODIUM 138 08/23/2024    K 4.3 08/23/2024     08/23/2024    CO2 30 08/23/2024    AGAP 7 08/23/2024    BUN 15 08/23/2024    CREATININE 0.70 08/23/2024    GLUC 100 02/26/2023    GLUF 96 08/23/2024    CALCIUM 9.9 08/23/2024    AST 16 08/23/2024    ALT 10 08/23/2024    ALKPHOS 67 08/23/2024    PROT 6.6 06/11/2016    TP 7.6 08/23/2024    BILITOT 2.0 (H) 06/11/2016    TBILI 1.71 (H) 08/23/2024    EGFR 124 08/23/2024     Lab Results   Component Value Date    WBC 5.61 08/23/2024    HGB 14.7 08/23/2024    HCT 44.4 08/23/2024    MCV 93 08/23/2024     08/23/2024     Lab Results   Component Value Date    MIGNUGLS98 290 05/01/2024       Lab Results   Component Value Date    VDB0DJURUSRE 2.273 08/23/2024    TSH 2.62 10/01/2022           ASSESSMENT & PLAN          Diagnoses and all orders for this visit:    Depression, recurrent (HCC)  -     divalproex sodium (DEPAKOTE SPRINKLE) 125 MG capsule; Take 1 capsule (125 mg total) by mouth 2 (two) times a day  -     nortriptyline (PAMELOR) 10 mg capsule; Take 3 capsules (30 mg total) by mouth daily at  bedtime    Encounter for long-term (current) use of other medications  -     Nortriptyline level; Future    Insomnia, unspecified type  -     nortriptyline (PAMELOR) 10 mg capsule; Take 3 capsules (30 mg total) by mouth daily at bedtime    Chronic tension-type headache, not intractable  -     nortriptyline (PAMELOR) 10 mg capsule; Take 3 capsules (30 mg total) by mouth daily at bedtime    Social anxiety disorder  -     nortriptyline (PAMELOR) 10 mg capsule; Take 3 capsules (30 mg total) by mouth daily at bedtime  -     propranolol (INDERAL) 10 mg tablet; Take 2 tablets (20 mg total) by mouth 2 (two) times a day    MARCOS (generalized anxiety disorder)  -     nortriptyline (PAMELOR) 10 mg capsule; Take 3 capsules (30 mg total) by mouth daily at bedtime  -     propranolol (INDERAL) 10 mg tablet; Take 2 tablets (20 mg total) by mouth 2 (two) times a day      Current Outpatient Medications   Medication Sig Dispense Refill    divalproex sodium (DEPAKOTE SPRINKLE) 125 MG capsule Take 1 capsule (125 mg total) by mouth 2 (two) times a day 180 capsule 0    nortriptyline (PAMELOR) 10 mg capsule Take 3 capsules (30 mg total) by mouth daily at bedtime 90 capsule 0    propranolol (INDERAL) 10 mg tablet Take 2 tablets (20 mg total) by mouth 2 (two) times a day      albuterol (ProAir HFA) 90 mcg/act inhaler Inhale 2 puffs every 6 (six) hours as needed for wheezing (Patient not taking: Reported on 8/26/2024) 8.5 g 0    Cholecalciferol (D3) 50 MCG (2000 UT) CHEW       clobetasol (TEMOVATE) 0.05 % ointment Apply 1/2 fingertip unit to vaginal area twice daily for 2 weeks, then once daily for 2 weeks, then twice weekly for 2 weeks 45 g 0    clotrimazole-betamethasone (LOTRISONE) 1-0.05 % cream Apply topically 2 (two) times a day (Patient not taking: Reported on 8/26/2024) 15 g 0    Cyanocobalamin (B-12) 1000 MCG SUBL       esomeprazole (NexIUM) 40 MG capsule Take 1 capsule (40 mg total) by mouth daily 90 capsule 1    ibuprofen (MOTRIN)  600 mg tablet TAKE 1 TABLET BY MOUTH 4 TIMES A DAY FOR 7 DAYS, AS NEEDED FOR HEADACHE      levocetirizine (XYZAL) 5 MG tablet Take 1 tablet (5 mg total) by mouth every evening 90 tablet 1    Levonorgestrel (MIRENA) 20 MCG/DAY IUD 1 each by Intrauterine Device route Once every 8 years      meclizine (ANTIVERT) 12.5 MG tablet TAKE 1 TABLET BY MOUTH 3 TIMES A DAY AS NEEDED FOR DIZZINESS. 20 tablet 5    meloxicam (MOBIC) 7.5 mg tablet Take 7.5 mg by mouth daily      methocarbamol (ROBAXIN) 500 mg tablet Take 1 tablet (500 mg total) by mouth 2 (two) times a day as needed for muscle spasms for up to 10 days 20 tablet 0    multivitamin (THERAGRAN) TABS Take 1 tablet by mouth daily      naproxen (Naprosyn) 500 mg tablet Take 1 tablet (500 mg total) by mouth 2 (two) times a day with meals for 10 days 20 tablet 0    ondansetron (Zofran ODT) 4 mg disintegrating tablet Take 1 tablet (4 mg total) by mouth every 6 (six) hours as needed for nausea or vomiting 20 tablet 1    scopolamine (TRANSDERM-SCOP) 1 mg/3 days TD 72 hr patch Place 1 patch on the skin over 72 hours every third day 4 patch 1     No current facility-administered medications for this visit.                Plan:       Will get nortriptyline level and coordinate care with vestibular PT.  For now cont nortriptyline 30 mg q bedtime, propranolol 20 mg bid, depakote 125 mg bid    Reviewed risks, benefits, side effects of medications, including no medication.  Patient understands and agrees to treatment plan.   F/u Daniel 9/26/24, sooner prn       Patient has been informed of 24 hours and weekend coverage for urgent situations accessed by calling the main clinic phone number.     Minna Corona PA-C

## 2024-09-13 DIAGNOSIS — F41.1 GAD (GENERALIZED ANXIETY DISORDER): Primary | ICD-10-CM

## 2024-09-13 RX ORDER — LORAZEPAM 0.5 MG/1
TABLET ORAL
Qty: 2 TABLET | Refills: 0 | Status: SHIPPED | OUTPATIENT
Start: 2024-09-13 | End: 2024-09-17

## 2024-09-13 NOTE — PATIENT COMMUNICATION
Called patient and leave a detailed message with provider message response. Mfuset message sent also

## 2024-09-14 ENCOUNTER — PATIENT MESSAGE (OUTPATIENT)
Dept: FAMILY MEDICINE CLINIC | Facility: CLINIC | Age: 24
End: 2024-09-14

## 2024-09-15 ENCOUNTER — HOSPITAL ENCOUNTER (OUTPATIENT)
Dept: RADIOLOGY | Facility: HOSPITAL | Age: 24
Discharge: HOME | End: 2024-09-15
Payer: COMMERCIAL

## 2024-09-15 DIAGNOSIS — G43.009 MIGRAINE WITHOUT AURA AND WITHOUT STATUS MIGRAINOSUS, NOT INTRACTABLE: ICD-10-CM

## 2024-09-15 DIAGNOSIS — R42 DIZZINESS: ICD-10-CM

## 2024-09-15 PROCEDURE — A9585 GADOBUTROL INJECTION: HCPCS | Performed by: FAMILY MEDICINE

## 2024-09-15 PROCEDURE — 70553 MRI BRAIN STEM W/O & W/DYE: CPT

## 2024-09-15 RX ORDER — GADOBUTROL 604.72 MG/ML
6 INJECTION INTRAVENOUS
Status: COMPLETED | OUTPATIENT
Start: 2024-09-15 | End: 2024-09-15

## 2024-09-15 RX ADMIN — GADOBUTROL 6 ML: 604.72 INJECTION INTRAVENOUS at 16:25

## 2024-09-16 ENCOUNTER — NURSE TRIAGE (OUTPATIENT)
Age: 24
End: 2024-09-16

## 2024-09-16 NOTE — PATIENT COMMUNICATION
Spoke to patient, stated she is still feeling the same and not feeling well, did place patient on schedule for tomorrow, did advise if she would get any worse to proceed to the emergency room

## 2024-09-16 NOTE — TELEPHONE ENCOUNTER
"Patient called in with complaints of vertigo and nausea. She was on a cruise and this started when she returned 9/11/24. The dizziness is getting better now, comes and goes. She has been taking Meclizine. She has been nauseated and not eating much. She is able to stay hydrated. She has not been moving her bowel often and has mild abdominal bloating. Denies vomiting. She has long history of constipation and has used Miralax in past-she will try this. OV 9/19/24 with PCP. She is asking if she can take her allergy Medication Levocetirizine at HS and Meclizine during the day. Please advise with PCP recommendations for these medications together. I reviewed the side effects with her.     Reason for Disposition   Nausea lasts > 1 week    Answer Assessment - Initial Assessment Questions  1. NAUSEA SEVERITY: \"How bad is the nausea?\" (e.g., mild, moderate, severe; dehydration, weight loss)    - MILD: loss of appetite without change in eating habits    - MODERATE: decreased oral intake without significant weight loss, dehydration, or malnutrition    - SEVERE: inadequate caloric or fluid intake, significant weight loss, symptoms of dehydration      moderate  2. ONSET: \"When did the nausea begin?\"      9/11/24  3. VOMITING: \"Any vomiting?\" If Yes, ask: \"How many times today?\"      no  4. RECURRENT SYMPTOM: \"Have you had nausea before?\" If Yes, ask: \"When was the last time?\" \"What happened that time?\"      no  5. CAUSE: \"What do you think is causing the nausea?\"      Not known  6. PREGNANCY: \"Is there any chance you are pregnant?\" (e.g., unprotected intercourse, missed birth control pill, broken condom)      Denies. Has irregular menses    Protocols used: Nausea-ADULT-OH    "

## 2024-09-16 NOTE — TELEPHONE ENCOUNTER
A user error has taken place: encounter opened in error, closed for administrative reasons.    Discussed with on call, inform pt that edema may last up to 2 weeks and if increases and/or headaches increase or are not relieved with rx to call.

## 2024-09-16 NOTE — TELEPHONE ENCOUNTER
Patient can take meclizine twice a day daytime PRN and take Xyzal, levocetirizine at night.  If her symptoms will worsen to her office visit on 919-please advise her to proceed to urgent care center or ER.  Thank you

## 2024-09-16 NOTE — TELEPHONE ENCOUNTER
Patient had called in wanting to know the MRI results that she had completed on the 15th of September.    Patient stated she is still having vertigo that is coming and going.     She also expressed that she hasn't had a good appetite, and havent ate a full meal since last Wednesday 11th.    Patient stated she is eating little here and there, but when doing so, she feels sick. Patient also mentioned she isnt going to the bathroom properly, and has recently been on a cruise and isnt sure if this could be the reason that plays a part in how she is feeling.     She had also mentioned in that her vertigo medication   meclizine (ANTIVERT) 12.5 MG tablet   That she is taking interferes with her allergy medication. Lately her allergies are acting up, and was wondering if she was able to start her allergy medication again.     Please advise back out to the patient, once the provider has reviewed over  the MRI results. Please also reach out, inn regards to the symptoms the patient is having.

## 2024-09-17 ENCOUNTER — OFFICE VISIT (OUTPATIENT)
Dept: FAMILY MEDICINE CLINIC | Facility: CLINIC | Age: 24
End: 2024-09-17
Payer: COMMERCIAL

## 2024-09-17 VITALS
HEIGHT: 68 IN | HEART RATE: 105 BPM | SYSTOLIC BLOOD PRESSURE: 124 MMHG | RESPIRATION RATE: 16 BRPM | TEMPERATURE: 98.4 F | OXYGEN SATURATION: 99 % | BODY MASS INDEX: 20.49 KG/M2 | WEIGHT: 135.2 LBS | DIASTOLIC BLOOD PRESSURE: 80 MMHG

## 2024-09-17 DIAGNOSIS — R42 VERTIGO: Primary | ICD-10-CM

## 2024-09-17 DIAGNOSIS — G43.009 MIGRAINE WITHOUT AURA AND WITHOUT STATUS MIGRAINOSUS, NOT INTRACTABLE: ICD-10-CM

## 2024-09-17 PROCEDURE — 99213 OFFICE O/P EST LOW 20 MIN: CPT | Performed by: FAMILY MEDICINE

## 2024-09-17 RX ORDER — DIAZEPAM 2 MG
2 TABLET ORAL 3 TIMES DAILY PRN
Qty: 20 TABLET | Refills: 0 | Status: SHIPPED | OUTPATIENT
Start: 2024-09-17

## 2024-09-17 RX ORDER — METHYLPREDNISOLONE 4 MG
TABLET, DOSE PACK ORAL
Qty: 21 EACH | Refills: 0 | Status: SHIPPED | OUTPATIENT
Start: 2024-09-17

## 2024-09-17 NOTE — PROGRESS NOTES
Ambulatory Visit  Name: Martha Hollis      : 2000      MRN: [id number]  Encounter Provider: Roya Chan MD  Encounter Date: 2024   Encounter department: [facility]    Assessment & Plan  Vertigo  Recurrent episodes of vertigo with nausea and decreased appetite.  Overall gradually improving, worsened after recent cruise.  Brain MRI is normal.  Minimal response to meclizine and Zofran.  Continue vestibular PT that has been helpful.  Schedule evaluation with ENT.  If ENT eval is unremarkable-consider consultation with neurology to rule out vestibular migraines.  Trial of low-dose Valium 2 mg 3 times daily for treatment of labyrinthitis (sedating side effects discussed).  Orders:    methylPREDNISolone 4 MG tablet therapy pack; Use as directed on package    diazepam (VALIUM) 2 mg tablet; Take 1 tablet (2 mg total) by mouth 3 (three) times a day as needed (dizziness)    Ambulatory Referral to Otolaryngology; Future    Migraine without aura and without status migrainosus, not intractable  Patient reports that headaches have significantly improved, on nortriptyline 30 mg nightly              History of Present Illness     Presents for reevaluation of dizziness, symptoms come and go.  3-4 episodes of dizziness per day.  Worse in the afternoon.  She is here today accompanied by her significant other.  Patient has been attending vestibular PT with slow progress, gradually improving.  Headaches are significantly better.  Brain MRI performed few days ago-normal.  She reports symptoms of spinning and lightheadedness.  She becomes nauseated at times.  Appetite is decreased.  Dizziness sometimes provokes dry heaving.  Patient eats small portions.  She reports the dizziness has worsened after returning from the cruise that lasted 5 days.  She felt great for the first 2 days and then dizziness got worse.  Meclizine helps slightly.  Patient has been using Zofran and Pepto-Bismol with no  improvement.  She remains on esomeprazole daily.  Denies abdominal pain.     Nausea  Associated symptoms include congestion, fatigue and nausea. Pertinent negatives include no fever, headaches or vomiting.   URI   Associated symptoms include congestion and nausea. Pertinent negatives include no headaches or vomiting.     Review of Systems   Constitutional:  Positive for fatigue. Negative for fever.   HENT:  Positive for congestion.    Respiratory: Negative.     Cardiovascular: Negative.    Gastrointestinal:  Positive for nausea. Negative for vomiting.   Genitourinary: Negative.    Neurological:  Positive for dizziness. Negative for tremors and headaches.     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    Wears glasses      Past Surgical History:   Procedure Laterality Date    LAPAROSCOPIC ENDOMETRIOSIS FULGURATION      TONSILECTOMY AND ADNOIDECTOMY      WISDOM TOOTH EXTRACTION       Family History   Problem Relation Age of Onset    Breast cancer Mother 28    No Known Problems Father     Breast cancer Maternal Grandmother     Breast cancer Maternal Grandfather     Depression Maternal Aunt     Anxiety disorder Maternal Aunt     Colon cancer Neg Hx     Ovarian cancer Neg Hx      Social History     Tobacco Use    Smoking status: Never     Passive exposure: Past    Smokeless tobacco: Never   Vaping Use    Vaping status: Never Used   Substance and Sexual Activity    Alcohol use: Yes     Comment: Social    Drug use: No    Sexual activity: Yes     Partners: Male     Birth  control/protection: I.U.D.     Current Outpatient Medications on File Prior to Visit   Medication Sig    Cholecalciferol (D3) 50 MCG (2000 UT) CHEW     clobetasol (TEMOVATE) 0.05 % ointment Apply 1/2 fingertip unit to vaginal area twice daily for 2 weeks, then once daily for 2 weeks, then twice weekly for 2 weeks    Cyanocobalamin (B-12) 1000 MCG SUBL     divalproex sodium (DEPAKOTE SPRINKLE) 125 MG capsule Take 1 capsule (125 mg total) by mouth 2 (two) times a day    esomeprazole (NexIUM) 40 MG capsule Take 1 capsule (40 mg total) by mouth daily    ibuprofen (MOTRIN) 600 mg tablet TAKE 1 TABLET BY MOUTH 4 TIMES A DAY FOR 7 DAYS, AS NEEDED FOR HEADACHE    levocetirizine (XYZAL) 5 MG tablet Take 1 tablet (5 mg total) by mouth every evening    Levonorgestrel (MIRENA) 20 MCG/DAY IUD 1 each by Intrauterine Device route Once every 8 years    LORazepam (Ativan) 0.5 mg tablet Take 1 to 2 tablets 1 hour prior to MRI for anxiety    meclizine (ANTIVERT) 12.5 MG tablet TAKE 1 TABLET BY MOUTH 3 TIMES A DAY AS NEEDED FOR DIZZINESS.    meloxicam (MOBIC) 7.5 mg tablet Take 7.5 mg by mouth daily    multivitamin (THERAGRAN) TABS Take 1 tablet by mouth daily    nortriptyline (PAMELOR) 10 mg capsule Take 3 capsules (30 mg total) by mouth daily at bedtime    ondansetron (Zofran ODT) 4 mg disintegrating tablet Take 1 tablet (4 mg total) by mouth every 6 (six) hours as needed for nausea or vomiting    propranolol (INDERAL) 10 mg tablet Take 2 tablets (20 mg total) by mouth 2 (two) times a day    scopolamine (TRANSDERM-SCOP) 1 mg/3 days TD 72 hr patch Place 1 patch on the skin over 72 hours every third day    albuterol (ProAir HFA) 90 mcg/act inhaler Inhale 2 puffs every 6 (six) hours as needed for wheezing (Patient not taking: Reported on 8/26/2024)    clotrimazole-betamethasone (LOTRISONE) 1-0.05 % cream Apply topically 2 (two) times a day (Patient not taking: Reported on 8/26/2024)    methocarbamol (ROBAXIN) 500 mg tablet Take 1 tablet  "(500 mg total) by mouth 2 (two) times a day as needed for muscle spasms for up to 10 days    naproxen (Naprosyn) 500 mg tablet Take 1 tablet (500 mg total) by mouth 2 (two) times a day with meals for 10 days     Allergies   Allergen Reactions    Pineapple - Food Allergy Other (See Comments)     Tingling tongue and throat    Pollen Extract Sneezing     congestion    Bee Pollen Sneezing     congestion     Immunization History   Administered Date(s) Administered    COVID-19 PFIZER VACCINE 0.3 ML IM 05/14/2021, 06/05/2021, 02/20/2022    COVID-19 Pfizer vac (Robinson-sucrose, gray cap) 12 yr+ IM 02/20/2022    DTaP 5 02/13/2003, 04/14/2003, 06/16/2003, 06/29/2004, 03/02/2007    HPV9 02/03/2017, 08/14/2017    Hep B, adult 2000, 01/13/2003, 09/16/2003    Hib (PRP-OMP) 02/13/2003, 04/14/2003, 06/16/2003, 03/25/2004    INFLUENZA 01/10/2023    IPV 02/13/2003, 04/14/2003, 06/29/2004, 03/02/2007    MMR 03/25/2004, 04/05/2007    Meningococcal MCV4, Unspecified 08/15/2014, 08/16/2019    Meningococcal MCV4P 08/15/2014, 08/16/2019    Meningococcal, Unknown Serogroups 08/15/2014    Pneumococcal Polysaccharide PPV23 02/13/2003    Tdap 08/15/2014    Tuberculin Skin Test-PPD Intradermal 06/25/2021    Varicella 12/30/2003, 04/05/2007     Objective     /80 (BP Location: Right arm, Patient Position: Sitting, Cuff Size: Standard)   Pulse 105   Temp 98.4 °F (36.9 °C) (Temporal)   Resp 16   Ht 5' 8\" (1.727 m)   Wt 61.3 kg (135 lb 3.2 oz)   SpO2 99%   BMI 20.56 kg/m²     Physical Exam  Vitals and nursing note reviewed.   Constitutional:       General: She is not in acute distress.     Appearance: Normal appearance. She is well-developed. She is not ill-appearing.   HENT:      Head: Normocephalic and atraumatic.   Eyes:      Extraocular Movements:      Right eye: Nystagmus present.      Conjunctiva/sclera: Conjunctivae normal.      Pupils: Pupils are equal, round, and reactive to light.   Neck:      Thyroid: No thyromegaly.      " Vascular: No carotid bruit.   Cardiovascular:      Rate and Rhythm: Normal rate and regular rhythm.      Heart sounds: Normal heart sounds. No murmur heard.  Pulmonary:      Effort: Pulmonary effort is normal. No respiratory distress.      Breath sounds: Normal breath sounds. No wheezing.   Abdominal:      General: There is no abdominal bruit.   Musculoskeletal:         General: Normal range of motion.      Cervical back: Neck supple.   Neurological:      General: No focal deficit present.      Mental Status: She is alert and oriented to person, place, and time.      Cranial Nerves: No cranial nerve deficit.      Coordination: Romberg sign positive. Coordination normal.   Psychiatric:         Mood and Affect: Mood normal.         Behavior: Behavior normal.

## 2024-09-19 ENCOUNTER — APPOINTMENT (OUTPATIENT)
Dept: PHYSICAL THERAPY | Facility: REHABILITATION | Age: 24
End: 2024-09-19
Payer: COMMERCIAL

## 2024-09-24 ENCOUNTER — OFFICE VISIT (OUTPATIENT)
Dept: PHYSICAL THERAPY | Facility: REHABILITATION | Age: 24
End: 2024-09-24
Payer: COMMERCIAL

## 2024-09-24 DIAGNOSIS — R42 VERTIGO: Primary | ICD-10-CM

## 2024-09-24 PROCEDURE — 97110 THERAPEUTIC EXERCISES: CPT

## 2024-09-24 PROCEDURE — 97112 NEUROMUSCULAR REEDUCATION: CPT

## 2024-09-24 NOTE — PROGRESS NOTES
"Daily Note     Today's date: 2024  Patient name: Martha Hollis  : 2000  MRN: [id number]  Referring provider: Roya Chan MD  Dx:   Encounter Diagnosis     ICD-10-CM    1. Vertigo  R42           Start Time: 1300  Stop Time: 1350  Total time in clinic (min): 50 minutes    Subjective: Reports improvement with recent medication changes. She reports neck pain changing to her right side.       Objective: See treatment diary below    HR supine: 87 BPM  HR standin BPM after 5 minutes    Ocular convergence: abnormal- blurriness reported 12\" from face.       Assessment: Performed screening for dysautonomia with patient experiencing greater than 30 bpm increase in heart rate with positional changes. Incorporated light aerobic resistance interventions with appropriate response. Progressed task complexity of of VOR interventions with higher intensity symptoms provoked. Incorporated convergence interventions to address deficits. Little symptoms provoked with cervical proprioception interventions- potential to progress next session. Updated HEP with interventions performed today. Tolerated treatment well. Patient exhibited good technique with therapeutic exercises and would benefit from continued PT      Plan: Continue per plan of care.      Precautions: Standard, Anxiety  : DHI 60.     Manuals          Assessment  LM LM LM         STM  L. Suboccipital release LM           C/s mobs  Gr II right side glide c2-c3 supine LM Gr II-III R side glide c2-c3 supine          C/s MET   R. Sb with R. Side glide LM 2x          T/s mobs   Supine HVLA T 4          Neuro Re-Ed             VOR x1 HEP Reviewed 1x seated   1x standing Walking ~15 feet with static stance at end  x8         Cervical proprioception   X15 H ea & vertical seatd + HEP X20 ea  with laser seated ~6 feet from wall    BP cuff poss NV         Convergence    3x5\" hold                                                           "   Ther Ex             Self STM  HEP Reviewed          Bike    L1 7'                                                                                       Ther Activity                                       Gait Training                                       Modalities

## 2024-09-26 ENCOUNTER — TELEMEDICINE (OUTPATIENT)
Dept: PSYCHIATRY | Facility: CLINIC | Age: 24
End: 2024-09-26
Payer: COMMERCIAL

## 2024-09-26 DIAGNOSIS — G44.229 CHRONIC TENSION-TYPE HEADACHE, NOT INTRACTABLE: ICD-10-CM

## 2024-09-26 DIAGNOSIS — F33.9 DEPRESSION, RECURRENT (HCC): Primary | ICD-10-CM

## 2024-09-26 DIAGNOSIS — F40.10 SOCIAL ANXIETY DISORDER: ICD-10-CM

## 2024-09-26 DIAGNOSIS — F41.1 GAD (GENERALIZED ANXIETY DISORDER): ICD-10-CM

## 2024-09-26 DIAGNOSIS — G47.00 INSOMNIA, UNSPECIFIED TYPE: ICD-10-CM

## 2024-09-26 PROCEDURE — 99214 OFFICE O/P EST MOD 30 MIN: CPT | Performed by: PHYSICIAN ASSISTANT

## 2024-09-26 RX ORDER — NORTRIPTYLINE HCL 10 MG
30 CAPSULE ORAL
Qty: 90 CAPSULE | Refills: 0 | Status: SHIPPED | OUTPATIENT
Start: 2024-09-26

## 2024-09-26 NOTE — ASSESSMENT & PLAN NOTE
Nortriptyline level ordered.  Cont 30 mg q bedtime and depakote 125 mg bid (hormonally -induced mood symptoms) Cont f/u OP therapist  Orders:    nortriptyline (PAMELOR) 10 mg capsule; Take 3 capsules (30 mg total) by mouth daily at bedtime

## 2024-09-26 NOTE — BH CRISIS PLAN
Client Name: Martha Hollis       Client YOB: 2000    [place] Safety Plan      Creation Date: 9/26/24    Created By: Minna Corona PA-C       Step 1: Warning Signs:   Warning Signs   [id number]            Step 2: Internal Coping Strategies:            Step 3: People and social settings that provide distraction:            Step 4: People whom I can ask for help during a crisis:      Step 5: Professionals or agencies I can contact during a crisis:        Crisis Phone Numbers:   Suicide Prevention Lifeline: Call or Text  989 Crisis Text Line: Text HOME to 983-195   Please note: Some [facility] do not have a separate number for Child/Adolescent specific crisis. If your county is not listed under Child/Adolescent, please call the adult number for your county      Adult Crisis Numbers: Child/Adolescent Crisis Numbers   [facility]: [phone] [facility]: [phone]   [facility]: [phone] [facility]: [phone]   [facility]: [phone] [place], [place]: [phone]   [facility]: [phone] [facility]: [phone]   [place]: [phone]   [facility]: [phone]   [facility]: [phone]   [facility]: [phone] (daytime) [phone] (after hours, weekends, holidays)      Step 6: Making the environment safer (plan for lethal means safety):      Optional: What is most important to me and worth living for?      [place] Safety Plan. Deja Hayden and Quinten Johnson. Used with permission of the authors.

## 2024-09-26 NOTE — ASSESSMENT & PLAN NOTE
Cont f/u OP therapist.   Orders:    nortriptyline (PAMELOR) 10 mg capsule; Take 3 capsules (30 mg total) by mouth daily at bedtime

## 2024-09-26 NOTE — ASSESSMENT & PLAN NOTE
Orders:    nortriptyline (PAMELOR) 10 mg capsule; Take 3 capsules (30 mg total) by mouth daily at bedtime

## 2024-09-26 NOTE — ASSESSMENT & PLAN NOTE
Cont f/u OP therapist  Orders:    nortriptyline (PAMELOR) 10 mg capsule; Take 3 capsules (30 mg total) by mouth daily at bedtime

## 2024-09-26 NOTE — PSYCH
Virtual Regular Visit    Verification of patient location:    Patient is located at Home in the following state in which I hold an active license [place]      Assessment/Plan:           Reason for visit is   Chief Complaint   Patient presents with    Virtual Regular Visit          Encounter provider Minna Corona PA-C      Recent Visits  No visits were found meeting these conditions.  Showing recent visits within past 7 days and meeting all other requirements  Today's Visits  Date Type Provider Dept   09/26/24 Telemedicine Minna Corona PA-C  [facility]   Showing today's visits and meeting all other requirements  Future Appointments  No visits were found meeting these conditions.  Showing future appointments within next 150 days and meeting all other requirements       The patient was identified by name and date of birth. Martha Hollis was informed that this is a telemedicine visit and that the visit is being conducted throughthe Epic Embedded platform. She agrees to proceed..  My office door was closed. No one else was in the room.  She acknowledged consent and understanding of privacy and security of the video platform. The patient has agreed to participate and understands they can discontinue the visit at any time.    Patient is aware this is a billable service.         MEDICATION MANAGEMENT NOTE        [facility]   [facility]  [address] [facility] [address] [phone]  This note was not shared with the patient due to this is a psychotherapy note        Name and Date of Birth:  Martha Hollis 23 y.o. 2000    Date of Visit: September 26, 2024    SUBJECTIVE:     Martha last seen by Daniel 9/12 at which time nortriptyline level ordered and meds unchanged.  Martha did not get lab done yet. Continues to struggle with ongoing vertigo- remains in PT and PCP started valium 2  mg and referred to ENT. Martha states she has not been sleeping well and attributes this at least partly to steroid that she just finished. Says her nausea is better and vertigo manageable with valium.  However , it return if she doesn't take the valium.  Continues to have periods of feeling overwhelmed and very stressed- trying to get caught up in school and mom's declining health from terminal illness (metastatic cancer).  Has been having crying and thinking about life without her mom.       States she often compares herself to her friends- would like to have her own place and a good-paying job, but she realizes that right now she needs to focus on her mom and school.     Review of Systems   Constitutional:  Negative for activity change.   Gastrointestinal:  Negative for nausea.   Neurological:  Positive for dizziness.        In PT, PCP also following   Psychiatric/Behavioral:  Positive for sleep disturbance.      Past Psychiatric History     Inpatient:  None  [facility]    No hx of suicide attempts.      Outpatient:  Therapist- Abby puentes- x 6-7 yrs.   Current therapist- [facility], [place].  This office since April 2022     Med trials: lexapro -more depressed.       Trauma/Loss History:           Abandonment by bio father.  Mother's illness  (stage 4 metastatic breast cancer)     Family Psychiatric History:      Psychiatric Illness:      Depression- grandfather; anxiety -aunt  Substance Abuse:       none reported  Suicide Attempts:        uncle     Social History:         Student at [employer]- on line classes currently.   Had IEP- learning disability- comprehension.  Single.  Living with boyfriend and boyfriend's family.                                   OBJECTIVE:     MENTAL STATUS EXAM  Appearance:  age appropriate   Behavior:  Pleasant & cooperative   Speech:  Normal volume, regular rate and rhythm   Mood:  anxious   Affect:  mood congruent   Language: intact and  appropriate for age, education, and intellect   Thought Process:  goal directed   Associations: intact associations   Thought Content:  negative thinking and cognitive distortions, no overt delusions, negative ruminations   Perceptual Disturbances: no auditory or visual hallcunations   Risk Potential / Abnormal Thoughts: Suicidal ideation - None  Homicidal ideation - None  Potential for aggression - No       Consciousness:  Alert & Awake   Sensorium:  Grossly oriented   Attention: attention span and concentration are age appropriate       Fund of Knowledge:  Memory: awareness of current events: yes  recent and remote memory grossly intact   Insight:  intact   Judgment: intact     Lab Review: I have reviewed all pertinent labs    Lab Results   Component Value Date     06/11/2016    SODIUM 138 08/23/2024    K 4.3 08/23/2024     08/23/2024    CO2 30 08/23/2024    AGAP 7 08/23/2024    BUN 15 08/23/2024    CREATININE 0.70 08/23/2024    GLUC 100 02/26/2023    GLUF 96 08/23/2024    CALCIUM 9.9 08/23/2024    AST 16 08/23/2024    ALT 10 08/23/2024    ALKPHOS 67 08/23/2024    PROT 6.6 06/11/2016    TP 7.6 08/23/2024    BILITOT 2.0 (H) 06/11/2016    TBILI 1.71 (H) 08/23/2024    EGFR 124 08/23/2024     Lab Results   Component Value Date    WBC 5.61 08/23/2024    HGB 14.7 08/23/2024    HCT 44.4 08/23/2024    MCV 93 08/23/2024     08/23/2024     Lab Results   Component Value Date    RBRLPRAL08 290 05/01/2024       Lab Results   Component Value Date    ZTU5EZTKZQHH 2.273 08/23/2024    TSH 2.62 10/01/2022           ASSESSMENT & PLAN        Assessment & Plan  Depression, recurrent (HCC)  Nortriptyline level ordered.  Cont 30 mg q bedtime and depakote 125 mg bid (hormonally -induced mood symptoms) Cont f/u OP therapist  Orders:    nortriptyline (PAMELOR) 10 mg capsule; Take 3 capsules (30 mg total) by mouth daily at bedtime    Insomnia, unspecified type    Orders:    nortriptyline (PAMELOR) 10 mg capsule; Take 3  capsules (30 mg total) by mouth daily at bedtime    Chronic tension-type headache, not intractable    Orders:    nortriptyline (PAMELOR) 10 mg capsule; Take 3 capsules (30 mg total) by mouth daily at bedtime    Social anxiety disorder  Cont f/u OP therapist  Orders:    nortriptyline (PAMELOR) 10 mg capsule; Take 3 capsules (30 mg total) by mouth daily at bedtime    MARCOS (generalized anxiety disorder)  Cont f/u OP therapist.   Orders:    nortriptyline (PAMELOR) 10 mg capsule; Take 3 capsules (30 mg total) by mouth daily at bedtime         Current Outpatient Medications   Medication Sig Dispense Refill    nortriptyline (PAMELOR) 10 mg capsule Take 3 capsules (30 mg total) by mouth daily at bedtime 90 capsule 0    albuterol (ProAir HFA) 90 mcg/act inhaler Inhale 2 puffs every 6 (six) hours as needed for wheezing (Patient not taking: Reported on 8/26/2024) 8.5 g 0    Cholecalciferol (D3) 50 MCG (2000 UT) CHEW       clobetasol (TEMOVATE) 0.05 % ointment Apply 1/2 fingertip unit to vaginal area twice daily for 2 weeks, then once daily for 2 weeks, then twice weekly for 2 weeks 45 g 0    clotrimazole-betamethasone (LOTRISONE) 1-0.05 % cream Apply topically 2 (two) times a day (Patient not taking: Reported on 8/26/2024) 15 g 0    Cyanocobalamin (B-12) 1000 MCG SUBL       diazepam (VALIUM) 2 mg tablet Take 1 tablet (2 mg total) by mouth 3 (three) times a day as needed (dizziness) 20 tablet 0    divalproex sodium (DEPAKOTE SPRINKLE) 125 MG capsule Take 1 capsule (125 mg total) by mouth 2 (two) times a day 180 capsule 0    esomeprazole (NexIUM) 40 MG capsule Take 1 capsule (40 mg total) by mouth daily 90 capsule 1    ibuprofen (MOTRIN) 600 mg tablet TAKE 1 TABLET BY MOUTH 4 TIMES A DAY FOR 7 DAYS, AS NEEDED FOR HEADACHE      levocetirizine (XYZAL) 5 MG tablet Take 1 tablet (5 mg total) by mouth every evening 90 tablet 1    Levonorgestrel (MIRENA) 20 MCG/DAY IUD 1 each by Intrauterine Device route Once every 8 years       meclizine (ANTIVERT) 12.5 MG tablet TAKE 1 TABLET BY MOUTH 3 TIMES A DAY AS NEEDED FOR DIZZINESS. 20 tablet 5    meloxicam (MOBIC) 7.5 mg tablet Take 7.5 mg by mouth daily      methylPREDNISolone 4 MG tablet therapy pack Use as directed on package 21 each 0    multivitamin (THERAGRAN) TABS Take 1 tablet by mouth daily      naproxen (Naprosyn) 500 mg tablet Take 1 tablet (500 mg total) by mouth 2 (two) times a day with meals for 10 days 20 tablet 0    ondansetron (Zofran ODT) 4 mg disintegrating tablet Take 1 tablet (4 mg total) by mouth every 6 (six) hours as needed for nausea or vomiting 20 tablet 1    propranolol (INDERAL) 10 mg tablet Take 2 tablets (20 mg total) by mouth 2 (two) times a day       No current facility-administered medications for this visit.              Plan:       Nortriptyline level ordered.  PCP rx valium 2 mg tid for vertigo/nausea.  Cont depakote 125 mg bid (hormonally-induced mood symptoms). Cont f/u OP ind therapist    Reviewed risks, benefits, side effects of medications, including no medication.  Patient understands and agrees to treatment plan.   F/u PA-C 11/8/24, sooner prn       Patient has been informed of 24 hours and weekend coverage for urgent situations accessed by calling the main clinic phone number.     Minna Corona PA-C

## 2024-09-26 NOTE — BH TREATMENT PLAN
"TREATMENT PLAN (Medication Management Only)        [facility]    Name and Date of Birth:  Martha Hollis 23 y.o. 2000  Date of Treatment Plan: September 26, 2024  Diagnosis/Diagnoses:    1. Depression, recurrent (HCC)    2. Insomnia, unspecified type    3. Chronic tension-type headache, not intractable    4. Social anxiety disorder    5. MARCOS (generalized anxiety disorder)      Strengths/Personal Resources for Self-Care: {AMB PATIENT STRENGTHS:23877}.  Area/Areas of need (in own words): {AMB PATIENT AREAS OF NEED:57817}  1. Long Term Goal: {AMB LONG TERM GOALS:25015}.  Target Date:{AMB TREATMENT PLAN TARGET DATE:34824}  Person/Persons responsible for completion of goal: {AMB Person TT Plan:60306}  2.  Short Term Objective (s) - How will we reach this goal?:   A. Provider new recommended medication/dosage changes and/or continue medication(s): {AMB SHORT TERM OBJECTIVE MEDS:78314}.  B. {AMB SHORT TERM OTHER OBJECTIVES:61453::\"N/A\"}.  C. {AMB SHORT TERM OTHER OBJECTIVES:42352::\"N/A\"}.  Target Date:{AMB TREATMENT PLAN TARGET DATE:97537}  Person/Persons Responsible for Completion of Goal: {AMB Person TT Plan:04784}  Progress Towards Goals: {AMB Progress Towards Goals TT Plan:04139}  Treatment Modality: {AMB TREATMENT MODALITY:60465}  Review due 180 days from date of this plan: {AMB TREATMENT PLAN REVIEW DUE:27310}  Expected length of service: {AMB LOS:57356}  My Physician/PA/NP and I have developed this plan together and I agree to work on the goals and objectives. I understand the treatment goals that were developed for my treatment.      "
normal

## 2024-10-01 ENCOUNTER — TELEMEDICINE (OUTPATIENT)
Dept: FAMILY MEDICINE CLINIC | Facility: CLINIC | Age: 24
End: 2024-10-01
Payer: COMMERCIAL

## 2024-10-01 ENCOUNTER — TELEPHONE (OUTPATIENT)
Age: 24
End: 2024-10-01

## 2024-10-01 DIAGNOSIS — R45.89 ANXIETY ABOUT HEALTH: ICD-10-CM

## 2024-10-01 DIAGNOSIS — R11.0 NAUSEA: Primary | ICD-10-CM

## 2024-10-01 PROCEDURE — 99213 OFFICE O/P EST LOW 20 MIN: CPT | Performed by: FAMILY MEDICINE

## 2024-10-01 RX ORDER — METOCLOPRAMIDE 10 MG/1
5 TABLET ORAL 4 TIMES DAILY
Qty: 30 TABLET | Refills: 0 | Status: SHIPPED | OUTPATIENT
Start: 2024-10-01

## 2024-10-01 RX ORDER — LORAZEPAM 0.5 MG/1
0.5 TABLET ORAL ONCE AS NEEDED
Qty: 2 TABLET | Refills: 0 | Status: SHIPPED | OUTPATIENT
Start: 2024-10-01

## 2024-10-01 NOTE — TELEPHONE ENCOUNTER
Patient has to have an MRI of her breast done on 10/ 03/2024 and is requesting medication for anxiety to be sent to her pharmacy.         Please advise.

## 2024-10-01 NOTE — PROGRESS NOTES
Virtual Regular Visit  Name: Martha Hollis      : 2000      MRN: [id number]  Encounter Provider: Howard Sexton DO  Encounter Date: 10/1/2024   Encounter department: [facility]    Verification of patient location:    Patient is located at Home in the following state in which I hold an active license [place]    Assessment & Plan  Nausea  Stop Meclizine, Scopolamine, Zofran as these may be contributing to mild constipation. Start Reglan. Follow up if nausea not improved. Can use probiotic and plenty of fiber for mild constipation.    Orders:    metoclopramide (Reglan) 10 mg tablet; Take 0.5 tablets (5 mg total) by mouth 4 (four) times a day         Encounter provider Howard Sexton DO    The patient was identified by name and date of birth. Martha Hollis was informed that this is a telemedicine visit and that the visit is being conducted through the Epic Embedded platform. She agrees to proceed..  My office door was closed. No one else was in the room.  She acknowledged consent and understanding of privacy and security of the video platform. The patient has agreed to participate and understands they can discontinue the visit at any time.    Patient is aware this is a billable service.     History of Present Illness     HPI    Here today for nausea. Was recently on a cruise, ever since then has been nauseous since the third day of the cruise. Has tried Zofran without relief. Was on Meclizine but changed to Scopolamine. Does have some vertigo and dizziness but this was before the cruise.    Does not think she is constipated but poor appetite and has not been able to empty herself completely.     Went through a breast clinic through [facility] because of mom's breast cancer. Had a mammogram which did not show any abnormalities but was recommended to have MRI.     History obtained from : patient  Review of Systems  Current Outpatient Medications on File Prior to Visit   Medication Sig  Dispense Refill    Cholecalciferol (D3) 50 MCG (2000 UT) CHEW       clobetasol (TEMOVATE) 0.05 % ointment Apply 1/2 fingertip unit to vaginal area twice daily for 2 weeks, then once daily for 2 weeks, then twice weekly for 2 weeks 45 g 0    Cyanocobalamin (B-12) 1000 MCG SUBL       diazepam (VALIUM) 2 mg tablet Take 1 tablet (2 mg total) by mouth 3 (three) times a day as needed (dizziness) 20 tablet 0    divalproex sodium (DEPAKOTE SPRINKLE) 125 MG capsule Take 1 capsule (125 mg total) by mouth 2 (two) times a day 180 capsule 0    esomeprazole (NexIUM) 40 MG capsule Take 1 capsule (40 mg total) by mouth daily 90 capsule 1    ibuprofen (MOTRIN) 600 mg tablet TAKE 1 TABLET BY MOUTH 4 TIMES A DAY FOR 7 DAYS, AS NEEDED FOR HEADACHE      levocetirizine (XYZAL) 5 MG tablet Take 1 tablet (5 mg total) by mouth every evening 90 tablet 1    Levonorgestrel (MIRENA) 20 MCG/DAY IUD 1 each by Intrauterine Device route Once every 8 years      meclizine (ANTIVERT) 12.5 MG tablet TAKE 1 TABLET BY MOUTH 3 TIMES A DAY AS NEEDED FOR DIZZINESS. 20 tablet 5    meloxicam (MOBIC) 7.5 mg tablet Take 7.5 mg by mouth daily      methylPREDNISolone 4 MG tablet therapy pack Use as directed on package 21 each 0    multivitamin (THERAGRAN) TABS Take 1 tablet by mouth daily      naproxen (Naprosyn) 500 mg tablet Take 1 tablet (500 mg total) by mouth 2 (two) times a day with meals for 10 days 20 tablet 0    nortriptyline (PAMELOR) 10 mg capsule Take 3 capsules (30 mg total) by mouth daily at bedtime 90 capsule 0    ondansetron (Zofran ODT) 4 mg disintegrating tablet Take 1 tablet (4 mg total) by mouth every 6 (six) hours as needed for nausea or vomiting 20 tablet 1    propranolol (INDERAL) 10 mg tablet Take 2 tablets (20 mg total) by mouth 2 (two) times a day      albuterol (ProAir HFA) 90 mcg/act inhaler Inhale 2 puffs every 6 (six) hours as needed for wheezing (Patient not taking: Reported on 8/26/2024) 8.5 g 0    clotrimazole-betamethasone  (LOTRISONE) 1-0.05 % cream Apply topically 2 (two) times a day (Patient not taking: Reported on 8/26/2024) 15 g 0     No current facility-administered medications on file prior to visit.      Social History     Tobacco Use    Smoking status: Never     Passive exposure: Past    Smokeless tobacco: Never   Vaping Use    Vaping status: Never Used   Substance and Sexual Activity    Alcohol use: Yes     Comment: Social    Drug use: No    Sexual activity: Yes     Partners: Male     Birth control/protection: I.U.D.         Objective     There were no vitals taken for this visit.  Physical Exam  Vitals reviewed.   Constitutional:       Appearance: Normal appearance.   Cardiovascular:      Rate and Rhythm: Normal rate.   Neurological:      Mental Status: She is alert.   Psychiatric:         Mood and Affect: Mood normal.         Behavior: Behavior normal.         Visit Time  Total Visit Duration: 10

## 2024-10-01 NOTE — TELEPHONE ENCOUNTER
Please clarify this message.    I do not see any MRI breast scheduled or ordered.    Patient is seeing Dr. Sexton today and can discuss Rx at the time of her office visit    Thank you

## 2024-10-02 ENCOUNTER — OFFICE VISIT (OUTPATIENT)
Dept: PHYSICAL THERAPY | Facility: REHABILITATION | Age: 24
End: 2024-10-02
Payer: COMMERCIAL

## 2024-10-02 DIAGNOSIS — R42 VERTIGO: Primary | ICD-10-CM

## 2024-10-02 PROCEDURE — 97112 NEUROMUSCULAR REEDUCATION: CPT

## 2024-10-02 NOTE — PROGRESS NOTES
"Daily Note     Today's date: 10/2/2024  Patient name: Martha Hollis  : 2000  MRN: [id number]  Referring provider: Roya Chan MD  Dx:   Encounter Diagnosis     ICD-10-CM    1. Vertigo  R42           Start Time: 1700  Stop Time: 1745  Total time in clinic (min): 45 minutes    Subjective: Martha says her neck pain has been better lately. She reports concurrent onset of headaches and dizziness at times. Overall she feels less dizzy lately. She has undergone another medication change from her primary to help manage symptoms.       Objective: See treatment diary below      Assessment: Tolerated treatment well. DHI is progressing, with progression from severe handicap to moderate handicap. Appropriate response to interventions performed today. Greater time until symptom onset during VOR interventions. Slow, cautious gait with progressions made today. Patient exhibited good technique with therapeutic exercises and would benefit from continued PT      Plan: Continue per plan of care.      Precautions: Standard, Anxiety  : DHI 60.   10/2: DHI 38.    Manuals 8/30 9/4 9/6 9/24 10/2        Assessment  LM LM LM         STM  L. Suboccipital release LM           C/s mobs  Gr II right side glide c2-c3 supine LM Gr II-III R side glide c2-c3 supine          C/s MET   R. Sb with R. Side glide LM 2x          T/s mobs   Supine HVLA T 4          Neuro Re-Ed             VOR x1 HEP Reviewed 1x seated   1x standing Walking ~15 feet with static stance at end  x8 Static stance 6x F ~ 4' from wall    Static stance 5x F on foam pad    Walking ~30' x4        Walking eye tracking     Eye turn <> head turn L<>R x4  ~40' ea        Cervical proprioception   X15 H ea & vertical seatd + HEP X20 ea  with laser seated ~6 feet from wall    BP cuff poss NV Np, nv        Convergence    3x5\" hold Reviewed                                                            Ther Ex             Self STM  HEP Reviewed          Bike    L1 7' L1 8'    "                                                                                   Ther Activity                                       Gait Training                                       Modalities

## 2024-10-07 ENCOUNTER — OFFICE VISIT (OUTPATIENT)
Dept: PHYSICAL THERAPY | Facility: REHABILITATION | Age: 24
End: 2024-10-07
Payer: COMMERCIAL

## 2024-10-07 DIAGNOSIS — R42 VERTIGO: Primary | ICD-10-CM

## 2024-10-07 PROCEDURE — 97112 NEUROMUSCULAR REEDUCATION: CPT

## 2024-10-07 PROCEDURE — 97110 THERAPEUTIC EXERCISES: CPT

## 2024-10-07 NOTE — PROGRESS NOTES
"Daily Note     Today's date: 10/7/2024  Patient name: Martha Hollis  : 2000  MRN: [id number]  Referring provider: Roya Chan MD  Dx:   Encounter Diagnosis     ICD-10-CM    1. Vertigo  R42           Start Time: 0800  Stop Time: 0850  Total time in clinic (min): 50 minutes    Subjective: Martha says she has been feeling less dizzy lately. She notices more lightheadedness, and it will still occur randomly, but tends to occur more with changes in positions.       Objective: See treatment diary below    [facility] test: positive for loss of pressure at 24 mmHg    Assessment: Potential dysautonomia to be contributing to symptoms with onset after change in position, and symptoms that occur with lack of motion. Mild symptom onset after aerobic exercise performed today. Progressed VOR interventions with mild symptom onset, and mild difficulty coordinating motion. Tolerated treatment well. Patient demonstrated fatigue post treatment and would benefit from continued PT      Plan: Continue per plan of care.      Precautions: Standard, Anxiety  : DHI 60.   10/2: DHI 38.    Manuals 8/30 9/4 9/6 9/24 10/2 10/7       Assessment  LM LM LM  [facility] flexion test       STM  L. Suboccipital release LM           C/s mobs  Gr II right side glide c2-c3 supine LM Gr II-III R side glide c2-c3 supine          C/s MET   R. Sb with R. Side glide LM 2x          T/s mobs   Supine HVLA T 4          Neuro Re-Ed             VOR x1 HEP Reviewed 1x seated   1x standing Walking ~15 feet with static stance at end  x8 Static stance 6x F ~ 4' from wall    Static stance 5x F on foam pad    Walking ~30' x4        VOR x2      Seated 4x ~25\"       Walking eye tracking     Eye turn <> head turn L<>R x4  ~40' ea Eye turn <> head turn L<>R x6  ~40' ea       Cervical proprioception   X15 H ea & vertical seatd + HEP X20 ea  with laser seated ~6 feet from wall    BP cuff poss NV Np, nv [facility] 20-24 mmHg 5\" hold x6 through       Convergence    3x5\" hold " Reviewed                                                            Ther Ex             Self STM  HEP Reviewed          Bike    L1 7' L1 8' L2 5', L1 2'     X2.                                                                                      Ther Activity                                       Gait Training                                       Modalities

## 2024-10-10 ENCOUNTER — APPOINTMENT (OUTPATIENT)
Dept: PHYSICAL THERAPY | Facility: REHABILITATION | Age: 24
End: 2024-10-10
Payer: COMMERCIAL

## 2024-10-10 ENCOUNTER — TELEPHONE (OUTPATIENT)
Age: 24
End: 2024-10-10

## 2024-10-10 NOTE — TELEPHONE ENCOUNTER
Called Martha back. She said she wants to make sure the symptoms she has been experiencing isn't because of any of her medications. She has been feeling really fatigued, has cravings for sweets, and feels nauseous throughout the day on and off. I asked her if she could be pregnant and she said she wasn't sure because she has an IUD so doesn't get her period. She said she has been feeling more emotional lately but it could be because of things going on in her life. She inquired about lab work for nortiptyline and I confirmed there is an order placed. She said she has been dealing with vertigo and this is the 3rd medication she has tried. Forwarding to provider for review. Clinical will follow up as advised.

## 2024-10-10 NOTE — TELEPHONE ENCOUNTER
"Spoke to Martha- c/o feeling \"off\"; fatigued and \"out of it\".  Continues to go to PT for vertigo.  Recommended GERMAINE- Martha wants to think about it.  Agrees to in person visit with Daniel 10/15 at 2:30.    "

## 2024-10-11 ENCOUNTER — OFFICE VISIT (OUTPATIENT)
Dept: PHYSICAL THERAPY | Facility: REHABILITATION | Age: 24
End: 2024-10-11
Payer: COMMERCIAL

## 2024-10-11 DIAGNOSIS — R42 VERTIGO: Primary | ICD-10-CM

## 2024-10-11 PROCEDURE — 97112 NEUROMUSCULAR REEDUCATION: CPT

## 2024-10-11 PROCEDURE — 97140 MANUAL THERAPY 1/> REGIONS: CPT

## 2024-10-11 NOTE — PROGRESS NOTES
Daily Note     Today's date: 10/11/2024  Patient name: Martha Hollis  : 2000  MRN: [id number]  Referring provider: Roya Chan MD  Dx:   Encounter Diagnosis     ICD-10-CM    1. Vertigo  R42           Start Time: 1350  Stop Time: 1445  Total time in clinic (min): 55 minutes    Subjective: Martha says she felt a little more light headed after last session. She feels like some of her symptoms got better after manual therapy done to her neck a few sessions ago.       Objective: See treatment diary below    Palpation & segmental mobility  Limited down glide of right OC-C1  Flexion rotation testing: produces symptoms with left rotation, Rotation~60 degrees bilaterally  Hypomobile, tender right side glide of C2-C3    Assessment: Tolerated treatment well. Mild symptom production during recumbent bicycle. Improved performance on cervical proprioception intervention with patient able to hold at greater pressures. Performed cervical mobilizations to address motion dysfunction with appropriate response. Reduction in dizziness symptoms after mobilizations with flexion rotation testing. Continued with VOR interventions with mild ocular symptoms produced, improved postural control and significantly reduced sway. Patient demonstrated fatigue post treatment, exhibited good technique with therapeutic exercises, and would benefit from continued PT      Plan: Continue per plan of care.      Precautions: Standard, Anxiety  : DHI 60.   10/2: DHI 38.    Manuals 8/30 9/4 9/6 9/24 10/2 10/7 10/11      Assessment  LM LM LM  CCF flexion test       STM  L. Suboccipital release LM           C/s mobs  Gr II right side glide c2-c3 supine LM Gr II-III R side glide c2-c3 supine    Supine    L. SB, R. Rot Gr III-IV upglide C2-C3      C/s MET   R. Sb with R. Side glide LM 2x          T/s mobs   Supine HVLA T 4          Neuro Re-Ed             VOR x1 HEP Reviewed 1x seated   1x standing Walking ~15 feet with static stance at end   "x8 Static stance 6x F ~ 4' from wall    Static stance 5x F on foam pad    Walking ~30' x4  Static 2x30\" 4' from wall on foam pad    Walking 50' x4      VOR x2      Seated 4x ~25\"       Walking eye tracking     Eye turn <> head turn L<>R x4  ~40' ea Eye turn <> head turn L<>R x6  ~40' ea Eye turn <> head turn L<>R x6  ~40' ea      Cervical proprioception   X15 H ea & vertical seatd + HEP X20 ea  with laser seated ~6 feet from wall    BP cuff poss NV Np, nv CCF 20-24 mmHg 5\" hold x6 through CCF 30mmHg hold 3x10\"       Convergence    3x5\" hold Reviewed                                                            Ther Ex             Self STM  HEP Reviewed          Bike    L1 7' L1 8' L2 5', L1 2'     X2.  L2 7' x2      SNAG1       1x5 ea                                                                       Ther Activity                                       Gait Training                                       Modalities                                                      "

## 2024-10-12 ENCOUNTER — APPOINTMENT (OUTPATIENT)
Dept: LAB | Facility: CLINIC | Age: 24
End: 2024-10-12
Payer: COMMERCIAL

## 2024-10-12 DIAGNOSIS — Z79.899 ENCOUNTER FOR LONG-TERM (CURRENT) USE OF OTHER MEDICATIONS: ICD-10-CM

## 2024-10-12 PROCEDURE — 36415 COLL VENOUS BLD VENIPUNCTURE: CPT

## 2024-10-12 PROCEDURE — 80335 ANTIDEPRESSANT TRICYCLIC 1/2: CPT

## 2024-10-14 ENCOUNTER — OFFICE VISIT (OUTPATIENT)
Dept: PHYSICAL THERAPY | Facility: REHABILITATION | Age: 24
End: 2024-10-14
Payer: COMMERCIAL

## 2024-10-14 DIAGNOSIS — R42 VERTIGO: Primary | ICD-10-CM

## 2024-10-14 LAB — NORTRIP SERPL-MCNC: 25 NG/ML (ref 50–150)

## 2024-10-14 PROCEDURE — 97112 NEUROMUSCULAR REEDUCATION: CPT

## 2024-10-14 PROCEDURE — 97110 THERAPEUTIC EXERCISES: CPT

## 2024-10-14 NOTE — PROGRESS NOTES
"Daily Note     Today's date: 10/14/2024  Patient name: Martha Hollis  : 2000  MRN: [id number]  Referring provider: Roya Chan MD  Dx:   Encounter Diagnosis     ICD-10-CM    1. Vertigo  R42           Start Time: 1220  Stop Time: 1300  Total time in clinic (min): 40 minutes    Subjective: Martha reports minimal improvement after cervical mobilizations performed last visit. She feels like she is having fewer symptoms, but continues to have problems with quick movements.       Objective: See treatment diary below    Flexion rotation testing: negative for symptom reproduction.       Assessment: Tolerated treatment well. Continued symptom provocation with VOR interventions, but duration until symptom onset is increasing with significantly reduced postural sway. Recommended patient attempt to perform aerobic exercise, with similar dosing as performed in the clinic. Patient would benefit from continued PT      Plan: Continue per plan of care.      Precautions: Standard, Anxiety  : DHI 60.   10/2: DHI 38.    Manuals 8/30 9/4 9/6 9/24 10/2 10/7 10/11 10/14     Assessment  LM LM LM  CCF flexion test  LM     STM  L. Suboccipital release LM           C/s mobs  Gr II right side glide c2-c3 supine LM Gr II-III R side glide c2-c3 supine    Supine    L. SB, R. Rot Gr III-IV upglide C2-C3 Np     C/s MET   R. Sb with R. Side glide LM 2x          T/s mobs   Supine HVLA T 4          Neuro Re-Ed             VOR x1 HEP Reviewed 1x seated   1x standing Walking ~15 feet with static stance at end  x8 Static stance 6x F ~ 4' from wall    Static stance 5x F on foam pad    Walking ~30' x4  Static 2x30\" 4' from wall on foam pad    Walking 50' x4 Walking x50' x6     VOR x2      Seated 4x ~25\"  Standing 4x30\"     Walking eye tracking     Eye turn <> head turn L<>R x4  ~40' ea Eye turn <> head turn L<>R x6  ~40' ea Eye turn <> head turn L<>R x6  ~40' ea      Cervical proprioception   X15 H ea & vertical seatd + HEP X20 ea  " "with laser seated ~6 feet from wall    BP cuff poss NV Np, nv CCF 20-24 mmHg 5\" hold x6 through CCF 30mmHg hold 3x10\"       Convergence    3x5\" hold Reviewed   10x10\" hold                                                         Ther Ex             Self STM  HEP Reviewed          Bike    L1 7' L1 8' L2 5', L1 2'     X2.  L2 7' x2 L2 8' x2     SNAG1       1x5 ea                                                                       Ther Activity                                       Gait Training                                       Modalities                                                        "

## 2024-10-15 ENCOUNTER — OFFICE VISIT (OUTPATIENT)
Dept: PSYCHIATRY | Facility: CLINIC | Age: 24
End: 2024-10-15
Payer: COMMERCIAL

## 2024-10-15 VITALS
WEIGHT: 139 LBS | DIASTOLIC BLOOD PRESSURE: 81 MMHG | BODY MASS INDEX: 21.13 KG/M2 | HEART RATE: 103 BPM | SYSTOLIC BLOOD PRESSURE: 123 MMHG

## 2024-10-15 DIAGNOSIS — F41.1 GAD (GENERALIZED ANXIETY DISORDER): ICD-10-CM

## 2024-10-15 DIAGNOSIS — F40.10 SOCIAL ANXIETY DISORDER: ICD-10-CM

## 2024-10-15 DIAGNOSIS — G47.00 INSOMNIA, UNSPECIFIED TYPE: ICD-10-CM

## 2024-10-15 DIAGNOSIS — G44.229 CHRONIC TENSION-TYPE HEADACHE, NOT INTRACTABLE: ICD-10-CM

## 2024-10-15 DIAGNOSIS — F33.9 DEPRESSION, RECURRENT (HCC): ICD-10-CM

## 2024-10-15 PROCEDURE — 99214 OFFICE O/P EST MOD 30 MIN: CPT | Performed by: PHYSICIAN ASSISTANT

## 2024-10-15 RX ORDER — NORTRIPTYLINE HYDROCHLORIDE 10 MG/1
30 CAPSULE ORAL
Qty: 90 CAPSULE | Refills: 0 | Status: SHIPPED | OUTPATIENT
Start: 2024-10-15 | End: 2024-10-25 | Stop reason: DRUGHIGH

## 2024-10-15 NOTE — PSYCH
"MEDICATION MANAGEMENT NOTE        [facility]   [facility] [place]  [address]   [facility] [place] [id number] [phone]  This note was not shared with the patient due to this is a psychotherapy note        Name and Date of Birth:  Martha Hollis 23 y.o. 2000    Date of Visit: October 15, 2024/seen with MARIEL Wolfe with pt's permission    SUBJECTIVE:     Martha seen by Daniel 9/26 at which time meds unchanged.  She continues to f/u with ind therapist and says they are currently working on boundaries.  Martha is living with her step-father and helping to care for her mother with terminal illness.  She continues to attend online school.  Continues to see PT.  Primary symptoms reported today: fatigue, lightheadedness, vertigo; feels stressed/overwhelmed/\"never learned how to cope\".   Denies pre-menstrual mood symptoms. Denies SI.    Review of Systems   Constitutional:  Positive for fatigue. Negative for appetite change and unexpected weight change.   Neurological:  Positive for light-headedness.   Psychiatric/Behavioral:  Negative for sleep disturbance.      Past Psychiatric History     Inpatient:  None  [facility].    No hx of suicide attempts.      Outpatient:  Therapist- Abby Martínez -[facility]- x 6-7 yrs.   Current therapist- [facility], [place].  This office since April 2022     Med trials: lexapro -more depressed, depakote- low dose- effective for pre-menstrual related mood symptoms.     Trauma/Loss History:           Abandonment by bio father.  Mother's illness  (stage 4 metastatic breast cancer)     Family Psychiatric History:      Psychiatric Illness:      Depression- grandfather; anxiety -aunt  Substance Abuse:       none reported  Suicide Attempts:        uncle     Social History:         Student at [employer]- on line classes currently.   Had IEP- learning disability- " comprehension.  Single.  currently living with step-father.                                  OBJECTIVE:   139 lbs. 123/81, 103; ortho: 114/83, 121    MENTAL STATUS EXAM  Appearance:  age appropriate, a bit disheveled   Behavior:  Pleasant & cooperative   Speech:  Normal volume, regular rate and rhythm   Mood:  Low. anxious   Affect:  mood congruent   Language: intact and appropriate for age, education, and intellect   Thought Process:  goal directed   Associations: intact associations   Thought Content:  negative thinking and cognitive distortions, negative ruminations   Perceptual Disturbances: no auditory or visual hallcunations   Risk Potential / Abnormal Thoughts: Suicidal ideation - None  Homicidal ideation - None  Potential for aggression - No       Consciousness:  Alert & Awake   Sensorium:  Grossly oriented   Attention: attention span and concentration are age appropriate       Fund of Knowledge:  Memory: awareness of current events: yes  recent and remote memory grossly intact   Insight:  intact   Judgment: intact   Muscle Strength Muscle Tone: Grossly normal  normal   Gait/Station: normal gait/station with good balance   Motor Activity: no abnormal movements       Lab Review: I have reviewed all pertinent labs    Lab Results   Component Value Date     06/11/2016    SODIUM 138 08/23/2024    K 4.3 08/23/2024     08/23/2024    CO2 30 08/23/2024    AGAP 7 08/23/2024    BUN 15 08/23/2024    CREATININE 0.70 08/23/2024    GLUC 100 02/26/2023    GLUF 96 08/23/2024    CALCIUM 9.9 08/23/2024    AST 16 08/23/2024    ALT 10 08/23/2024    ALKPHOS 67 08/23/2024    PROT 6.6 06/11/2016    TP 7.6 08/23/2024    BILITOT 2.0 (H) 06/11/2016    TBILI 1.71 (H) 08/23/2024    EGFR 124 08/23/2024     Lab Results   Component Value Date    WBC 5.61 08/23/2024    HGB 14.7 08/23/2024    HCT 44.4 08/23/2024    MCV 93 08/23/2024     08/23/2024     Lab Results   Component Value Date    VVLOMJDG13 290 05/01/2024        Lab Results   Component Value Date    INT0TKKAGAAG 2.273 08/23/2024    TSH 2.62 10/01/2022     10/12/24: nortriptyline= 25      ASSESSMENT & PLAN        Assessment & Plan  Depression, recurrent (HCC)  For now, cont nortriptyline 30 mg q bedtime.  May be increasing this in future visits.  Stop low dose depakote.  Cont f/u ind therapist.   Martha declines referral to PHP.   Orders:    nortriptyline (PAMELOR) 10 mg capsule; Take 3 capsules (30 mg total) by mouth daily at bedtime    Insomnia, unspecified type    Orders:    nortriptyline (PAMELOR) 10 mg capsule; Take 3 capsules (30 mg total) by mouth daily at bedtime    Chronic tension-type headache, not intractable    Orders:    nortriptyline (PAMELOR) 10 mg capsule; Take 3 capsules (30 mg total) by mouth daily at bedtime    Social anxiety disorder  Cont propranolol 20 mg bid for now and nortriptyline 30 mg q bedtime.  Cont f/u ind therapist  Orders:    nortriptyline (PAMELOR) 10 mg capsule; Take 3 capsules (30 mg total) by mouth daily at bedtime    MAROCS (generalized anxiety disorder)  Cont propranolol 20 mg bid and nortriptyline 30 mg q bedtime.  Cont f/u ind therapist.  Martha declines referral to PHP.   Orders:    nortriptyline (PAMELOR) 10 mg capsule; Take 3 capsules (30 mg total) by mouth daily at bedtime       Current Outpatient Medications   Medication Sig Dispense Refill    nortriptyline (PAMELOR) 10 mg capsule Take 3 capsules (30 mg total) by mouth daily at bedtime 90 capsule 0    diazepam (VALIUM) 2 mg tablet Take 1 tablet (2 mg total) by mouth 3 (three) times a day as needed (dizziness) 20 tablet 0    esomeprazole (NexIUM) 40 MG capsule Take 1 capsule (40 mg total) by mouth daily 90 capsule 1    levocetirizine (XYZAL) 5 MG tablet Take 1 tablet (5 mg total) by mouth every evening 90 tablet 1    Levonorgestrel (MIRENA) 20 MCG/DAY IUD 1 each by Intrauterine Device route Once every 8 years      LORazepam (Ativan) 0.5 mg tablet Take 1 tablet (0.5 mg total)  by mouth once as needed for anxiety 2 tablet 0    methylPREDNISolone 4 MG tablet therapy pack Use as directed on package 21 each 0    metoclopramide (Reglan) 10 mg tablet Take 0.5 tablets (5 mg total) by mouth 4 (four) times a day 30 tablet 0    multivitamin (THERAGRAN) TABS Take 1 tablet by mouth daily      naproxen (Naprosyn) 500 mg tablet Take 1 tablet (500 mg total) by mouth 2 (two) times a day with meals for 10 days 20 tablet 0    propranolol (INDERAL) 10 mg tablet Take 2 tablets (20 mg total) by mouth 2 (two) times a day       No current facility-administered medications for this visit.                  Plan:        Stop low dose depakote- hormonally- based mood symptoms seemed to have resolved with gyne interventions. For now will cont nortriptyline 30 mg q bedtime and propranolol 20 mg bid.  Will re-eval symptoms in 10 days and may increase nortriptyline and decrease propranolol at that time.     Reviewed risks, benefits, side effects of medications, including no medication.  Patient understands and agrees to treatment plan.   F/u [facility] 10/25/24, sooner prn        Patient has been informed of 24 hours and weekend coverage for urgent situations accessed by calling the main clinic phone number.     Minna Corona PA-C

## 2024-10-16 NOTE — ASSESSMENT & PLAN NOTE
For now, cont nortriptyline 30 mg q bedtime.  May be increasing this in future visits.  Stop low dose depakote.  Cont f/u ind therapist.   Martha declines referral to PHP.   Orders:    nortriptyline (PAMELOR) 10 mg capsule; Take 3 capsules (30 mg total) by mouth daily at bedtime

## 2024-10-16 NOTE — ASSESSMENT & PLAN NOTE
Cont propranolol 20 mg bid and nortriptyline 30 mg q bedtime.  Cont f/u ind therapist.  Martha declines referral to PHP.   Orders:    nortriptyline (PAMELOR) 10 mg capsule; Take 3 capsules (30 mg total) by mouth daily at bedtime

## 2024-10-16 NOTE — ASSESSMENT & PLAN NOTE
Cont propranolol 20 mg bid for now and nortriptyline 30 mg q bedtime.  Cont f/u ind therapist  Orders:    nortriptyline (PAMELOR) 10 mg capsule; Take 3 capsules (30 mg total) by mouth daily at bedtime

## 2024-10-17 ENCOUNTER — OFFICE VISIT (OUTPATIENT)
Dept: PHYSICAL THERAPY | Facility: REHABILITATION | Age: 24
End: 2024-10-17
Payer: COMMERCIAL

## 2024-10-17 DIAGNOSIS — R42 VERTIGO: Primary | ICD-10-CM

## 2024-10-17 PROCEDURE — 97112 NEUROMUSCULAR REEDUCATION: CPT

## 2024-10-17 PROCEDURE — 97110 THERAPEUTIC EXERCISES: CPT

## 2024-10-17 NOTE — PROGRESS NOTES
"Daily Note     Today's date: 10/17/2024  Patient name: Martha Hollis  : 2000  MRN: [id number]  Referring provider: Roya Chan MD  Dx:   Encounter Diagnosis     ICD-10-CM    1. Vertigo  R42           Start Time: 930  Stop Time: 1015  Total time in clinic (min): 45 minutes    Subjective: Martha reports 80% improvement overall. She was able to bike at home with lightheaded symptoms produced. She will be having medication changes to address her lightheadedness, and is being taken off Depakote. There is potential to be taken off of her propanolol as well.       Objective: See treatment diary below    GROC: 80% improved  VOR cancel: negative.     Assessment: Tolerated treatment well. Patient continues to improve reflected by GROC score, and DHI improvement. Progressed rotational intervention speed and amplitude to progress accomodation with symptoms produced. Recommended patient attempt to increase speed of rotations for HEP with patient providing verbal understanding. Patient exhibited good technique with therapeutic exercises and would benefit from continued PT      Plan: Continue per plan of care.      Precautions: Standard, Anxiety  : DHI 60.   10/2: DHI 38.  10/17 DHI 16    Manuals 8/30 9/4 9/6 9/24 10/2 10/7 10/11 10/14 10/17    Assessment  LM LM LM  CCF flexion test  LM     STM  L. Suboccipital release LM           C/s mobs  Gr II right side glide c2-c3 supine LM Gr II-III R side glide c2-c3 supine    Supine    L. SB, R. Rot Gr III-IV upglide C2-C3 Np     C/s MET   R. Sb with R. Side glide LM 2x          T/s mobs   Supine HVLA T 4          Neuro Re-Ed             VOR x1 HEP Reviewed 1x seated   1x standing Walking ~15 feet with static stance at end  x8 Static stance 6x F ~ 4' from wall    Static stance 5x F on foam pad    Walking ~30' x4  Static 2x30\" 4' from wall on foam pad    Walking 50' x4 Walking x50' x6 Walking 6x50'     Standing 3x30\" VC speed    VOR x2      Seated 4x ~25\"  Standing " "4x30\" Standing 4x30\" on foam pad    Walking eye tracking     Eye turn <> head turn L<>R x4  ~40' ea Eye turn <> head turn L<>R x6  ~40' ea Eye turn <> head turn L<>R x6  ~40' ea      Cervical proprioception   X15 H ea & vertical seatd + HEP X20 ea  with laser seated ~6 feet from wall    BP cuff poss NV Np, nv CCF 20-24 mmHg 5\" hold x6 through CCF 30mmHg hold 3x10\"       Convergence    3x5\" hold Reviewed   10x10\" hold     Wall ball twists         Back to wall     Horizontal 4x~30\"    PNF lift/chop   3x~30\" ea                                           Ther Ex             Self STM  HEP Reviewed          Bike    L1 7' L1 8' L2 5', L1 2'     X2.  L2 7' x2 L2 8' x2 L2 8' x1    SNAG1       1x5 ea                                                                       Ther Activity                                       Gait Training                                       Modalities                                                          "

## 2024-10-21 ENCOUNTER — OFFICE VISIT (OUTPATIENT)
Dept: PHYSICAL THERAPY | Facility: REHABILITATION | Age: 24
End: 2024-10-21
Payer: COMMERCIAL

## 2024-10-21 DIAGNOSIS — R42 VERTIGO: Primary | ICD-10-CM

## 2024-10-21 PROCEDURE — 97112 NEUROMUSCULAR REEDUCATION: CPT

## 2024-10-21 PROCEDURE — 97110 THERAPEUTIC EXERCISES: CPT

## 2024-10-21 NOTE — PROGRESS NOTES
"Daily Note     Today's date: 10/21/2024  Patient name: Martha Hollis  : 2000  MRN: [id number]  Referring provider: Roya Chan MD  Dx:   Encounter Diagnosis     ICD-10-CM    1. Vertigo  R42           Start Time: 1222  Stop Time: 1300  Total time in clinic (min): 38 minutes    Subjective: Martha reports \"feeling it\" after last session, but reduction in symptoms after. She feels like coming off the Depakote has been helping.       Objective: See treatment diary below      Assessment: Tolerated treatment well. Symptoms produced during rotation interventions, with time until onset slightly reducing throughout treatment. Progressed walking distance on walking VOR intervention to provide greater stimulus for adaptation. Recommended patient transition to VOR with marching should she not have enough space for walking VOR for HEP. Patient experiences symptom onset prior to completion, however is able to resume quickly. Reviewed SNAG, providing cueing for execution. Patient exhibited good technique with therapeutic exercises and would benefit from continued PT      Plan: Continue per plan of care.      Precautions: Standard, Anxiety  : DHI 60.   10/2: DHI 38.  10/17 DHI 16    Manuals 8/30 9/4 9/6 9/24 10/2 10/7 10/11 10/14 10/17 10/21   Assessment  LM LM LM  CCF flexion test  LM  Re-eval nv    STM  L. Suboccipital release LM           C/s mobs  Gr II right side glide c2-c3 supine LM Gr II-III R side glide c2-c3 supine    Supine    L. SB, R. Rot Gr III-IV upglide C2-C3 Np     C/s MET   R. Sb with R. Side glide LM 2x          T/s mobs   Supine HVLA T 4          Neuro Re-Ed             VOR x1 HEP Reviewed 1x seated   1x standing Walking ~15 feet with static stance at end  x8 Static stance 6x F ~ 4' from wall    Static stance 5x F on foam pad    Walking ~30' x4  Static 2x30\" 4' from wall on foam pad    Walking 50' x4 Walking x50' x6 Walking 6x50'     Standing 3x30\" VC speed Walking 6x 70'    VOR x2      " "Seated 4x ~25\"  Standing 4x30\" Standing 4x30\" on foam pad Standing on foam pad 4x30\"   Walking eye tracking     Eye turn <> head turn L<>R x4  ~40' ea Eye turn <> head turn L<>R x6  ~40' ea Eye turn <> head turn L<>R x6  ~40' ea      Cervical proprioception   X15 H ea & vertical seatd + HEP X20 ea  with laser seated ~6 feet from wall    BP cuff poss NV Np, nv CCF 20-24 mmHg 5\" hold x6 through CCF 30mmHg hold 3x10\"       Convergence    3x5\" hold Reviewed   10x10\" hold     Wall ball twists         Back to wall     Horizontal 4x~30\"    PNF lift/chop   3x~30\" ea Back to wall 3x30\" all    Horizontal    PNF lift & chop                                          Ther Ex             Self STM  HEP Reviewed          Bike    L1 7' L1 8' L2 5', L1 2'     X2.  L2 7' x2 L2 8' x2 L2 8' x1 L2 9'   SNAG       1x5 ea   X5 ea. VC                                                                    Ther Activity                                       Gait Training                                       Modalities                                                            "

## 2024-10-24 ENCOUNTER — APPOINTMENT (OUTPATIENT)
Dept: PHYSICAL THERAPY | Facility: REHABILITATION | Age: 24
End: 2024-10-24
Payer: COMMERCIAL

## 2024-10-25 ENCOUNTER — TELEMEDICINE (OUTPATIENT)
Dept: PSYCHIATRY | Facility: CLINIC | Age: 24
End: 2024-10-25
Payer: COMMERCIAL

## 2024-10-25 DIAGNOSIS — F41.1 GAD (GENERALIZED ANXIETY DISORDER): ICD-10-CM

## 2024-10-25 DIAGNOSIS — F33.9 DEPRESSION, RECURRENT (HCC): Primary | ICD-10-CM

## 2024-10-25 PROCEDURE — 99214 OFFICE O/P EST MOD 30 MIN: CPT | Performed by: PHYSICIAN ASSISTANT

## 2024-10-25 RX ORDER — NORTRIPTYLINE HYDROCHLORIDE 50 MG/1
50 CAPSULE ORAL
Qty: 30 CAPSULE | Refills: 0 | Status: SHIPPED | OUTPATIENT
Start: 2024-10-25 | End: 2024-11-24

## 2024-10-25 NOTE — ASSESSMENT & PLAN NOTE
Increase nortriptyline to 50 mg q bedtime.  Cont ind therapy  Orders:    nortriptyline (PAMELOR) 50 mg capsule; Take 1 capsule (50 mg total) by mouth daily at bedtime

## 2024-10-25 NOTE — ASSESSMENT & PLAN NOTE
Increase nortriptyline to 50 mg q bedtime.  Hold inderal. Cont ind therapy  Orders:    nortriptyline (PAMELOR) 50 mg capsule; Take 1 capsule (50 mg total) by mouth daily at bedtime

## 2024-10-25 NOTE — PSYCH
"  Virtual Regular Visit    Verification of patient location:    Patient is located at Other in the following state in which I hold an active license [place]             Reason for visit is   Chief Complaint   Patient presents with    Virtual Regular Visit          Encounter provider Minna Corona PA-C      Recent Visits  No visits were found meeting these conditions.  Showing recent visits within past 7 days and meeting all other requirements  Today's Visits  Date Type Provider Dept   10/25/24 Telemedicine Minna Corona PA-C  [facility]   Showing today's visits and meeting all other requirements  Future Appointments  No visits were found meeting these conditions.  Showing future appointments within next 150 days and meeting all other requirements       The patient was identified by name and date of birth. Martha Hollis was informed that this is a telemedicine visit and that the visit is being conducted throughthe Epic Embedded platform. She agrees to proceed..  My office door was closed. No one else was in the room.  She acknowledged consent and understanding of privacy and security of the video platform. The patient has agreed to participate and understands they can discontinue the visit at any time.    Patient is aware this is a billable service.     MEDICATION MANAGEMENT NOTE        [facility] - [facility]   [facility] [place]  [address] [facility] [id number] [phone]        Name and Date of Birth:  Martha Hollis 23 y.o. 2000    Date of Visit: October 25, 2024    SUBJECTIVE:     Martha seen by Daniel 10/15 at which time low dose depakote stopped.  Martha reports essentially unchanged symptoms.  Feels depressed, stressed, anticipatory grief with her mother, difficulty falling asleep, urges to vape, feels \"drained and exhausted\".  Denies SI.  Is now living with step-dad and helping out in " the care of her mother.  Still in PT for vertigo.     Review of Systems   Constitutional:  Positive for fatigue. Negative for activity change.   Psychiatric/Behavioral:  Positive for sleep disturbance.      Past Psychiatric History     Inpatient:  None  [facility]    No hx of suicide attempts.      Outpatient:  Therapist- Abby Martínez -weekly- x 6-7 yrs.   Current therapist- [facility], [place].  This office since April 2022     Med trials: lexapro -more depressed, depakote- low dose- effective for pre-menstrual related mood symptoms.     Trauma/Loss History:           Abandonment by bio father.  Mother's illness  (stage 4 metastatic breast cancer)     Family Psychiatric History:      Psychiatric Illness:      Depression- grandfather; anxiety -aunt  Substance Abuse:       none reported  Suicide Attempts:        uncle     Social History:         Student at [employer]- on line classes currently.   Had IEP- learning disability- comprehension.  Single.  currently living with step-father.                                OBJECTIVE:     MENTAL STATUS EXAM  Appearance:  age appropriate   Behavior:  Pleasant & cooperative   Speech:  Normal volume, regular rate and rhythm   Mood:  depressed and anxious   Affect:  constricted   Language: intact and appropriate for age, education, and intellect   Thought Process:  goal directed   Associations: intact associations   Thought Content:  negative ruminations   Perceptual Disturbances: no auditory or visual hallcunations   Risk Potential / Abnormal Thoughts: Suicidal ideation - None  Homicidal ideation - None  Potential for aggression - No       Consciousness:  Alert & Awake   Sensorium:  Grossly oriented   Attention: attention span and concentration are age appropriate       Fund of Knowledge:  Memory: awareness of current events: yes  recent and remote memory grossly intact   Insight:  intact   Judgment: intact     Lab Review: I have reviewed all pertinent  labs    Lab Results   Component Value Date     06/11/2016    SODIUM 138 08/23/2024    K 4.3 08/23/2024     08/23/2024    CO2 30 08/23/2024    AGAP 7 08/23/2024    BUN 15 08/23/2024    CREATININE 0.70 08/23/2024    GLUC 100 02/26/2023    GLUF 96 08/23/2024    CALCIUM 9.9 08/23/2024    AST 16 08/23/2024    ALT 10 08/23/2024    ALKPHOS 67 08/23/2024    PROT 6.6 06/11/2016    TP 7.6 08/23/2024    BILITOT 2.0 (H) 06/11/2016    TBILI 1.71 (H) 08/23/2024    EGFR 124 08/23/2024     Lab Results   Component Value Date    WBC 5.61 08/23/2024    HGB 14.7 08/23/2024    HCT 44.4 08/23/2024    MCV 93 08/23/2024     08/23/2024     Lab Results   Component Value Date    NSCHQXIO50 290 05/01/2024       Lab Results   Component Value Date    XEB9NARSBSUC 2.273 08/23/2024    TSH 2.62 10/01/2022       10/12/24: nortriptyline = 25 on 30 mg/d.     ASSESSMENT & PLAN        Assessment & Plan  MARCOS (generalized anxiety disorder)  Increase nortriptyline to 50 mg q bedtime.  Hold inderal. Cont ind therapy  Orders:    nortriptyline (PAMELOR) 50 mg capsule; Take 1 capsule (50 mg total) by mouth daily at bedtime    Depression, recurrent (HCC)  Increase nortriptyline to 50 mg q bedtime.  Cont ind therapy  Orders:    nortriptyline (PAMELOR) 50 mg capsule; Take 1 capsule (50 mg total) by mouth daily at bedtime       Current Outpatient Medications   Medication Sig Dispense Refill    nortriptyline (PAMELOR) 50 mg capsule Take 1 capsule (50 mg total) by mouth daily at bedtime 30 capsule 0    diazepam (VALIUM) 2 mg tablet Take 1 tablet (2 mg total) by mouth 3 (three) times a day as needed (dizziness) (Patient not taking: Reported on 10/25/2024) 20 tablet 0    esomeprazole (NexIUM) 40 MG capsule Take 1 capsule (40 mg total) by mouth daily 90 capsule 1    levocetirizine (XYZAL) 5 MG tablet Take 1 tablet (5 mg total) by mouth every evening 90 tablet 1    Levonorgestrel (MIRENA) 20 MCG/DAY IUD 1 each by Intrauterine Device route Once every 8  years      LORazepam (Ativan) 0.5 mg tablet Take 1 tablet (0.5 mg total) by mouth once as needed for anxiety (Patient not taking: Reported on 10/25/2024) 2 tablet 0    methylPREDNISolone 4 MG tablet therapy pack Use as directed on package 21 each 0    metoclopramide (Reglan) 10 mg tablet Take 0.5 tablets (5 mg total) by mouth 4 (four) times a day 30 tablet 0    multivitamin (THERAGRAN) TABS Take 1 tablet by mouth daily      naproxen (Naprosyn) 500 mg tablet Take 1 tablet (500 mg total) by mouth 2 (two) times a day with meals for 10 days 20 tablet 0     No current facility-administered medications for this visit.              Plan:        Increase nortriptyline to 50 mg q bedtime and hold inderal.  Cont ind therapy.    Reviewed risks, benefits, side effects of medications, including no medication.  Patient understands and agrees to treatment plan.   F/u Pa-C 11/25/24, sooner prn        Patient has been informed of 24 hours and weekend coverage for urgent situations accessed by calling the main clinic phone number.     PASCUAL MartínezC

## 2024-10-28 ENCOUNTER — APPOINTMENT (OUTPATIENT)
Dept: PHYSICAL THERAPY | Facility: REHABILITATION | Age: 24
End: 2024-10-28
Payer: COMMERCIAL

## 2024-10-30 ENCOUNTER — APPOINTMENT (OUTPATIENT)
Dept: PHYSICAL THERAPY | Facility: REHABILITATION | Age: 24
End: 2024-10-30
Payer: COMMERCIAL

## 2024-10-31 ENCOUNTER — OFFICE VISIT (OUTPATIENT)
Dept: FAMILY MEDICINE CLINIC | Facility: CLINIC | Age: 24
End: 2024-10-31
Payer: COMMERCIAL

## 2024-10-31 ENCOUNTER — APPOINTMENT (OUTPATIENT)
Dept: PHYSICAL THERAPY | Facility: REHABILITATION | Age: 24
End: 2024-10-31
Payer: COMMERCIAL

## 2024-10-31 VITALS
RESPIRATION RATE: 18 BRPM | OXYGEN SATURATION: 98 % | HEIGHT: 68 IN | DIASTOLIC BLOOD PRESSURE: 70 MMHG | WEIGHT: 139 LBS | SYSTOLIC BLOOD PRESSURE: 110 MMHG | TEMPERATURE: 98 F | BODY MASS INDEX: 21.07 KG/M2

## 2024-10-31 DIAGNOSIS — J06.9 VIRAL URI: Primary | ICD-10-CM

## 2024-10-31 PROCEDURE — 99213 OFFICE O/P EST LOW 20 MIN: CPT | Performed by: FAMILY MEDICINE

## 2024-10-31 RX ORDER — TRETINOIN 0.8 MG/G
GEL TOPICAL
COMMUNITY
Start: 2024-10-03

## 2024-10-31 NOTE — PROGRESS NOTES
"Ambulatory Visit  Name: Martha Hollis      : 2000      MRN: [id number]  Encounter Provider: Howard Sexton DO  Encounter Date: 10/31/2024   Encounter department: [facility]    Assessment & Plan  Viral URI  COVID negative at home. Recommend symptom directed treatment and supportive care. Can use Mucinex. Follow up if not improved.          History of Present Illness     HPI  Sore throat and congestion for 5 days, fever 2 nights ago, none since. Taking immune support vitamins, Ibuprofen, also flonase. COVID negative on day 3 of illness. No myalgias like previous episodes Some intermittent shortness of breath.          Review of Systems        Objective     /70   Temp 98 °F (36.7 °C)   Resp 18   Ht 5' 8\" (1.727 m)   Wt 63 kg (139 lb)   SpO2 98%   BMI 21.13 kg/m²     Physical Exam  Vitals reviewed.   Constitutional:       Appearance: She is ill-appearing.   HENT:      Nose: Nose normal.      Mouth/Throat:      Mouth: Mucous membranes are moist.      Pharynx: Posterior oropharyngeal erythema present.   Eyes:      Extraocular Movements: Extraocular movements intact.      Conjunctiva/sclera: Conjunctivae normal.   Cardiovascular:      Rate and Rhythm: Normal rate and regular rhythm.      Heart sounds: Normal heart sounds.   Pulmonary:      Effort: Pulmonary effort is normal.      Breath sounds: Normal breath sounds.   Abdominal:      General: Abdomen is flat.   Skin:     General: Skin is warm and dry.   Neurological:      Mental Status: She is alert.   Psychiatric:         Mood and Affect: Mood normal.         Behavior: Behavior normal.         "

## 2024-11-06 ENCOUNTER — OFFICE VISIT (OUTPATIENT)
Dept: FAMILY MEDICINE CLINIC | Facility: CLINIC | Age: 24
End: 2024-11-06
Payer: COMMERCIAL

## 2024-11-06 VITALS
RESPIRATION RATE: 18 BRPM | HEART RATE: 86 BPM | HEIGHT: 68 IN | OXYGEN SATURATION: 97 % | TEMPERATURE: 98.4 F | SYSTOLIC BLOOD PRESSURE: 100 MMHG | BODY MASS INDEX: 20.82 KG/M2 | WEIGHT: 137.38 LBS | DIASTOLIC BLOOD PRESSURE: 70 MMHG

## 2024-11-06 DIAGNOSIS — K21.9 CHRONIC GERD: ICD-10-CM

## 2024-11-06 DIAGNOSIS — Z00.00 PHYSICAL EXAM, ANNUAL: Primary | ICD-10-CM

## 2024-11-06 DIAGNOSIS — K59.00 CONSTIPATION, UNSPECIFIED CONSTIPATION TYPE: ICD-10-CM

## 2024-11-06 DIAGNOSIS — Z23 ENCOUNTER FOR IMMUNIZATION: ICD-10-CM

## 2024-11-06 PROBLEM — R10.33 PERIUMBILICAL ABDOMINAL PAIN: Chronic | Status: RESOLVED | Noted: 2021-07-21 | Resolved: 2024-11-06

## 2024-11-06 PROCEDURE — 90715 TDAP VACCINE 7 YRS/> IM: CPT

## 2024-11-06 PROCEDURE — 99395 PREV VISIT EST AGE 18-39: CPT | Performed by: NURSE PRACTITIONER

## 2024-11-06 PROCEDURE — 86580 TB INTRADERMAL TEST: CPT

## 2024-11-06 PROCEDURE — 90471 IMMUNIZATION ADMIN: CPT

## 2024-11-06 NOTE — PROGRESS NOTES
Adult Annual Physical  Name: Martha Hollis      : 2000      MRN: [id number]  Encounter Provider: DOUG Cabrales  Encounter Date: 2024   Encounter department: [facility]    Assessment & Plan  Physical exam, annual         Chronic GERD  Stable on Nexium daily and Reglan prn for nausea.          Constipation, unspecified constipation type  Miralax as needed. Increase water intake, fiber intake, exercise.          Encounter for immunization    Orders:    TDAP VACCINE GREATER THAN OR EQUAL TO 8YO IM    TB Skin Test    Immunizations and preventive care screenings were discussed with patient today. Appropriate education was printed on patient's after visit summary.    Counseling:  Alcohol/drug use: discussed moderation in alcohol intake, the recommendations for healthy alcohol use, and avoidance of illicit drug use.  Dental Health: discussed importance of regular tooth brushing, flossing, and dental visits.  Injury prevention: discussed safety/seat belts, safety helmets, smoke detectors, carbon monoxide detectors, and smoking near bedding or upholstery.  Sexual health: discussed sexually transmitted diseases, partner selection, use of condoms, avoidance of unintended pregnancy, and contraceptive alternatives.  Exercise: the importance of regular exercise/physical activity was discussed. Recommend exercise 3-5 times per week for at least 30 minutes.          History of Present Illness     Adult Annual Physical:  Patient presents for annual physical. Martha Hollis is a 23 year old female presenting today for annual exam.   Physical required for employment.     Chronic Fatigue.     Mood is good. Follows with psychiatry.   Medications recently changed taken of Depakote and Propranolol.   Now on Nortriptyline, with recent dose increase.     Vertigo saw ENT--referred to neurology due to migraines and dizziness. She did go to PT.     Follows with gyn at [facility]--Endometriosis  specialist.   Mom had breast cancer at 28. High risk breast cancer, her gyn at [facility] follows and has her doing mammograms annually.           .     Diet and Physical Activity:  - Diet/Nutrition: well balanced diet.  - Exercise: no formal exercise. inconsistent    General Health:  - Sleep:. Sleep is fair, good and bad days.  - Hearing: normal hearing bilateral ears.  - Vision: no vision problems, wears glasses and goes for regular eye exams.  - Dental: regular dental visits.    /GYN Health:  - Follows with GYN: yes.     Review of Systems   Constitutional: Negative.    HENT: Negative.     Respiratory: Negative.     Cardiovascular: Negative.    Gastrointestinal: Negative.    Genitourinary: Negative.    Musculoskeletal: Negative.    Skin: Negative.    Neurological: Negative.    Hematological: Negative.    Psychiatric/Behavioral: Negative.       Current Outpatient Medications on File Prior to Visit   Medication Sig Dispense Refill    esomeprazole (NexIUM) 40 MG capsule Take 1 capsule (40 mg total) by mouth daily 90 capsule 1    levocetirizine (XYZAL) 5 MG tablet Take 1 tablet (5 mg total) by mouth every evening 90 tablet 1    Levonorgestrel (MIRENA) 20 MCG/DAY IUD 1 each by Intrauterine Device route Once every 8 years      metoclopramide (Reglan) 10 mg tablet Take 0.5 tablets (5 mg total) by mouth 4 (four) times a day (Patient taking differently: Take 5 mg by mouth 4 (four) times a day as needed) 30 tablet 0    multivitamin (THERAGRAN) TABS Take 1 tablet by mouth daily      nortriptyline (PAMELOR) 50 mg capsule Take 1 capsule (50 mg total) by mouth daily at bedtime 30 capsule 0    Retin-A Micro Pump 0.08 % GEL Apply TO AFFECTED AREA(S) of THE FACE nightly      naproxen (Naprosyn) 500 mg tablet Take 1 tablet (500 mg total) by mouth 2 (two) times a day with meals for 10 days 20 tablet 0     No current facility-administered medications on file prior to visit.        Objective     /70   Pulse 86   Temp  "98.4 °F (36.9 °C)   Resp 18   Ht 5' 8\" (1.727 m)   Wt 62.3 kg (137 lb 6 oz)   SpO2 97%   BMI 20.89 kg/m²     Physical Exam  Vitals and nursing note reviewed.   Constitutional:       General: She is not in acute distress.     Appearance: Normal appearance. She is not ill-appearing.   HENT:      Head: Atraumatic.      Right Ear: Tympanic membrane normal.      Left Ear: Tympanic membrane normal.      Mouth/Throat:      Mouth: Mucous membranes are moist.      Pharynx: Oropharynx is clear.   Eyes:      Conjunctiva/sclera: Conjunctivae normal.      Pupils: Pupils are equal, round, and reactive to light.   Cardiovascular:      Rate and Rhythm: Normal rate and regular rhythm.      Heart sounds: No murmur heard.  Pulmonary:      Effort: Pulmonary effort is normal.      Breath sounds: Normal breath sounds.   Abdominal:      General: Bowel sounds are normal.      Palpations: Abdomen is soft.      Tenderness: There is no abdominal tenderness.   Musculoskeletal:         General: No deformity.      Cervical back: Normal range of motion and neck supple.      Right lower leg: No edema.      Left lower leg: No edema.   Lymphadenopathy:      Cervical: No cervical adenopathy.   Skin:     General: Skin is warm and dry.      Findings: No rash.   Neurological:      Mental Status: She is alert.      Gait: Gait normal.   Psychiatric:         Mood and Affect: Mood normal.         "

## 2024-11-07 PROBLEM — K21.9 CHRONIC GERD: Status: ACTIVE | Noted: 2024-11-07

## 2024-11-08 LAB
INDURATION: NORMAL MM
TB SKIN TEST: NEGATIVE

## 2024-11-16 DIAGNOSIS — F33.9 DEPRESSION, RECURRENT (HCC): ICD-10-CM

## 2024-11-16 DIAGNOSIS — F41.1 GAD (GENERALIZED ANXIETY DISORDER): ICD-10-CM

## 2024-11-18 RX ORDER — NORTRIPTYLINE HYDROCHLORIDE 50 MG/1
50 CAPSULE ORAL
Qty: 90 CAPSULE | Refills: 1 | OUTPATIENT
Start: 2024-11-18 | End: 2024-12-18

## 2024-11-19 ENCOUNTER — TELEPHONE (OUTPATIENT)
Age: 24
End: 2024-11-19

## 2024-11-19 NOTE — TELEPHONE ENCOUNTER
Pt called to get her appt for 11/25/24 at 2:30pm switched to a virtual visit due to no reason given.  Writer switched appt.

## 2024-11-25 ENCOUNTER — TELEMEDICINE (OUTPATIENT)
Dept: PSYCHIATRY | Facility: CLINIC | Age: 24
End: 2024-11-25
Payer: COMMERCIAL

## 2024-11-25 DIAGNOSIS — F33.9 DEPRESSION, RECURRENT (HCC): Primary | ICD-10-CM

## 2024-11-25 DIAGNOSIS — F41.1 GAD (GENERALIZED ANXIETY DISORDER): ICD-10-CM

## 2024-11-25 PROCEDURE — 99214 OFFICE O/P EST MOD 30 MIN: CPT | Performed by: PHYSICIAN ASSISTANT

## 2024-11-25 RX ORDER — NORTRIPTYLINE HYDROCHLORIDE 50 MG/1
50 CAPSULE ORAL
Qty: 90 CAPSULE | Refills: 0 | Status: SHIPPED | OUTPATIENT
Start: 2024-11-25 | End: 2025-02-23

## 2024-11-25 NOTE — PSYCH
Virtual Regular Visit    Verification of patient location:    Patient is located at Other in the following state in which I hold an active license [place]           Reason for visit is   Chief Complaint   Patient presents with    Virtual Regular Visit          Encounter provider Minna Corona PA-C      Recent Visits  No visits were found meeting these conditions.  Showing recent visits within past 7 days and meeting all other requirements  Today's Visits  Date Type Provider Dept   11/25/24 Telemedicine Minna Corona PA-C  [facility]   Showing today's visits and meeting all other requirements  Future Appointments  No visits were found meeting these conditions.  Showing future appointments within next 150 days and meeting all other requirements       The patient was identified by name and date of birth. Martha Hollis was informed that this is a telemedicine visit and that the visit is being conducted throughthe Epic Embedded platform. She agrees to proceed..  My office door was closed. No one else was in the room.  She acknowledged consent and understanding of privacy and security of the video platform. The patient has agreed to participate and understands they can discontinue the visit at any time.    Patient is aware this is a billable service.       MEDICATION MANAGEMENT NOTE        [facility] - [facility]   [facility] [place]  [address] [facility] [id number] [phone]  This note was not shared with the patient due to this is a psychotherapy note        Name and Date of Birth:  Martha Hollis 23 y.o. 2000    Date of Visit: Nov 25, 2024    SUBJECTIVE:     Martha seen by Daniel 10/25 at which time inderal held and nortriptyline increased to 50 mg.    Martha says she just started a new job at a  - part time and anticipates increasing hours.  She likes it and is adjusting.  Still with  excessive worry/apprehension, hilton about her health, but has not had to leave work secondary to symptoms.  Notes energy is low but says that it is good when she follows a gluten-free or low gluten diet.  Says she has had neg test for celiac.      Review of Systems   Constitutional:  Negative for appetite change.   Gastrointestinal:  Positive for nausea.        Mild and decreased, has not needed prn reglan recently   Psychiatric/Behavioral:  Negative for sleep disturbance.      Past Psychiatric History     Inpatient:  None  [facility].    No hx of suicide attempts.      Outpatient:  Therapist- Abby Martínez -[place]- x 6-7 yrs.   Current therapist- [facility], [place].  This office since April 2022     Med trials: lexapro -more depressed, depakote- low dose- effective for pre-menstrual related mood symptoms.     Trauma/Loss History:           Abandonment by bio father.  Mother's illness  (stage 4 metastatic breast cancer)     Family Psychiatric History:      Psychiatric Illness:      Depression- grandfather; anxiety -aunt  Substance Abuse:       none reported  Suicide Attempts:        uncle     Social History:         Student at [employer]- on line classes currently.   Had IEP- learning disability- comprehension.  Single.  currently living with step-father.                                OBJECTIVE:     MENTAL STATUS EXAM  Appearance:  age appropriate, dressed casually   Behavior:  Pleasant & cooperative   Speech:  Normal volume, regular rate and rhythm   Mood:  Mood improved and less anxious   Affect:  mood congruent   Language: intact and appropriate for age, education, and intellect   Thought Process:  goal directed   Associations: intact associations   Thought Content:  negative ruminations   Perceptual Disturbances: no auditory or visual hallcunations   Risk Potential / Abnormal Thoughts: Suicidal ideation - None  Homicidal ideation - None  Potential for aggression - No       Consciousness:   Alert & Awake   Sensorium:  Grossly oriented   Attention: attention span and concentration are age appropriate       Fund of Knowledge:  Memory: awareness of current events: yes  recent and remote memory grossly intact   Insight:  intact   Judgment: intact     Lab Review: I have reviewed all pertinent labs    Lab Results   Component Value Date     06/11/2016    SODIUM 138 08/23/2024    K 4.3 08/23/2024     08/23/2024    CO2 30 08/23/2024    AGAP 7 08/23/2024    BUN 15 08/23/2024    CREATININE 0.70 08/23/2024    GLUC 100 02/26/2023    GLUF 96 08/23/2024    CALCIUM 9.9 08/23/2024    AST 16 08/23/2024    ALT 10 08/23/2024    ALKPHOS 67 08/23/2024    PROT 6.6 06/11/2016    TP 7.6 08/23/2024    BILITOT 2.0 (H) 06/11/2016    TBILI 1.71 (H) 08/23/2024    EGFR 124 08/23/2024     Lab Results   Component Value Date    WBC 5.61 08/23/2024    HGB 14.7 08/23/2024    HCT 44.4 08/23/2024    MCV 93 08/23/2024     08/23/2024     Lab Results   Component Value Date    HKJHQGWH44 290 05/01/2024     Lab Results   Component Value Date    CJC1OMEFSTAA 2.273 08/23/2024    TSH 2.62 10/01/2022           ASSESSMENT & PLAN        Assessment & Plan  MARCOS (generalized anxiety disorder)  Cont f/u ind therapist  Orders:    nortriptyline (PAMELOR) 50 mg capsule; Take 1 capsule (50 mg total) by mouth daily at bedtime    Depression, recurrent (HCC)  Cont f/u ind therapist  Orders:    nortriptyline (PAMELOR) 50 mg capsule; Take 1 capsule (50 mg total) by mouth daily at bedtime       Current Outpatient Medications   Medication Sig Dispense Refill    nortriptyline (PAMELOR) 50 mg capsule Take 1 capsule (50 mg total) by mouth daily at bedtime 90 capsule 0    esomeprazole (NexIUM) 40 MG capsule Take 1 capsule (40 mg total) by mouth daily 90 capsule 1    levocetirizine (XYZAL) 5 MG tablet Take 1 tablet (5 mg total) by mouth every evening 90 tablet 1    Levonorgestrel (MIRENA) 20 MCG/DAY IUD 1 each by Intrauterine Device route Once every 8  years      metoclopramide (Reglan) 10 mg tablet Take 0.5 tablets (5 mg total) by mouth 4 (four) times a day (Patient taking differently: Take 5 mg by mouth 4 (four) times a day as needed) 30 tablet 0    multivitamin (THERAGRAN) TABS Take 1 tablet by mouth daily      naproxen (Naprosyn) 500 mg tablet Take 1 tablet (500 mg total) by mouth 2 (two) times a day with meals for 10 days 20 tablet 0    Retin-A Micro Pump 0.08 % GEL Apply TO AFFECTED AREA(S) of THE FACE nightly       No current facility-administered medications for this visit.                Plan:        Encouraged to follow diet that she feels better on.  No med change- cont nortriptyline 50 mg q bedtime and cont f/u ind therapist.     Reviewed risks, benefits, side effects of medications, including no medication.  Patient understands and agrees to treatment plan.   F/u Pa-C 1/7/25, sooner prn       Patient has been informed of 24 hours and weekend coverage for urgent situations accessed by calling the main clinic phone number.     Minna Corona PA-C

## 2024-11-26 NOTE — BH TREATMENT PLAN
Chest Pain   WHAT YOU NEED TO KNOW:   Chest pain can be caused by a range of conditions, from not serious to life-threatening  Chest pain can be a symptom of a digestive problem, such as acid reflux or a stomach ulcer  An anxiety attack or a strong emotion, such as anger, can also cause chest pain  Infection, inflammation, or a fracture in the bones or cartilage in your chest can cause pain or discomfort  Sometimes chest pain or pressure is caused by poor blood flow to your heart (angina)  Chest pain may also be caused by life-threatening conditions such as a heart attack or blood clot in your lungs  DISCHARGE INSTRUCTIONS:   Call 911 if:   · You have any of the following signs of a heart attack:      ¨ Squeezing, pressure, or pain in your chest that lasts longer than 5 minutes or returns    ¨ Discomfort or pain in your back, neck, jaw, stomach, or arm     ¨ Trouble breathing    ¨ Nausea or vomiting    ¨ Lightheadedness or a sudden cold sweat, especially with chest pain or trouble breathing    Return to the emergency department if:   · You have chest discomfort that gets worse, even with medicine  · You cough or vomit blood  · Your bowel movements are black or bloody  · You cannot stop vomiting, or it hurts to swallow  Contact your healthcare provider if:   · You have questions or concerns about your condition or care  Medicines:   · Medicines  may be given to treat the cause of your chest pain  Examples include pain medicine, anxiety medicine, or medicines to increase blood flow to your heart  · Do not take certain medicines without asking your healthcare provider first   These include NSAIDs, herbal or vitamin supplements, or hormones (estrogen or progestin)  · Take your medicine as directed  Contact your healthcare provider if you think your medicine is not helping or if you have side effects  Tell him or her if you are allergic to any medicine   Keep a list of the medicines, vitamins, TREATMENT PLAN (Medication Management Only)        [facility]    Name and Date of Birth:  Martha Hollis 23 y.o. 2000  Date of Treatment Plan: November 26, 2024  Diagnosis/Diagnoses:    1. Depression, recurrent (HCC)    2. MARCOS (generalized anxiety disorder)      Strengths/Personal Resources for Self-Care: taking medications as prescribed, family ties, motivation for treatment, self-reliance, willingness to work on problems.  Area/Areas of need (in own words): anxiety, depression  1. Long Term Goal: improve control of depression and anxiety  Target Date:6 weeks - 1/7/2025  Person/Persons responsible for completion of goal: Martha  2.  Short Term Objective (s) - How will we reach this goal?:   A. Provider new recommended medication/dosage changes and/or continue medication(s): continue current medications as prescribed.  B. Attend medication management appointments regularly.  C. Attend psychotherapy regularly.  Target Date:3 months - 2/26/2025  Person/Persons Responsible for Completion of Goal: Martha  Progress Towards Goals: continuing treatment  Treatment Modality: medication management every 6 weeks  Review due 180 days from date of this plan: 4 months - 3/26/2025  Expected length of service: ongoing treatment  My Physician/PA/NP and I have developed this plan together and I agree to work on the goals and objectives. I understand the treatment goals that were developed for my treatment.       and herbs you take  Include the amounts, and when and why you take them  Bring the list or the pill bottles to follow-up visits  Carry your medicine list with you in case of an emergency  Follow up with your healthcare provider within 72 hours, or as directed: You may need to return for more tests to find the cause of your chest pain  You may be referred to a specialist, such as a cardiologist or gastroenterologist  Write down your questions so you remember to ask them during your visits  Healthy living tips: The following are general healthy guidelines  If your chest pain is caused by a heart problem, your healthcare provider will give you specific guidelines to follow  · Do not smoke  Nicotine and other chemicals in cigarettes and cigars can cause lung and heart damage  Ask your healthcare provider for information if you currently smoke and need help to quit  E-cigarettes or smokeless tobacco still contain nicotine  Talk to your healthcare provider before you use these products  · Eat a variety of healthy, low-fat foods  Healthy foods include fruits, vegetables, whole-grain breads, low-fat dairy products, beans, lean meats, and fish  Ask for more information about a heart healthy diet  · Ask about activity  Your healthcare provider will tell you which activities to limit or avoid  Ask when you can drive, return to work, and have sex  Ask about the best exercise plan for you  · Maintain a healthy weight  Ask your healthcare provider how much you should weigh  Ask him or her to help you create a weight loss plan if you are overweight  · Get the flu and pneumonia vaccines  All adults should get the influenza (flu) vaccine  Get it every year as soon as it becomes available  The pneumococcal vaccine is given to adults aged 72 years or older  The vaccine is given every 5 years to prevent pneumococcal disease, such as pneumonia    © 2017 [employer] Information is for End User's use only and may not be sold, redistributed or otherwise used for commercial purposes  All illustrations and images included in CareNotes® are the copyrighted property of A D A M , Inc  or Aki Kent  The above information is an  only  It is not intended as medical advice for individual conditions or treatments  Talk to your doctor, nurse or pharmacist before following any medical regimen to see if it is safe and effective for you  Thalidomide Counseling: I discussed with the patient the risks of thalidomide including but not limited to birth defects, anxiety, weakness, chest pain, dizziness, cough and severe allergy.

## 2024-11-26 NOTE — ASSESSMENT & PLAN NOTE
Cont f/u ind therapist  Orders:    nortriptyline (PAMELOR) 50 mg capsule; Take 1 capsule (50 mg total) by mouth daily at bedtime

## 2024-11-29 DIAGNOSIS — K21.9 CHRONIC GERD: ICD-10-CM

## 2024-11-29 RX ORDER — ESOMEPRAZOLE MAGNESIUM 40 MG/1
40 CAPSULE, DELAYED RELEASE ORAL DAILY
Qty: 90 CAPSULE | Refills: 1 | Status: SHIPPED | OUTPATIENT
Start: 2024-11-29

## 2024-12-04 ENCOUNTER — OFFICE VISIT (OUTPATIENT)
Dept: CARDIOLOGY CLINIC | Facility: CLINIC | Age: 24
End: 2024-12-04
Payer: COMMERCIAL

## 2024-12-04 VITALS
SYSTOLIC BLOOD PRESSURE: 118 MMHG | WEIGHT: 140.2 LBS | HEART RATE: 119 BPM | DIASTOLIC BLOOD PRESSURE: 72 MMHG | OXYGEN SATURATION: 98 % | BODY MASS INDEX: 21.32 KG/M2

## 2024-12-04 DIAGNOSIS — I34.1 MITRAL VALVE PROLAPSE: ICD-10-CM

## 2024-12-04 DIAGNOSIS — R00.2 PALPITATIONS: Primary | ICD-10-CM

## 2024-12-04 PROCEDURE — 99213 OFFICE O/P EST LOW 20 MIN: CPT | Performed by: INTERNAL MEDICINE

## 2024-12-04 NOTE — LETTER
December 4, 2024     Roya Chan MD  [address] [facility] [address]    Patient: Martha Hollis   YOB: 2000   Date of Visit: 12/4/2024       Dear Dr. Chan:    Thank you for referring Martha Hollis to me for evaluation. Below are my notes for this consultation.    If you have questions, please do not hesitate to call me. I look forward to following your patient along with you.         Sincerely,        Leonel Constantino MD        CC: No Recipients    Leonel Constantino MD  12/4/2024  4:39 PM  Sign when Signing Visit  Cardiology   Martha Hollis 24 y.o. female MRN: [id number]   Encounter: [id number]      Assessment:    >> Palpitations  >> Mitral valve prolapse  >> Hx of Endometriosis    Plan:    -Echo showed mitral valve prolapse that appeared mild, no significant mitral regurgitation  -Holter monitor was unremarkable  -She does admit to significant anxiety especially when present that healthcare providers offices  -Her Holter demonstrated mean heart rate of 82 bpm  -Counseled on cutting back on caffeine/alcohol.  -Return to office in 6 months      12/4/2024: No complaints, is tachycardic.  In the interim between our last visit and today she has seen her primary care physician for vertigo, she is also saw ENT for the same and has an appointment with neurology.  She denies any pedal edema, shortness of breath, chest pain.  Overall she is doing well, she discontinued the propranolol on her own as she thought it might be exacerbating her vertigo.    Rightward axis detected on her EKG is not new    CC: Palpitations, history of mitral valve prolapse    HPI  24 y.o. female presents with a history of mitral valve prolapse.  She is here to establish care with an adult cardiologist.  She only complaint is of intermittent palpitations.  They do occur daily.  They are not associated with chest pain or syncope.    Her palpitations have been going on for several years.  No change in frequency.    She  does not drink excessive alcohol, does not drink energy drinks or sodas.  Drinks 1 to 2 cups of coffee per day.      The patient denies chest pain, shortness of breath, orthopnea, PND, pedal edema, syncope, presyncope, diaphoresis, nausea/vomiting       The ASCVD Risk score (Ryan ALY, et al., 2019) failed to calculate for the following reasons:    The 2019 ASCVD risk score is only valid for ages 40 to 79            Physical exam  Objective  Vitals: Blood pressure 118/72, pulse (!) 119, weight 63.6 kg (140 lb 3.2 oz), SpO2 98%.    General:  AO x3, no acute distress  Cardiac:  S1-S2 normal, 2/6 systolic murmur in the mitral area. No rubs or gallops, JVP: normal  Lungs:  Clear to auscultation bilaterally, no wheezing or crackles.  Abdomen:  Soft, nontender, nondistended.  Extremities:  Warm, well perfused, pulses palpable, no ulcers or rashes. Edema:absent  Neuro: Grossly nonfocal        ======================================================  TREADMILL STRESS  Results for orders placed during the hospital encounter of 17    Stress test only, exercise    [place] [address]  [phone]    Stress Electrocardiography during Exercise    Name: NARA WILD  MR #: [id number]  Account #: [id number]  Study date: 2017  : 2000  Age: 16 years  Gender: Female  Height: 67 in  Weight: 108 lb  BSA: 1.56 m squared    Allergies: NO KNOWN ALLERGIES    Diagnosis: R00.2 - Palpitations    Primary Physician:  Roya Chan MD  Referring Physician:  Clau Falk MD  CC:  Thao Logan MD  Technician:  ANABELA Carolina  Interpreting Physician:  Clau Falk MD    CLINICAL QUESTION: Arrhythmia.    HISTORY: The patient is a 16 year old  female. Chest pain status: no chest pain. Other symptoms: palpitations. PVC's noted on EKG and Holter monitor. Coronary artery disease risk factors: none. Cardiovascular history:  none  significant.    REST ECG: Normal sinus rhythm. The ECG showed occasional premature ventricular contractions.    PROCEDURE: Treadmill exercise testing was performed, using the Rodolfo protocol. Stress electrocardiographic evaluation was performed.    RODOLFO PROTOCOL:  HR bpm SBP mmHg DBP mmHg Symptoms ST change Rhythm/conduct  Baseline 115 100 68 none normal ST-T occasional PVC's  Stage 1 117 104 68 none normal ST-T occasional PVC's  Stage 2 146 110 70 none normal ST-T NSR, no ectopy  Stage 3 176 116 70 none normal ST-T NSR, no ectopy  Stage 4 200 122 70 fatigue, leg pain normal ST-T rare PVC's  Immediate 203 126 72 same as above same as above NSR, no ventricular ectopy  Recovery 1 153 120 70 none -- same as above  Recovery 2 127 116 70 none -- NSR, occasional PVC's    STRESS SUMMARY: Pre exercise POX 99% and peak exercise POX 96%. Duration of exercise was 12 min and 24 sec. The patient exercised to protocol stage 5. Maximal work rate was 14.1 METs. Maximal heart rate during stress was 203 bpm ( 99 % of  maximal predicted heart rate). The heart rate response to stress was normal. There was normal resting blood pressure with an appropriate response to stress. The rate-pressure product for the peak heart rate and blood pressure was 84196.  There was no chest pain or palpitations during stress. Ventricular ectopy suppressed with exercise. The stress test was terminated due to leg pain and fatigue. Arrhythmia during stress: Occasional isolated premature ventricular beats were  seen at baseline and early exercise stages. Suppressed in Stage 2, 3, and 4 but 1 isolated premature ventricular beat was seen at peak exercise. Occasional isolated premature ventricular beats were seen in the final stage of recovery.    SUMMARY:  -  Stress results: Duration of exercise was 12 min and 24 sec. Target heart rate was achieved. There was no chest pain or palpitations during stress. Ventricular ectopy suppressed with  exercise.    IMPRESSIONS: Normal study after maximal exercise.    Prepared and signed by    Clau Falk MD  Signed 06/10/2017 22:31:27     ----------------------------------------------------------------------------------------------  NUCLEAR STRESS TEST: No results found for this or any previous visit.    No results found for this or any previous visit.      --------------------------------------------------------------------------------  CATH:  No results found for this or any previous visit.    --------------------------------------------------------------------------------  ECHO:   Results for orders placed during the hospital encounter of 16    Echo complete with contrast if indicated    Narrative  [address] [address] [phone]    Transthoracic Echocardiogram  2D, M-mode, Doppler, and Color Doppler    Study date:  2016    Patient: NARA WILD  MR number: [id number]  Account number: [id number]  : 2000  Age: 15 years  Gender: Female  Status: Outpatient  [facility]: [address] [facility]  Height: 63 in / 160 cm  Weight: 104.9 lb / 47.7 kg  BSA: 1.47 m squared    Indications: Murmur.  Diagnosis:   R01.1 - Cardiac murmur, unspecified    Sonagrapher:  JO ANN Washington  Ordering Physician:  Roya Chan MD  Group:  [facility]  Interpreting Physician:  Christiano Alberts MD    IMPRESSIONS:  No significant congenital heart disease identified.    PROCEDURE: The study was performed in the [address] [place] [address] [facility].  This was a routine study. The transthoracic approach was used. The study  included complete 2D imaging, M-mode, complete spectral Doppler, and color  Doppler. The heart rate was 79 bpm.    ANATOMIC RELATIONSHIPS: Visceral situs: normal. Left sided cardiac apex  (levocardia). Normal atrial situs (atrial situs solitus). Normal appendage  morphology and laterality.  Concordant atrioventricular alignment. Ventricular  d-loop. Normal infundibular anatomy. Concordant ventriculoarterial connection.  Normally related great vessels.    SYSTEMIC VEINS: SVC: The superior vena cava size was normal. IVC: The inferior  vena cava was normal in size and course.    PULMONARY VEINS: The pulmonary veins drained normally to the left atrium.  DOPPLER: Doppler flow pattern was normal in the pulmonary vein(s).    RIGHT ATRIUM: Size was normal.    LEFT ATRIUM: Size was normal.    ATRIAL SEPTUM: No defect or patent foramen ovale was identified.    TRICUSPID VALVE: The valve structure was normal. DOPPLER: The transtricuspid  velocity was within the normal range. There was no evidence for tricuspid  stenosis. There was no regurgitation.    MITRAL VALVE: Valve structure was normal. DOPPLER: The transmitral velocity was  within the normal range. There was no evidence for stenosis. There was no  regurgitation.    LEFT VENTRICLE: The cavity size was normal. Wall thickness was normal. Systolic  function was normal. There were no regional wall motion abnormalities.    PULMONIC VALVE: Leaflets exhibited normal thickness and normal cuspal  separation. DOPPLER: The transpulmonic velocity was within the normal range.  There was no regurgitation.    AORTIC VALVE: The valve was trileaflet. Leaflets exhibited normal thickness and  normal cuspal separation. DOPPLER: Transaortic velocity was within the normal  range. There was no stenosis. There was no regurgitation.    AORTA: There was a normal-sized left aortic arch with normal brachiocephalic  branching.    EXTRACARDIAC SHUNTING: No ductal shunt was detected by Doppler.    PERICARDIUM: There was no pericardial effusion. The pericardium was normal in  appearance.    SYSTEM MEASUREMENT TABLES  MM  %FS: 28.2 %  Ao Diam: 1.8 cm  D-E [place]: 1.4 cm  E-F [place]: 0.1 m/s  EDV(Teich): 70.7 ml  EF(Teich): 55 %  ESV(Teich): 31.9 ml  IVSd: 0.6 cm  LA Diam: 1.6 cm  LA/Ao:  0.9  LVIDd: 4 cm  LVIDs: 2.9 cm  LVPWd: 0.6 cm  LVPWs: 0 cm  SV(Teich): 38.9 ml    Prepared and electronically signed by    Christiano Alberts MD  Signed 17-Jun-2016 08:43:44    No results found for this or any previous visit.    --------------------------------------------------------------------------------  HOLTER  No results found for this or any previous visit.    --------------------------------------------------------------------------------  CAROTIDS  No results found for this or any previous visit.       There are no diagnoses linked to this encounter.     ======================================================          Review of Systems  ROS as noted above, otherwise 12 point review of systems was performed and is negative.     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   • Wears glasses      Past Surgical History:   Procedure Laterality Date   • LAPAROSCOPIC ENDOMETRIOSIS FULGURATION     • TONSILECTOMY AND ADNOIDECTOMY     • WISDOM TOOTH EXTRACTION       Social History     Substance and Sexual Activity   Alcohol Use Not Currently    Comment: Social     Social History     Substance and Sexual Activity   Drug Use No     Social History     Tobacco Use   Smoking Status Never   • Passive exposure: Past   Smokeless Tobacco Never     Family History   Problem Relation Age of Onset   • Breast cancer Mother 28   • Cancer Mother    • No Known Problems Father    • Breast cancer Maternal Grandmother   "  • Breast cancer Maternal Grandfather    • Depression Maternal Aunt    • Anxiety disorder Maternal Aunt    • Colon cancer Neg Hx    • Ovarian cancer Neg Hx        Meds/Allergies  Hospital Medications:   No current facility-administered medications for this visit.    Home Medications: Not in a hospital admission.      Allergies   Allergen Reactions   • Pineapple - Food Allergy Other (See Comments)     Tingling tongue and throat   • Pollen Extract Sneezing     congestion   • Bee Pollen Sneezing     congestion         Portions of the record may have been created with voice recognition software.  Occasional wrong words or \"sound a like\" substitutions may have occurred due to the inherent limitations of voice recognition software.  Read the chart carefully and recognize, using context, where substitutions have occurred.        I have spent a total of 25 min in reviewing and/or ordering tests, medications, or procedures, performing an examination or evaluation, reviewing pertinent history, counseling and educating the patient, referring and/or communicating with other health care professionals, documenting in the EMR and general coordination of care of the patient today.               "

## 2024-12-04 NOTE — PROGRESS NOTES
Cardiology   Martha Hollis 24 y.o. female MRN: [id number]   Encounter: [id number]      Assessment:    >> Palpitations  >> Mitral valve prolapse  >> Hx of Endometriosis    Plan:    -Echo showed mitral valve prolapse that appeared mild, no significant mitral regurgitation  -Holter monitor was unremarkable  -She does admit to significant anxiety especially when present that healthcare providers offices  -Her Holter demonstrated mean heart rate of 82 bpm  -Counseled on cutting back on caffeine/alcohol.  -Return to office in 6 months      12/4/2024: No complaints, is tachycardic.  In the interim between our last visit and today she has seen her primary care physician for vertigo, she is also saw ENT for the same and has an appointment with neurology.  She denies any pedal edema, shortness of breath, chest pain.  Overall she is doing well, she discontinued the propranolol on her own as she thought it might be exacerbating her vertigo.    Rightward axis detected on her EKG is not new    CC: Palpitations, history of mitral valve prolapse    HPI  24 y.o. female presents with a history of mitral valve prolapse.  She is here to establish care with an adult cardiologist.  She only complaint is of intermittent palpitations.  They do occur daily.  They are not associated with chest pain or syncope.    Her palpitations have been going on for several years.  No change in frequency.    She does not drink excessive alcohol, does not drink energy drinks or sodas.  Drinks 1 to 2 cups of coffee per day.      The patient denies chest pain, shortness of breath, orthopnea, PND, pedal edema, syncope, presyncope, diaphoresis, nausea/vomiting       The ASCVD Risk score ([place] DK, et al., 2019) failed to calculate for the following reasons:    The 2019 ASCVD risk score is only valid for ages 40 to 79            Physical exam  Objective   Vitals: Blood pressure 118/72, pulse (!) 119, weight 63.6 kg (140 lb 3.2 oz), SpO2 98%.    General:  AO x3,  no acute distress  Cardiac:  S1-S2 normal, 2/6 systolic murmur in the mitral area. No rubs or gallops, JVP: normal  Lungs:  Clear to auscultation bilaterally, no wheezing or crackles.  Abdomen:  Soft, nontender, nondistended.  Extremities:  Warm, well perfused, pulses palpable, no ulcers or rashes. Edema:absent  Neuro: Grossly nonfocal        ======================================================  TREADMILL STRESS  Results for orders placed during the hospital encounter of 17    Stress test only, exercise    [place] [address]  [phone]    Stress Electrocardiography during Exercise    Name: NARA WILD  MR #: [id number]  Account #: [id number]  Study date: 2017  : 2000  Age: 16 years  Gender: Female  Height: 67 in  Weight: 108 lb  BSA: 1.56 m squared    Allergies: NO KNOWN ALLERGIES    Diagnosis: R00.2 - Palpitations    Primary Physician:  Roya Chan MD  Referring Physician:  Clau Falk MD  CC:  Thao Logan MD  Technician:  ANABELA Carolina  Interpreting Physician:  Clau Falk MD    CLINICAL QUESTION: Arrhythmia.    HISTORY: The patient is a 16 year old  female. Chest pain status: no chest pain. Other symptoms: palpitations. PVC's noted on EKG and Holter monitor. Coronary artery disease risk factors: none. Cardiovascular history: none  significant.    REST ECG: Normal sinus rhythm. The ECG showed occasional premature ventricular contractions.    PROCEDURE: Treadmill exercise testing was performed, using the Tim protocol. Stress electrocardiographic evaluation was performed.    TIM PROTOCOL:  HR bpm SBP mmHg DBP mmHg Symptoms ST change Rhythm/conduct  Baseline 115 100 68 none normal ST-T occasional PVC's  Stage 1 117 104 68 none normal ST-T occasional PVC's  Stage 2 146 110 70 none normal ST-T NSR, no ectopy  Stage 3 176 116 70 none normal ST-T NSR, no ectopy  Stage 4 200 122 70  fatigue, leg pain normal ST-T rare PVC's  Immediate 203 126 72 same as above same as above NSR, no ventricular ectopy  Recovery 1 153 120 70 none -- same as above  Recovery 2 127 116 70 none -- NSR, occasional PVC's    STRESS SUMMARY: Pre exercise POX 99% and peak exercise POX 96%. Duration of exercise was 12 min and 24 sec. The patient exercised to protocol stage 5. Maximal work rate was 14.1 METs. Maximal heart rate during stress was 203 bpm ( 99 % of  maximal predicted heart rate). The heart rate response to stress was normal. There was normal resting blood pressure with an appropriate response to stress. The rate-pressure product for the peak heart rate and blood pressure was 64417.  There was no chest pain or palpitations during stress. Ventricular ectopy suppressed with exercise. The stress test was terminated due to leg pain and fatigue. Arrhythmia during stress: Occasional isolated premature ventricular beats were  seen at baseline and early exercise stages. Suppressed in Stage 2, 3, and 4 but 1 isolated premature ventricular beat was seen at peak exercise. Occasional isolated premature ventricular beats were seen in the final stage of recovery.    SUMMARY:  -  Stress results: Duration of exercise was 12 min and 24 sec. Target heart rate was achieved. There was no chest pain or palpitations during stress. Ventricular ectopy suppressed with exercise.    IMPRESSIONS: Normal study after maximal exercise.    Prepared and signed by    Clau Falk MD  Signed 06/10/2017 22:31:27     ----------------------------------------------------------------------------------------------  NUCLEAR STRESS TEST: No results found for this or any previous visit.    No results found for this or any previous visit.      --------------------------------------------------------------------------------  CATH:  No results found for this or any previous  visit.    --------------------------------------------------------------------------------  ECHO:   Results for orders placed during the hospital encounter of 16    Echo complete with contrast if indicated    Narrative  [address] [id number] [phone]    Transthoracic Echocardiogram  2D, M-mode, Doppler, and Color Doppler    Study date:  2016    Patient: NARA WILD  MR number: [id number]  Account number: [id number]  : 2000  Age: 15 years  Gender: Female  Status: Outpatient  Location: [address]  Height: 63 in / 160 cm  Weight: 104.9 lb / 47.7 kg  BSA: 1.47 m squared    Indications: Murmur.  Diagnosis:   R01.1 - Cardiac murmur, unspecified    Sonagrapher:  JO ANN Washington  Ordering Physician:  Roya Chan MD  Group:  [facility]  Interpreting Physician:  Christiano Alberts MD    IMPRESSIONS:  No significant congenital heart disease identified.    PROCEDURE: The study was performed in the [address] [place] [address].  This was a routine study. The transthoracic approach was used. The study  included complete 2D imaging, M-mode, complete spectral Doppler, and color  Doppler. The heart rate was 79 bpm.    ANATOMIC RELATIONSHIPS: Visceral situs: normal. Left sided cardiac apex  (levocardia). Normal atrial situs (atrial situs solitus). Normal appendage  morphology and laterality. Concordant atrioventricular alignment. Ventricular  d-loop. Normal infundibular anatomy. Concordant ventriculoarterial connection.  Normally related great vessels.    SYSTEMIC VEINS: SVC: The superior vena cava size was normal. IVC: The inferior  vena cava was normal in size and course.    PULMONARY VEINS: The pulmonary veins drained normally to the left atrium.  DOPPLER: Doppler flow pattern was normal in the pulmonary vein(s).    RIGHT ATRIUM: Size was normal.    LEFT ATRIUM: Size was  normal.    ATRIAL SEPTUM: No defect or patent foramen ovale was identified.    TRICUSPID VALVE: The valve structure was normal. DOPPLER: The transtricuspid  velocity was within the normal range. There was no evidence for tricuspid  stenosis. There was no regurgitation.    MITRAL VALVE: Valve structure was normal. DOPPLER: The transmitral velocity was  within the normal range. There was no evidence for stenosis. There was no  regurgitation.    LEFT VENTRICLE: The cavity size was normal. Wall thickness was normal. Systolic  function was normal. There were no regional wall motion abnormalities.    PULMONIC VALVE: Leaflets exhibited normal thickness and normal cuspal  separation. DOPPLER: The transpulmonic velocity was within the normal range.  There was no regurgitation.    AORTIC VALVE: The valve was trileaflet. Leaflets exhibited normal thickness and  normal cuspal separation. DOPPLER: Transaortic velocity was within the normal  range. There was no stenosis. There was no regurgitation.    AORTA: There was a normal-sized left aortic arch with normal brachiocephalic  branching.    EXTRACARDIAC SHUNTING: No ductal shunt was detected by Doppler.    PERICARDIUM: There was no pericardial effusion. The pericardium was normal in  appearance.    SYSTEM MEASUREMENT TABLES  MM  %FS: 28.2 %  Ao Diam: 1.8 cm  D-E [place]: 1.4 cm  E-F [place]: 0.1 m/s  EDV(Teich): 70.7 ml  EF(Teich): 55 %  ESV(Teich): 31.9 ml  IVSd: 0.6 cm  LA Diam: 1.6 cm  LA/Ao: 0.9  LVIDd: 4 cm  LVIDs: 2.9 cm  LVPWd: 0.6 cm  LVPWs: 0 cm  SV(Teich): 38.9 ml    Prepared and electronically signed by    Christiano Alberts MD  Signed 17-Jun-2016 08:43:44    No results found for this or any previous visit.    --------------------------------------------------------------------------------  HOLTER  No results found for this or any previous visit.    --------------------------------------------------------------------------------  CAROTIDS  No results found for this or  any previous visit.       There are no diagnoses linked to this encounter.     ======================================================          Review of Systems  ROS as noted above, otherwise 12 point review of systems was performed and is negative.     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    Wears glasses      Past Surgical History:   Procedure Laterality Date    LAPAROSCOPIC ENDOMETRIOSIS FULGURATION      TONSILECTOMY AND ADNOIDECTOMY      WISDOM TOOTH EXTRACTION       Social History     Substance and Sexual Activity   Alcohol Use Not Currently    Comment: Social     Social History     Substance and Sexual Activity   Drug Use No     Social History     Tobacco Use   Smoking Status Never    Passive exposure: Past   Smokeless Tobacco Never     Family History   Problem Relation Age of Onset    Breast cancer Mother 28    Cancer Mother     No Known Problems Father     Breast cancer Maternal Grandmother     Breast cancer Maternal Grandfather     Depression Maternal Aunt     Anxiety disorder Maternal Aunt     Colon cancer Neg Hx     Ovarian cancer Neg Hx        Meds/Allergies   Hospital Medications:   No current facility-administered medications for this visit.    Home Medications: Not in a hospital admission.      Allergies   Allergen Reactions    Pineapple - Food Allergy Other (See Comments)     Tingling tongue and throat    Pollen Extract Sneezing     congestion    Bee Pollen Sneezing     congestion  "        Portions of the record may have been created with voice recognition software.  Occasional wrong words or \"sound a like\" substitutions may have occurred due to the inherent limitations of voice recognition software.  Read the chart carefully and recognize, using context, where substitutions have occurred.        I have spent a total of 25 min in reviewing and/or ordering tests, medications, or procedures, performing an examination or evaluation, reviewing pertinent history, counseling and educating the patient, referring and/or communicating with other health care professionals, documenting in the EMR and general coordination of care of the patient today.               "

## 2024-12-05 ENCOUNTER — OFFICE VISIT (OUTPATIENT)
Dept: FAMILY MEDICINE CLINIC | Facility: CLINIC | Age: 24
End: 2024-12-05
Payer: COMMERCIAL

## 2024-12-05 VITALS
HEIGHT: 68 IN | SYSTOLIC BLOOD PRESSURE: 110 MMHG | OXYGEN SATURATION: 94 % | BODY MASS INDEX: 21.22 KG/M2 | RESPIRATION RATE: 16 BRPM | HEART RATE: 114 BPM | WEIGHT: 140 LBS | TEMPERATURE: 98.2 F | DIASTOLIC BLOOD PRESSURE: 70 MMHG

## 2024-12-05 DIAGNOSIS — K21.9 CHRONIC GERD: ICD-10-CM

## 2024-12-05 DIAGNOSIS — Z23 ENCOUNTER FOR IMMUNIZATION: ICD-10-CM

## 2024-12-05 DIAGNOSIS — K62.5 BRBPR (BRIGHT RED BLOOD PER RECTUM): Primary | ICD-10-CM

## 2024-12-05 PROCEDURE — 90656 IIV3 VACC NO PRSV 0.5 ML IM: CPT

## 2024-12-05 PROCEDURE — 90471 IMMUNIZATION ADMIN: CPT

## 2024-12-05 PROCEDURE — 99214 OFFICE O/P EST MOD 30 MIN: CPT | Performed by: FAMILY MEDICINE

## 2024-12-05 NOTE — PROGRESS NOTES
"Name: Martha Hollis      : 2000      MRN: [id number]  Encounter Provider: Howard Sexton DO  Encounter Date: 2024   Encounter department: [facility]  :  Assessment & Plan  BRBPR (bright red blood per rectum)  Likely related to hemorrhoid vs fissure in the setting of constipation and straining to have bowel movements. Has GI appointment scheduled next month. Recommend addition of stool softener and laxative to her daily regimen, ensure adequate hydration.         Chronic GERD  Having a lot of stomach gurgling despite Nexium. Will add Pepcid to her regimen and follow up if not improved.                History of Present Illness     HPI    BRBPR first wipe every bowel movement. History of constipation, does have to strain sometimes. Using Benefiber gummies daily and has noticed improvement since she started taking them. Diet is smoothies for breakfast with frozen fruit, spinach, almond milk. Lunch is dinner leftovers vegetable, protein, starch. Dinner is same. Sometimes pain with BM and shortly after BM when she has to strain. Taking Nexium once daily for GERD symptoms. Using Reglan prn, infrequently.     Review of Systems       Objective   /70   Pulse (!) 114   Temp 98.2 °F (36.8 °C) (Temporal)   Resp 16   Ht 5' 8\" (1.727 m)   Wt 63.5 kg (140 lb)   LMP  (LMP Unknown)   SpO2 94%   BMI 21.29 kg/m²      Physical Exam  Vitals reviewed.   Constitutional:       Appearance: Normal appearance.   Cardiovascular:      Rate and Rhythm: Normal rate.   Pulmonary:      Effort: Pulmonary effort is normal.   Abdominal:      General: Abdomen is flat. There is no distension.      Palpations: Abdomen is soft.      Tenderness: There is no abdominal tenderness.   Skin:     General: Skin is warm and dry.   Neurological:      Mental Status: She is alert.   Psychiatric:         Mood and Affect: Mood normal.         Behavior: Behavior normal.         "

## 2024-12-05 NOTE — BH TREATMENT PLAN
Sent a message to patient's [employer] regarding BP check.    Electronically Signed By: KEVIN Wilkes     TREATMENT PLAN (Medication Management Only)        [facility]    Name and Date of Birth:  Martha Hollis 23 y.o. 2000  Date of Treatment Plan: June 13, 2024  Diagnosis/Diagnoses:    1. Depression, recurrent (HCC)    2. Social anxiety disorder    3. MARCOS (generalized anxiety disorder)      Strengths/Personal Resources for Self-Care: taking medications as prescribed, family ties, motivation for treatment, willingness to work on problems.  Area/Areas of need (in own words): anxiety, depression  1. Long Term Goal: decrease anxiety and depression  Target Date:3 months - 9/13/2024  Person/Persons responsible for completion of goal: Martha  2.  Short Term Objective (s) - How will we reach this goal?:   A. Provider new recommended medication/dosage changes and/or continue medication(s): continue current medications as prescribed.  B. Attend medication management appointments regularly.  C. Attend psychotherapy regularly.  Target Date:3 months - 9/13/2024  Person/Persons Responsible for Completion of Goal: Martha  Progress Towards Goals: continuing treatment  Treatment Modality: medication management every 4 weeks  Review due 180 days from date of this plan: 4 months - 10/13/2024  Expected length of service: ongoing treatment  My Physician/PA/NP and I have developed this plan together and I agree to work on the goals and objectives. I understand the treatment goals that were developed for my treatment.Treatment Plan done but not signed at time of office visit due to:  Plan reviewed by phone or in person  and verbal consent given due to virtual visit  Signed: Minna Corona PA-C (signature pad not working)

## 2024-12-05 NOTE — ASSESSMENT & PLAN NOTE
Having a lot of stomach gurgling despite Nexium. Will add Pepcid to her regimen and follow up if not improved.

## 2024-12-05 NOTE — ASSESSMENT & PLAN NOTE
Likely related to hemorrhoid vs fissure in the setting of constipation and straining to have bowel movements. Has GI appointment scheduled next month. Recommend addition of stool softener and laxative to her daily regimen, ensure adequate hydration.

## 2024-12-30 DIAGNOSIS — J30.9 ALLERGIC RHINITIS, UNSPECIFIED SEASONALITY, UNSPECIFIED TRIGGER: ICD-10-CM

## 2024-12-31 DIAGNOSIS — F33.9 DEPRESSION, RECURRENT (HCC): ICD-10-CM

## 2024-12-31 RX ORDER — DIVALPROEX SODIUM 125 MG/1
125 CAPSULE, COATED PELLETS ORAL 2 TIMES DAILY
Qty: 180 CAPSULE | Refills: 0 | OUTPATIENT
Start: 2024-12-31

## 2024-12-31 RX ORDER — LEVOCETIRIZINE DIHYDROCHLORIDE 5 MG/1
5 TABLET, FILM COATED ORAL EVERY EVENING
Qty: 90 TABLET | Refills: 1 | Status: SHIPPED | OUTPATIENT
Start: 2024-12-31

## 2025-01-07 ENCOUNTER — TELEMEDICINE (OUTPATIENT)
Dept: PSYCHIATRY | Facility: CLINIC | Age: 25
End: 2025-01-07
Payer: COMMERCIAL

## 2025-01-07 DIAGNOSIS — F33.9 DEPRESSION, RECURRENT (HCC): Primary | ICD-10-CM

## 2025-01-07 DIAGNOSIS — F41.1 GAD (GENERALIZED ANXIETY DISORDER): ICD-10-CM

## 2025-01-07 PROCEDURE — 99214 OFFICE O/P EST MOD 30 MIN: CPT | Performed by: PHYSICIAN ASSISTANT

## 2025-01-07 RX ORDER — BUSPIRONE HYDROCHLORIDE 10 MG/1
TABLET ORAL
Qty: 90 TABLET | Refills: 1 | Status: SHIPPED | OUTPATIENT
Start: 2025-01-07

## 2025-01-07 NOTE — PSYCH
Virtual Regular Visit    Verification of patient location:    Patient is located at Home in the following state in which I hold an active license [place]      Reason for visit is   Chief Complaint   Patient presents with    Virtual Regular Visit          Encounter provider Minna Corona PA-C      Recent Visits  No visits were found meeting these conditions.  Showing recent visits within past 7 days and meeting all other requirements  Today's Visits  Date Type Provider Dept   01/07/25 Telemedicine Minna Corona PA-C  [facility]   Showing today's visits and meeting all other requirements  Future Appointments  No visits were found meeting these conditions.  Showing future appointments within next 150 days and meeting all other requirements       The patient was identified by name and date of birth. Martha Hollis was informed that this is a telemedicine visit and that the visit is being conducted throughthe Epic Embedded platform. She agrees to proceed..  My office door was closed. The patient was notified the following individuals were present in the room MARIEL Hurtado.  She acknowledged consent and understanding of privacy and security of the video platform. The patient has agreed to participate and understands they can discontinue the visit at any time.    Patient is aware this is a billable service.         MEDICATION MANAGEMENT NOTE        [facility]   [facility] [place]  [address] [facility] [address] [phone]    This note was not shared with the patient due to reasonable likelihood of causing patient harm      Name and Date of Birth:  Martha Hollis 24 y.o. 2000    Date of Visit: January 7, 2025    SUBJECTIVE:     Martha seen by Daniel 11/25 at which time nortriptyline unchanged. Martha reports ongoing anxiety which starts with excessive worry ([place] about her health;  "fear of getting sick), moves to panic and accompanied by mild mood swings described as \"emotions feel confused and mixed up\". Feels like she doesn;t get enough sleep because \"my mind stays awake\".  Calming music at night helps sometimes. Appetite variable.  Fatigue ongoing. Cont to see ind therapist.     Review of Systems   Constitutional:  Positive for fatigue. Negative for unexpected weight change.        Variable appetite   Gastrointestinal:  Positive for constipation.        Mild and chronic   Psychiatric/Behavioral:  Positive for sleep disturbance.      Past Psychiatric History     Inpatient:  None  [facility]    No hx of suicide attempts.      Outpatient:  Therapist- Abby Martínez -weekly- x 6-7 yrs.   Current therapist- [facility], [place].  This office since April 2022     Med trials: lexapro -more depressed, depakote- low dose- effective for pre-menstrual related mood symptoms.     Trauma/Loss History:           Abandonment by bio father.  Mother's illness  (stage 4 metastatic breast cancer)     Family Psychiatric History:      Psychiatric Illness:      Depression- grandfather; anxiety -aunt  Substance Abuse:       none reported  Suicide Attempts:        uncle     Social History:         Student at [employer]- on line classes currently.   Had IEP- learning disability- comprehension.  Single.  currently living with step-father. Employed.                                 OBJECTIVE:     MENTAL STATUS EXAM  Appearance:  age appropriate   Behavior:  Pleasant & cooperative   Speech:  Normal volume, regular rate and rhythm   Mood:  anxious   Affect:  constricted   Language: intact and appropriate for age, education, and intellect   Thought Process:  goal directed   Associations: intact associations   Thought Content:  negative ruminations   Perceptual Disturbances: no auditory or visual hallcunations   Risk Potential / Abnormal Thoughts: Suicidal ideation - None  Homicidal ideation - " None  Potential for aggression - No       Consciousness:  Alert & Awake   Sensorium:  Grossly oriented   Attention: attention span and concentration are age appropriate       Fund of Knowledge:  Memory: awareness of current events: yes  recent and remote memory grossly intact   Insight:  fair   Judgment: intact     Lab Review: I have reviewed all pertinent labs    Lab Results   Component Value Date     06/11/2016    SODIUM 138 08/23/2024    K 4.3 08/23/2024     08/23/2024    CO2 30 08/23/2024    AGAP 7 08/23/2024    BUN 15 08/23/2024    CREATININE 0.70 08/23/2024    GLUC 100 02/26/2023    GLUF 96 08/23/2024    CALCIUM 9.9 08/23/2024    AST 16 08/23/2024    ALT 10 08/23/2024    ALKPHOS 67 08/23/2024    PROT 6.6 06/11/2016    TP 7.6 08/23/2024    BILITOT 2.0 (H) 06/11/2016    TBILI 1.71 (H) 08/23/2024    EGFR 124 08/23/2024     Lab Results   Component Value Date    WBC 5.61 08/23/2024    HGB 14.7 08/23/2024    HCT 44.4 08/23/2024    MCV 93 08/23/2024     08/23/2024     Lab Results   Component Value Date    CGOKWUOC30 290 05/01/2024     Lab Results   Component Value Date    IMH1SUBPGXTB 2.273 08/23/2024    TSH 2.62 10/01/2022           ASSESSMENT & PLAN        Assessment & Plan  MARCOS (generalized anxiety disorder)   Retrial buspar .  Cont nortriptyline 50 mg q bedtime.  Cont f/u ind therapist  Orders:    busPIRone (BUSPAR) 10 mg tablet; 1/2 tab po tid with meals x one week then one po tid with meals    Depression, recurrent (HCC)  Cont f/u ind therapist.  Cont nortriptyline 50 mg q bedtime.           Current Outpatient Medications   Medication Sig Dispense Refill    busPIRone (BUSPAR) 10 mg tablet 1/2 tab po tid with meals x one week then one po tid with meals 90 tablet 1    esomeprazole (NexIUM) 40 MG capsule TAKE 1 CAPSULE (40 MG TOTAL) BY MOUTH DAILY. 90 capsule 1    levocetirizine (XYZAL) 5 MG tablet Take 1 tablet (5 mg total) by mouth every evening 90 tablet 1    Levonorgestrel (MIRENA) 20  MCG/DAY IUD 1 each by Intrauterine Device route Once every 8 years      metoclopramide (Reglan) 10 mg tablet Take 0.5 tablets (5 mg total) by mouth 4 (four) times a day (Patient taking differently: Take 5 mg by mouth 4 (four) times a day PRN) 30 tablet 0    multivitamin (THERAGRAN) TABS Take 1 tablet by mouth daily      naproxen (Naprosyn) 500 mg tablet Take 1 tablet (500 mg total) by mouth 2 (two) times a day with meals for 10 days 20 tablet 0    nortriptyline (PAMELOR) 50 mg capsule Take 1 capsule (50 mg total) by mouth daily at bedtime 90 capsule 0    Retin-A Micro Pump 0.08 % GEL Apply TO AFFECTED AREA(S) of THE FACE nightly       No current facility-administered medications for this visit.                Plan:          Retrial buspar- increase to 10 mg tid.  Cont nortriptyline and f/u ind therapist.   Reviewed risks, benefits, side effects of medications, including no medication.  Patient understands and agrees to treatment plan.   F/u Daniel 2/11/25, sooner prn        Patient has been informed of 24 hours and weekend coverage for urgent situations accessed by calling the main clinic phone number.     Minna Corona PA-C

## 2025-01-07 NOTE — ASSESSMENT & PLAN NOTE
Retrial buspar .  Cont nortriptyline 50 mg q bedtime.  Cont f/u ind therapist  Orders:    busPIRone (BUSPAR) 10 mg tablet; 1/2 tab po tid with meals x one week then one po tid with meals

## 2025-01-13 ENCOUNTER — TELEPHONE (OUTPATIENT)
Dept: NEUROLOGY | Facility: CLINIC | Age: 25
End: 2025-01-13

## 2025-01-13 NOTE — TELEPHONE ENCOUNTER
Pt called in and was returning a missed call regarding a sooner appt offer for 1/13/25 at 3:30 pm with Dr. Gil.     I checked and was unable to locate this appt offer.     I told the pt that she is still on the wait list and to call back if she get another offer for sooner appt.

## 2025-01-13 NOTE — TELEPHONE ENCOUNTER
Called and left VM for pt - offered sooner appt for today 1/13 @3:30 with Dr Gil in [facility] office. Provided call back number if pt is interested - as long as slot is still available

## 2025-01-22 ENCOUNTER — OFFICE VISIT (OUTPATIENT)
Dept: URGENT CARE | Facility: CLINIC | Age: 25
End: 2025-01-22
Payer: COMMERCIAL

## 2025-01-22 DIAGNOSIS — R31.9 URINARY TRACT INFECTION WITH HEMATURIA, SITE UNSPECIFIED: Primary | ICD-10-CM

## 2025-01-22 DIAGNOSIS — N39.0 URINARY TRACT INFECTION WITH HEMATURIA, SITE UNSPECIFIED: Primary | ICD-10-CM

## 2025-01-22 DIAGNOSIS — R39.9 UTI SYMPTOMS: ICD-10-CM

## 2025-01-22 LAB
SL AMB  POCT GLUCOSE, UA: NEGATIVE
SL AMB LEUKOCYTE ESTERASE,UA: ABNORMAL
SL AMB POCT BILIRUBIN,UA: NEGATIVE
SL AMB POCT BLOOD,UA: ABNORMAL
SL AMB POCT CLARITY,UA: CLEAR
SL AMB POCT COLOR,UA: ABNORMAL
SL AMB POCT KETONES,UA: 80
SL AMB POCT NITRITE,UA: NEGATIVE
SL AMB POCT PH,UA: 5
SL AMB POCT SPECIFIC GRAVITY,UA: 1.02
SL AMB POCT URINE PROTEIN: 30
SL AMB POCT UROBILINOGEN: 0.2

## 2025-01-22 PROCEDURE — 87086 URINE CULTURE/COLONY COUNT: CPT

## 2025-01-22 PROCEDURE — S9083 URGENT CARE CENTER GLOBAL: HCPCS

## 2025-01-22 PROCEDURE — 87077 CULTURE AEROBIC IDENTIFY: CPT

## 2025-01-22 PROCEDURE — G0382 LEV 3 HOSP TYPE B ED VISIT: HCPCS

## 2025-01-22 PROCEDURE — 81002 URINALYSIS NONAUTO W/O SCOPE: CPT

## 2025-01-22 PROCEDURE — 87186 SC STD MICRODIL/AGAR DIL: CPT

## 2025-01-22 PROCEDURE — 99213 OFFICE O/P EST LOW 20 MIN: CPT

## 2025-01-22 RX ORDER — NITROFURANTOIN 25; 75 MG/1; MG/1
100 CAPSULE ORAL 2 TIMES DAILY
Qty: 10 CAPSULE | Refills: 0 | Status: SHIPPED | OUTPATIENT
Start: 2025-01-22 | End: 2025-01-27

## 2025-01-22 NOTE — PROGRESS NOTES
[facility]        NAME: Martha Hollis is a 24 y.o. female  : 2000    MRN: [id number]  DATE: 2025  TIME: 6:55 PM    Assessment and Plan   Urinary tract infection with hematuria, site unspecified [N39.0, R31.9]  1. Urinary tract infection with hematuria, site unspecified  nitrofurantoin (MACROBID) 100 mg capsule      2. UTI symptoms  POCT urine dip    Urine culture    Urine culture            Patient Instructions       Follow up with PCP in 3-5 days.  Proceed to  ER if symptoms worsen.    If tests have been performed at [facility], our office will contact you with results if changes need to be made to the care plan discussed with you at the visit.  You can review your full results on [facility].    Chief Complaint   No chief complaint on file.        History of Present Illness       24-year-old female presents for UTI symptoms x 2 to 3 days.  Patient denies fevers, chills, nausea, vomiting, abdominal pain, flank pain    Difficulty Urinating   This is a new problem. The current episode started in the past 7 days. The problem occurs every urination. The problem has been gradually worsening. The quality of the pain is described as aching and shooting. The pain is at a severity of 5/10. The pain is mild. There has been no fever. The fever has been present for Less than 1 day. She is Sexually active. There is No history of pyelonephritis. Associated symptoms include a discharge, frequency and urgency. Pertinent negatives include no chills, flank pain, hematuria, hesitancy, nausea, possible pregnancy, sweats or vomiting.       Review of Systems   Review of Systems   Constitutional:  Negative for chills and fever.   Gastrointestinal:  Negative for abdominal pain, nausea and vomiting.   Genitourinary:  Positive for dysuria, frequency and urgency. Negative for flank pain, hematuria and hesitancy.         Current Medications       Current Outpatient Medications:     nitrofurantoin (MACROBID)  100 mg capsule, Take 1 capsule (100 mg total) by mouth 2 (two) times a day for 5 days, Disp: 10 capsule, Rfl: 0    busPIRone (BUSPAR) 10 mg tablet, 1/2 tab po tid with meals x one week then one po tid with meals, Disp: 90 tablet, Rfl: 1    esomeprazole (NexIUM) 40 MG capsule, TAKE 1 CAPSULE (40 MG TOTAL) BY MOUTH DAILY., Disp: 90 capsule, Rfl: 1    levocetirizine (XYZAL) 5 MG tablet, Take 1 tablet (5 mg total) by mouth every evening, Disp: 90 tablet, Rfl: 1    Levonorgestrel (MIRENA) 20 MCG/DAY IUD, 1 each by Intrauterine Device route Once every 8 years, Disp: , Rfl:     metoclopramide (Reglan) 10 mg tablet, Take 0.5 tablets (5 mg total) by mouth 4 (four) times a day (Patient taking differently: Take 5 mg by mouth 4 (four) times a day PRN), Disp: 30 tablet, Rfl: 0    multivitamin (THERAGRAN) TABS, Take 1 tablet by mouth daily, Disp: , Rfl:     naproxen (Naprosyn) 500 mg tablet, Take 1 tablet (500 mg total) by mouth 2 (two) times a day with meals for 10 days, Disp: 20 tablet, Rfl: 0    nortriptyline (PAMELOR) 50 mg capsule, Take 1 capsule (50 mg total) by mouth daily at bedtime, Disp: 90 capsule, Rfl: 0    Retin-A Micro Pump 0.08 % GEL, Apply TO AFFECTED AREA(S) of THE FACE nightly, Disp: , Rfl:     Current Allergies     Allergies as of 01/22/2025 - Reviewed 12/05/2024   Allergen Reaction Noted    Pineapple - food allergy Other (See Comments) 07/03/2023    Pollen extract Sneezing 12/01/2020    Bee pollen Sneezing 12/01/2020            The following portions of the patient's history were reviewed and updated as appropriate: allergies, current medications, past family history, past medical history, past social history, past surgical history and problem list.     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        Objective   There were no vitals taken for this visit.  No LMP recorded. Patient has had an implant.       Physical Exam     Physical Exam  Vitals and nursing note reviewed.   Constitutional:       General: She is not in acute distress.     Appearance: She is not toxic-appearing.   HENT:      Head: Normocephalic and atraumatic.   Eyes:      Conjunctiva/sclera: Conjunctivae normal.   Pulmonary:      Effort: Pulmonary effort is normal.   Abdominal:      General: There is no distension.      Palpations: Abdomen is soft.      Tenderness: There is no abdominal tenderness. There is no right CVA tenderness, left CVA tenderness or guarding.   Neurological:      Mental Status: She is alert.   Psychiatric:         Mood and Affect: Mood normal.         Behavior: Behavior normal.

## 2025-01-23 ENCOUNTER — TELEPHONE (OUTPATIENT)
Age: 25
End: 2025-01-23

## 2025-01-23 NOTE — TELEPHONE ENCOUNTER
Patient submitted online appt request via [facility] landing page (web).      Called pt, left non-detailed vm to return call for further assistance.

## 2025-01-24 LAB
BACTERIA UR CULT: ABNORMAL
BACTERIA UR CULT: ABNORMAL

## 2025-01-27 RX ORDER — DOXYCYCLINE 50 MG/1
1 CAPSULE ORAL DAILY
COMMUNITY
Start: 2024-12-05

## 2025-01-28 ENCOUNTER — OFFICE VISIT (OUTPATIENT)
Dept: OBGYN CLINIC | Facility: CLINIC | Age: 25
End: 2025-01-28
Payer: COMMERCIAL

## 2025-01-28 ENCOUNTER — APPOINTMENT (OUTPATIENT)
Dept: LAB | Facility: CLINIC | Age: 25
End: 2025-01-28
Payer: COMMERCIAL

## 2025-01-28 ENCOUNTER — OFFICE VISIT (OUTPATIENT)
Dept: GASTROENTEROLOGY | Facility: CLINIC | Age: 25
End: 2025-01-28
Payer: COMMERCIAL

## 2025-01-28 VITALS
DIASTOLIC BLOOD PRESSURE: 70 MMHG | SYSTOLIC BLOOD PRESSURE: 112 MMHG | WEIGHT: 135.6 LBS | HEIGHT: 68 IN | BODY MASS INDEX: 20.55 KG/M2

## 2025-01-28 VITALS
DIASTOLIC BLOOD PRESSURE: 74 MMHG | WEIGHT: 132.4 LBS | HEIGHT: 68 IN | SYSTOLIC BLOOD PRESSURE: 114 MMHG | TEMPERATURE: 98.4 F | BODY MASS INDEX: 20.07 KG/M2

## 2025-01-28 DIAGNOSIS — K21.00 GASTROESOPHAGEAL REFLUX DISEASE WITH ESOPHAGITIS WITHOUT HEMORRHAGE: Primary | ICD-10-CM

## 2025-01-28 DIAGNOSIS — R10.2 PELVIC PAIN: ICD-10-CM

## 2025-01-28 DIAGNOSIS — K59.04 CHRONIC IDIOPATHIC CONSTIPATION: ICD-10-CM

## 2025-01-28 DIAGNOSIS — R23.2 HOT FLASHES: Primary | ICD-10-CM

## 2025-01-28 DIAGNOSIS — R23.2 HOT FLASHES: ICD-10-CM

## 2025-01-28 DIAGNOSIS — R13.19 ESOPHAGEAL DYSPHAGIA: ICD-10-CM

## 2025-01-28 LAB
ESTRADIOL SERPL-MCNC: 300.6 PG/ML
FSH SERPL-ACNC: 5.2 MIU/ML
TSH SERPL DL<=0.05 MIU/L-ACNC: 1.32 UIU/ML (ref 0.45–4.5)

## 2025-01-28 PROCEDURE — 83001 ASSAY OF GONADOTROPIN (FSH): CPT

## 2025-01-28 PROCEDURE — 84443 ASSAY THYROID STIM HORMONE: CPT

## 2025-01-28 PROCEDURE — 99214 OFFICE O/P EST MOD 30 MIN: CPT | Performed by: INTERNAL MEDICINE

## 2025-01-28 PROCEDURE — 82670 ASSAY OF TOTAL ESTRADIOL: CPT

## 2025-01-28 PROCEDURE — 36415 COLL VENOUS BLD VENIPUNCTURE: CPT

## 2025-01-28 PROCEDURE — 99214 OFFICE O/P EST MOD 30 MIN: CPT | Performed by: OBSTETRICS & GYNECOLOGY

## 2025-01-28 RX ORDER — SODIUM CHLORIDE, SODIUM LACTATE, POTASSIUM CHLORIDE, CALCIUM CHLORIDE 600; 310; 30; 20 MG/100ML; MG/100ML; MG/100ML; MG/100ML
125 INJECTION, SOLUTION INTRAVENOUS CONTINUOUS
OUTPATIENT
Start: 2025-01-28

## 2025-01-28 RX ORDER — LINACLOTIDE 72 UG/1
1 CAPSULE, GELATIN COATED ORAL
Qty: 60 CAPSULE | Refills: 3 | Status: SHIPPED | OUTPATIENT
Start: 2025-01-28

## 2025-01-28 NOTE — ASSESSMENT & PLAN NOTE
- Patient endorses constipation since young age.  Previously benefiting from MiraLAX, but she has been taking MiraLAX daily consistently and only having a bowel movement every few days  - Start Linzess 72 mcg p.o. daily to avoid diarrhea which we discussed   - Increase water intake as well as high-fiber foods  Orders:    linaCLOtide (Linzess) 72 MCG CAPS; Take 1 capsule by mouth daily before breakfast

## 2025-01-28 NOTE — PROGRESS NOTES
Name: Martha Hollis      : 2000      MRN: [id number]  Encounter Provider: Maciel Burnett MD  Encounter Date: 2025   Encounter department: [facility] [place]  :  Assessment & Plan  Gastroesophageal reflux disease with esophagitis without hemorrhage  24 y.o. female with history of mitral valve prolapse, migraines, MDD, MARCOS, endometriosis status post laparoscopic robotic assisted endometriosis resection, non-celiac gluten sensitivity, chronic constipation and GERD who presents for outpatient GI follow-up.  Recently having worsening reflux symptoms, constipation and now endorses dysphagia to solids and sometimes liquids.    - GERD lifestyle changes discussed, including avoidance of trigger foods (potential foods include coffee, caffeine, chocolate, mint, tomato-based products, spicy foods, fatty foods), avoid tight fitting clothing, elevate head of bed 30 degrees, avoid eating 2-3 hours prior to bedtime, weight loss, avoid alcohol, avoid tobacco use.   - Continue Nexium 40 mg p.o. daily taken 30 minutes before breakfast on an empty stomach  - EGD to evaluate dysphagia and worsening GERD  Orders:    linaCLOtide (Linzess) 72 MCG CAPS; Take 1 capsule by mouth daily before breakfast    EGD; Future    Esophageal dysphagia  - Patient with chronic reflux, differential includes peptic stricture versus EOE  - EGD with esophageal biopsies  Orders:    linaCLOtide (Linzess) 72 MCG CAPS; Take 1 capsule by mouth daily before breakfast    EGD; Future    Chronic idiopathic constipation  - Patient endorses constipation since young age.  Previously benefiting from MiraLAX, but she has been taking MiraLAX daily consistently and only having a bowel movement every few days  - Start Linzess 72 mcg p.o. daily to avoid diarrhea which we discussed   - Increase water intake as well as high-fiber foods  Orders:    linaCLOtide (Linzess) 72 MCG CAPS; Take 1 capsule by mouth daily before  breakfast        History of Present Illness     Martha Hollis is a 24 y.o. female with history of mitral valve prolapse, migraines, MDD, MARCOS, endometriosis status post laparoscopic robotic assisted endometriosis resection, non-celiac gluten sensitivity, chronic constipation and GERD who presents for outpatient GI follow-up.  Is on Nexium 40 mg p.o. daily.  Last seen in GI clinic by Dr. Jackson on 10/8/2022.  At that time was following dairy free and gluten-free diet and using MiraLAX and dicyclomine as needed.  Chronically elevated total bilirubin.    She has been having worsening reflux for months now with heartburn and belching. She has been having some dysphagia with medications and food sometimes. Sometimes pills get same. No worsening. Sometimes liquids are a problem to. Having worsening constipation with a BM every few days, sometimes hard sometimes not. Taking Miralax every day, but still not going often. Drinks about 40-50 oz of water daily. Eats gluten free bagels/bread, vegetables, salad. Some nausea and vomiting. Most mornings has some nausea that can make it difficult to eat. Takes Reglan as needed for vertigo. Recently finished nitrofurantoin yesterday. Taking doxycycline for the past few months. NSAIDs less than once a week. Alcohol less than once a week, socially. No tobacco or marijuana. Denies weight loss, melena, hematochezia.     EGD/colonoscopy from 6/8/2022 showing grade a esophagitis and benign-appearing subcentimeter polyp in ascending colon with path revealing normal gastric, duodenal biopsies as well as normal random terminal ileum and colon biopsies with ascending polyp returning as normal colonic mucosa    History obtained from: patient    Review of Systems   Constitutional:  Negative for fever.   Cardiovascular:  Negative for leg swelling.   Gastrointestinal:  Negative for abdominal pain, blood in stool, constipation, diarrhea, nausea, rectal pain and vomiting.   All other systems  "reviewed and are negative.    Medical History Reviewed by provider this encounter:     .     Objective   /74 (BP Location: Left arm, Patient Position: Sitting, Cuff Size: Adult)   Temp 98.4 °F (36.9 °C) (Tympanic)   Ht 5' 8\" (1.727 m)   Wt 60.1 kg (132 lb 6.4 oz)   BMI 20.13 kg/m²      Physical Exam  Vitals and nursing note reviewed.   Constitutional:       General: She is not in acute distress.     Appearance: She is well-developed and normal weight.   Abdominal:      General: Abdomen is flat. There is no distension.      Palpations: Abdomen is soft.      Tenderness: There is no abdominal tenderness.   Musculoskeletal:         General: No swelling.   Neurological:      Mental Status: She is alert.         Administrative Statements   I have spent a total time of 32 minutes in caring for this patient on the day of the visit/encounter including Diagnostic results, Prognosis, Risks and benefits of tx options, Instructions for management, and Patient and family education.   "

## 2025-01-28 NOTE — PATIENT INSTRUCTIONS
Scheduled date of EGD (as of today): 2/25/2025  Physician performing EGD: Dr. TAD Navarro  Location of EGD: [facility]  Desired bowel prep reviewed with patient: EGD proc prep instructions given to pt @ office visit   Instructions reviewed with patient by: Latrice  Clearances:  N/A    Placed patient on 6M recall list.

## 2025-01-28 NOTE — PROGRESS NOTES
Name: Martha Hollis      : 2000      MRN: [id number]  Encounter Provider: Nat Cadena DO  Encounter Date: 2025   Encounter department: [facility]  :  Assessment & Plan  Hot flashes    Orders:    Follicle stimulating hormone; Future    Estradiol; Future    TSH, 3rd generation; Future    Pelvic pain    Orders:    US pelvis complete w transvaginal; Future    Reviewed extensive records including op note, resection of endometriosis done in [place].  Patient has been on OCPs combination and progesterone only with minimal improvement.  Currently on Mirena IUD which has decreased her menstrual flow.  At this point, will obtain labs and pelvic ultrasound.  We discussed other treatment options for endometriosis including Myfembree, Depo-Provera, Depo-Lupron.  Risks and benefits reviewed.  All questions answered.  She will return to office in 4 weeks for follow-up and further discussion on treatment options and decisions.    History of Present Illness   HPI  Martha Hollis is a 24 y.o. female who presents     This is a pleasant 24-year-old female G0 presents as a new patient for discussion of endometriosis.  She ultimately was diagnosed with stage II endometriosis 2024, underwent laparoscopic robotic assisted endometriosis resection.  She has been having hot flashes and night sweats.  At point she feels she is going through menopause.    She had her second Mirena IUD inserted 2024.  Her first IUD was inserted 2020 where she was eventually amenorrheic.  The IUD was removed  with initial thought of to conceive.  Thereafter she changed her mind.  Prior to the initial IUD she was on OCPs for approximately 4 years with minimal results.  Her cycles are regular lasting 7 days described as very heavy.  She did receive iron infusions in the past.  After her laparoscopic surgery for endometriosis she was then placed on a progesterone pill with minimal results.  Followed by insertion of a second  Mirena IUD.  She states the first few months she had significant bleeding was placed on OCPs for approximately 2 months.  Her cycles are now irregular, light.  She still gets more left lower quadrant pain.  She is also had difficulty with vague bladder symptoms as well as rectal symptoms.  She was informed that endometriosis was diagnosed on bladder, ureter, colon, anterior abdominal wall.  However uterus and ovaries appeared completely normal.  She is sexually active and using the IUD as a form of contraception.    Family history is significant for her mother who was diagnosed with stage II breast cancer at age 28, has had several recurrences and now has distant mets, currently age 47.  Her mother underwent genetic testing which was negative.    Patient has been having mammograms and breast MRIs  At [place].    Her GYN surgery was also in [place].  She is now relocating back to the [facility].    Scheduled to see neurologist regarding possible migraine headaches    1/2020 mirena IUD insert till 2022 5/2024 mirena IUD insert    2/2023 pelvic US NL ovary uterus    H/o migrane menstrual    Neuro consult     History obtained from: patient    Review of Systems   Constitutional:  Negative for fatigue, fever and unexpected weight change.   Respiratory:  Negative for cough, chest tightness, shortness of breath and wheezing.    Cardiovascular: Negative.  Negative for chest pain and palpitations.   Gastrointestinal:  Positive for constipation. Negative for abdominal distention, abdominal pain, blood in stool, diarrhea, nausea and vomiting.   Genitourinary:  Positive for frequency, menstrual problem and pelvic pain. Negative for difficulty urinating, dyspareunia, dysuria, flank pain, genital sores, hematuria, urgency, vaginal bleeding, vaginal discharge and vaginal pain.   Skin:  Negative for rash.     Current Outpatient Medications on File Prior to Visit   Medication Sig Dispense Refill    busPIRone (BUSPAR) 10 mg  "tablet 1/2 tab po tid with meals x one week then one po tid with meals 90 tablet 1    doxycycline monohydrate (MONODOX) 50 mg capsule Take 1 capsule by mouth in the morning      esomeprazole (NexIUM) 40 MG capsule TAKE 1 CAPSULE (40 MG TOTAL) BY MOUTH DAILY. 90 capsule 1    levocetirizine (XYZAL) 5 MG tablet Take 1 tablet (5 mg total) by mouth every evening 90 tablet 1    Levonorgestrel (MIRENA) 20 MCG/DAY IUD 1 each by Intrauterine Device route Once every 8 years      metoclopramide (Reglan) 10 mg tablet Take 0.5 tablets (5 mg total) by mouth 4 (four) times a day 30 tablet 0    multivitamin (THERAGRAN) TABS Take 1 tablet by mouth daily      nitrofurantoin (MACROBID) 100 mg capsule Take 1 capsule (100 mg total) by mouth 2 (two) times a day for 5 days 10 capsule 0    nortriptyline (PAMELOR) 50 mg capsule Take 1 capsule (50 mg total) by mouth daily at bedtime 90 capsule 0    Retin-A Micro Pump 0.08 % GEL Apply TO AFFECTED AREA(S) of THE FACE nightly      naproxen (Naprosyn) 500 mg tablet Take 1 tablet (500 mg total) by mouth 2 (two) times a day with meals for 10 days 20 tablet 0     No current facility-administered medications on file prior to visit.         Objective   /70   Ht 5' 8\" (1.727 m)   Wt 61.5 kg (135 lb 9.6 oz)   BMI 20.62 kg/m²      Physical Exam    Administrative Statements   I have spent a total time of 38 minutes in caring for this patient on the day of the visit/encounter including Diagnostic results, Prognosis, Risks and benefits of tx options, Instructions for management, Impressions, Counseling / Coordination of care, Documenting in the medical record, Reviewing / ordering tests, medicine, procedures  , and Obtaining or reviewing history  .   "

## 2025-02-01 ENCOUNTER — HOSPITAL ENCOUNTER (OUTPATIENT)
Dept: RADIOLOGY | Facility: HOSPITAL | Age: 25
Discharge: HOME | End: 2025-02-01
Attending: OBSTETRICS & GYNECOLOGY
Payer: COMMERCIAL

## 2025-02-01 DIAGNOSIS — R10.2 PELVIC PAIN: ICD-10-CM

## 2025-02-01 PROCEDURE — 76830 TRANSVAGINAL US NON-OB: CPT

## 2025-02-01 PROCEDURE — 76856 US EXAM PELVIC COMPLETE: CPT

## 2025-02-03 ENCOUNTER — TELEPHONE (OUTPATIENT)
Age: 25
End: 2025-02-03

## 2025-02-03 NOTE — TELEPHONE ENCOUNTER
I returned call to patient       I explained to patient :    She most likely has not met her deductible     She can go online to [email or web address] for coupon/co-pay card to see if this helps    She should call her insurance as for the covered alternative is this same class of drugs    Also understand what is her deductible     If her deductible is not met she most likely will have this time of cost until it is met       Patient verbalized understanding

## 2025-02-03 NOTE — TELEPHONE ENCOUNTER
Patients GI provider:  Dr. Burnett    Number to return call: [phone]    Reason for call: Pt calling to ask if there is a different medication other then Linzess because her copay for it is $50 pt is asking if another medication may be cheaper. Please reach back out to the pt to discuss    Scheduled procedure/appointment date if applicable:/procedure 2/25/25

## 2025-02-21 DIAGNOSIS — F33.9 DEPRESSION, RECURRENT (HCC): ICD-10-CM

## 2025-02-21 DIAGNOSIS — F41.1 GAD (GENERALIZED ANXIETY DISORDER): ICD-10-CM

## 2025-02-21 RX ORDER — NORTRIPTYLINE HYDROCHLORIDE 50 MG/1
50 CAPSULE ORAL
Qty: 90 CAPSULE | Refills: 0 | Status: SHIPPED | OUTPATIENT
Start: 2025-02-21 | End: 2025-05-22

## 2025-02-24 ENCOUNTER — TELEPHONE (OUTPATIENT)
Age: 25
End: 2025-02-24

## 2025-02-25 ENCOUNTER — TELEMEDICINE (OUTPATIENT)
Dept: OBGYN CLINIC | Facility: CLINIC | Age: 25
End: 2025-02-25
Payer: COMMERCIAL

## 2025-02-25 ENCOUNTER — HOSPITAL ENCOUNTER (OUTPATIENT)
Dept: GASTROENTEROLOGY | Facility: HOSPITAL | Age: 25
Setting detail: OUTPATIENT SURGERY
Discharge: HOME | End: 2025-02-25
Attending: INTERNAL MEDICINE
Payer: COMMERCIAL

## 2025-02-25 ENCOUNTER — ANESTHESIA EVENT (OUTPATIENT)
Dept: GASTROENTEROLOGY | Facility: HOSPITAL | Age: 25
End: 2025-02-25
Payer: COMMERCIAL

## 2025-02-25 ENCOUNTER — TELEPHONE (OUTPATIENT)
Dept: OBGYN CLINIC | Facility: CLINIC | Age: 25
End: 2025-02-25

## 2025-02-25 ENCOUNTER — ANESTHESIA (OUTPATIENT)
Dept: GASTROENTEROLOGY | Facility: HOSPITAL | Age: 25
End: 2025-02-25
Payer: COMMERCIAL

## 2025-02-25 VITALS
TEMPERATURE: 97.9 F | RESPIRATION RATE: 18 BRPM | WEIGHT: 134 LBS | OXYGEN SATURATION: 96 % | BODY MASS INDEX: 20.31 KG/M2 | HEART RATE: 106 BPM | HEIGHT: 68 IN | SYSTOLIC BLOOD PRESSURE: 108 MMHG | DIASTOLIC BLOOD PRESSURE: 66 MMHG

## 2025-02-25 DIAGNOSIS — K21.00 GASTROESOPHAGEAL REFLUX DISEASE WITH ESOPHAGITIS WITHOUT HEMORRHAGE: ICD-10-CM

## 2025-02-25 DIAGNOSIS — Z97.5 PRESENCE OF MIRENA IUD: ICD-10-CM

## 2025-02-25 DIAGNOSIS — N83.201 CYST OF RIGHT OVARY: ICD-10-CM

## 2025-02-25 DIAGNOSIS — R23.2 HOT FLASHES: ICD-10-CM

## 2025-02-25 DIAGNOSIS — Z80.3 FAMILY HISTORY OF BREAST CANCER: ICD-10-CM

## 2025-02-25 DIAGNOSIS — N80.9 ENDOMETRIOSIS: Primary | ICD-10-CM

## 2025-02-25 DIAGNOSIS — R13.19 ESOPHAGEAL DYSPHAGIA: ICD-10-CM

## 2025-02-25 LAB
EXT PREGNANCY TEST URINE: NEGATIVE
EXT. CONTROL: NORMAL

## 2025-02-25 PROCEDURE — 81025 URINE PREGNANCY TEST: CPT | Performed by: ANESTHESIOLOGY

## 2025-02-25 PROCEDURE — 88305 TISSUE EXAM BY PATHOLOGIST: CPT | Performed by: PATHOLOGY

## 2025-02-25 PROCEDURE — 43239 EGD BIOPSY SINGLE/MULTIPLE: CPT | Performed by: INTERNAL MEDICINE

## 2025-02-25 PROCEDURE — 99213 OFFICE O/P EST LOW 20 MIN: CPT | Performed by: OBSTETRICS & GYNECOLOGY

## 2025-02-25 RX ORDER — LIDOCAINE HYDROCHLORIDE 10 MG/ML
INJECTION, SOLUTION EPIDURAL; INFILTRATION; INTRACAUDAL; PERINEURAL AS NEEDED
Status: DISCONTINUED | OUTPATIENT
Start: 2025-02-25 | End: 2025-02-25

## 2025-02-25 RX ORDER — PROPOFOL 10 MG/ML
INJECTION, EMULSION INTRAVENOUS AS NEEDED
Status: DISCONTINUED | OUTPATIENT
Start: 2025-02-25 | End: 2025-02-25

## 2025-02-25 RX ORDER — SODIUM CHLORIDE, SODIUM LACTATE, POTASSIUM CHLORIDE, CALCIUM CHLORIDE 600; 310; 30; 20 MG/100ML; MG/100ML; MG/100ML; MG/100ML
INJECTION, SOLUTION INTRAVENOUS CONTINUOUS PRN
Status: DISCONTINUED | OUTPATIENT
Start: 2025-02-25 | End: 2025-02-25

## 2025-02-25 RX ORDER — RELUGOLIX, ESTRADIOL HEMIHYDRATE, AND NORETHINDRONE ACETATE 40; 1; .5 MG/1; MG/1; MG/1
1 TABLET, FILM COATED ORAL DAILY
Qty: 30 TABLET | Refills: 2 | Status: SHIPPED | OUTPATIENT
Start: 2025-02-25

## 2025-02-25 RX ADMIN — PROPOFOL 50 MG: 10 INJECTION, EMULSION INTRAVENOUS at 08:04

## 2025-02-25 RX ADMIN — PROPOFOL 50 MG: 10 INJECTION, EMULSION INTRAVENOUS at 08:06

## 2025-02-25 RX ADMIN — SODIUM CHLORIDE, SODIUM LACTATE, POTASSIUM CHLORIDE, AND CALCIUM CHLORIDE: .6; .31; .03; .02 INJECTION, SOLUTION INTRAVENOUS at 08:00

## 2025-02-25 RX ADMIN — PROPOFOL 50 MG: 10 INJECTION, EMULSION INTRAVENOUS at 08:08

## 2025-02-25 RX ADMIN — PROPOFOL 100 MG: 10 INJECTION, EMULSION INTRAVENOUS at 08:03

## 2025-02-25 RX ADMIN — LIDOCAINE HYDROCHLORIDE 50 MG: 10 INJECTION, SOLUTION EPIDURAL; INFILTRATION; INTRACAUDAL at 08:09

## 2025-02-25 NOTE — ANESTHESIA PREPROCEDURE EVALUATION
Procedure:  EGD    Relevant Problems   ANESTHESIA   (+) Motion sickness      CARDIO   (+) Migraine without aura and without status migrainosus, not intractable   (+) Mitral valve prolapse      GI/HEPATIC   (+) BRBPR (bright red blood per rectum)   (+) Chronic GERD      MUSCULOSKELETAL   (+) Chronic midline thoracic back pain      NEURO/PSYCH   (+) Chronic midline thoracic back pain   (+) Chronic tension-type headache, not intractable   (+) Depression, recurrent (HCC)   (+) MARCOS (generalized anxiety disorder)   (+) Migraine without aura and without status migrainosus, not intractable   (+) Social anxiety disorder   (+) Syringomyelia (HCC)      1/2024: normal biventricular systolic function  Physical Exam    Airway    Mallampati score: I         Dental       Cardiovascular      Pulmonary      Other Findings  post-pubertal.      Anesthesia Plan  ASA Score- 2     Anesthesia Type- IV sedation with anesthesia with ASA Monitors.         Additional Monitors:     Airway Plan:     Comment: IV sedation,  standard ASA monitors. Risks and benefits discussed with patient; patient consented and agrees to proceed.    I saw and evaluated the patient. If seen with CRNA, we have discussed the anesthetic plan and I am in agreement that the plan is appropriate for the patient.  Upt NW 2/25/2025.       Plan Factors-    Chart reviewed.   Existing labs reviewed.                   Induction- intravenous.    Postoperative Plan-         Informed Consent- Anesthetic plan and risks discussed with patient.  I personally reviewed this patient with the CRNA. Discussed and agreed on the Anesthesia Plan with the CRNA..      NPO Status:  Vitals Value Taken Time   Date of last liquid 02/24/25 02/25/25 0705   Time of last liquid 2330 02/25/25 0705   Date of last solid 02/24/25 02/25/25 0705   Time of last solid 2000 02/25/25 0705

## 2025-02-25 NOTE — H&P
H&P - Gastroenterology   Name: Martha Hollis 24 y.o. female I MRN: [id number]  Unit/Bed#:  I Date of Admission: 2/25/2025   Date of Service: 2/25/2025 I Hospital Day: 0     Assessment & Plan   This is a 24 y.o. year old female here for EGD, and she is stable and optimized for her procedure.    History of Present Illness    Martha Hollis is a 24 y.o. year old female who presents for pill dysphagia and GERD. Last EGD in 2022 revealed grade A esophagitis; gastric and duodenal biopsies unremarkable at that time.     REVIEW OF SYSTEMS: Per the HPI, and otherwise unremarkable.    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    Wears glasses      Past Surgical History:   Procedure Laterality Date    LAPAROSCOPIC ENDOMETRIOSIS FULGURATION  01/2024    stage 2, [facility], robotic assisted with resection    TONSILECTOMY AND ADNOIDECTOMY      WISDOM TOOTH EXTRACTION       Social History     Tobacco Use    Smoking status: Never     Passive exposure: Past    Smokeless tobacco: Never   Vaping Use    Vaping status: Never Used   Substance and Sexual Activity    Alcohol use: Yes     Comment: Social    Drug use: No    Sexual activity: Yes     Partners: Male     Birth control/protection: OCP     E-Cigarette/Vaping    E-Cigarette Use Never User      E-Cigarette/Vaping Substances    Nicotine No     THC No     CBD No     Flavoring No     Other No     Unknown No      Meds/Allergies     Current Outpatient Medications:     busPIRone  (BUSPAR) 10 mg tablet    doxycycline monohydrate (MONODOX) 50 mg capsule    esomeprazole (NexIUM) 40 MG capsule    levocetirizine (XYZAL) 5 MG tablet    linaCLOtide (Linzess) 72 MCG CAPS    multivitamin (THERAGRAN) TABS    nortriptyline (PAMELOR) 50 mg capsule    Retin-A Micro Pump 0.08 % GEL    Levonorgestrel (MIRENA) 20 MCG/DAY IUD    metoclopramide (Reglan) 10 mg tablet    naproxen (Naprosyn) 500 mg tablet  Allergies   Allergen Reactions    Pineapple - Food Allergy Other (See Comments)     Tingling tongue and throat    Pollen Extract Sneezing     congestion    Bee Pollen Sneezing     congestion       Objective :  Temp:  [98.8 °F (37.1 °C)] 98.8 °F (37.1 °C)  HR:  [114] 114  BP: (132)/(80) 132/80  Resp:  [20] 20  SpO2:  [95 %] 95 %  O2 Device: None (Room air)    Physical Exam  Gen: NAD  Head: NCAT  CV: RRR  CHEST: Clear  ABD: soft, NT/ND  EXT: no edema

## 2025-02-25 NOTE — TELEPHONE ENCOUNTER
Phone call to patient advised oked to switch her appt for a virtual visit for today 02/25/2025 at  1145 am with Dr. Cadena.

## 2025-02-25 NOTE — ANESTHESIA POSTPROCEDURE EVALUATION
Post-Op Assessment Note    CV Status:  Stable    Pain management: adequate       Mental Status:  Sleepy and arousable   Hydration Status:  Euvolemic   PONV Controlled:  Controlled   Airway Patency:  Patent     Post Op Vitals Reviewed: Yes    No anethesia notable event occurred.    Staff: CRNA           Last Filed PACU Vitals:  Vitals Value Taken Time   Temp 97.9 °F (36.6 °C) 02/25/25 0814   Pulse 98 02/25/25 0814   /58 02/25/25 0814   Resp 18 02/25/25 0814   SpO2 99 % 02/25/25 0814       Modified Dayron:     Vitals Value Taken Time   Activity 2 02/25/25 0814   Respiration 2 02/25/25 0814   Circulation 2 02/25/25 0814   Consciousness 1 02/25/25 0814   Oxygen Saturation 2 02/25/25 0814     Modified Dayron Score: 9

## 2025-02-25 NOTE — PROGRESS NOTES
Virtual Regular VisitName: Martha Hollis      : 2000      MRN: [id number]  Encounter Provider: Nat Cadena DO  Encounter Date: 2025   Encounter department: [facility]  :  Assessment & Plan  Hot flashes         Presence of Mirena IUD         Endometriosis    Orders:    Relugolix-Estradiol-Norethind (Myfembree) 40-1-0.5 MG TABS; Take 1 tablet by mouth in the morning    Cyst of right ovary    Orders:    US pelvis complete w transvaginal; Future    Reviewed labs with normal FSH level, TSH.  Pelvic ultrasound revealing 3 cm ovarian cyst, rule out corpus luteum versus endometrioma.  Implications reviewed.  We will repeat pelvic ultrasound in 2 to 3 months.  Discussed other treatment options for endometriosis including GnRH antagonists/agonist.  Risks and benefits reviewed.  Patient is agreeable.  She will start Myfembree daily, likely need pre-authorization.  Return to office 3 months after ultrasound done and follow-up medication.      History of Present Illness           This is a pleasant 24-year-old female G0 who presents for follow-up.   her GYN history significant for stage II endometriosis, 2024, status post laparoscopic robotic assisted endometriosis resection.  Thereafter she had second Mirena IUD inserted 2024.  She started having hot flashes and night sweats.  She subsequently developed recurrent lower pelvic pain consistent with her endometriosis pain.  She has had irregular spotting since her second Mirena IUD was inserted.    She underwent endoscopy early this morning, has been following up with gastroenterology for reflux, dysphagia.  Further workup as an evaluation.    There is also strong family history of breast cancer.  She has been following up with her breast specialist annually as well as annual mammogram and initiating breast MRI.  There has been a delay in scheduling due to medical problems.  She would like to transfer care to a local breast  specialist.      Follows up with therapist, history of anxiety, depression.          HPI  Review of Systems   Constitutional:  Negative for fatigue, fever and unexpected weight change.   Respiratory:  Negative for cough, chest tightness, shortness of breath and wheezing.    Cardiovascular: Negative.  Negative for chest pain and palpitations.   Gastrointestinal: Negative.  Negative for abdominal distention, abdominal pain, blood in stool, constipation, diarrhea, nausea and vomiting.   Genitourinary: Negative.  Negative for difficulty urinating, dyspareunia, dysuria, flank pain, frequency, genital sores, hematuria, pelvic pain, urgency, vaginal bleeding, vaginal discharge and vaginal pain.   Skin:  Negative for rash.       Objective   There were no vitals taken for this visit.    Physical Exam  Constitutional:       Appearance: Normal appearance.   Neurological:      Mental Status: She is alert and oriented to person, place, and time.   Psychiatric:         Behavior: Behavior normal.         Administrative Statements   Encounter provider Nat Cadena, DO    The Patient is located at Home and in the following state in which I hold an active license [place].    The patient was identified by name and date of birth. Martha Hollis was informed that this is a telemedicine visit and that the visit is being conducted through the Epic Embedded platform. She agrees to proceed..  My office door was closed. No one else was in the room.  She acknowledged consent and understanding of privacy and security of the video platform. The patient has agreed to participate and understands they can discontinue the visit at any time.    I have spent a total time of 20 minutes in caring for this patient on the day of the visit/encounter including Diagnostic results, Prognosis, Risks and benefits of tx options, Instructions for management, Impressions, Counseling / Coordination of care, Documenting in the medical record, Reviewing/placing orders  in the medical record (including tests, medications, and/or procedures), and Obtaining or reviewing history  .

## 2025-02-26 ENCOUNTER — APPOINTMENT (EMERGENCY)
Dept: RADIOLOGY | Facility: HOSPITAL | Age: 25
End: 2025-02-26
Payer: COMMERCIAL

## 2025-02-26 ENCOUNTER — TELEPHONE (OUTPATIENT)
Dept: NEUROLOGY | Facility: CLINIC | Age: 25
End: 2025-02-26

## 2025-02-26 ENCOUNTER — TELEPHONE (OUTPATIENT)
Dept: OBGYN CLINIC | Facility: CLINIC | Age: 25
End: 2025-02-26

## 2025-02-26 ENCOUNTER — HOSPITAL ENCOUNTER (EMERGENCY)
Facility: HOSPITAL | Age: 25
Discharge: HOME | End: 2025-02-26
Attending: EMERGENCY MEDICINE | Admitting: EMERGENCY MEDICINE
Payer: COMMERCIAL

## 2025-02-26 ENCOUNTER — NURSE TRIAGE (OUTPATIENT)
Age: 25
End: 2025-02-26

## 2025-02-26 VITALS
OXYGEN SATURATION: 98 % | TEMPERATURE: 99 F | RESPIRATION RATE: 18 BRPM | SYSTOLIC BLOOD PRESSURE: 127 MMHG | DIASTOLIC BLOOD PRESSURE: 80 MMHG | HEART RATE: 106 BPM

## 2025-02-26 DIAGNOSIS — R07.9 CHEST PAIN, UNSPECIFIED: ICD-10-CM

## 2025-02-26 DIAGNOSIS — K29.70 GASTRITIS: Primary | ICD-10-CM

## 2025-02-26 DIAGNOSIS — R07.9 CHEST PAIN: ICD-10-CM

## 2025-02-26 LAB
ANION GAP SERPL CALCULATED.3IONS-SCNC: 9 MMOL/L (ref 4–13)
BASOPHILS # BLD AUTO: 0.04 THOUSANDS/ÂΜL (ref 0–0.1)
BASOPHILS NFR BLD AUTO: 1 % (ref 0–1)
BUN SERPL-MCNC: 13 MG/DL (ref 5–25)
CALCIUM SERPL-MCNC: 9.6 MG/DL (ref 8.4–10.2)
CHLORIDE SERPL-SCNC: 101 MMOL/L (ref 96–108)
CO2 SERPL-SCNC: 29 MMOL/L (ref 21–32)
CREAT SERPL-MCNC: 0.74 MG/DL (ref 0.6–1.3)
EOSINOPHIL # BLD AUTO: 0.17 THOUSAND/ÂΜL (ref 0–0.61)
EOSINOPHIL NFR BLD AUTO: 2 % (ref 0–6)
ERYTHROCYTE [DISTWIDTH] IN BLOOD BY AUTOMATED COUNT: 12 % (ref 11.6–15.1)
GFR SERPL CREATININE-BSD FRML MDRD: 115 ML/MIN/1.73SQ M
GLUCOSE SERPL-MCNC: 92 MG/DL (ref 65–140)
HCT VFR BLD AUTO: 43.2 % (ref 34.8–46.1)
HGB BLD-MCNC: 14.8 G/DL (ref 11.5–15.4)
IMM GRANULOCYTES # BLD AUTO: 0.03 THOUSAND/UL (ref 0–0.2)
IMM GRANULOCYTES NFR BLD AUTO: 0 % (ref 0–2)
LYMPHOCYTES # BLD AUTO: 1.96 THOUSANDS/ÂΜL (ref 0.6–4.47)
LYMPHOCYTES NFR BLD AUTO: 25 % (ref 14–44)
MCH RBC QN AUTO: 31.4 PG (ref 26.8–34.3)
MCHC RBC AUTO-ENTMCNC: 34.3 G/DL (ref 31.4–37.4)
MCV RBC AUTO: 92 FL (ref 82–98)
MONOCYTES # BLD AUTO: 0.75 THOUSAND/ÂΜL (ref 0.17–1.22)
MONOCYTES NFR BLD AUTO: 10 % (ref 4–12)
NEUTROPHILS # BLD AUTO: 4.96 THOUSANDS/ÂΜL (ref 1.85–7.62)
NEUTS SEG NFR BLD AUTO: 62 % (ref 43–75)
NRBC BLD AUTO-RTO: 0 /100 WBCS
PLATELET # BLD AUTO: 202 THOUSANDS/UL (ref 149–390)
PMV BLD AUTO: 9 FL (ref 8.9–12.7)
POTASSIUM SERPL-SCNC: 3.7 MMOL/L (ref 3.5–5.3)
RBC # BLD AUTO: 4.71 MILLION/UL (ref 3.81–5.12)
SODIUM SERPL-SCNC: 139 MMOL/L (ref 135–147)
WBC # BLD AUTO: 7.91 THOUSAND/UL (ref 4.31–10.16)

## 2025-02-26 PROCEDURE — 93005 ELECTROCARDIOGRAM TRACING: CPT

## 2025-02-26 PROCEDURE — 99285 EMERGENCY DEPT VISIT HI MDM: CPT

## 2025-02-26 PROCEDURE — 74177 CT ABD & PELVIS W/CONTRAST: CPT

## 2025-02-26 PROCEDURE — 71260 CT THORAX DX C+: CPT

## 2025-02-26 PROCEDURE — 85025 COMPLETE CBC W/AUTO DIFF WBC: CPT | Performed by: EMERGENCY MEDICINE

## 2025-02-26 PROCEDURE — 80048 BASIC METABOLIC PNL TOTAL CA: CPT | Performed by: EMERGENCY MEDICINE

## 2025-02-26 PROCEDURE — 36415 COLL VENOUS BLD VENIPUNCTURE: CPT | Performed by: EMERGENCY MEDICINE

## 2025-02-26 PROCEDURE — 99285 EMERGENCY DEPT VISIT HI MDM: CPT | Performed by: EMERGENCY MEDICINE

## 2025-02-26 RX ORDER — LIDOCAINE HYDROCHLORIDE 20 MG/ML
15 SOLUTION OROPHARYNGEAL ONCE
Status: COMPLETED | OUTPATIENT
Start: 2025-02-26 | End: 2025-02-26

## 2025-02-26 RX ORDER — SUCRALFATE 1 G/1
1 TABLET ORAL ONCE
Status: COMPLETED | OUTPATIENT
Start: 2025-02-26 | End: 2025-02-26

## 2025-02-26 RX ORDER — SUCRALFATE ORAL 1 G/10ML
500 SUSPENSION ORAL 2 TIMES DAILY
Qty: 100 ML | Refills: 0 | Status: SHIPPED | OUTPATIENT
Start: 2025-02-26 | End: 2025-03-08

## 2025-02-26 RX ADMIN — IOHEXOL 50 ML: 240 INJECTION, SOLUTION INTRATHECAL; INTRAVASCULAR; INTRAVENOUS; ORAL at 19:26

## 2025-02-26 RX ADMIN — SUCRALFATE 1 G: 1 TABLET ORAL at 21:06

## 2025-02-26 RX ADMIN — LIDOCAINE HYDROCHLORIDE 15 ML: 20 SOLUTION ORAL at 21:06

## 2025-02-26 RX ADMIN — IOHEXOL 85 ML: 350 INJECTION, SOLUTION INTRAVENOUS at 21:45

## 2025-02-26 NOTE — TELEPHONE ENCOUNTER
"FOLLOW UP: 3/14/25   REASON FOR CONVERSATION: Chest/abdominal pain 5/10 s/p EGD  SYMPTOMS: pain/discomfort in chest and stomach, especially after eating beginning last night.  OTHER: Pt reports pain/discomfort in chest and stomach whenever she eats and it is constant. Pt had EGD yesterday. Denies vomiting, fever, dark stool. ER precautions reviewed with pt and understanding verbalized.    DISPOSITION: home care with provider message    Reason for Disposition   MILD TO MODERATE constant pain lasting > 2 hours    Answer Assessment - Initial Assessment Questions  1. LOCATION: \"Where does it hurt?\"       Chest and stomach  2. RADIATION: \"Does the pain shoot anywhere else?\" (e.g., chest, back)      To back  3. ONSET: \"When did the pain begin?\" (e.g., minutes, hours or days ago)       Last night  4. SUDDEN: \"Gradual or sudden onset?\"      gradual  5. PATTERN \"Does the pain come and go, or is it constant?\"      constant  6. SEVERITY: \"How bad is the pain?\"  (e.g., Scale 1-10; mild, moderate, or severe)      5/10  7. RECURRENT SYMPTOM: \"Have you ever had this type of stomach pain before?\" If Yes, ask: \"When was the last time?\" and \"What happened that time?\"       denies  8. AGGRAVATING FACTORS: \"Does anything seem to cause this pain?\" (e.g., foods, stress, alcohol)      eating  10. OTHER SYMPTOMS: \"Do you have any other symptoms?\" (e.g., back pain, diarrhea, fever, urination pain, vomiting)        denies    Protocols used: Abdominal Pain - Upper-Adult-OH    "

## 2025-02-26 NOTE — TELEPHONE ENCOUNTER
"It is common to have some gas pains after endoscopy however if she is truly unable to eat without feeling pain and she is saying it is \"constant,\" especially if she is feeling it in her chest, we would recommend she is seen in the ED for urgent imaging. Thanks   "

## 2025-02-26 NOTE — TELEPHONE ENCOUNTER
Cover my Meds prior auth request for     Relugolix-Estradiol-Norethind (Myfembree) 40-1-0.5 MG TABS Take 1 tablet by mouth in the morning   Key [id number]

## 2025-02-26 NOTE — TELEPHONE ENCOUNTER
Tried calling PT to confirm upcoming appointment on 3/6 at 1:30pm with Dr. Gil at the [address] [place] [address] location. Left Pt VM with call back number.

## 2025-02-26 NOTE — Clinical Note
Martha Hollis was seen and treated in our emergency department on 2/26/2025.    No restrictions            Diagnosis:     Martha  .    She may return on this date: 02/28/2025         If you have any questions or concerns, please don't hesitate to call.      Vernon Leslie MD    ______________________________           _______________          _______________  Hospital Representative                              Date                                Time

## 2025-02-27 LAB
ATRIAL RATE: 104 BPM
P AXIS: 76 DEGREES
PR INTERVAL: 162 MS
QRS AXIS: 107 DEGREES
QRSD INTERVAL: 88 MS
QT INTERVAL: 324 MS
QTC INTERVAL: 427 MS
T WAVE AXIS: 58 DEGREES
VENTRICULAR RATE: 104 BPM

## 2025-02-27 PROCEDURE — 88305 TISSUE EXAM BY PATHOLOGIST: CPT | Performed by: PATHOLOGY

## 2025-02-27 PROCEDURE — 93010 ELECTROCARDIOGRAM REPORT: CPT | Performed by: INTERNAL MEDICINE

## 2025-02-27 NOTE — ED PROVIDER NOTES
Emergency Department Note- Martha Hollis 24 y.o. female MRN: [id number]    Unit/Bed#: ED 07 Encounter: [id number]    Martha Hollis is a 24 y.o. female who presents with   Chief Complaint   Patient presents with    Chest Pain     Pt had endoscopy yesterday; c/o increased chest and back discomfort. Pt states pain is worse when she eats or drinks          History of Present Illness   HPI:  Martha Hollis is a 24 y.o. female who presents for evaluation of:  Chest, epigastric, and back discomfort since having esophagogastroduodenoscopy yesterday at [facility].  Patient contacted gastroenterology and was told to come to the ED for imaging to rule out perforation.  Patient denies associated dyspnea, cough, fevers, chills, vomiting, lower abdominal pain, dysuria.  Deep breathing does not provoke the discomfort.  She has no history of venous thromboembolism.  No LMP recorded. Patient has had an implant.    Review of Systems   Constitutional:  Negative for fatigue and fever.   HENT:  Negative for congestion and sore throat.    Respiratory:  Negative for cough and shortness of breath.    Cardiovascular:  Positive for chest pain. Negative for palpitations.   Gastrointestinal:  Positive for abdominal pain. Negative for nausea.   Genitourinary:  Negative for flank pain and frequency.   Musculoskeletal:  Positive for back pain. Negative for neck pain.   Neurological:  Negative for light-headedness and headaches.   Psychiatric/Behavioral:  Negative for dysphoric mood and hallucinations.    All other systems reviewed and are negative.      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    Wears glasses      Past Surgical History:   Procedure Laterality Date    LAPAROSCOPIC ENDOMETRIOSIS FULGURATION  2024    stage 2, [facility], robotic assisted with resection    TONSILECTOMY AND ADNOIDECTOMY      WISDOM TOOTH EXTRACTION       Social History   Social History     Substance and Sexual Activity   Alcohol Use Yes    Comment: Social     Social History     Substance and Sexual Activity   Drug Use No     Social History     Tobacco Use   Smoking Status Never    Passive exposure: Past   Smokeless Tobacco Never     Family History:   Family History   Problem Relation Age of Onset    Breast cancer Mother 28    Cancer Mother     No Known Problems Father     Breast cancer Maternal Grandmother     Breast cancer Maternal Grandfather     Depression Maternal Aunt     Anxiety disorder Maternal Aunt     Colon cancer Neg Hx     Ovarian cancer Neg Hx        Meds/Allergies   PTA meds:   Prior to Admission Medications   Prescriptions Last Dose Informant Patient Reported? Taking?   Levonorgestrel (MIRENA) 20 MCG/DAY IUD  Self Yes No   Si each by Intrauterine Device route Once every 8 years   Relugolix-Estradiol-Norethind (Myfembree) 40-1-0.5 MG TABS   No No   Sig: Take 1 tablet by mouth in the morning   Retin-A Micro Pump 0.08 % GEL  Self Yes No   Sig: Apply TO AFFECTED AREA(S) of THE FACE nightly   busPIRone (BUSPAR) 10 mg tablet  Self No No   Si/2 tab po tid with meals x one week then one po tid with meals   doxycycline monohydrate (MONODOX) 50 mg capsule  Self Yes No   Sig: Take 1 capsule by mouth in the morning   esomeprazole (NexIUM) 40 MG capsule  Self No No   Sig: TAKE 1 CAPSULE (40 MG TOTAL) BY MOUTH DAILY.   levocetirizine (XYZAL) 5 MG tablet  Self No No   Sig: Take 1 tablet (5 mg total) by mouth every evening   linaCLOtide (Linzess) 72 MCG CAPS    No No   Sig: Take 1 capsule by mouth daily before breakfast   metoclopramide (Reglan) 10 mg tablet  Self No No   Sig: Take 0.5 tablets (5 mg total) by mouth 4 (four) times a day   multivitamin (THERAGRAN) TABS  Self Yes No   Sig: Take 1 tablet by mouth daily   naproxen (Naprosyn) 500 mg tablet   No No   Sig: Take 1 tablet (500 mg total) by mouth 2 (two) times a day with meals for 10 days   nortriptyline (PAMELOR) 50 mg capsule   No No   Sig: TAKE 1 CAPSULE (50 MG TOTAL) BY MOUTH DAILY AT BEDTIME      Facility-Administered Medications: None     Allergies   Allergen Reactions    Pineapple - Food Allergy Other (See Comments)     Tingling tongue and throat    Pollen Extract Sneezing     congestion    Bee Pollen Sneezing     congestion       Objective   First Vitals:   Blood Pressure: 127/80 (02/26/25 1806)  Pulse: (!) 106 (02/26/25 1806)  Temperature: 99 °F (37.2 °C) (02/26/25 1806)  Respirations: 18 (02/26/25 1806)  SpO2: 98 % (02/26/25 1806)    Current Vitals:   Blood Pressure: 127/80 (02/26/25 1806)  Pulse: (!) 106 (02/26/25 1806)  Temperature: 99 °F (37.2 °C) (02/26/25 1806)  Respirations: 18 (02/26/25 1806)  SpO2: 98 % (02/26/25 1806)    No intake or output data in the 24 hours ending 02/26/25 1911    Invasive Devices       None                   Physical Exam  Vitals and nursing note reviewed.   Constitutional:       General: She is not in acute distress.     Appearance: Normal appearance. She is well-developed.   HENT:      Head: Normocephalic and atraumatic.      Right Ear: External ear normal.      Left Ear: External ear normal.      Nose: Nose normal.      Mouth/Throat:      Pharynx: No oropharyngeal exudate.   Eyes:      Conjunctiva/sclera: Conjunctivae normal.      Pupils: Pupils are equal, round, and reactive to light.   Cardiovascular:      Rate and Rhythm: Normal rate and regular rhythm.   Pulmonary:      Effort: Pulmonary effort is normal. No respiratory distress.   Abdominal:      General: Abdomen is  "flat. There is no distension.      Palpations: Abdomen is soft.   Musculoskeletal:         General: No deformity. Normal range of motion.      Cervical back: Normal range of motion and neck supple.   Skin:     General: Skin is warm and dry.      Capillary Refill: Capillary refill takes less than 2 seconds.   Neurological:      General: No focal deficit present.      Mental Status: She is alert and oriented to person, place, and time. Mental status is at baseline.      Coordination: Coordination normal.   Psychiatric:         Mood and Affect: Mood normal.         Behavior: Behavior normal.         Thought Content: Thought content normal.         Judgment: Judgment normal.           Medical Decision Makin.  Acute chest, back, and abdominal pain post esophagogastroduodenoscopy: Patient was advised by the gastroenterology team to come to the ED for imaging to rule out perforation post esophagogastroduodenoscopy.  Plan to obtain a CBC to rule out anemia and leukocytosis; a basic metabolic profile to rule out electrolyte electrolyte disturbance, uremia, and disorders of glucose homeostasis; urine hCG is negative.  CT scan chest abdomen and pelvis to rule out perforation post endoscopy.  ECG to rule out pericarditis.    Recent Results (from the past 36 hours)   Urine Pregnancy (Holding Area Only) POCT    Collection Time: 25  7:32 AM   Result Value Ref Range    EXT Preg Test, Ur Negative Negative    Control Valid Valid   ECG 12 lead    Collection Time: 25  6:14 PM   Result Value Ref Range    Ventricular Rate 104 BPM    Atrial Rate 104 BPM    ME Interval 162 ms    QRSD Interval 88 ms    QT Interval 324 ms    QTC Interval 427 ms    P Axis 76 degrees    QRS Axis 107 degrees    T Wave [place] 58 degrees     CT chest abdomen pelvis w contrast    (Results Pending)         Portions of the record may have been created with voice recognition software. Occasional wrong word or \"sound a like\" substitutions may have " occurred due to the inherent limitations of voice recognition software.  Read the chart carefully and recognize, using context, where substitutions have occurred.         Vernon Leslie MD  02/26/25 1915

## 2025-02-27 NOTE — ED PROCEDURE NOTE
PROCEDURE  ECG 12 Lead Documentation Only    Date/Time: 2/26/2025 7:16 PM    Performed by: Vernon Leslie MD  Authorized by: Vernon Leslie MD    Indications / Diagnosis:  Chest pain  ECG reviewed by me, the ED Provider: yes    Patient location:  ED  Previous ECG:     Previous ECG:  Unavailable    Comparison to cardiac monitor: No    Interpretation:     Interpretation: normal    Rate:     ECG rate:  104    ECG rate assessment: tachycardic    Rhythm:     Rhythm: sinus tachycardia    Ectopy:     Ectopy: none    QRS:     QRS axis:  Normal    QRS intervals:  Normal  Conduction:     Conduction: normal    ST segments:     ST segments:  Normal  T waves:     T waves: normal         Vernon Leslie MD  02/26/25 1916

## 2025-02-27 NOTE — TELEPHONE ENCOUNTER
PA for (Myfembree) 40-1-0.5 MG SUBMITTED to [employer]    via    []CMM-KEY:   [x]Surescripts-Case ID # [id number]   []Availity-Auth ID # NDC #   []Faxed to plan   []Other website   []Phone call Case ID #     []PA sent as URGENT    All office notes, labs and other pertaining documents and studies sent. Clinical questions answered. Awaiting determination from insurance company.     Turnaround time for your insurance to make a decision on your Prior Authorization can take 7-21 business days.

## 2025-02-28 ENCOUNTER — RESULTS FOLLOW-UP (OUTPATIENT)
Dept: GASTROENTEROLOGY | Facility: CLINIC | Age: 25
End: 2025-02-28

## 2025-02-28 NOTE — TELEPHONE ENCOUNTER
PA for (Myfembree) 40-1-0.5 MG APPROVED     Date(s) approved January 28, 2025 to February 27, 2027     Case #    Patient advised by          [x]MyChart Message  []Phone call   [x]LMOM  []L/M to call office as no active Communication consent on file  []Unable to leave detailed message as VM not approved on Communication consent       Pharmacy advised by    [x]Fax  []Phone call    Specialty Pharmacy    []     Approval letter scanned into Media No

## 2025-03-04 ENCOUNTER — OFFICE VISIT (OUTPATIENT)
Dept: FAMILY MEDICINE CLINIC | Facility: CLINIC | Age: 25
End: 2025-03-04
Payer: COMMERCIAL

## 2025-03-04 VITALS
HEIGHT: 68 IN | TEMPERATURE: 99.7 F | RESPIRATION RATE: 16 BRPM | SYSTOLIC BLOOD PRESSURE: 102 MMHG | BODY MASS INDEX: 20.31 KG/M2 | OXYGEN SATURATION: 98 % | WEIGHT: 134 LBS | DIASTOLIC BLOOD PRESSURE: 64 MMHG | HEART RATE: 119 BPM

## 2025-03-04 DIAGNOSIS — J06.9 VIRAL UPPER RESPIRATORY TRACT INFECTION: ICD-10-CM

## 2025-03-04 DIAGNOSIS — G95.0 SYRINGOMYELIA (HCC): ICD-10-CM

## 2025-03-04 LAB
SARS-COV-2 AG UPPER RESP QL IA: NEGATIVE
SL AMB POCT RAPID FLU A: NEGATIVE
SL AMB POCT RAPID FLU B: NEGATIVE
VALID CONTROL: NORMAL

## 2025-03-04 PROCEDURE — 87811 SARS-COV-2 COVID19 W/OPTIC: CPT | Performed by: NURSE PRACTITIONER

## 2025-03-04 PROCEDURE — 87804 INFLUENZA ASSAY W/OPTIC: CPT | Performed by: NURSE PRACTITIONER

## 2025-03-04 PROCEDURE — 99213 OFFICE O/P EST LOW 20 MIN: CPT | Performed by: NURSE PRACTITIONER

## 2025-03-04 NOTE — LETTER
March 4, 2025     Patient: Martha Hollis  YOB: 2000  Date of Visit: 3/4/2025      To Whom it May Concern:    Martha Hollis is under my professional care. Martha was seen in my office on 3/4/2025. Martha may return to work on 3/6/2025 . Please excuse from work 3/3, 3/4, and 3/5.     If you have any questions or concerns, please don't hesitate to call.         Sincerely,          DOUG Cabrales        CC: No Recipients

## 2025-03-04 NOTE — PROGRESS NOTES
Name: Martha Hollis      : 2000      MRN: [id number]  Encounter Provider: DOUG Cabrales  Encounter Date: 3/4/2025   Encounter department: [facility]  :  Assessment & Plan  Viral upper respiratory tract infection  Negative influenza and COVID-19 swabs today.   May return to work with no fever for 24 hours without taking Tylenol or ibuprofen.   Continue supportive care.   Call for any worsening or persisting symptoms beyond 7-10 days.     Orders:  •  POCT Rapid Covid Ag  •  POCT rapid flu A and B    Syringomyelia (HCC)  Diagnosed in childhood. No current problems.   Continue follow up with Dr. Chan.             Depression Screening and Follow-up Plan: Patient's depression screening was positive with a PHQ-9 score of 9.   Patient assessed for underlying major depression. Brief counseling provided and recommend additional follow-up/re-evaluation next office visit.       History of Present Illness   Martha Hollis is a 24 year old female presenting today for cold symptoms.     Symptoms started 3 days ago.     Symptoms include:    Nasal congestion  Fevers  Body aches  Head pressure  Cough  Irritated throat  No nausea, vomiting, or diarrhea.     Sick contacts: one sick contact, had COVID-19, but they were wearing a mask.   Works at a  with infants and toddlers.    Home COVID-19 test was negative yesterday.     Taking OTC: decongestant, dayquil, nyquil.    Had influenza vaccine this season.         Review of Systems   Constitutional:  Positive for chills, diaphoresis, fatigue and fever.   HENT:  Positive for congestion, rhinorrhea and sore throat. Negative for ear pain.    Respiratory:  Positive for cough. Negative for chest tightness, shortness of breath and wheezing.    Cardiovascular: Negative.    Gastrointestinal: Negative.    Neurological:  Positive for headaches.       Objective   /64 (BP Location: Right arm, Patient Position: Sitting, Cuff Size: Standard)   Pulse  "(!) 119   Temp 99.7 °F (37.6 °C) (Tympanic)   Resp 16   Ht 5' 8\" (1.727 m)   Wt 60.8 kg (134 lb)   LMP  (LMP Unknown)   SpO2 98%   BMI 20.37 kg/m²      Physical Exam  Vitals and nursing note reviewed.   Constitutional:       General: She is not in acute distress.     Appearance: Normal appearance. She is not ill-appearing.   HENT:      Head: Atraumatic.      Right Ear: Tympanic membrane normal.      Left Ear: Tympanic membrane normal.      Nose: Congestion and rhinorrhea present.      Mouth/Throat:      Mouth: Mucous membranes are moist.      Pharynx: Oropharynx is clear.   Cardiovascular:      Rate and Rhythm: Regular rhythm. Tachycardia present.      Heart sounds: No murmur heard.  Pulmonary:      Effort: Pulmonary effort is normal.      Breath sounds: Normal breath sounds.   Musculoskeletal:      Cervical back: Normal range of motion and neck supple.   Lymphadenopathy:      Cervical: No cervical adenopathy.   Neurological:      Mental Status: She is alert.   Psychiatric:         Mood and Affect: Mood normal.         Current Outpatient Medications:   •  busPIRone (BUSPAR) 10 mg tablet, 1/2 tab po tid with meals x one week then one po tid with meals, Disp: 90 tablet, Rfl: 1  •  esomeprazole (NexIUM) 40 MG capsule, TAKE 1 CAPSULE (40 MG TOTAL) BY MOUTH DAILY., Disp: 90 capsule, Rfl: 1  •  levocetirizine (XYZAL) 5 MG tablet, Take 1 tablet (5 mg total) by mouth every evening, Disp: 90 tablet, Rfl: 1  •  Levonorgestrel (MIRENA) 20 MCG/DAY IUD, 1 each by Intrauterine Device route Once every 8 years, Disp: , Rfl:   •  linaCLOtide (Linzess) 72 MCG CAPS, Take 1 capsule by mouth daily before breakfast, Disp: 60 capsule, Rfl: 3  •  metoclopramide (Reglan) 10 mg tablet, Take 0.5 tablets (5 mg total) by mouth 4 (four) times a day, Disp: 30 tablet, Rfl: 0  •  multivitamin (THERAGRAN) TABS, Take 1 tablet by mouth daily, Disp: , Rfl:   •  nortriptyline (PAMELOR) 50 mg capsule, TAKE 1 CAPSULE (50 MG TOTAL) BY MOUTH DAILY AT " BEDTIME, Disp: 90 capsule, Rfl: 0  •  Relugolix-Estradiol-Norethind (Myfembree) 40-1-0.5 MG TABS, Take 1 tablet by mouth in the morning, Disp: 30 tablet, Rfl: 2  •  Retin-A Micro Pump 0.08 % GEL, Apply TO AFFECTED AREA(S) of THE FACE nightly, Disp: , Rfl:   •  sucralfate (CARAFATE) 1 g/10 mL suspension, Take 5 mL (500 mg total) by mouth 2 (two) times a day for 10 days, Disp: 100 mL, Rfl: 0

## 2025-03-06 ENCOUNTER — CONSULT (OUTPATIENT)
Dept: NEUROLOGY | Facility: CLINIC | Age: 25
End: 2025-03-06
Payer: COMMERCIAL

## 2025-03-06 VITALS
HEART RATE: 110 BPM | WEIGHT: 136 LBS | DIASTOLIC BLOOD PRESSURE: 64 MMHG | BODY MASS INDEX: 20.61 KG/M2 | SYSTOLIC BLOOD PRESSURE: 110 MMHG | OXYGEN SATURATION: 98 % | TEMPERATURE: 98.4 F | HEIGHT: 68 IN

## 2025-03-06 DIAGNOSIS — G95.0 SYRINGOMYELIA (HCC): Primary | ICD-10-CM

## 2025-03-06 DIAGNOSIS — R42 VERTIGO: ICD-10-CM

## 2025-03-06 PROCEDURE — 99245 OFF/OP CONSLTJ NEW/EST HI 55: CPT | Performed by: PSYCHIATRY & NEUROLOGY

## 2025-03-06 RX ORDER — CLINDAMYCIN PHOSPHATE AND BENZOYL PEROXIDE 10; 50 MG/G; MG/G
GEL TOPICAL
COMMUNITY
Start: 2025-02-07

## 2025-03-06 NOTE — PROGRESS NOTES
Name: Martha Hollis      : 2000      MRN: [id number]  Encounter Provider: Lucy Gil MD  Encounter Date: 3/6/2025   Encounter department: [facility] [place]  :  Assessment & Plan  Vertigo  Patient with a history of syringomyelia, chronic constipation, migraine headaches, depression, endometriosis, anxiety, insomnia, mitral valve prolapse and social anxiety disorder presents for evaluation of vertigo. Symptom onset potentially after a rollercoaster ride one year ago. Per chart review it was after taking propranolol. She reports symptoms improved with vestibular maneuvers.     DDX include BPPV vs orthostatic sx due to propranolol use. It is reassuring sx have been getting better. I personally reviewed Brain MRI and this was unremarkable. Will continue to monitor.   Orders:    Ambulatory referral to Neurology    Syringomyelia (HCC)  Incidentally found while reviewing patients MRI images available in pacs. This was first initially noted around  which would make patient around 6 years of age. Patient unable to state additional details other than she suffered from chronic constipation and this might have been found incidentally while undergoing workup for this. She denies any weakness or paresthesias of the upper or lower extremities. No chiari malformation on brain MRI.     Will repeat MRI T spine to evaluate for any interval changes. Follow up in 4 months with results.   Orders:    MRI thoracic spine with and without contrast; Future          History of Present Illness   HPI     Patient with a history of syringomyelia, chronic constipation, migraine headaches, depression, endometriosis, anxiety, insomnia, mitral valve prolapse and social anxiety disorder presents for evaluation of vertigo.    Patient reports that in 2024 she was started on propranolol for cardiac palpitations.  Soon after starting this medication she started feeling lightheaded.  She was seen in  urgent care and felt that this might have been a reaction to her medication.  She was sent back to her primary care provider.  Cardiac workup was negative and she was restarted on propranolol.  Symptoms thought to be due to inner ear pathology.  She was advised on vestibular maneuvers which appeared to help.  Patient had an MRI of her brain done in September which was unremarkable.  Patient currently not taking propranolol.    Patient currently with mild symptoms which have greatly improved with the Epley maneuver.  She reports that around the time of symptom onset she was on a roller coaster and got a severe headache afterwards.  MRI done several months after this was normal.  Patient reports getting headaches which are typically unilateral associated with sensitivity to light sounds and smells.  These improved after starting on birth control.    Workup:      MRI BRAIN W WO CONTRAST (Final) 9/15/2024  3:49 PM    Impression  Unremarkable MRI of the brain.       MRI thoracic spine 10/18/2006: Central cord syndrome or abnormality within the thoracic spinal cord may represent syringohydromyelia    MRI thoracic spine 7/5/2010: Stable thoracic spine MRI demonstrating the thoracic spinal cord syrinx from T2-T9-T10 with maximum transverse dimension of 1.5 mm    Review of Systems   Constitutional:  Negative for appetite change, fatigue and fever.   HENT: Negative.  Negative for hearing loss, tinnitus, trouble swallowing and voice change.    Eyes: Negative.  Negative for photophobia, pain and visual disturbance.   Respiratory: Negative.  Negative for shortness of breath.    Cardiovascular: Negative.  Negative for palpitations.   Gastrointestinal: Negative.  Negative for nausea and vomiting.   Endocrine: Negative.  Negative for cold intolerance.   Genitourinary: Negative.  Negative for dysuria, frequency and urgency.   Musculoskeletal:  Negative for back pain, gait problem, myalgias, neck pain and neck stiffness.   Skin:  "Negative.  Negative for rash.   Allergic/Immunologic: Negative.    Neurological:  Positive for dizziness. Negative for tremors, seizures, syncope, facial asymmetry, speech difficulty, weakness, light-headedness, numbness and headaches.        Pt presents with mild vertigo symptoms. States she started experiencing them about a year ago. Since then, they have improved from a constant thing to a very mild symptom. Went to PT for this.   Hematological: Negative.  Does not bruise/bleed easily.   Psychiatric/Behavioral: Negative.  Negative for confusion, hallucinations and sleep disturbance.    All other systems reviewed and are negative.   I have personally reviewed the MA's review of systems and made changes as necessary.         Objective   Ht 5' 8\" (1.727 m)   LMP  (LMP Unknown)   BMI 20.37 kg/m²     Physical Exam  Eyes:      General: Lids are normal.      Extraocular Movements: Extraocular movements intact.      Pupils: Pupils are equal, round, and reactive to light.   Neurological:      Motor: Motor strength is normal.     Deep Tendon Reflexes:      Reflex Scores:       Bicep reflexes are 2+ on the right side and 2+ on the left side.       Brachioradialis reflexes are 2+ on the right side and 2+ on the left side.       Patellar reflexes are 2+ on the right side and 2+ on the left side.       Achilles reflexes are 2+ on the right side and 2+ on the left side.      Neurological Exam  Mental Status  Awake, alert and oriented to person, place and time.    Cranial Nerves  CN II: Visual fields full to confrontation.  CN III, IV, VI: Extraocular movements intact bilaterally. Normal lids and orbits bilaterally. Pupils equal round and reactive to light bilaterally.  CN V: Facial sensation is normal.  CN VII: Full and symmetric facial movement.  CN VIII: Hearing is normal.  CN IX, X: Palate elevates symmetrically  CN XI: Shoulder shrug strength is normal.  CN XII: Tongue midline without atrophy or fasciculations.    Motor   " Strength is 5/5 throughout all four extremities.    Sensory  Vibration is normal in upper and lower extremities.     Reflexes                                            Right                      Left  Brachioradialis                    2+                         2+  Biceps                                 2+                         2+  Patellar                                2+                         2+  Achilles                                2+                         2+    Coordination  Right: Finger-to-nose normal.Left: Finger-to-nose normal.    Gait  Casual gait is normal including stance, stride, and arm swing.

## 2025-03-08 ENCOUNTER — TELEMEDICINE (OUTPATIENT)
Dept: OTHER | Facility: HOSPITAL | Age: 25
End: 2025-03-08
Payer: COMMERCIAL

## 2025-03-08 VITALS — TEMPERATURE: 100.2 F

## 2025-03-08 DIAGNOSIS — K52.9 GASTROENTERITIS: Primary | ICD-10-CM

## 2025-03-08 PROBLEM — K62.5 BRBPR (BRIGHT RED BLOOD PER RECTUM): Status: RESOLVED | Noted: 2024-12-05 | Resolved: 2025-03-08

## 2025-03-08 PROCEDURE — 99213 OFFICE O/P EST LOW 20 MIN: CPT | Performed by: PHYSICIAN ASSISTANT

## 2025-03-08 RX ORDER — ONDANSETRON 4 MG/1
4 TABLET, ORALLY DISINTEGRATING ORAL EVERY 6 HOURS PRN
Qty: 20 TABLET | Refills: 0 | Status: SHIPPED | OUTPATIENT
Start: 2025-03-08

## 2025-03-08 NOTE — PROGRESS NOTES
Virtual Regular Visit  Name: Martha Hollis      : 2000      MRN: [id number]  Encounter Provider: Shannon D Severino, PA-C  Encounter Date: 3/8/2025   Encounter department: VIRTUAL CARE       Verification of patient location:  Patient is located at Home in the following state in which I hold an active license [place] :  Assessment & Plan  Gastroenteritis    Orders:    ondansetron (ZOFRAN-ODT) 4 mg disintegrating tablet; Take 1 tablet (4 mg total) by mouth every 6 (six) hours as needed for nausea or vomiting        Encounter provider Shannon D Severino, PA-C    The patient was identified by name and date of birth. Martha Hollis was informed that this is a telemedicine visit and that the visit is being conducted through the Epic Embedded platform. She agrees to proceed..  My office door was closed. No one else was in the room.  She acknowledged consent and understanding of privacy and security of the video platform. The patient has agreed to participate and understands they can discontinue the visit at any time.    Patient is aware this is a billable service.     History obtained from: patient  History of Present Illness     Pt reports last night around 1630 develop abdominal pain/gurgling, then around 1730 started with vomiting and diarrhea. Reports about 6-7 episodes of vomiting. Reports about 6-7 episodes of diarrhea as well. No blood. Has been tolerating fluids this morning. Took pepto, ginger candies, ice pops. Multiple contacts at work with similar sx. Low grade fevers.       Review of Systems    Objective   Temp 100.2 °F (37.9 °C)   LMP  (LMP Unknown)     Physical Exam  Constitutional:       General: She is not in acute distress.     Appearance: Normal appearance. She is ill-appearing (mild). She is not toxic-appearing.   HENT:      Head: Normocephalic and atraumatic.      Nose: No rhinorrhea.      Mouth/Throat:      Mouth: Mucous membranes are dry.   Eyes:      Conjunctiva/sclera: Conjunctivae normal.    Pulmonary:      Effort: Pulmonary effort is normal. No respiratory distress.      Breath sounds: No wheezing (no gross audible wheeze through computer).   Musculoskeletal:      Cervical back: Normal range of motion.   Skin:     Findings: No rash (on face or neck).   Neurological:      Mental Status: She is alert.      Cranial Nerves: No dysarthria or facial asymmetry.   Psychiatric:         Mood and Affect: Mood normal.         Behavior: Behavior normal.         Visit Time  Total Visit Duration: 12 minutes not including the time spent for establishing the audio/video connection.

## 2025-03-08 NOTE — LETTER
March 8, 2025     Patient: Martha Hollis   YOB: 2000   Date of Visit: 3/8/2025       To Whom it May Concern:    Martha Hollis is under my professional care. She was seen virtually on 3/8/2025. She may return to work on 3/11/25 .    If you have any questions or concerns, please don't hesitate to call.         Sincerely,          Shannon D Severino, PA-C        CC: No Recipients

## 2025-03-08 NOTE — PATIENT INSTRUCTIONS
"Thank you for choosing to trust [facility] with your care today. Virtual visits are very convenient, but do have some limitations. Schedule a follow-up appointment with your primary care physician for recheck in person in 2-3 days-especially if symptoms aren't improving. If you cannot see your PCP, you can schedule a follow up appointment at a [facility]. Go to the emergency department if you develop any new or worsening symptoms including lower belly pain, higher fevers, dehydration or anything else that is concerning.    Avoid dairy x 1-2 weeks  Clear liquid (water gatorade, pedialyte, apple/grape juice, ginger ale, jello, broth) first- nothing red  Advance to bland diet as tolerated (bananas, rice, apple sauce, toast, plain crackers, plain pasta)  No greasy, spicy or acidic foods/drinks.    Excuses can be found in \"Letters\" section of [employer] [facility]. Can print if opened from a web browser  [facility] phone number is [phone] if you need assistance or have further questions    [phone]  Schedule or Reschedule Outpatient Testing - Option 2  Billing - Option 3  General Info - Option 4  Vip[facility] Help - Option 5  Comprehensive Spine Program - Option 6    "

## 2025-03-10 DIAGNOSIS — J32.9 SINUSITIS, UNSPECIFIED CHRONICITY, UNSPECIFIED LOCATION: Primary | ICD-10-CM

## 2025-03-14 ENCOUNTER — OFFICE VISIT (OUTPATIENT)
Dept: GASTROENTEROLOGY | Facility: CLINIC | Age: 25
End: 2025-03-14
Payer: COMMERCIAL

## 2025-03-14 VITALS
HEIGHT: 68 IN | DIASTOLIC BLOOD PRESSURE: 76 MMHG | BODY MASS INDEX: 19.55 KG/M2 | SYSTOLIC BLOOD PRESSURE: 110 MMHG | TEMPERATURE: 98.8 F | WEIGHT: 129 LBS

## 2025-03-14 DIAGNOSIS — R13.19 ESOPHAGEAL DYSPHAGIA: ICD-10-CM

## 2025-03-14 DIAGNOSIS — K59.04 CHRONIC IDIOPATHIC CONSTIPATION: ICD-10-CM

## 2025-03-14 DIAGNOSIS — K21.9 GERD WITHOUT ESOPHAGITIS: Primary | ICD-10-CM

## 2025-03-14 PROCEDURE — 99214 OFFICE O/P EST MOD 30 MIN: CPT

## 2025-03-14 RX ORDER — LUBIPROSTONE 8 UG/1
8 CAPSULE ORAL 2 TIMES DAILY WITH MEALS
Qty: 60 CAPSULE | Refills: 3 | Status: SHIPPED | OUTPATIENT
Start: 2025-03-14

## 2025-03-14 RX ORDER — PANTOPRAZOLE SODIUM 40 MG/1
40 TABLET, DELAYED RELEASE ORAL DAILY
Qty: 30 TABLET | Refills: 3 | Status: SHIPPED | OUTPATIENT
Start: 2025-03-14

## 2025-03-14 NOTE — PATIENT INSTRUCTIONS
Patient is scheduled for a Barium Swallow  03/18/2025 at 10 am in [place]    Patient is also scheduled for a follow up on 06/30/2025 at 1:00 am with Nara

## 2025-03-14 NOTE — ASSESSMENT & PLAN NOTE
Patient with chronic constipation, unable to tolerate Linzess 72 mcg due to diarrhea.  Will trial on Amitiza 8 mcg twice daily.  Recommend Benefiber OTC once daily, high fiber diet, increased water intake, and activity as tolerated to prevent/avoid constipation.   Orders:    lubiprostone (AMITIZA) 8 mcg capsule; Take 1 capsule (8 mcg total) by mouth 2 (two) times a day with meals

## 2025-03-14 NOTE — PROGRESS NOTES
Name: Martha Hollis      : 2000      MRN: [id number]  Encounter Provider: Nara Royal PA-C  Encounter Date: 3/14/2025   Encounter [facility]  :  Assessment & Plan  GERD without esophagitis  Patient reports symptoms have been mostly controlled on Nexium.  She is requesting to switch to pantoprazole as this worked better in the past.  Still with occasional feeling of food getting stuck, going down slowly.    Reviewed EGD with patient, esophageal biopsies unremarkable.  Plan barium swallow.  Can consider esophageal manometry if ongoing symptoms.  Orders:    pantoprazole (PROTONIX) 40 mg tablet; Take 1 tablet (40 mg total) by mouth daily    Esophageal dysphagia  As above.  Orders:    [place] barium swallow; Future    Chronic idiopathic constipation  Patient with chronic constipation, unable to tolerate Linzess 72 mcg due to diarrhea.  Will trial on Amitiza 8 mcg twice daily.  Recommend Benefiber OTC once daily, high fiber diet, increased water intake, and activity as tolerated to prevent/avoid constipation.   Orders:    lubiprostone (AMITIZA) 8 mcg capsule; Take 1 capsule (8 mcg total) by mouth 2 (two) times a day with meals    Follow-up 3 months    History of Present Illness   HPI  Martha Hollis is a 24 y.o. female with PMH of migraines, GERD, MARCOS, depression, endometriosis who presents for follow-up.  Patient was seen by Dr. Aldrich 2025 for GERD, dysphagia, chronic constipation.  At that time patient with heartburn, belching, dysphagia.  Plan to obtain EGD continue Nexium, start Linzess 72 mcg.  Patient had EGD done that was unremarkable except for moderate amount of bile in the stomach.  She then had chest epigastric, back pain and went to the ER 2025.  She had CT C/A/P with IV contrast that showed no acute abnormalities.  CBC and BMP were unremarkable.  She states this resolved after few days.    Patient states her heartburn has been mostly controlled  on Nexium 40 mg daily.  Her dysphagia has improved some but still with feeling of food going down slowly.  No issues with liquids or pills.  She has only been taking Linzess intermittently as this causes significant diarrhea.  Last week she had stomach bug and had nausea vomiting, diarrhea.  This has since resolved but appetite has not returned.  Denies fever/chills, weight loss, melena, hematochezia.    Prior imaging/procedures:  EGD 2/25/2025: Normal esophagus, moderate amount of bile within the body and fundus of the stomach, mild abnormal mucosa with erosion in the fundus of the stomach, normal duodenum (esophageal biopsies negative for EOE)    Review of Systems   Constitutional:  Negative for appetite change, chills and fever.   HENT:  Positive for trouble swallowing.    Respiratory:  Negative for shortness of breath.    Gastrointestinal:  Positive for constipation. Negative for abdominal pain, blood in stool, diarrhea, nausea and vomiting.     Medical History Reviewed by provider this encounter:  Tobacco  Allergies  Meds  Problems  Med Hx  Surg Hx  Fam Hx     .  Current Outpatient Medications on File Prior to Visit   Medication Sig Dispense Refill    amoxicillin-clavulanate (AUGMENTIN) 875-125 mg per tablet Take 1 tablet by mouth every 12 (twelve) hours for 7 days 14 tablet 0    busPIRone (BUSPAR) 10 mg tablet 1/2 tab po tid with meals x one week then one po tid with meals 90 tablet 1    Clindamycin Phos-Benzoyl Perox gel EVERY NIGHT AT BEDTIME TO FACE FOR ACNE. KEEP IN THE REFRIGERATOR. DISCARD AFTER 2 MONTHS      levocetirizine (XYZAL) 5 MG tablet Take 1 tablet (5 mg total) by mouth every evening 90 tablet 1    Levonorgestrel (MIRENA) 20 MCG/DAY IUD 1 each by Intrauterine Device route Once every 8 years      metoclopramide (Reglan) 10 mg tablet Take 0.5 tablets (5 mg total) by mouth 4 (four) times a day 30 tablet 0    multivitamin (THERAGRAN) TABS Take 1 tablet by mouth daily      nortriptyline  (PAMELOR) 50 mg capsule TAKE 1 CAPSULE (50 MG TOTAL) BY MOUTH DAILY AT BEDTIME 90 capsule 0    ondansetron (ZOFRAN-ODT) 4 mg disintegrating tablet Take 1 tablet (4 mg total) by mouth every 6 (six) hours as needed for nausea or vomiting 20 tablet 0    Relugolix-Estradiol-Norethind (Myfembree) 40-1-0.5 MG TABS Take 1 tablet by mouth in the morning 30 tablet 2    Retin-A Micro Pump 0.08 % GEL Apply TO AFFECTED AREA(S) of THE FACE nightly      [DISCONTINUED] esomeprazole (NexIUM) 40 MG capsule TAKE 1 CAPSULE (40 MG TOTAL) BY MOUTH DAILY. 90 capsule 1    [DISCONTINUED] linaCLOtide (Linzess) 72 MCG CAPS Take 1 capsule by mouth daily before breakfast 60 capsule 3    sucralfate (CARAFATE) 1 g/10 mL suspension Take 5 mL (500 mg total) by mouth 2 (two) times a day for 10 days 100 mL 0     No current facility-administered medications on file prior to visit.      Social History     Tobacco Use    Smoking status: Never     Passive exposure: Past    Smokeless tobacco: Never   Vaping Use    Vaping status: Never Used   Substance and Sexual Activity    Alcohol use: Yes     Comment: Social    Drug use: No    Sexual activity: Yes     Partners: Male     Birth control/protection: OCP        Objective   LMP  (LMP Unknown)      Physical Exam  Vitals reviewed.   Constitutional:       General: She is not in acute distress.     Appearance: She is not ill-appearing.   HENT:      Head: Normocephalic and atraumatic.   Cardiovascular:      Rate and Rhythm: Normal rate and regular rhythm.   Pulmonary:      Effort: Pulmonary effort is normal.      Breath sounds: Normal breath sounds.   Abdominal:      General: Abdomen is flat. Bowel sounds are normal. There is no distension.      Palpations: Abdomen is soft.      Tenderness: There is no abdominal tenderness.   Skin:     General: Skin is warm and dry.   Neurological:      Mental Status: She is alert. Mental status is at baseline.

## 2025-03-18 ENCOUNTER — RESULTS FOLLOW-UP (OUTPATIENT)
Dept: GASTROENTEROLOGY | Facility: CLINIC | Age: 25
End: 2025-03-18

## 2025-03-18 ENCOUNTER — HOSPITAL ENCOUNTER (OUTPATIENT)
Dept: RADIOLOGY | Facility: HOSPITAL | Age: 25
Discharge: HOME | End: 2025-03-18
Payer: COMMERCIAL

## 2025-03-18 DIAGNOSIS — R13.19 ESOPHAGEAL DYSPHAGIA: ICD-10-CM

## 2025-03-18 PROCEDURE — 74220 X-RAY XM ESOPHAGUS 1CNTRST: CPT

## 2025-03-24 ENCOUNTER — TELEMEDICINE (OUTPATIENT)
Dept: PSYCHIATRY | Facility: CLINIC | Age: 25
End: 2025-03-24
Payer: COMMERCIAL

## 2025-03-24 DIAGNOSIS — F33.9 DEPRESSION, RECURRENT (HCC): Primary | ICD-10-CM

## 2025-03-24 DIAGNOSIS — F41.1 GAD (GENERALIZED ANXIETY DISORDER): ICD-10-CM

## 2025-03-24 DIAGNOSIS — F43.21 GRIEF: ICD-10-CM

## 2025-03-24 PROCEDURE — 99214 OFFICE O/P EST MOD 30 MIN: CPT | Performed by: PHYSICIAN ASSISTANT

## 2025-03-24 NOTE — PSYCH
MEDICATION MANAGEMENT NOTE    Name: Martha Hollis      : 2000      MRN: [id number]  Encounter Provider: Minna Corona PA-C  Encounter Date: 3/24/2025   Encounter department: [facility]    Insurance: Payor: BLUE CROSS / Plan: [facility] PLAN 361 / Product Type: Blue Fee for Service /      Reason for Visit:   Chief Complaint   Patient presents with    Virtual Regular Visit   :  Assessment & Plan  Depression, recurrent (HCC)  Cont ind therapy.  Cont nortriptyline 50 mg q bedtime       MARCOS (generalized anxiety disorder)  Cont ind therapy.  Cont nortriptyline 50 mg q bedtime and buspar 5 mg tid       Grief  Cont ind therapy.           Current Outpatient Medications   Medication Sig Dispense Refill    busPIRone (BUSPAR) 10 mg tablet 1/2 tab po tid with meals x one week then one po tid with meals 90 tablet 1    Clindamycin Phos-Benzoyl Perox gel EVERY NIGHT AT BEDTIME TO FACE FOR ACNE. KEEP IN THE REFRIGERATOR. DISCARD AFTER 2 MONTHS      levocetirizine (XYZAL) 5 MG tablet Take 1 tablet (5 mg total) by mouth every evening 90 tablet 1    Levonorgestrel (MIRENA) 20 MCG/DAY IUD 1 each by Intrauterine Device route Once every 8 years      lubiprostone (AMITIZA) 8 mcg capsule Take 1 capsule (8 mcg total) by mouth 2 (two) times a day with meals 60 capsule 3    metoclopramide (Reglan) 10 mg tablet Take 0.5 tablets (5 mg total) by mouth 4 (four) times a day 30 tablet 0    multivitamin (THERAGRAN) TABS Take 1 tablet by mouth daily      nortriptyline (PAMELOR) 50 mg capsule TAKE 1 CAPSULE (50 MG TOTAL) BY MOUTH DAILY AT BEDTIME 90 capsule 0    ondansetron (ZOFRAN-ODT) 4 mg disintegrating tablet Take 1 tablet (4 mg total) by mouth every 6 (six) hours as needed for nausea or vomiting 20 tablet 0    pantoprazole (PROTONIX) 40 mg tablet Take 1 tablet (40 mg total) by mouth daily 30 tablet 3    Relugolix-Estradiol-Norethind (Myfembree) 40-1-0.5 MG TABS Take 1 tablet by mouth in the morning 30  "tablet 2    Retin-A Micro Pump 0.08 % GEL Apply TO AFFECTED AREA(S) of THE FACE nightly       No current facility-administered medications for this visit.         Treatment Recommendations:  F/u Daniel 25.  Meds unchanged.  Cont ind therapy.  Discussed PHP- will hold referral for right now.  Will discuss with ind therapist. Restart Vit B-12 1000 mcg/d OTC  Educated about diagnosis and treatment modalities. Verbalizes understanding and agreement with the treatment plan.  Discussed self monitoring of symptoms, and symptom monitoring tools.  Discussed medications and if treatment adjustment was needed or desired.  Aware of 24 hour and weekend coverage for urgent situations accessed by calling [facility] main practice number      Medications Risks/Benefits:      Risks, Benefits And Possible Side Effects Of Medications:    Risks, benefits, and possible side effects of medications explained to Martha and she (or legal representative) verbalizes understanding and agreement for treatment.      History of Present Illness     Martha last seen by Daniel  at which time buspar restarted.  Martha currently taking 5 mg tid- gets dizzy on 10 mg.  Unfortunately, Martha's mom  on .  Martha had about 10 days off afterwards and took a trip, but very anxious coming back to [place] and has had increase in anxiety since then.  Is seeing therapist once a week and therapist has agreed to see her more often if needed.  Martha reports she is overwhelmed with emotions at times; is often reminded of her mother and wishes mom were here to help with certain things.  Martha find herself wanting to call or text her mom.   She is having a lot of dreams about her mom and needs to sleep with the lights on for fear she will see her mom or feel her presence- \"I fear the paranormal\".  Denies SI. Says she is considering moving out of state- \"nothing to come back to\".  Biggest supports are her step-father who she " continues to live with and an aunt who lives out of state.  Continues to work but put school on hold for the semester.   Was seen in ED 2/26 for chest pain s/p endoscopy.  Has been f/u with GI- had normal EGD and biopsies and neg barium swallow.  +fatigue. Is not taking B-12.    Review Of Systems: Review of systems as noted above in HPI/Subjective. A relevant review of symptoms was otherwise negative    Past Psychiatric History     Inpatient:  None.  [facility] ([facility]).   No hx of suicide attempts.      Outpatient:  Therapist- Abby Martínez -weekly.   Current therapist- [facility], [place].  This office since April 2022     Med trials: lexapro -more depressed, depakote- low dose- effective for pre-menstrual related mood symptoms.     Trauma/Loss History:           Abandonment by bio father.  Mother's illness  and death (Feb 2025).      Family Psychiatric History:      Psychiatric Illness:      Depression- grandfather; anxiety -aunt  Substance Abuse:       none reported  Suicide Attempts:        uncle     Social History:           Had IEP- learning disability- comprehension.  Single.  Currently living with step-father. Employed.          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    Wears glasses         Past Surgical History:   Procedure Laterality Date    LAPAROSCOPIC ENDOMETRIOSIS FULGURATION  01/2024    stage 2, [facility], robotic assisted with resection    TONSILECTOMY AND ADNOIDECTOMY      WISDOM  TOOTH EXTRACTION       Allergies:   Allergies   Allergen Reactions    Pineapple - Food Allergy Other (See Comments)     Tingling tongue and throat    Pollen Extract Sneezing     congestion    Bee Pollen Sneezing     congestion                 Medical History Reviewed by provider this encounter:  Tobacco  Allergies  Meds  Problems  Med Hx  Surg Hx  Fam Hx          Objective   LMP  (LMP Unknown)      Mental Status Evaluation:    Appearance age appropriate   Behavior pleasant, cooperative   Speech normal rate, normal volume, normal pitch, spontaneous   Mood Low, mildly anxious   Affect mood-congruent   Thought Processes organized, goal directed   Thought Content no overt delusions   Perceptual Disturbances: none   Abnormal Thoughts  Risk Potential Suicidal ideation - None  Homicidal ideation - None  Potential for aggression - No   Orientation oriented to person, place, time/date, and situation   Memory recent and remote memory grossly intact   Consciousness alert and awake   Attention Span Concentration Span attention span and concentration are age appropriate   Intellect appears to be of average intelligence   Insight intact   Judgement intact   Muscle Strength and  Gait unable to assess today due to virtual visit   Motor activity unable to assess today due to virtual visit   Language intact   Fund of Knowledge adequate knowledge of current events  adequate fund of knowledge regarding past history  adequate fund of knowledge regarding vocabulary                Laboratory Results: I have personally reviewed all pertinent laboratory/tests results    CBC:   Lab Results   Component Value Date    WBC 7.91 02/26/2025    RBC 4.71 02/26/2025    HGB 14.8 02/26/2025    HCT 43.2 02/26/2025    MCV 92 02/26/2025     02/26/2025    MCH 31.4 02/26/2025    MCHC 34.3 02/26/2025    RDW 12.0 02/26/2025    MPV 9.0 02/26/2025    NEUTROABS 4.96 02/26/2025     CMP:   Lab Results   Component Value Date    SODIUM 139 02/26/2025     K 3.7 02/26/2025     02/26/2025    CO2 29 02/26/2025    AGAP 9 02/26/2025    BUN 13 02/26/2025    CREATININE 0.74 02/26/2025    GLUC 92 02/26/2025    GLUF 96 08/23/2024    CALCIUM 9.6 02/26/2025    AST 16 08/23/2024    ALT 10 08/23/2024    ALKPHOS 67 08/23/2024    TP 7.6 08/23/2024    ALB 4.6 08/23/2024    TBILI 1.71 (H) 08/23/2024    EGFR 115 02/26/2025       Thyroid Studies:   Lab Results   Component Value Date    FMO1IHBQMCMV 1.317 01/28/2025       BMP:   Lab Results   Component Value Date    SODIUM 139 02/26/2025    K 3.7 02/26/2025     02/26/2025    CO2 29 02/26/2025    AGAP 9 02/26/2025    BUN 13 02/26/2025    CREATININE 0.74 02/26/2025    GLUC 92 02/26/2025    GLUF 96 08/23/2024    CALCIUM 9.6 02/26/2025    EGFR 115 02/26/2025       ECG   Lab Results   Component Value Date    VENTRATE 104 02/26/2025    ATRIALRATE 104 02/26/2025    PRINT 162 02/26/2025    QRSDINT 88 02/26/2025    QTINT 324 02/26/2025    PAXIS 76 02/26/2025    QRSAXIS 107 02/26/2025    TWAVEAXIS 58 02/26/2025     Vitamin D Level   Lab Results   Component Value Date    LFEX30TYSYVF 27.0 (L) 05/01/2024     Vitamin B12   Lab Results   Component Value Date    VPTOGADV30 290 05/01/2024       Suicide/Homicide Risk Assessment:    Risk of Harm to Self:  Based on today's assessment, Martha presents the following risk of harm to self: none    Risk of Harm to Others:  Based on today's assessment, Martha presents the following risk of harm to others: none        Note Share:    This note was not shared with the patient due to reasonable likelihood of causing patient harm    Administrative Statements   Administrative Statements   Encounter provider Minna Corona PA-C    The Patient is located at Home and in the following state in which I hold an active license [place].    The patient was identified by name and date of birth. Martha Hollis was informed that this is a telemedicine visit and that the visit is being conducted through the Epic  Embedded platform. She agrees to proceed..  My office door was closed. No one else was in the room.  She acknowledged consent and understanding of privacy and security of the video platform. The patient has agreed to participate and understands they can discontinue the visit at any time.    I have spent a total time of 20 minutes in caring for this patient on the day of the visit/encounter including Risks and benefits of tx options, Instructions for management, Risk factor reductions, Counseling / Coordination of care, Documenting in the medical record, Reviewing/placing orders in the medical record (including tests, medications, and/or procedures), and Obtaining or reviewing history  , not including the time spent for establishing the audio/video connection.    Visit Time  Visit Start Time: 1300  Visit Stop Time: 1320  Total Visit Duration:  20 minutes    Minna Corona PA-C 03/24/25

## 2025-04-02 ENCOUNTER — TELEPHONE (OUTPATIENT)
Dept: HEMATOLOGY ONCOLOGY | Facility: CLINIC | Age: 25
End: 2025-04-02

## 2025-04-02 ENCOUNTER — TELEPHONE (OUTPATIENT)
Age: 25
End: 2025-04-02

## 2025-04-02 ENCOUNTER — PATIENT MESSAGE (OUTPATIENT)
Dept: FAMILY MEDICINE CLINIC | Facility: CLINIC | Age: 25
End: 2025-04-02

## 2025-04-02 NOTE — TELEPHONE ENCOUNTER
Referral to Surgical Oncology received.  Chart reviewed by  for Surgical oncology at this time.       Diagnosis:     Z80.3 (ICD-10-CM) - Family history of breast cancer in female     After review of chart, instructions for scheduling added to referral and sent to be scheduled as advised.

## 2025-04-02 NOTE — TELEPHONE ENCOUNTER
PT called in to get back on the schedule for follow up from her US with Dr Cadena. There was no available appointments with her in the next couple months. Please follow up to schedule.

## 2025-04-03 ENCOUNTER — OFFICE VISIT (OUTPATIENT)
Dept: FAMILY MEDICINE CLINIC | Facility: CLINIC | Age: 25
End: 2025-04-03
Payer: COMMERCIAL

## 2025-04-03 VITALS
BODY MASS INDEX: 20.1 KG/M2 | OXYGEN SATURATION: 93 % | TEMPERATURE: 98.2 F | HEART RATE: 110 BPM | HEIGHT: 68 IN | WEIGHT: 132.6 LBS | RESPIRATION RATE: 16 BRPM | DIASTOLIC BLOOD PRESSURE: 68 MMHG | SYSTOLIC BLOOD PRESSURE: 104 MMHG

## 2025-04-03 DIAGNOSIS — F32.2 SEVERE MAJOR DEPRESSIVE DISORDER (HCC): ICD-10-CM

## 2025-04-03 DIAGNOSIS — K59.00 CONSTIPATION, UNSPECIFIED CONSTIPATION TYPE: Primary | ICD-10-CM

## 2025-04-03 PROCEDURE — 99213 OFFICE O/P EST LOW 20 MIN: CPT | Performed by: FAMILY MEDICINE

## 2025-04-03 NOTE — ASSESSMENT & PLAN NOTE
Recommend Colace. Will check CBC and iron panel given chronic mild bleeding for a couple of months now. Start Probiotic. Follow up if not improved.  Orders:  •  CBC and Platelet; Future  •  Iron Panel (Includes Ferritin, Iron Sat%, Iron, and TIBC); Future

## 2025-04-03 NOTE — PROGRESS NOTES
"Name: Martha Hollis      : 2000      MRN: [id number]  Encounter Provider: Howard Sexton DO  Encounter Date: 4/3/2025   Encounter department: [facility]  :  Assessment & Plan  Constipation, unspecified constipation type  Recommend Colace. Will check CBC and iron panel given chronic mild bleeding for a couple of months now. Start Probiotic. Follow up if not improved.  Orders:  •  CBC and Platelet; Future  •  Iron Panel (Includes Ferritin, Iron Sat%, Iron, and TIBC); Future           History of Present Illness   HPI    Saw GI for IBS symptoms. Had endoscopy which was normal. Also had swallow study that was normal. Was having dysphagia previously. Changed from Linzess to Amitiza for constipation, but now having pain with bowel movements and some blood on the toilet paper and toilet water. Has burning sensation after BM. Sometimes needs to strain. Was using Benefiber gummies. Drinking plenty of water. Had colonoscopy in  which was ok, told to return at age 47.    Feeling somewhat more fatigued lately. Sometimes has a hard time falling asleep.     Stomach has been more sensitive since recent GI bug a few weeks ago. Slight nausea with eating and some diarrhea.         Review of Systems    Objective   /68 (BP Location: Left arm, Patient Position: Sitting, Cuff Size: Standard)   Pulse (!) 110   Temp 98.2 °F (36.8 °C) (Temporal)   Resp 16   Ht 5' 8\" (1.727 m)   Wt 60.1 kg (132 lb 9.6 oz)   LMP  (LMP Unknown)   SpO2 93%   BMI 20.16 kg/m²      Physical Exam  Vitals reviewed.   Constitutional:       Appearance: Normal appearance.   Eyes:      Extraocular Movements: Extraocular movements intact.      Conjunctiva/sclera: Conjunctivae normal.   Cardiovascular:      Rate and Rhythm: Normal rate and regular rhythm.      Heart sounds: Normal heart sounds.   Pulmonary:      Effort: Pulmonary effort is normal.   Abdominal:      General: Abdomen is flat. There is no distension.      " Palpations: Abdomen is soft.      Tenderness: There is no abdominal tenderness.   Skin:     General: Skin is warm and dry.      Capillary Refill: Capillary refill takes less than 2 seconds.   Neurological:      General: No focal deficit present.      Mental Status: She is alert and oriented to person, place, and time.   Psychiatric:         Mood and Affect: Mood normal.         Behavior: Behavior normal.

## 2025-04-04 ENCOUNTER — APPOINTMENT (OUTPATIENT)
Dept: LAB | Facility: CLINIC | Age: 25
End: 2025-04-04
Payer: COMMERCIAL

## 2025-04-04 DIAGNOSIS — K59.00 CONSTIPATION, UNSPECIFIED CONSTIPATION TYPE: ICD-10-CM

## 2025-04-04 LAB
ERYTHROCYTE [DISTWIDTH] IN BLOOD BY AUTOMATED COUNT: 12.6 % (ref 11.6–15.1)
FERRITIN SERPL-MCNC: 48 NG/ML (ref 11–307)
HCT VFR BLD AUTO: 42.3 % (ref 34.8–46.1)
HGB BLD-MCNC: 14.5 G/DL (ref 11.5–15.4)
IRON SATN MFR SERPL: 28 % (ref 15–50)
IRON SERPL-MCNC: 93 UG/DL (ref 50–212)
MCH RBC QN AUTO: 31.7 PG (ref 26.8–34.3)
MCHC RBC AUTO-ENTMCNC: 34.3 G/DL (ref 31.4–37.4)
MCV RBC AUTO: 92 FL (ref 82–98)
PLATELET # BLD AUTO: 175 THOUSANDS/UL (ref 149–390)
PMV BLD AUTO: 9.8 FL (ref 8.9–12.7)
RBC # BLD AUTO: 4.58 MILLION/UL (ref 3.81–5.12)
TIBC SERPL-MCNC: 336 UG/DL (ref 250–450)
TRANSFERRIN SERPL-MCNC: 240 MG/DL (ref 203–362)
UIBC SERPL-MCNC: 243 UG/DL (ref 155–355)
WBC # BLD AUTO: 5.03 THOUSAND/UL (ref 4.31–10.16)

## 2025-04-04 PROCEDURE — 36415 COLL VENOUS BLD VENIPUNCTURE: CPT

## 2025-04-04 PROCEDURE — 82728 ASSAY OF FERRITIN: CPT

## 2025-04-04 PROCEDURE — 83550 IRON BINDING TEST: CPT

## 2025-04-04 PROCEDURE — 83540 ASSAY OF IRON: CPT

## 2025-04-04 PROCEDURE — 85027 COMPLETE CBC AUTOMATED: CPT

## 2025-04-07 ENCOUNTER — RESULTS FOLLOW-UP (OUTPATIENT)
Dept: FAMILY MEDICINE CLINIC | Facility: CLINIC | Age: 25
End: 2025-04-07

## 2025-04-09 ENCOUNTER — APPOINTMENT (OUTPATIENT)
Dept: LAB | Facility: CLINIC | Age: 25
End: 2025-04-09
Payer: COMMERCIAL

## 2025-04-09 DIAGNOSIS — K59.04 CHRONIC IDIOPATHIC CONSTIPATION: Primary | ICD-10-CM

## 2025-04-09 DIAGNOSIS — R10.9 ABDOMINAL PAIN, UNSPECIFIED ABDOMINAL LOCATION: ICD-10-CM

## 2025-04-09 DIAGNOSIS — K59.04 CHRONIC IDIOPATHIC CONSTIPATION: ICD-10-CM

## 2025-04-09 LAB — IGA SERPL-MCNC: 139 MG/DL (ref 66–433)

## 2025-04-09 PROCEDURE — 82784 ASSAY IGA/IGD/IGG/IGM EACH: CPT

## 2025-04-09 PROCEDURE — 86364 TISS TRNSGLTMNASE EA IG CLAS: CPT

## 2025-04-09 PROCEDURE — 36415 COLL VENOUS BLD VENIPUNCTURE: CPT

## 2025-04-10 ENCOUNTER — RESULTS FOLLOW-UP (OUTPATIENT)
Dept: GASTROENTEROLOGY | Facility: CLINIC | Age: 25
End: 2025-04-10

## 2025-04-10 LAB — TTG IGA SER IA-ACNC: <0.4 U/ML (ref ?–10)

## 2025-04-14 ENCOUNTER — HOSPITAL ENCOUNTER (OUTPATIENT)
Dept: RADIOLOGY | Facility: HOSPITAL | Age: 25
Discharge: HOME | End: 2025-04-14
Attending: OBSTETRICS & GYNECOLOGY
Payer: COMMERCIAL

## 2025-04-14 DIAGNOSIS — N83.201 CYST OF RIGHT OVARY: ICD-10-CM

## 2025-04-14 PROCEDURE — 76856 US EXAM PELVIC COMPLETE: CPT

## 2025-04-14 PROCEDURE — 76830 TRANSVAGINAL US NON-OB: CPT

## 2025-04-15 ENCOUNTER — OFFICE VISIT (OUTPATIENT)
Dept: SURGICAL ONCOLOGY | Facility: CLINIC | Age: 25
End: 2025-04-15
Payer: COMMERCIAL

## 2025-04-15 VITALS
DIASTOLIC BLOOD PRESSURE: 66 MMHG | HEART RATE: 113 BPM | WEIGHT: 135 LBS | SYSTOLIC BLOOD PRESSURE: 116 MMHG | TEMPERATURE: 98 F | BODY MASS INDEX: 20.46 KG/M2 | OXYGEN SATURATION: 99 % | HEIGHT: 68 IN

## 2025-04-15 DIAGNOSIS — Z80.3 FAMILY HISTORY OF BREAST CANCER IN MOTHER: ICD-10-CM

## 2025-04-15 DIAGNOSIS — Z12.39 BREAST CANCER SCREENING OTHER THAN MAMMOGRAM: ICD-10-CM

## 2025-04-15 DIAGNOSIS — R92.343 EXTREMELY DENSE TISSUE OF BOTH BREASTS ON MAMMOGRAPHY: ICD-10-CM

## 2025-04-15 DIAGNOSIS — Z91.89 AT HIGH RISK FOR BREAST CANCER: Primary | ICD-10-CM

## 2025-04-15 PROCEDURE — 99244 OFF/OP CNSLTJ NEW/EST MOD 40: CPT

## 2025-04-15 NOTE — ASSESSMENT & PLAN NOTE
Her mother was diagnosed at age 28 with HR+ cancer, and passed at age of 47 from metastatic disease. Genetic panel in 2018 was negative. I will review her mother's testing with our genetics team to determine if any testing is indicated for the patient now.  Orders:  •  MRI breast bilateral w and wo contrast w cad; Future

## 2025-04-15 NOTE — LETTER
April 15, 2025     Nat Cadena DO  [address]  [place] [address]    Patient: Martha Hollis   YOB: 2000   Date of Visit: 4/15/2025       Dear DO Roya Song MD:    Below are my notes for this high-risk breast consultation.    If you have questions, please do not hesitate to call me. I look forward to following your patient along with you.         Sincerely,        DOUG Lacy        CC: MD Anjali Wagoner CRNP  4/15/2025  2:43 PM  Sign when Signing Visit  Name: Martha Hollis      : 2000      MRN: [id number]  Encounter Provider: DOUG Lacy  Encounter Date: 4/15/2025   Encounter department: [facility] [place]  :  Assessment & Plan  At high risk for breast cancer  I have reviewed current NCCN guidelines which suggest annual screening should start no younger than the age of 25. However, because of her mother's extremely young age at diagnosis, the patient had already initiated early screening through [facility]; she would like to continue with annual imaging.  We have discussed the risks and benefits of imaging at this point. While I do not agree with mammography at the age of 24, breast MRI may be reasonable. Assuming her insurance approves this, we will obtain these yearly.  We would then initiate annual mammogram at the age of 32.  I have also reviewed the concept of chemoprevention.  This is generally recommended for women aged 35 or older, and review of current literature suggests little benefit for younger women versus older women.  I will discuss this with our Medical Oncology team, and contact the patient with their recommendation.  Since the patient is due for her annual exam with her gynecologist this month, I will see her again in 6 months, and then annually thereafter.  I have advised her to have a low threshold for any breast changes.    Orders:  •  MRI breast bilateral w and wo contrast w cad; Future    Family history of breast cancer in mother  Her mother was diagnosed at age 28 with HR+ cancer, and passed at age of 47 from metastatic disease. Genetic panel in 2018 was negative. I will review her mother's testing with our genetics team to determine if any testing is indicated for the patient now.  Orders:  •  MRI breast bilateral w and wo contrast w cad; Future    Extremely dense tissue of both breasts on mammography    Orders:  •  MRI breast bilateral w and wo contrast w cad; Future    Breast cancer screening other than mammogram    Orders:  •  MRI breast bilateral w and wo contrast w cad; Future        History of Present Illness  Martha Hollis is a 24 y.o. year old female who presents today for high risk breast consultation. She reports that her mother recently passed from metastatic breast cancer at the age of 47. She was initially diagnosed at the age of 30 with HR+ cancer, and experienced multiple recurrences. Her mother had a genetic panel drawn in 2018 which was negative. Her maternal grandmother also had breast cancer, diagnosed at 62, with negative genetics.  The patient reports that she had already initiated care with a breast specialist at [facility] last year.  Bilateral mammogram and left breast US were performed on March 25, 2024, with BIRADS-2 result and category d density. She was advised to have a breast MRI performed 6 months later, but didn't have this done secondary to other health issues at the time.  She denies any breast issues or complaints, and has never had any breast biopsies or surgeries performed.  She reports menarche at 14 and has never been pregnant.  She has not personally had any genetic testing performed. I have calculated her lifetime risk to be at least 32%.      Review of Systems A complete review of systems is negative other than that noted above in the HPI.         Objective  /66    "Pulse (!) 113   Temp 98 °F (36.7 °C)   Ht 5' 8\" (1.727 m)   Wt 61.2 kg (135 lb)   SpO2 99%   BMI 20.53 kg/m²     Pain Screening:  Pain Score: 0-No pain  ECOG    Physical Exam  Vitals reviewed.   Constitutional:       General: She is not in acute distress.     Appearance: Normal appearance. She is normal weight. She is not ill-appearing or toxic-appearing.   HENT:      Head: Normocephalic and atraumatic.   Eyes:      General: No scleral icterus.  Cardiovascular:      Rate and Rhythm: Normal rate.   Pulmonary:      Effort: Pulmonary effort is normal.   Chest:   Breasts:     Right: Normal.      Left: Normal.      Comments: Breasts are symmetric bilaterally, with very dense fibroglandular tissue present throughout. There are no dominant masses, nodules, skin changes or tenderness on exam. I do not appreciate any adenopathy.  Musculoskeletal:         General: No swelling or tenderness. Normal range of motion.      Cervical back: Normal range of motion and neck supple.   Lymphadenopathy:      Cervical: No cervical adenopathy.      Upper Body:      Right upper body: No supraclavicular, axillary or pectoral adenopathy.      Left upper body: No supraclavicular, axillary or pectoral adenopathy.   Skin:     General: Skin is warm and dry.   Neurological:      General: No focal deficit present.      Mental Status: She is alert and oriented to person, place, and time.   Psychiatric:         Mood and Affect: Mood normal.         Behavior: Behavior normal.         Thought Content: Thought content normal.         Judgment: Judgment normal.              "

## 2025-04-15 NOTE — PROGRESS NOTES
Name: Martha Hollis      : 2000      MRN: [id number]  Encounter Provider: DOUG Lacy  Encounter Date: 4/15/2025   Encounter department: [facility] [place]  :  Assessment & Plan  At high risk for breast cancer  I have reviewed current NCCN guidelines which suggest annual screening should start no younger than the age of 25. However, because of her mother's extremely young age at diagnosis, the patient had already initiated early screening through [facility]; she would like to continue with annual imaging.  We have discussed the risks and benefits of imaging at this point. While I do not agree with mammography at the age of 24, breast MRI may be reasonable. Assuming her insurance approves this, we will obtain these yearly.  We would then initiate annual mammogram at the age of 32.  I have also reviewed the concept of chemoprevention.  This is generally recommended for women aged 35 or older, and review of current literature suggests little benefit for younger women versus older women.  I will discuss this with our Medical Oncology team, and contact the patient with their recommendation.  Since the patient is due for her annual exam with her gynecologist this month, I will see her again in 6 months, and then annually thereafter.  I have advised her to have a low threshold for any breast changes.   Orders:  •  MRI breast bilateral w and wo contrast w cad; Future    Family history of breast cancer in mother  Her mother was diagnosed at age 28 with HR+ cancer, and passed at age of 47 from metastatic disease. Genetic panel in 2018 was negative. I will review her mother's testing with our genetics team to determine if any testing is indicated for the patient now.  Orders:  •  MRI breast bilateral w and wo contrast w cad; Future    Extremely dense tissue of both breasts on mammography    Orders:  •  MRI breast bilateral w and wo contrast w cad;  "Future    Breast cancer screening other than mammogram    Orders:  •  MRI breast bilateral w and wo contrast w cad; Future        History of Present Illness   aMrtha Hollis is a 24 y.o. year old female who presents today for high risk breast consultation. She reports that her mother recently passed from metastatic breast cancer at the age of 47. She was initially diagnosed at the age of 28 with HR+ cancer, and experienced multiple recurrences. Her mother had a genetic panel drawn in 2018 which was negative. Her maternal grandmother also had breast cancer, diagnosed at 62, with negative genetics.  The patient reports that she had already initiated care with a breast specialist at [facility] last year.  Bilateral mammogram and left breast US were performed on March 25, 2024, with BIRADS-2 result and category d density. She was advised to have a breast MRI performed 6 months later, but didn't have this done secondary to other health issues at the time.  She denies any breast issues or complaints, and has never had any breast biopsies or surgeries performed.  She reports menarche at 14 and has never been pregnant.  She has not personally had any genetic testing performed. I have calculated her lifetime risk to be at least 32%.      Review of Systems A complete review of systems is negative other than that noted above in the HPI.         Objective   /66   Pulse (!) 113   Temp 98 °F (36.7 °C)   Ht 5' 8\" (1.727 m)   Wt 61.2 kg (135 lb)   SpO2 99%   BMI 20.53 kg/m²     Pain Screening:  Pain Score: 0-No pain  ECOG    Physical Exam  Vitals reviewed.   Constitutional:       General: She is not in acute distress.     Appearance: Normal appearance. She is normal weight. She is not ill-appearing or toxic-appearing.   HENT:      Head: Normocephalic and atraumatic.   Eyes:      General: No scleral icterus.  Cardiovascular:      Rate and Rhythm: Normal rate.   Pulmonary:      Effort: Pulmonary effort is " normal.   Chest:   Breasts:     Right: Normal.      Left: Normal.      Comments: Breasts are symmetric bilaterally, with very dense fibroglandular tissue present throughout. There are no dominant masses, nodules, skin changes or tenderness on exam. I do not appreciate any adenopathy.  Musculoskeletal:         General: No swelling or tenderness. Normal range of motion.      Cervical back: Normal range of motion and neck supple.   Lymphadenopathy:      Cervical: No cervical adenopathy.      Upper Body:      Right upper body: No supraclavicular, axillary or pectoral adenopathy.      Left upper body: No supraclavicular, axillary or pectoral adenopathy.   Skin:     General: Skin is warm and dry.   Neurological:      General: No focal deficit present.      Mental Status: She is alert and oriented to person, place, and time.   Psychiatric:         Mood and Affect: Mood normal.         Behavior: Behavior normal.         Thought Content: Thought content normal.         Judgment: Judgment normal.

## 2025-04-15 NOTE — ASSESSMENT & PLAN NOTE
I have reviewed current NCCN guidelines which suggest annual screening should start no younger than the age of 25. However, because of her mother's extremely young age at diagnosis, the patient had already initiated early screening through [facility]; she would like to continue with annual imaging.  We have discussed the risks and benefits of imaging at this point. While I do not agree with mammography at the age of 24, breast MRI may be reasonable. Assuming her insurance approves this, we will obtain these yearly.  We would then initiate annual mammogram at the age of 32.  I have also reviewed the concept of chemoprevention.  This is generally recommended for women aged 35 or older, and review of current literature suggests little benefit for younger women versus older women.  I will discuss this with our Medical Oncology team, and contact the patient with their recommendation.  Since the patient is due for her annual exam with her gynecologist this month, I will see her again in 6 months, and then annually thereafter.  I have advised her to have a low threshold for any breast changes.   Orders:  •  MRI breast bilateral w and wo contrast w cad; Future

## 2025-04-25 ENCOUNTER — TELEPHONE (OUTPATIENT)
Age: 25
End: 2025-04-25

## 2025-04-25 NOTE — TELEPHONE ENCOUNTER
Patient is calling regarding cancelling an appointment.    Date/Time: 4/25/2025 12pm    Reason:     Patient was rescheduled: YES [x] NO []  If yes, when was Patient reschedule for: 4/28/2025 1:30pm    Patient requesting call back to reschedule: YES [] NO [x]

## 2025-04-29 ENCOUNTER — OFFICE VISIT (OUTPATIENT)
Dept: OBGYN CLINIC | Facility: CLINIC | Age: 25
End: 2025-04-29
Payer: COMMERCIAL

## 2025-04-29 VITALS
DIASTOLIC BLOOD PRESSURE: 68 MMHG | WEIGHT: 132.2 LBS | HEIGHT: 68 IN | BODY MASS INDEX: 20.03 KG/M2 | SYSTOLIC BLOOD PRESSURE: 100 MMHG

## 2025-04-29 DIAGNOSIS — Z97.5 PRESENCE OF MIRENA IUD: ICD-10-CM

## 2025-04-29 DIAGNOSIS — N76.2 ACUTE VULVITIS: ICD-10-CM

## 2025-04-29 DIAGNOSIS — N89.8 VAGINAL DISCHARGE: ICD-10-CM

## 2025-04-29 DIAGNOSIS — Z11.3 SCREENING FOR STDS (SEXUALLY TRANSMITTED DISEASES): ICD-10-CM

## 2025-04-29 DIAGNOSIS — N39.0 RECURRENT UTI: ICD-10-CM

## 2025-04-29 DIAGNOSIS — N80.9 ENDOMETRIOSIS: Primary | ICD-10-CM

## 2025-04-29 PROCEDURE — 81514 NFCT DS BV&VAGINITIS DNA ALG: CPT | Performed by: OBSTETRICS & GYNECOLOGY

## 2025-04-29 PROCEDURE — 87661 TRICHOMONAS VAGINALIS AMPLIF: CPT | Performed by: OBSTETRICS & GYNECOLOGY

## 2025-04-29 PROCEDURE — 99214 OFFICE O/P EST MOD 30 MIN: CPT | Performed by: OBSTETRICS & GYNECOLOGY

## 2025-04-29 PROCEDURE — 87491 CHLMYD TRACH DNA AMP PROBE: CPT | Performed by: OBSTETRICS & GYNECOLOGY

## 2025-04-29 PROCEDURE — 87563 M. GENITALIUM AMP PROBE: CPT | Performed by: OBSTETRICS & GYNECOLOGY

## 2025-04-29 PROCEDURE — 87591 N.GONORRHOEAE DNA AMP PROB: CPT | Performed by: OBSTETRICS & GYNECOLOGY

## 2025-04-29 RX ORDER — NITROFURANTOIN MACROCRYSTALS 50 MG/1
50 CAPSULE ORAL AS NEEDED
Qty: 30 CAPSULE | Refills: 1 | Status: SHIPPED | OUTPATIENT
Start: 2025-04-29

## 2025-04-29 RX ORDER — RELUGOLIX, ESTRADIOL HEMIHYDRATE, AND NORETHINDRONE ACETATE 40; 1; .5 MG/1; MG/1; MG/1
1 TABLET, FILM COATED ORAL DAILY
Qty: 289.28 TABLET | Refills: 1 | Status: SHIPPED | OUTPATIENT
Start: 2025-04-29

## 2025-04-29 RX ORDER — CEPHALEXIN 500 MG/1
1 CAPSULE ORAL 2 TIMES DAILY
COMMUNITY
Start: 2025-04-23

## 2025-04-29 RX ORDER — CLOTRIMAZOLE AND BETAMETHASONE DIPROPIONATE 10; .64 MG/G; MG/G
CREAM TOPICAL 2 TIMES DAILY
Qty: 45 G | Refills: 0 | Status: SHIPPED | OUTPATIENT
Start: 2025-04-29 | End: 2025-05-09

## 2025-04-29 RX ORDER — RELUGOLIX, ESTRADIOL HEMIHYDRATE, AND NORETHINDRONE ACETATE 40; 1; .5 MG/1; MG/1; MG/1
1 TABLET, FILM COATED ORAL DAILY
Qty: 30 TABLET | Refills: 6 | Status: SHIPPED | OUTPATIENT
Start: 2025-04-29 | End: 2025-04-29 | Stop reason: SDUPTHER

## 2025-04-29 NOTE — ASSESSMENT & PLAN NOTE
Orders:    Relugolix-Estradiol-Norethind (Myfembree) 40-1-0.5 MG TABS; Take 1 tablet by mouth in the morning

## 2025-04-29 NOTE — PROGRESS NOTES
Name: Martha Hollis      : 2000      MRN: [id number]  Encounter Provider: Nat Cadena DO  Encounter Date: 2025   Encounter department: [facility]  :  Assessment & Plan  Endometriosis    Orders:    Relugolix-Estradiol-Norethind (Myfembree) 40-1-0.5 MG TABS; Take 1 tablet by mouth in the morning    Presence of Mirena IUD         Recurrent UTI    Orders:    nitrofurantoin (MACRODANTIN) 50 mg capsule; Take 1 capsule (50 mg total) by mouth if needed (take one dose after intercourse)    Acute vulvitis    Orders:    clotrimazole-betamethasone (LOTRISONE) 1-0.05 % cream; Apply topically 2 (two) times a day for 10 days    Vaginal discharge         Continue Myfembree x 6 months.  Discussed the risks and benefits.  Marked improvement.  Discussed treatment options for recurrent urinary tract infection.  Discussed postcoital Macrodantin x 1 dose as needed.  She will then monitor bladder symptoms thereafter.  Cultures obtained for GC, chlamydia, trichomonas, bacterial vaginosis and Candida.  She will use topical Lotrisone externally nightly x 10 days.  All questions answered.  Return to office in 6 months for annual visit or as needed    History of Present Illness   HPI  Martha Hollis is a 24 y.o. female who presents     This is a pleasant 24-year-old female G0 presents for follow-up.  She is completed 2 months of Myfembree.  She denies any vaginal bleeding or spotting, however since IUD Mirena has had no menstrual cycle.  She states her lower pelvic pain has markedly improved greater than 90%.  She has been having recurrent urinary tract infections and is currently finishing course of Keflex.  She would like to try a prophylactic antibiotic.  She has been complaining of external vaginal irritation.  She is sexually active, monogamous relationship for 2 years.  She is using the IUD as a form of contraception.    Long history of endometriosis, stage II diagnosed laparoscopically 2024, surgery done  at [facility]    Denies any mood swings irritability, hot flashes or night sweats.      History obtained from: patient    Review of Systems   Constitutional:  Negative for fatigue, fever and unexpected weight change.   Respiratory:  Negative for cough, chest tightness, shortness of breath and wheezing.    Cardiovascular: Negative.  Negative for chest pain and palpitations.   Gastrointestinal: Negative.  Negative for abdominal distention, abdominal pain, blood in stool, constipation, diarrhea, nausea and vomiting.   Genitourinary: Negative.  Negative for difficulty urinating, dyspareunia, dysuria, flank pain, frequency, genital sores, hematuria, pelvic pain, urgency, vaginal bleeding, vaginal discharge and vaginal pain.   Skin:  Negative for rash.     Current Outpatient Medications on File Prior to Visit   Medication Sig Dispense Refill    busPIRone (BUSPAR) 10 mg tablet 1/2 tab po tid with meals x one week then one po tid with meals 90 tablet 1    cephalexin (KEFLEX) 500 mg capsule Take 1 capsule by mouth 2 (two) times a day      Clindamycin Phos-Benzoyl Perox gel EVERY NIGHT AT BEDTIME TO FACE FOR ACNE. KEEP IN THE REFRIGERATOR. DISCARD AFTER 2 MONTHS      levocetirizine (XYZAL) 5 MG tablet Take 1 tablet (5 mg total) by mouth every evening 90 tablet 1    Levonorgestrel (MIRENA) 20 MCG/DAY IUD 1 each by Intrauterine Device route Once every 8 years      lubiprostone (AMITIZA) 8 mcg capsule Take 1 capsule (8 mcg total) by mouth 2 (two) times a day with meals 60 capsule 3    metoclopramide (Reglan) 10 mg tablet Take 0.5 tablets (5 mg total) by mouth 4 (four) times a day 30 tablet 0    multivitamin (THERAGRAN) TABS Take 1 tablet by mouth daily      nortriptyline (PAMELOR) 50 mg capsule TAKE 1 CAPSULE (50 MG TOTAL) BY MOUTH DAILY AT BEDTIME 90 capsule 0    ondansetron (ZOFRAN-ODT) 4 mg disintegrating tablet Take 1 tablet (4 mg total) by mouth every 6 (six) hours as needed for nausea or vomiting 20 tablet 0    pantoprazole  "(PROTONIX) 40 mg tablet Take 1 tablet (40 mg total) by mouth daily 30 tablet 3    Retin-A Micro Pump 0.08 % GEL Apply TO AFFECTED AREA(S) of THE FACE nightly      [DISCONTINUED] Relugolix-Estradiol-Norethind (Myfembree) 40-1-0.5 MG TABS Take 1 tablet by mouth in the morning 30 tablet 2     No current facility-administered medications on file prior to visit.         Objective   /68   Ht 5' 8\" (1.727 m)   Wt 60 kg (132 lb 3.2 oz)   BMI 20.10 kg/m²      Physical Exam  Constitutional:       Appearance: Normal appearance.   Abdominal:      General: Bowel sounds are normal. There is no distension.      Palpations: Abdomen is soft.      Tenderness: There is no abdominal tenderness. There is no guarding or rebound.   Genitourinary:     Labia:         Right: No rash, tenderness or lesion.         Left: No rash, tenderness or lesion.       Vagina: No signs of injury. No vaginal discharge, tenderness or lesions.      Cervix: No cervical motion tenderness, discharge, friability, lesion, erythema or cervical bleeding.   Neurological:      Mental Status: She is alert.     External genitalia is evident of erythema noted along labia bilaterally, no other lesions seen.  The vagina is well estrogenized.  Scant discharge noted.  Vaginal pH slightly elevated.    Administrative Statements   I have spent a total time of 30 minutes in caring for this patient on the day of the visit/encounter including Diagnostic results, Prognosis, Risks and benefits of tx options, Instructions for management, Impressions, Counseling / Coordination of care, Documenting in the medical record, Reviewing/placing orders in the medical record (including tests, medications, and/or procedures), and Obtaining or reviewing history  .  "

## 2025-04-30 ENCOUNTER — RESULTS FOLLOW-UP (OUTPATIENT)
Dept: OBGYN CLINIC | Facility: CLINIC | Age: 25
End: 2025-04-30

## 2025-04-30 DIAGNOSIS — B37.9 CANDIDIASIS: Primary | ICD-10-CM

## 2025-04-30 LAB
C GLABRATA DNA VAG QL NAA+PROBE: NEGATIVE
C KRUSEI DNA VAG QL NAA+PROBE: NEGATIVE
C TRACH DNA SPEC QL NAA+PROBE: NEGATIVE
CANDIDA SP 6 PNL VAG NAA+PROBE: POSITIVE
M GENITALIUM DNA SPEC QL NAA+PROBE: NEGATIVE
N GONORRHOEA DNA SPEC QL NAA+PROBE: NEGATIVE
T VAGINALIS DNA SPEC QL NAA+PROBE: NEGATIVE
T VAGINALIS DNA VAG QL NAA+PROBE: NEGATIVE
VAGINOSIS/ITIS DNA PNL VAG PROBE+SIG AMP: NEGATIVE

## 2025-04-30 RX ORDER — FLUCONAZOLE 150 MG/1
150 TABLET ORAL ONCE
Qty: 1 TABLET | Refills: 0 | Status: SHIPPED | OUTPATIENT
Start: 2025-04-30 | End: 2025-04-30

## 2025-05-02 ENCOUNTER — OFFICE VISIT (OUTPATIENT)
Dept: FAMILY MEDICINE CLINIC | Facility: CLINIC | Age: 25
End: 2025-05-02
Payer: COMMERCIAL

## 2025-05-02 VITALS
WEIGHT: 135.8 LBS | DIASTOLIC BLOOD PRESSURE: 84 MMHG | HEIGHT: 68 IN | SYSTOLIC BLOOD PRESSURE: 124 MMHG | BODY MASS INDEX: 20.58 KG/M2 | RESPIRATION RATE: 16 BRPM | OXYGEN SATURATION: 98 % | TEMPERATURE: 98.2 F | HEART RATE: 122 BPM

## 2025-05-02 DIAGNOSIS — J30.1 CHRONIC SEASONAL ALLERGIC RHINITIS DUE TO POLLEN: ICD-10-CM

## 2025-05-02 DIAGNOSIS — H10.13 ALLERGIC CONJUNCTIVITIS OF BOTH EYES: ICD-10-CM

## 2025-05-02 DIAGNOSIS — J30.9 ALLERGIC RHINITIS, UNSPECIFIED SEASONALITY, UNSPECIFIED TRIGGER: Primary | ICD-10-CM

## 2025-05-02 PROCEDURE — 99213 OFFICE O/P EST LOW 20 MIN: CPT | Performed by: FAMILY MEDICINE

## 2025-05-02 RX ORDER — AZELASTINE HYDROCHLORIDE 0.5 MG/ML
1 SOLUTION/ DROPS OPHTHALMIC 2 TIMES DAILY
Qty: 6 ML | Refills: 3 | Status: SHIPPED | OUTPATIENT
Start: 2025-05-02

## 2025-05-02 RX ORDER — METHYLPREDNISOLONE 4 MG/1
TABLET ORAL
Qty: 21 EACH | Refills: 0 | Status: SHIPPED | OUTPATIENT
Start: 2025-05-02

## 2025-05-02 RX ORDER — AZELASTINE 1 MG/ML
2 SPRAY, METERED NASAL 2 TIMES DAILY
Qty: 30 ML | Refills: 3 | Status: SHIPPED | OUTPATIENT
Start: 2025-05-02

## 2025-05-02 NOTE — ASSESSMENT & PLAN NOTE
Patient presents for evaluation of seasonal allergies, significant nasal congestion/sinus pressure, watery eyes, postnasal drip.  She is nontoxic and afebrile.  Start Medrol Dosepak and azelastine nasal spray. Continue Xyzal and Flonase.  She may add Claritin in the morning for the next few weeks.  Orders:  •  Ambulatory Referral to Allergy; Future  •  methylPREDNISolone 4 MG tablet therapy pack; Use as directed on package  •  azelastine (ASTELIN) 0.1 % nasal spray; 2 sprays into each nostril 2 (two) times a day Use in each nostril as directed

## 2025-05-02 NOTE — PROGRESS NOTES
Name: Martha Hollis      : 2000      MRN: [id number]  Encounter Provider: Roya Chan MD  Encounter Date: 2025   Encounter department: [facility]    Assessment & Plan  Allergic rhinitis, unspecified seasonality, unspecified trigger    Orders:  •  Ambulatory Referral to Allergy; Future    Chronic seasonal allergic rhinitis due to pollen  Patient presents for evaluation of seasonal allergies, significant nasal congestion/sinus pressure, watery eyes, postnasal drip.  She is nontoxic and afebrile.  Start Medrol Dosepak and azelastine nasal spray. Continue Xyzal and Flonase.  She may add Claritin in the morning for the next few weeks.  Orders:  •  Ambulatory Referral to Allergy; Future  •  methylPREDNISolone 4 MG tablet therapy pack; Use as directed on package  •  azelastine (ASTELIN) 0.1 % nasal spray; 2 sprays into each nostril 2 (two) times a day Use in each nostril as directed    Allergic conjunctivitis of both eyes    Orders:  •  azelastine (OPTIVAR) 0.05 % ophthalmic solution; Administer 1 drop to both eyes 2 (two) times a day       Patient Instructions   Please take Claritin/loratadine 10 mg in the morning, OTC, please keep Xyzal at night  Please continue with Flonase and add azelastine nasal spray twice a day as needed  You may use azelastine eyedrops as needed  Referral to allergy/immunology for testing  Medrol Dosepak, 6-day short steroid taper to relieve sinus pressure and congestion right away    Return if symptoms worsen or fail to improve.    History of Present Illness     Presents for evaluation of seasonal allergies.  She is complaining of postnasal drip, tickle in the back of her throat, sinus and ear pressure, watery eyes.  She denies symptoms of fever but feels warm at times.  She has been on regimen of Xyzal and Flonase. She reports occasional yellow and green mucus expectoration.  No symptoms of chest tightness or wheezing.        Review of Systems    Constitutional: Negative.  Negative for fever.   HENT:  Positive for congestion, postnasal drip and sinus pain.    Eyes:  Positive for itching.   Respiratory:  Negative for chest tightness and shortness of breath.    Cardiovascular: Negative.    Allergic/Immunologic: Positive for environmental allergies.     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   • Wears glasses      Past Surgical History:   Procedure Laterality Date   • LAPAROSCOPIC ENDOMETRIOSIS FULGURATION  01/2024    stage 2, [facility], robotic assisted with resection   • TONSILECTOMY AND ADNOIDECTOMY     • WISDOM TOOTH EXTRACTION       Family History   Problem Relation Age of Onset   • Breast cancer Mother 28        passed at 47; genetics negative   • No Known Problems Father    • Breast cancer Maternal Grandmother 62        genetics negative   • Depression Maternal Aunt    • Anxiety disorder Maternal Aunt    • Colon cancer Maternal Uncle 40   • Ovarian cancer Neg Hx      Social History     Tobacco Use   • Smoking status: Never     Passive exposure: Past   • Smokeless tobacco: Never   Vaping Use   • Vaping status: Never Used   Substance and Sexual Activity   • Alcohol use: Yes     Comment: Social   • Drug use: No   • Sexual activity: Yes     Partners: Male     Birth control/protection: OCP     Current Outpatient Medications on File Prior to Visit   Medication Sig   • busPIRone (BUSPAR) 10 mg tablet 1/2 tab po tid with meals x one week then one po tid with  meals   • cephalexin (KEFLEX) 500 mg capsule Take 1 capsule by mouth 2 (two) times a day   • Clindamycin Phos-Benzoyl Perox gel EVERY NIGHT AT BEDTIME TO FACE FOR ACNE. KEEP IN THE REFRIGERATOR. DISCARD AFTER 2 MONTHS   • levocetirizine (XYZAL) 5 MG tablet Take 1 tablet (5 mg total) by mouth every evening   • Levonorgestrel (MIRENA) 20 MCG/DAY IUD 1 each by Intrauterine Device route Once every 8 years   • lubiprostone (AMITIZA) 8 mcg capsule Take 1 capsule (8 mcg total) by mouth 2 (two) times a day with meals   • metoclopramide (Reglan) 10 mg tablet Take 0.5 tablets (5 mg total) by mouth 4 (four) times a day   • multivitamin (THERAGRAN) TABS Take 1 tablet by mouth daily   • nitrofurantoin (MACRODANTIN) 50 mg capsule Take 1 capsule (50 mg total) by mouth if needed (take one dose after intercourse)   • nortriptyline (PAMELOR) 50 mg capsule TAKE 1 CAPSULE (50 MG TOTAL) BY MOUTH DAILY AT BEDTIME   • ondansetron (ZOFRAN-ODT) 4 mg disintegrating tablet Take 1 tablet (4 mg total) by mouth every 6 (six) hours as needed for nausea or vomiting   • pantoprazole (PROTONIX) 40 mg tablet Take 1 tablet (40 mg total) by mouth daily   • Relugolix-Estradiol-Norethind (Myfembree) 40-1-0.5 MG TABS Take 1 tablet by mouth in the morning   • Retin-A Micro Pump 0.08 % GEL Apply TO AFFECTED AREA(S) of THE FACE nightly   • [DISCONTINUED] clotrimazole-betamethasone (LOTRISONE) 1-0.05 % cream Apply topically 2 (two) times a day for 10 days     Allergies   Allergen Reactions   • Pineapple - Food Allergy Other (See Comments)     Tingling tongue and throat   • Pollen Extract Sneezing     congestion   • Bee Pollen Sneezing     congestion     Immunization History   Administered Date(s) Administered   • COVID-19 PFIZER VACCINE 0.3 ML IM 05/14/2021, 06/05/2021, 02/20/2022   • COVID-19 Pfizer vac (Robinson-sucrose, gray cap) 12 yr+ IM 02/20/2022   • DTaP 5 02/13/2003, 04/14/2003, 06/16/2003, 06/29/2004, 03/02/2007   • HPV9 02/03/2017, 08/14/2017   • Hep  "B, adult 2002, 01/13/2003, 09/16/2003   • Hib (PRP-OMP) 02/13/2003, 04/14/2003, 06/16/2003, 03/25/2004   • INFLUENZA 01/10/2023   • IPV 02/13/2003, 04/14/2003, 06/29/2004, 03/02/2007   • Influenza, seasonal, injectable, preservative free 12/05/2024   • MMR 03/25/2004, 04/05/2007   • Meningococcal MCV4, Unspecified 08/15/2014, 08/16/2019   • Meningococcal MCV4P 08/15/2014, 08/16/2019   • Meningococcal, Unknown Serogroups 08/15/2014   • Pneumococcal Polysaccharide PPV23 02/13/2003   • Tdap 08/15/2014, 11/06/2024   • Tuberculin Skin Test-PPD Intradermal 06/25/2021, 11/06/2024   • Varicella 12/30/2003, 04/05/2007     Objective   /84 (BP Location: Right arm, Patient Position: Sitting, Cuff Size: Standard)   Pulse (!) 122   Temp 98.2 °F (36.8 °C) (Temporal)   Resp 16   Ht 5' 8\" (1.727 m)   Wt 61.6 kg (135 lb 12.8 oz)   SpO2 98%   BMI 20.65 kg/m²     Physical Exam  Vitals and nursing note reviewed.   Constitutional:       General: She is not in acute distress.     Appearance: Normal appearance. She is well-developed. She is not ill-appearing.   HENT:      Head: Normocephalic and atraumatic.      Right Ear: Tympanic membrane normal.      Left Ear: Tympanic membrane normal.      Nose: Mucosal edema and congestion present.      Mouth/Throat:      Mouth: Mucous membranes are moist.      Pharynx: No posterior oropharyngeal erythema.   Eyes:      Conjunctiva/sclera: Conjunctivae normal.   Cardiovascular:      Rate and Rhythm: Normal rate and regular rhythm.      Heart sounds: Normal heart sounds. No murmur heard.     Comments: HR 96 bpm  Pulmonary:      Effort: Pulmonary effort is normal. No respiratory distress.      Breath sounds: Normal breath sounds. No wheezing or rhonchi.   Musculoskeletal:      Cervical back: Neck supple. No rigidity.   Neurological:      Mental Status: She is alert and oriented to person, place, and time.      Cranial Nerves: No cranial nerve deficit.      Deep Tendon Reflexes: " Reflexes are normal and symmetric.   Psychiatric:         Mood and Affect: Mood normal.         Behavior: Behavior normal.         Thought Content: Thought content normal.

## 2025-05-02 NOTE — PATIENT INSTRUCTIONS
Please take Claritin/loratadine 10 mg in the morning, OTC, please keep Xyzal at night  Please continue with Flonase and add azelastine nasal spray twice a day as needed  You may use azelastine eyedrops as needed  Referral to allergy/immunology for testing  Medrol Dosepak, 6-day short steroid taper to relieve sinus pressure and congestion right away

## 2025-05-07 ENCOUNTER — NURSE TRIAGE (OUTPATIENT)
Age: 25
End: 2025-05-07

## 2025-05-07 ENCOUNTER — PATIENT MESSAGE (OUTPATIENT)
Dept: FAMILY MEDICINE CLINIC | Facility: CLINIC | Age: 25
End: 2025-05-07

## 2025-05-07 NOTE — TELEPHONE ENCOUNTER
"FOLLOW UP: Please review with PCP and further advise Pt on possible medication side effect/interaction.    REASON FOR CONVERSATION: Medication Reaction    SYMPTOMS: See below.    OTHER: n/a    DISPOSITION: Callback by PCP Today      Reason for Disposition   Caller has NON-URGENT medicine question about med that PCP or specialist prescribed and triager unable to answer question    Answer Assessment - Initial Assessment Questions  1. NAME of MEDICINE: \"What medicine(s) are you calling about?\"      methylPREDNISolone 4 MG Use as directed on package    2. QUESTION: \"What is your question?\" (e.g., double dose of medicine, side effect)      Possible side effect of steroid?? Pt seen by PCP on 5/2 and prescribed methylPREDNISolone 4 MG Use as directed on package for sinus pressure/congestion.    3. PRESCRIBER: \"Who prescribed the medicine?\" Reason: if prescribed by specialist, call should be referred to that group.      PCP    4. SYMPTOMS: \"Do you have any symptoms?\" If Yes, ask: \"What symptoms are you having?\"  \"How bad are the symptoms (e.g., mild, moderate, severe)      Sunburn like flushing through the day with sweating. Head and body aches.    Protocols used: Medication Question Call-Adult-[place]    "

## 2025-05-07 NOTE — TELEPHONE ENCOUNTER
"FOLLOW UP: call back within 1 hour     REASON FOR CONVERSATION: Medication Reaction    SYMPTOMS: feels very hot and sweaty throughout the day it comes and goes. Body aches and headache.     OTHER: patient stated she has taken steroids before and never had this reaction but she is concerned it might be interacting with one of her other medication. Should she continue to take or stop?    DISPOSITION: Discuss With PCP and Callback by Nurse Within 1 Hour        Reason for Disposition   Caller has URGENT medicine question about med that PCP or specialist prescribed and triager unable to answer question    Answer Assessment - Initial Assessment Questions  1. NAME of MEDICINE: \"What medicine(s) are you calling about?\"        methylPREDNISolone 4 MG tablet therapy pack     2. QUESTION: \"What is your question?\" (e.g., double dose of medicine, side effect)      Patient sent [employer] message and also called to follow up with it. Message below for reference:   \"Since I've been taking the steroids for my allergies. I've been throughout the day and night getting really hot as if I have sunburn and then it goes away and I'm all sweaty and makes my sinus stuff feel like I have the flu because my body aches my head aches. I'm not sure why this is happening because I never had this happen to me before when I took steroids. I feel it's interacting with one of my medications but I'm not too sure. \"     3. PRESCRIBER: \"Who prescribed the medicine?\" Reason: if prescribed by specialist, call should be referred to that group.      Dr. Chan    4. SYMPTOMS: \"Do you have any symptoms?\" If Yes, ask: \"What symptoms are you having?\"  \"How bad are the symptoms (e.g., mild, moderate, severe)      See message above    Protocols used: Medication Question Call-Adult-[place]    "

## 2025-05-07 NOTE — TELEPHONE ENCOUNTER
Please contact the patient.    Medrol Dosepak (steroid) may cause symptoms of feeling hot, flushed, anxious and restless.    According to my calculations patient should be finishing Rx today.  If she still has some doses left that she can stop Rx sooner.    Steroids would not interfere with her current medications but definitely could cause symptoms as outlined above.    All her symptoms should disappear within 24 to 48 hours after stopping the steroid pack.    If she is not feeling better over the next few days-she should be seen    Thank you

## 2025-05-07 NOTE — TELEPHONE ENCOUNTER
Regarding: possible medication interaction  ----- Message from Laurence JIMÉNEZ sent at 5/7/2025 11:19 AM EDT -----  Please see patients advise message below    Hi Dr. Jefferson,  Since I've been taking the steroids for my allergies. I've been throughout the day and night getting really hot as if I have sunburn and then it goes away and I'm all sweaty and makes my sinus stuff feel like I have the flu because my body aches my head aches. I'm not sure why this is happening because I never had this happen to me before when I took steroids. I feel it's interacting with one of my medications but I'm not too sure.   Thanks,  Julia

## 2025-05-08 DIAGNOSIS — R11.0 NAUSEA: ICD-10-CM

## 2025-05-09 RX ORDER — METOCLOPRAMIDE 10 MG/1
5 TABLET ORAL 4 TIMES DAILY
Qty: 30 TABLET | Refills: 0 | OUTPATIENT
Start: 2025-05-09

## 2025-05-11 DIAGNOSIS — F41.1 GAD (GENERALIZED ANXIETY DISORDER): ICD-10-CM

## 2025-05-12 ENCOUNTER — TELEPHONE (OUTPATIENT)
Dept: PSYCHIATRY | Facility: CLINIC | Age: 25
End: 2025-05-12

## 2025-05-12 NOTE — TELEPHONE ENCOUNTER
Left voicemail for patient to call to schedule follow up appt for prescription refills with Minna Corona

## 2025-05-14 RX ORDER — BUSPIRONE HYDROCHLORIDE 10 MG/1
TABLET ORAL
Qty: 90 TABLET | Refills: 1 | OUTPATIENT
Start: 2025-05-14

## 2025-05-20 ENCOUNTER — TELEPHONE (OUTPATIENT)
Dept: CARDIOLOGY CLINIC | Facility: CLINIC | Age: 25
End: 2025-05-20

## 2025-05-30 DIAGNOSIS — F33.9 DEPRESSION, RECURRENT (HCC): ICD-10-CM

## 2025-05-30 DIAGNOSIS — F41.1 GAD (GENERALIZED ANXIETY DISORDER): ICD-10-CM

## 2025-05-30 RX ORDER — BUSPIRONE HYDROCHLORIDE 10 MG/1
10 TABLET ORAL 3 TIMES DAILY
Qty: 90 TABLET | Refills: 2 | Status: SHIPPED | OUTPATIENT
Start: 2025-05-30 | End: 2025-08-28

## 2025-05-30 RX ORDER — NORTRIPTYLINE HYDROCHLORIDE 50 MG/1
50 CAPSULE ORAL
Qty: 90 CAPSULE | Refills: 1 | Status: SHIPPED | OUTPATIENT
Start: 2025-05-30 | End: 2025-11-26

## 2025-06-30 ENCOUNTER — TELEPHONE (OUTPATIENT)
Dept: GASTROENTEROLOGY | Facility: CLINIC | Age: 25
End: 2025-06-30

## 2025-06-30 NOTE — TELEPHONE ENCOUNTER
I called and left a message for the patient to call back to schedule 6 month follow up with Dr Burnett from recall. Sent a letter via [employer]

## 2025-07-11 DIAGNOSIS — J30.9 ALLERGIC RHINITIS, UNSPECIFIED SEASONALITY, UNSPECIFIED TRIGGER: ICD-10-CM

## 2025-07-12 RX ORDER — LEVOCETIRIZINE DIHYDROCHLORIDE 5 MG/1
5 TABLET, FILM COATED ORAL EVERY EVENING
Qty: 90 TABLET | Refills: 1 | Status: SHIPPED | OUTPATIENT
Start: 2025-07-12

## 2025-08-19 ENCOUNTER — TELEPHONE (OUTPATIENT)
Dept: GASTROENTEROLOGY | Facility: CLINIC | Age: 25
End: 2025-08-19